# Patient Record
Sex: FEMALE | Race: WHITE | NOT HISPANIC OR LATINO | Employment: OTHER | URBAN - METROPOLITAN AREA
[De-identification: names, ages, dates, MRNs, and addresses within clinical notes are randomized per-mention and may not be internally consistent; named-entity substitution may affect disease eponyms.]

---

## 2017-01-09 ENCOUNTER — OFFICE VISIT - HEALTHEAST (OUTPATIENT)
Dept: INTERNAL MEDICINE | Facility: CLINIC | Age: 82
End: 2017-01-09

## 2017-01-09 DIAGNOSIS — R19.7 DIARRHEA: ICD-10-CM

## 2017-01-09 DIAGNOSIS — Z78.9 NONSMOKER: ICD-10-CM

## 2017-01-09 DIAGNOSIS — K21.00 GASTROESOPHAGEAL REFLUX DISEASE WITH ESOPHAGITIS: ICD-10-CM

## 2017-01-09 DIAGNOSIS — R10.13 EPIGASTRIC DISCOMFORT: ICD-10-CM

## 2017-01-09 DIAGNOSIS — M35.3 PMR (POLYMYALGIA RHEUMATICA) (H): ICD-10-CM

## 2017-01-09 DIAGNOSIS — M19.90 OA (OSTEOARTHRITIS): ICD-10-CM

## 2017-01-09 DIAGNOSIS — L72.3 SEBACEOUS CYST: ICD-10-CM

## 2017-01-09 DIAGNOSIS — E21.3 HYPERPARATHYROIDISM (H): ICD-10-CM

## 2017-02-26 ENCOUNTER — OFFICE VISIT - HEALTHEAST (OUTPATIENT)
Dept: FAMILY MEDICINE | Facility: CLINIC | Age: 82
End: 2017-02-26

## 2017-02-26 DIAGNOSIS — R19.7 DIARRHEA: ICD-10-CM

## 2017-03-14 ENCOUNTER — OFFICE VISIT - HEALTHEAST (OUTPATIENT)
Dept: INTERNAL MEDICINE | Facility: CLINIC | Age: 82
End: 2017-03-14

## 2017-03-14 DIAGNOSIS — I10 HTN (HYPERTENSION): ICD-10-CM

## 2017-03-14 DIAGNOSIS — L57.0 AK (ACTINIC KERATOSIS): ICD-10-CM

## 2017-03-14 DIAGNOSIS — R19.7 DIARRHEA: ICD-10-CM

## 2017-03-14 DIAGNOSIS — K21.9 GERD (GASTROESOPHAGEAL REFLUX DISEASE): ICD-10-CM

## 2017-03-14 DIAGNOSIS — M35.3 PMR (POLYMYALGIA RHEUMATICA) (H): ICD-10-CM

## 2017-04-04 ENCOUNTER — RECORDS - HEALTHEAST (OUTPATIENT)
Dept: GENERAL RADIOLOGY | Age: 82
End: 2017-04-04

## 2017-04-04 ENCOUNTER — OFFICE VISIT - HEALTHEAST (OUTPATIENT)
Dept: INTERNAL MEDICINE | Facility: CLINIC | Age: 82
End: 2017-04-04

## 2017-04-04 DIAGNOSIS — J06.9 URI (UPPER RESPIRATORY INFECTION): ICD-10-CM

## 2017-04-04 DIAGNOSIS — R05.9 COUGH: ICD-10-CM

## 2017-06-20 ENCOUNTER — OFFICE VISIT - HEALTHEAST (OUTPATIENT)
Dept: INTERNAL MEDICINE | Facility: CLINIC | Age: 82
End: 2017-06-20

## 2017-06-20 ENCOUNTER — COMMUNICATION - HEALTHEAST (OUTPATIENT)
Dept: INTERNAL MEDICINE | Facility: CLINIC | Age: 82
End: 2017-06-20

## 2017-06-20 DIAGNOSIS — K22.89 PRESBYESOPHAGUS: ICD-10-CM

## 2017-06-20 DIAGNOSIS — F41.9 ANXIETY: ICD-10-CM

## 2017-06-20 DIAGNOSIS — I10 HTN (HYPERTENSION): ICD-10-CM

## 2017-06-20 DIAGNOSIS — R60.0 BILATERAL LOWER EXTREMITY EDEMA: ICD-10-CM

## 2017-06-20 DIAGNOSIS — M35.3 PMR (POLYMYALGIA RHEUMATICA) (H): ICD-10-CM

## 2017-06-20 DIAGNOSIS — Z66 DNR (DO NOT RESUSCITATE): ICD-10-CM

## 2017-06-21 ENCOUNTER — COMMUNICATION - HEALTHEAST (OUTPATIENT)
Dept: INTERNAL MEDICINE | Facility: CLINIC | Age: 82
End: 2017-06-21

## 2017-07-10 ENCOUNTER — AMBULATORY - HEALTHEAST (OUTPATIENT)
Dept: INTERNAL MEDICINE | Facility: CLINIC | Age: 82
End: 2017-07-10

## 2017-07-11 ENCOUNTER — OFFICE VISIT - HEALTHEAST (OUTPATIENT)
Dept: INTERNAL MEDICINE | Facility: CLINIC | Age: 82
End: 2017-07-11

## 2017-07-11 DIAGNOSIS — Z02.89 ENCOUNTER FOR COMPLETION OF FORM WITH PATIENT: ICD-10-CM

## 2017-07-11 DIAGNOSIS — M35.3 PMR (POLYMYALGIA RHEUMATICA) (H): ICD-10-CM

## 2017-07-11 DIAGNOSIS — R60.0 BILATERAL LOWER EXTREMITY EDEMA: ICD-10-CM

## 2017-07-11 DIAGNOSIS — I10 HTN (HYPERTENSION): ICD-10-CM

## 2017-07-11 DIAGNOSIS — F41.9 ANXIETY: ICD-10-CM

## 2017-08-14 ENCOUNTER — COMMUNICATION - HEALTHEAST (OUTPATIENT)
Dept: INTERNAL MEDICINE | Facility: CLINIC | Age: 82
End: 2017-08-14

## 2017-08-14 DIAGNOSIS — K21.00 GASTROESOPHAGEAL REFLUX DISEASE WITH ESOPHAGITIS: ICD-10-CM

## 2017-08-28 ENCOUNTER — COMMUNICATION - HEALTHEAST (OUTPATIENT)
Dept: INTERNAL MEDICINE | Facility: CLINIC | Age: 82
End: 2017-08-28

## 2017-09-11 ENCOUNTER — OFFICE VISIT - HEALTHEAST (OUTPATIENT)
Dept: INTERNAL MEDICINE | Facility: CLINIC | Age: 82
End: 2017-09-11

## 2017-09-11 DIAGNOSIS — K21.00 GASTROESOPHAGEAL REFLUX DISEASE WITH ESOPHAGITIS: ICD-10-CM

## 2017-09-11 DIAGNOSIS — F41.9 ANXIETY: ICD-10-CM

## 2017-09-11 DIAGNOSIS — K22.89 PRESBYESOPHAGUS: ICD-10-CM

## 2017-09-11 DIAGNOSIS — Z00.00 HEALTHCARE MAINTENANCE: ICD-10-CM

## 2017-09-11 DIAGNOSIS — I10 HTN (HYPERTENSION): ICD-10-CM

## 2017-09-11 ASSESSMENT — MIFFLIN-ST. JEOR: SCORE: 1098.44

## 2017-10-21 ENCOUNTER — COMMUNICATION - HEALTHEAST (OUTPATIENT)
Dept: INTERNAL MEDICINE | Facility: CLINIC | Age: 82
End: 2017-10-21

## 2017-10-21 DIAGNOSIS — I10 HTN (HYPERTENSION): ICD-10-CM

## 2017-11-10 ENCOUNTER — RECORDS - HEALTHEAST (OUTPATIENT)
Dept: ADMINISTRATIVE | Facility: OTHER | Age: 82
End: 2017-11-10

## 2017-11-13 ENCOUNTER — OFFICE VISIT - HEALTHEAST (OUTPATIENT)
Dept: INTERNAL MEDICINE | Facility: CLINIC | Age: 82
End: 2017-11-13

## 2017-11-13 DIAGNOSIS — I10 HTN (HYPERTENSION): ICD-10-CM

## 2017-11-13 DIAGNOSIS — M35.3 PMR (POLYMYALGIA RHEUMATICA) (H): ICD-10-CM

## 2017-11-13 DIAGNOSIS — S80.10XA SUPERFICIAL BRUISING OF LOWER LEG: ICD-10-CM

## 2017-11-13 DIAGNOSIS — F41.9 ANXIETY: ICD-10-CM

## 2017-11-13 DIAGNOSIS — K22.89 PRESBYESOPHAGUS: ICD-10-CM

## 2017-11-13 ASSESSMENT — MIFFLIN-ST. JEOR: SCORE: 1089.03

## 2018-02-13 ENCOUNTER — RECORDS - HEALTHEAST (OUTPATIENT)
Dept: ADMINISTRATIVE | Facility: OTHER | Age: 83
End: 2018-02-13

## 2018-02-13 ENCOUNTER — OFFICE VISIT - HEALTHEAST (OUTPATIENT)
Dept: INTERNAL MEDICINE | Facility: CLINIC | Age: 83
End: 2018-02-13

## 2018-02-13 DIAGNOSIS — M35.3 PMR (POLYMYALGIA RHEUMATICA) (H): ICD-10-CM

## 2018-02-13 DIAGNOSIS — L98.8 SKIN LESION OF BREAST: ICD-10-CM

## 2018-02-13 DIAGNOSIS — L72.3 SEBACEOUS CYST: ICD-10-CM

## 2018-02-13 DIAGNOSIS — I10 HTN (HYPERTENSION): ICD-10-CM

## 2018-02-13 DIAGNOSIS — F41.9 ANXIETY: ICD-10-CM

## 2018-02-13 DIAGNOSIS — K22.89 PRESBYESOPHAGUS: ICD-10-CM

## 2018-02-13 DIAGNOSIS — Z79.899 CONTROLLED SUBSTANCE AGREEMENT SIGNED: ICD-10-CM

## 2018-02-13 LAB
AMPHETAMINES UR QL SCN: NORMAL
ANION GAP SERPL CALCULATED.3IONS-SCNC: 7 MMOL/L (ref 5–18)
BARBITURATES UR QL: NORMAL
BENZODIAZ UR QL: NORMAL
BUN SERPL-MCNC: 19 MG/DL (ref 8–28)
C REACTIVE PROTEIN LHE: 0.8 MG/DL (ref 0–0.8)
CALCIUM SERPL-MCNC: 10.3 MG/DL (ref 8.5–10.5)
CANNABINOIDS UR QL SCN: NORMAL
CHLORIDE BLD-SCNC: 106 MMOL/L (ref 98–107)
CO2 SERPL-SCNC: 29 MMOL/L (ref 22–31)
COCAINE UR QL: NORMAL
CREAT SERPL-MCNC: 0.9 MG/DL (ref 0.6–1.1)
CREAT UR-MCNC: 101.3 MG/DL
ERYTHROCYTE [DISTWIDTH] IN BLOOD BY AUTOMATED COUNT: 12.5 % (ref 11–14.5)
ERYTHROCYTE [SEDIMENTATION RATE] IN BLOOD BY WESTERGREN METHOD: 14 MM/HR (ref 0–20)
GFR SERPL CREATININE-BSD FRML MDRD: 59 ML/MIN/1.73M2
GLUCOSE BLD-MCNC: 89 MG/DL (ref 70–125)
HCT VFR BLD AUTO: 40.3 % (ref 35–47)
HGB BLD-MCNC: 13.5 G/DL (ref 12–16)
MCH RBC QN AUTO: 30.3 PG (ref 27–34)
MCHC RBC AUTO-ENTMCNC: 33.4 G/DL (ref 32–36)
MCV RBC AUTO: 91 FL (ref 80–100)
METHADONE UR QL SCN: NORMAL
OPIATES UR QL SCN: NORMAL
OXYCODONE UR QL: NORMAL
PCP UR QL SCN: NORMAL
PLATELET # BLD AUTO: 215 THOU/UL (ref 140–440)
PMV BLD AUTO: 9 FL (ref 7–10)
POTASSIUM BLD-SCNC: 4.2 MMOL/L (ref 3.5–5)
RBC # BLD AUTO: 4.45 MILL/UL (ref 3.8–5.4)
SODIUM SERPL-SCNC: 142 MMOL/L (ref 136–145)
WBC: 5.7 THOU/UL (ref 4–11)

## 2018-02-15 ENCOUNTER — COMMUNICATION - HEALTHEAST (OUTPATIENT)
Dept: INTERNAL MEDICINE | Facility: CLINIC | Age: 83
End: 2018-02-15

## 2018-02-27 ENCOUNTER — RECORDS - HEALTHEAST (OUTPATIENT)
Dept: ADMINISTRATIVE | Facility: OTHER | Age: 83
End: 2018-02-27

## 2018-03-13 ENCOUNTER — RECORDS - HEALTHEAST (OUTPATIENT)
Dept: ADMINISTRATIVE | Facility: OTHER | Age: 83
End: 2018-03-13

## 2018-04-25 ENCOUNTER — RECORDS - HEALTHEAST (OUTPATIENT)
Dept: ADMINISTRATIVE | Facility: OTHER | Age: 83
End: 2018-04-25

## 2018-04-30 ENCOUNTER — RECORDS - HEALTHEAST (OUTPATIENT)
Dept: ADMINISTRATIVE | Facility: OTHER | Age: 83
End: 2018-04-30

## 2018-05-15 ENCOUNTER — OFFICE VISIT - HEALTHEAST (OUTPATIENT)
Dept: INTERNAL MEDICINE | Facility: CLINIC | Age: 83
End: 2018-05-15

## 2018-05-15 DIAGNOSIS — M35.3 PMR (POLYMYALGIA RHEUMATICA) (H): ICD-10-CM

## 2018-05-15 DIAGNOSIS — S72.001A: ICD-10-CM

## 2018-05-15 DIAGNOSIS — K22.89 PRESBYESOPHAGUS: ICD-10-CM

## 2018-05-15 DIAGNOSIS — I10 HTN (HYPERTENSION): ICD-10-CM

## 2018-06-01 ENCOUNTER — RECORDS - HEALTHEAST (OUTPATIENT)
Dept: ADMINISTRATIVE | Facility: OTHER | Age: 83
End: 2018-06-01

## 2018-07-29 ENCOUNTER — COMMUNICATION - HEALTHEAST (OUTPATIENT)
Dept: INTERNAL MEDICINE | Facility: CLINIC | Age: 83
End: 2018-07-29

## 2018-08-13 ENCOUNTER — RECORDS - HEALTHEAST (OUTPATIENT)
Dept: ADMINISTRATIVE | Facility: OTHER | Age: 83
End: 2018-08-13

## 2018-08-17 ENCOUNTER — OFFICE VISIT - HEALTHEAST (OUTPATIENT)
Dept: INTERNAL MEDICINE | Facility: CLINIC | Age: 83
End: 2018-08-17

## 2018-08-17 DIAGNOSIS — E21.3 HYPERPARATHYROIDISM (H): ICD-10-CM

## 2018-08-17 DIAGNOSIS — M35.3 PMR (POLYMYALGIA RHEUMATICA) (H): ICD-10-CM

## 2018-08-17 DIAGNOSIS — M19.90 OA (OSTEOARTHRITIS): ICD-10-CM

## 2018-08-17 DIAGNOSIS — R06.09 DOE (DYSPNEA ON EXERTION): ICD-10-CM

## 2018-08-17 DIAGNOSIS — K21.9 GERD (GASTROESOPHAGEAL REFLUX DISEASE): ICD-10-CM

## 2018-08-17 DIAGNOSIS — I10 HTN (HYPERTENSION): ICD-10-CM

## 2018-08-17 LAB
ANION GAP SERPL CALCULATED.3IONS-SCNC: 13 MMOL/L (ref 5–18)
BUN SERPL-MCNC: 18 MG/DL (ref 8–28)
C REACTIVE PROTEIN LHE: 0.7 MG/DL (ref 0–0.8)
CALCIUM SERPL-MCNC: 10.4 MG/DL (ref 8.5–10.5)
CHLORIDE BLD-SCNC: 110 MMOL/L (ref 98–107)
CO2 SERPL-SCNC: 22 MMOL/L (ref 22–31)
CREAT SERPL-MCNC: 0.93 MG/DL (ref 0.6–1.1)
ERYTHROCYTE [DISTWIDTH] IN BLOOD BY AUTOMATED COUNT: 12.5 % (ref 11–14.5)
ERYTHROCYTE [SEDIMENTATION RATE] IN BLOOD BY WESTERGREN METHOD: 13 MM/HR (ref 0–20)
GFR SERPL CREATININE-BSD FRML MDRD: 57 ML/MIN/1.73M2
GLUCOSE BLD-MCNC: 75 MG/DL (ref 70–125)
HCT VFR BLD AUTO: 39.6 % (ref 35–47)
HGB BLD-MCNC: 13.3 G/DL (ref 12–16)
MCH RBC QN AUTO: 30.4 PG (ref 27–34)
MCHC RBC AUTO-ENTMCNC: 33.5 G/DL (ref 32–36)
MCV RBC AUTO: 91 FL (ref 80–100)
PLATELET # BLD AUTO: 244 THOU/UL (ref 140–440)
PMV BLD AUTO: 8.8 FL (ref 7–10)
POTASSIUM BLD-SCNC: 4.5 MMOL/L (ref 3.5–5)
RBC # BLD AUTO: 4.36 MILL/UL (ref 3.8–5.4)
SODIUM SERPL-SCNC: 145 MMOL/L (ref 136–145)
WBC: 5.8 THOU/UL (ref 4–11)

## 2018-09-02 ENCOUNTER — COMMUNICATION - HEALTHEAST (OUTPATIENT)
Dept: INTERNAL MEDICINE | Facility: CLINIC | Age: 83
End: 2018-09-02

## 2018-09-02 DIAGNOSIS — K21.00 GASTROESOPHAGEAL REFLUX DISEASE WITH ESOPHAGITIS: ICD-10-CM

## 2018-09-10 ENCOUNTER — COMMUNICATION - HEALTHEAST (OUTPATIENT)
Dept: INTERNAL MEDICINE | Facility: CLINIC | Age: 83
End: 2018-09-10

## 2018-10-26 ENCOUNTER — COMMUNICATION - HEALTHEAST (OUTPATIENT)
Dept: INTERNAL MEDICINE | Facility: CLINIC | Age: 83
End: 2018-10-26

## 2018-10-26 DIAGNOSIS — I10 HTN (HYPERTENSION): ICD-10-CM

## 2018-11-07 ENCOUNTER — COMMUNICATION - HEALTHEAST (OUTPATIENT)
Dept: INTERNAL MEDICINE | Facility: CLINIC | Age: 83
End: 2018-11-07

## 2018-11-07 DIAGNOSIS — I10 HTN (HYPERTENSION): ICD-10-CM

## 2018-12-03 ENCOUNTER — COMMUNICATION - HEALTHEAST (OUTPATIENT)
Dept: INTERNAL MEDICINE | Facility: CLINIC | Age: 83
End: 2018-12-03

## 2018-12-03 DIAGNOSIS — K21.00 GASTROESOPHAGEAL REFLUX DISEASE WITH ESOPHAGITIS: ICD-10-CM

## 2018-12-17 ENCOUNTER — OFFICE VISIT - HEALTHEAST (OUTPATIENT)
Dept: INTERNAL MEDICINE | Facility: CLINIC | Age: 83
End: 2018-12-17

## 2018-12-17 DIAGNOSIS — D69.2 SENILE PURPURA (H): ICD-10-CM

## 2018-12-17 DIAGNOSIS — K21.9 GASTROESOPHAGEAL REFLUX DISEASE, ESOPHAGITIS PRESENCE NOT SPECIFIED: ICD-10-CM

## 2018-12-17 DIAGNOSIS — G56.02 LEFT CARPAL TUNNEL SYNDROME: ICD-10-CM

## 2018-12-17 DIAGNOSIS — K22.89 PRESBYESOPHAGUS: ICD-10-CM

## 2018-12-17 DIAGNOSIS — I10 ESSENTIAL HYPERTENSION: ICD-10-CM

## 2018-12-17 DIAGNOSIS — M35.3 PMR (POLYMYALGIA RHEUMATICA) (H): ICD-10-CM

## 2018-12-17 DIAGNOSIS — F41.9 ANXIETY: ICD-10-CM

## 2019-01-22 ENCOUNTER — COMMUNICATION - HEALTHEAST (OUTPATIENT)
Dept: INTERNAL MEDICINE | Facility: CLINIC | Age: 84
End: 2019-01-22

## 2019-02-01 ENCOUNTER — COMMUNICATION - HEALTHEAST (OUTPATIENT)
Dept: INTERNAL MEDICINE | Facility: CLINIC | Age: 84
End: 2019-02-01

## 2019-02-01 DIAGNOSIS — I10 ESSENTIAL HYPERTENSION: ICD-10-CM

## 2019-04-18 ENCOUNTER — COMMUNICATION - HEALTHEAST (OUTPATIENT)
Dept: INTERNAL MEDICINE | Facility: CLINIC | Age: 84
End: 2019-04-18

## 2019-04-18 ENCOUNTER — OFFICE VISIT - HEALTHEAST (OUTPATIENT)
Dept: INTERNAL MEDICINE | Facility: CLINIC | Age: 84
End: 2019-04-18

## 2019-04-18 DIAGNOSIS — M35.3 PMR (POLYMYALGIA RHEUMATICA) (H): ICD-10-CM

## 2019-04-18 DIAGNOSIS — Z79.899 CONTROLLED SUBSTANCE AGREEMENT SIGNED: ICD-10-CM

## 2019-04-18 DIAGNOSIS — M15.0 PRIMARY OSTEOARTHRITIS INVOLVING MULTIPLE JOINTS: ICD-10-CM

## 2019-04-18 DIAGNOSIS — I10 ESSENTIAL HYPERTENSION: ICD-10-CM

## 2019-04-18 DIAGNOSIS — D69.2 SENILE PURPURA (H): ICD-10-CM

## 2019-04-18 DIAGNOSIS — Z51.81 ENCOUNTER FOR THERAPEUTIC DRUG LEVEL MONITORING: ICD-10-CM

## 2019-04-18 DIAGNOSIS — K21.9 GASTROESOPHAGEAL REFLUX DISEASE, ESOPHAGITIS PRESENCE NOT SPECIFIED: ICD-10-CM

## 2019-04-18 DIAGNOSIS — F41.9 ANXIETY: ICD-10-CM

## 2019-04-18 DIAGNOSIS — R21 FACIAL RASH: ICD-10-CM

## 2019-04-18 DIAGNOSIS — E21.3 HYPERPARATHYROIDISM (H): ICD-10-CM

## 2019-04-18 LAB
ANION GAP SERPL CALCULATED.3IONS-SCNC: 11 MMOL/L (ref 5–18)
BUN SERPL-MCNC: 17 MG/DL (ref 8–28)
C REACTIVE PROTEIN LHE: 0.7 MG/DL (ref 0–0.8)
CALCIUM SERPL-MCNC: 10.9 MG/DL (ref 8.5–10.5)
CHLORIDE BLD-SCNC: 105 MMOL/L (ref 98–107)
CO2 SERPL-SCNC: 25 MMOL/L (ref 22–31)
CREAT SERPL-MCNC: 0.84 MG/DL (ref 0.6–1.1)
ERYTHROCYTE [DISTWIDTH] IN BLOOD BY AUTOMATED COUNT: 12.3 % (ref 11–14.5)
ERYTHROCYTE [SEDIMENTATION RATE] IN BLOOD BY WESTERGREN METHOD: 28 MM/HR (ref 0–20)
GFR SERPL CREATININE-BSD FRML MDRD: >60 ML/MIN/1.73M2
GLUCOSE BLD-MCNC: 94 MG/DL (ref 70–125)
HCT VFR BLD AUTO: 40.5 % (ref 35–47)
HGB BLD-MCNC: 13.4 G/DL (ref 12–16)
MCH RBC QN AUTO: 29.9 PG (ref 27–34)
MCHC RBC AUTO-ENTMCNC: 33.1 G/DL (ref 32–36)
MCV RBC AUTO: 90 FL (ref 80–100)
PLATELET # BLD AUTO: 280 THOU/UL (ref 140–440)
PMV BLD AUTO: 8.5 FL (ref 7–10)
POTASSIUM BLD-SCNC: 4.5 MMOL/L (ref 3.5–5)
RBC # BLD AUTO: 4.48 MILL/UL (ref 3.8–5.4)
SODIUM SERPL-SCNC: 141 MMOL/L (ref 136–145)
WBC: 7.1 THOU/UL (ref 4–11)

## 2019-05-02 ENCOUNTER — RECORDS - HEALTHEAST (OUTPATIENT)
Dept: ADMINISTRATIVE | Facility: OTHER | Age: 84
End: 2019-05-02

## 2019-07-25 ENCOUNTER — COMMUNICATION - HEALTHEAST (OUTPATIENT)
Dept: INTERNAL MEDICINE | Facility: CLINIC | Age: 84
End: 2019-07-25

## 2019-07-25 DIAGNOSIS — F41.9 ANXIETY: ICD-10-CM

## 2019-08-19 ENCOUNTER — OFFICE VISIT - HEALTHEAST (OUTPATIENT)
Dept: INTERNAL MEDICINE | Facility: CLINIC | Age: 84
End: 2019-08-19

## 2019-08-19 DIAGNOSIS — M15.0 PRIMARY OSTEOARTHRITIS INVOLVING MULTIPLE JOINTS: ICD-10-CM

## 2019-08-19 DIAGNOSIS — E21.3 HYPERPARATHYROIDISM (H): ICD-10-CM

## 2019-08-19 DIAGNOSIS — M35.3 PMR (POLYMYALGIA RHEUMATICA) (H): ICD-10-CM

## 2019-08-19 DIAGNOSIS — I10 HTN (HYPERTENSION): ICD-10-CM

## 2019-08-19 DIAGNOSIS — I10 ESSENTIAL HYPERTENSION: ICD-10-CM

## 2019-08-19 DIAGNOSIS — F41.9 ANXIETY: ICD-10-CM

## 2019-08-19 DIAGNOSIS — K21.9 GASTROESOPHAGEAL REFLUX DISEASE, ESOPHAGITIS PRESENCE NOT SPECIFIED: ICD-10-CM

## 2019-08-29 ENCOUNTER — COMMUNICATION - HEALTHEAST (OUTPATIENT)
Dept: INTERNAL MEDICINE | Facility: CLINIC | Age: 84
End: 2019-08-29

## 2019-08-29 DIAGNOSIS — K21.00 GASTROESOPHAGEAL REFLUX DISEASE WITH ESOPHAGITIS: ICD-10-CM

## 2019-09-09 ENCOUNTER — COMMUNICATION - HEALTHEAST (OUTPATIENT)
Dept: INTERNAL MEDICINE | Facility: CLINIC | Age: 84
End: 2019-09-09

## 2019-09-09 DIAGNOSIS — M35.3 PMR (POLYMYALGIA RHEUMATICA) (H): ICD-10-CM

## 2019-09-18 ENCOUNTER — RECORDS - HEALTHEAST (OUTPATIENT)
Dept: ADMINISTRATIVE | Facility: OTHER | Age: 84
End: 2019-09-18

## 2019-10-26 ENCOUNTER — COMMUNICATION - HEALTHEAST (OUTPATIENT)
Dept: INTERNAL MEDICINE | Facility: CLINIC | Age: 84
End: 2019-10-26

## 2019-10-26 DIAGNOSIS — I10 HTN (HYPERTENSION): ICD-10-CM

## 2019-12-16 ENCOUNTER — OFFICE VISIT - HEALTHEAST (OUTPATIENT)
Dept: INTERNAL MEDICINE | Facility: CLINIC | Age: 84
End: 2019-12-16

## 2019-12-16 DIAGNOSIS — I10 ESSENTIAL HYPERTENSION: ICD-10-CM

## 2019-12-16 DIAGNOSIS — M25.50 MULTIPLE JOINT PAIN: ICD-10-CM

## 2019-12-16 DIAGNOSIS — R53.83 FATIGUE, UNSPECIFIED TYPE: ICD-10-CM

## 2019-12-16 DIAGNOSIS — R06.09 DOE (DYSPNEA ON EXERTION): ICD-10-CM

## 2019-12-16 DIAGNOSIS — M15.0 PRIMARY OSTEOARTHRITIS INVOLVING MULTIPLE JOINTS: ICD-10-CM

## 2019-12-16 DIAGNOSIS — M21.069 ACQUIRED GENU VALGUM, UNSPECIFIED LATERALITY: ICD-10-CM

## 2019-12-16 DIAGNOSIS — E21.3 HYPERPARATHYROIDISM (H): ICD-10-CM

## 2019-12-16 DIAGNOSIS — M35.3 PMR (POLYMYALGIA RHEUMATICA) (H): ICD-10-CM

## 2019-12-16 DIAGNOSIS — Z51.81 ENCOUNTER FOR THERAPEUTIC DRUG LEVEL MONITORING: ICD-10-CM

## 2019-12-16 LAB
ALBUMIN SERPL-MCNC: 4.1 G/DL (ref 3.5–5)
ALP SERPL-CCNC: 108 U/L (ref 45–120)
ALT SERPL W P-5'-P-CCNC: 18 U/L (ref 0–45)
AMPHETAMINES UR QL SCN: NORMAL
ANION GAP SERPL CALCULATED.3IONS-SCNC: 10 MMOL/L (ref 5–18)
AST SERPL W P-5'-P-CCNC: 21 U/L (ref 0–40)
BARBITURATES UR QL: NORMAL
BENZODIAZ UR QL: NORMAL
BILIRUB SERPL-MCNC: 0.5 MG/DL (ref 0–1)
BNP SERPL-MCNC: 96 PG/ML (ref 0–167)
BUN SERPL-MCNC: 15 MG/DL (ref 8–28)
C REACTIVE PROTEIN LHE: 1.6 MG/DL (ref 0–0.8)
CALCIUM SERPL-MCNC: 10.7 MG/DL (ref 8.5–10.5)
CANNABINOIDS UR QL SCN: NORMAL
CHLORIDE BLD-SCNC: 105 MMOL/L (ref 98–107)
CO2 SERPL-SCNC: 26 MMOL/L (ref 22–31)
COCAINE UR QL: NORMAL
CREAT SERPL-MCNC: 0.86 MG/DL (ref 0.6–1.1)
CREAT UR-MCNC: 25.7 MG/DL
ERYTHROCYTE [DISTWIDTH] IN BLOOD BY AUTOMATED COUNT: 12.1 % (ref 11–14.5)
ERYTHROCYTE [SEDIMENTATION RATE] IN BLOOD BY WESTERGREN METHOD: 27 MM/HR (ref 0–20)
GFR SERPL CREATININE-BSD FRML MDRD: >60 ML/MIN/1.73M2
GLUCOSE BLD-MCNC: 93 MG/DL (ref 70–125)
HCT VFR BLD AUTO: 42.8 % (ref 35–47)
HGB BLD-MCNC: 13.9 G/DL (ref 12–16)
MCH RBC QN AUTO: 29.2 PG (ref 27–34)
MCHC RBC AUTO-ENTMCNC: 32.3 G/DL (ref 32–36)
MCV RBC AUTO: 90 FL (ref 80–100)
METHADONE UR QL SCN: NORMAL
OPIATES UR QL SCN: NORMAL
OXYCODONE UR QL: NORMAL
PCP UR QL SCN: NORMAL
PLATELET # BLD AUTO: 246 THOU/UL (ref 140–440)
PMV BLD AUTO: 8.3 FL (ref 7–10)
POTASSIUM BLD-SCNC: 4.8 MMOL/L (ref 3.5–5)
PROT SERPL-MCNC: 7.1 G/DL (ref 6–8)
RBC # BLD AUTO: 4.75 MILL/UL (ref 3.8–5.4)
SODIUM SERPL-SCNC: 141 MMOL/L (ref 136–145)
WBC: 7.4 THOU/UL (ref 4–11)

## 2020-01-23 ENCOUNTER — COMMUNICATION - HEALTHEAST (OUTPATIENT)
Dept: INTERNAL MEDICINE | Facility: CLINIC | Age: 85
End: 2020-01-23

## 2020-01-23 DIAGNOSIS — I10 ESSENTIAL HYPERTENSION: ICD-10-CM

## 2020-03-04 ENCOUNTER — RECORDS - HEALTHEAST (OUTPATIENT)
Dept: ADMINISTRATIVE | Facility: OTHER | Age: 85
End: 2020-03-04

## 2020-03-09 ENCOUNTER — OFFICE VISIT - HEALTHEAST (OUTPATIENT)
Dept: INTERNAL MEDICINE | Facility: CLINIC | Age: 85
End: 2020-03-09

## 2020-03-09 DIAGNOSIS — L57.0 ACTINIC KERATOSIS: ICD-10-CM

## 2020-03-09 DIAGNOSIS — L98.499 NONHEALING SKIN ULCER, UNSPECIFIED ULCER STAGE (H): ICD-10-CM

## 2020-03-09 ASSESSMENT — MIFFLIN-ST. JEOR: SCORE: 1060.45

## 2020-04-06 ENCOUNTER — COMMUNICATION - HEALTHEAST (OUTPATIENT)
Dept: INTERNAL MEDICINE | Facility: CLINIC | Age: 85
End: 2020-04-06

## 2020-04-06 DIAGNOSIS — F41.9 ANXIETY: ICD-10-CM

## 2020-04-07 ENCOUNTER — COMMUNICATION - HEALTHEAST (OUTPATIENT)
Dept: INTERNAL MEDICINE | Facility: CLINIC | Age: 85
End: 2020-04-07

## 2020-04-09 ENCOUNTER — OFFICE VISIT - HEALTHEAST (OUTPATIENT)
Dept: INTERNAL MEDICINE | Facility: CLINIC | Age: 85
End: 2020-04-09

## 2020-04-09 DIAGNOSIS — K21.9 GASTROESOPHAGEAL REFLUX DISEASE, ESOPHAGITIS PRESENCE NOT SPECIFIED: ICD-10-CM

## 2020-04-09 DIAGNOSIS — I10 ESSENTIAL HYPERTENSION: ICD-10-CM

## 2020-04-09 DIAGNOSIS — M35.3 PMR (POLYMYALGIA RHEUMATICA) (H): ICD-10-CM

## 2020-04-09 DIAGNOSIS — E21.3 HYPERPARATHYROIDISM (H): ICD-10-CM

## 2020-04-09 DIAGNOSIS — D69.2 SENILE PURPURA (H): ICD-10-CM

## 2020-04-09 DIAGNOSIS — F41.9 ANXIETY: ICD-10-CM

## 2020-06-07 ENCOUNTER — COMMUNICATION - HEALTHEAST (OUTPATIENT)
Dept: INTERNAL MEDICINE | Facility: CLINIC | Age: 85
End: 2020-06-07

## 2020-06-07 DIAGNOSIS — M35.3 PMR (POLYMYALGIA RHEUMATICA) (H): ICD-10-CM

## 2020-08-24 ENCOUNTER — COMMUNICATION - HEALTHEAST (OUTPATIENT)
Dept: INTERNAL MEDICINE | Facility: CLINIC | Age: 85
End: 2020-08-24

## 2020-09-08 ENCOUNTER — COMMUNICATION - HEALTHEAST (OUTPATIENT)
Dept: INTERNAL MEDICINE | Facility: CLINIC | Age: 85
End: 2020-09-08

## 2020-09-08 DIAGNOSIS — F41.9 ANXIETY: ICD-10-CM

## 2020-09-15 ENCOUNTER — COMMUNICATION - HEALTHEAST (OUTPATIENT)
Dept: INTERNAL MEDICINE | Facility: CLINIC | Age: 85
End: 2020-09-15

## 2020-09-15 ENCOUNTER — OFFICE VISIT - HEALTHEAST (OUTPATIENT)
Dept: INTERNAL MEDICINE | Facility: CLINIC | Age: 85
End: 2020-09-15

## 2020-09-15 DIAGNOSIS — E21.3 HYPERPARATHYROIDISM (H): ICD-10-CM

## 2020-09-15 DIAGNOSIS — K21.00 GASTROESOPHAGEAL REFLUX DISEASE WITH ESOPHAGITIS: ICD-10-CM

## 2020-09-15 DIAGNOSIS — Z23 IMMUNIZATION DUE: ICD-10-CM

## 2020-09-15 DIAGNOSIS — M35.3 PMR (POLYMYALGIA RHEUMATICA) (H): ICD-10-CM

## 2020-09-15 DIAGNOSIS — M25.50 MULTIPLE JOINT PAIN: ICD-10-CM

## 2020-09-15 DIAGNOSIS — M54.10 BACK PAIN WITH RIGHT-SIDED RADICULOPATHY: ICD-10-CM

## 2020-09-15 DIAGNOSIS — I10 ESSENTIAL HYPERTENSION: ICD-10-CM

## 2020-09-15 DIAGNOSIS — E78.2 MIXED HYPERLIPIDEMIA: ICD-10-CM

## 2020-09-15 LAB
ANION GAP SERPL CALCULATED.3IONS-SCNC: 11 MMOL/L (ref 5–18)
BUN SERPL-MCNC: 19 MG/DL (ref 8–28)
C REACTIVE PROTEIN LHE: 0.9 MG/DL (ref 0–0.8)
CALCIUM SERPL-MCNC: 10.5 MG/DL (ref 8.5–10.5)
CCP AB SER IA-ACNC: <0.5 U/ML
CHLORIDE BLD-SCNC: 104 MMOL/L (ref 98–107)
CO2 SERPL-SCNC: 25 MMOL/L (ref 22–31)
CREAT SERPL-MCNC: 0.93 MG/DL (ref 0.6–1.1)
ERYTHROCYTE [DISTWIDTH] IN BLOOD BY AUTOMATED COUNT: 12 % (ref 11–14.5)
ERYTHROCYTE [SEDIMENTATION RATE] IN BLOOD BY WESTERGREN METHOD: 21 MM/HR (ref 0–20)
GFR SERPL CREATININE-BSD FRML MDRD: 57 ML/MIN/1.73M2
GLUCOSE BLD-MCNC: 87 MG/DL (ref 70–125)
HCT VFR BLD AUTO: 41.9 % (ref 35–47)
HGB BLD-MCNC: 14.1 G/DL (ref 12–16)
MCH RBC QN AUTO: 30.8 PG (ref 27–34)
MCHC RBC AUTO-ENTMCNC: 33.6 G/DL (ref 32–36)
MCV RBC AUTO: 92 FL (ref 80–100)
PLATELET # BLD AUTO: 252 THOU/UL (ref 140–440)
PMV BLD AUTO: 8.6 FL (ref 7–10)
POTASSIUM BLD-SCNC: 4.8 MMOL/L (ref 3.5–5)
PTH-INTACT SERPL-MCNC: 155 PG/ML (ref 10–86)
RBC # BLD AUTO: 4.58 MILL/UL (ref 3.8–5.4)
RHEUMATOID FACT SERPL-ACNC: <15 IU/ML (ref 0–30)
SODIUM SERPL-SCNC: 140 MMOL/L (ref 136–145)
WBC: 7.3 THOU/UL (ref 4–11)

## 2020-09-15 RX ORDER — OMEPRAZOLE 40 MG/1
40 CAPSULE, DELAYED RELEASE ORAL 2 TIMES DAILY
Qty: 180 CAPSULE | Refills: 3 | Status: SHIPPED | OUTPATIENT
Start: 2020-09-15 | End: 2021-08-30

## 2020-09-21 ENCOUNTER — COMMUNICATION - HEALTHEAST (OUTPATIENT)
Dept: INTERNAL MEDICINE | Facility: CLINIC | Age: 85
End: 2020-09-21

## 2020-10-21 ENCOUNTER — RECORDS - HEALTHEAST (OUTPATIENT)
Dept: ADMINISTRATIVE | Facility: OTHER | Age: 85
End: 2020-10-21

## 2020-10-31 ENCOUNTER — COMMUNICATION - HEALTHEAST (OUTPATIENT)
Dept: INTERNAL MEDICINE | Facility: CLINIC | Age: 85
End: 2020-10-31

## 2020-10-31 DIAGNOSIS — I10 HTN (HYPERTENSION): ICD-10-CM

## 2020-11-03 RX ORDER — METOPROLOL TARTRATE 50 MG
50 TABLET ORAL 2 TIMES DAILY
Qty: 180 TABLET | Refills: 3 | Status: SHIPPED | OUTPATIENT
Start: 2020-11-03 | End: 2021-11-01

## 2020-12-16 ENCOUNTER — RECORDS - HEALTHEAST (OUTPATIENT)
Dept: ADMINISTRATIVE | Facility: OTHER | Age: 85
End: 2020-12-16

## 2021-01-06 ENCOUNTER — COMMUNICATION - HEALTHEAST (OUTPATIENT)
Dept: INTERNAL MEDICINE | Facility: CLINIC | Age: 86
End: 2021-01-06

## 2021-01-06 DIAGNOSIS — I10 ESSENTIAL HYPERTENSION: ICD-10-CM

## 2021-01-07 RX ORDER — AMLODIPINE BESYLATE 2.5 MG/1
2.5 TABLET ORAL DAILY
Qty: 90 TABLET | Refills: 3 | Status: SHIPPED | OUTPATIENT
Start: 2021-01-07 | End: 2022-01-28

## 2021-02-08 ENCOUNTER — COMMUNICATION - HEALTHEAST (OUTPATIENT)
Dept: INTERNAL MEDICINE | Facility: CLINIC | Age: 86
End: 2021-02-08

## 2021-03-02 ENCOUNTER — COMMUNICATION - HEALTHEAST (OUTPATIENT)
Dept: INTERNAL MEDICINE | Facility: CLINIC | Age: 86
End: 2021-03-02

## 2021-03-10 ENCOUNTER — COMMUNICATION - HEALTHEAST (OUTPATIENT)
Dept: INTERNAL MEDICINE | Facility: CLINIC | Age: 86
End: 2021-03-10

## 2021-03-10 DIAGNOSIS — M35.3 PMR (POLYMYALGIA RHEUMATICA) (H): ICD-10-CM

## 2021-03-10 DIAGNOSIS — F41.9 ANXIETY: ICD-10-CM

## 2021-04-09 ENCOUNTER — OFFICE VISIT - HEALTHEAST (OUTPATIENT)
Dept: INTERNAL MEDICINE | Facility: CLINIC | Age: 86
End: 2021-04-09

## 2021-04-09 DIAGNOSIS — D69.2 SENILE PURPURA (H): ICD-10-CM

## 2021-04-09 DIAGNOSIS — R60.0 EDEMA OF RIGHT LOWER EXTREMITY: ICD-10-CM

## 2021-04-09 DIAGNOSIS — M17.0 PRIMARY OSTEOARTHRITIS OF BOTH KNEES: ICD-10-CM

## 2021-04-09 DIAGNOSIS — I10 ESSENTIAL HYPERTENSION: ICD-10-CM

## 2021-04-09 DIAGNOSIS — R06.00 DYSPNEA, UNSPECIFIED TYPE: ICD-10-CM

## 2021-04-09 DIAGNOSIS — L98.499 NONHEALING SKIN ULCER, UNSPECIFIED ULCER STAGE (H): ICD-10-CM

## 2021-04-09 DIAGNOSIS — R20.0 NUMBNESS OF FINGERS: ICD-10-CM

## 2021-04-09 DIAGNOSIS — M19.041 PRIMARY OSTEOARTHRITIS OF BOTH HANDS: ICD-10-CM

## 2021-04-09 DIAGNOSIS — M19.042 PRIMARY OSTEOARTHRITIS OF BOTH HANDS: ICD-10-CM

## 2021-04-09 DIAGNOSIS — M35.3 PMR (POLYMYALGIA RHEUMATICA) (H): ICD-10-CM

## 2021-04-09 DIAGNOSIS — E21.3 HYPERPARATHYROIDISM (H): ICD-10-CM

## 2021-04-09 LAB
ALBUMIN SERPL-MCNC: 4 G/DL (ref 3.5–5)
ALP SERPL-CCNC: 108 U/L (ref 45–120)
ALT SERPL W P-5'-P-CCNC: 16 U/L (ref 0–45)
ANION GAP SERPL CALCULATED.3IONS-SCNC: 8 MMOL/L (ref 5–18)
AST SERPL W P-5'-P-CCNC: 18 U/L (ref 0–40)
BILIRUB DIRECT SERPL-MCNC: 0.1 MG/DL
BILIRUB SERPL-MCNC: 0.3 MG/DL (ref 0–1)
BNP SERPL-MCNC: 79 PG/ML (ref 0–167)
BUN SERPL-MCNC: 21 MG/DL (ref 8–28)
C REACTIVE PROTEIN LHE: 1.1 MG/DL (ref 0–0.8)
CALCIUM SERPL-MCNC: 10.1 MG/DL (ref 8.5–10.5)
CHLORIDE BLD-SCNC: 105 MMOL/L (ref 98–107)
CO2 SERPL-SCNC: 28 MMOL/L (ref 22–31)
CREAT SERPL-MCNC: 0.9 MG/DL (ref 0.6–1.1)
ERYTHROCYTE [DISTWIDTH] IN BLOOD BY AUTOMATED COUNT: 14.1 % (ref 11–14.5)
ERYTHROCYTE [SEDIMENTATION RATE] IN BLOOD BY WESTERGREN METHOD: 22 MM/HR (ref 0–20)
GFR SERPL CREATININE-BSD FRML MDRD: 59 ML/MIN/1.73M2
GLUCOSE BLD-MCNC: 96 MG/DL (ref 70–125)
HCT VFR BLD AUTO: 41.5 % (ref 35–47)
HGB BLD-MCNC: 13.3 G/DL (ref 12–16)
MCH RBC QN AUTO: 28.9 PG (ref 27–34)
MCHC RBC AUTO-ENTMCNC: 32 G/DL (ref 32–36)
MCV RBC AUTO: 90 FL (ref 80–100)
PLATELET # BLD AUTO: 279 THOU/UL (ref 140–440)
PMV BLD AUTO: 10.8 FL (ref 7–10)
POTASSIUM BLD-SCNC: 4.4 MMOL/L (ref 3.5–5)
PROT SERPL-MCNC: 7 G/DL (ref 6–8)
RBC # BLD AUTO: 4.6 MILL/UL (ref 3.8–5.4)
SODIUM SERPL-SCNC: 141 MMOL/L (ref 136–145)
WBC: 8.8 THOU/UL (ref 4–11)

## 2021-04-09 ASSESSMENT — ANXIETY QUESTIONNAIRES
5. BEING SO RESTLESS THAT IT IS HARD TO SIT STILL: NOT AT ALL
2. NOT BEING ABLE TO STOP OR CONTROL WORRYING: SEVERAL DAYS
3. WORRYING TOO MUCH ABOUT DIFFERENT THINGS: SEVERAL DAYS
6. BECOMING EASILY ANNOYED OR IRRITABLE: SEVERAL DAYS
7. FEELING AFRAID AS IF SOMETHING AWFUL MIGHT HAPPEN: NOT AT ALL
4. TROUBLE RELAXING: NOT AT ALL
IF YOU CHECKED OFF ANY PROBLEMS ON THIS QUESTIONNAIRE, HOW DIFFICULT HAVE THESE PROBLEMS MADE IT FOR YOU TO DO YOUR WORK, TAKE CARE OF THINGS AT HOME, OR GET ALONG WITH OTHER PEOPLE: SOMEWHAT DIFFICULT
GAD7 TOTAL SCORE: 4
1. FEELING NERVOUS, ANXIOUS, OR ON EDGE: SEVERAL DAYS

## 2021-04-09 ASSESSMENT — MIFFLIN-ST. JEOR: SCORE: 1101.28

## 2021-04-09 ASSESSMENT — PATIENT HEALTH QUESTIONNAIRE - PHQ9: SUM OF ALL RESPONSES TO PHQ QUESTIONS 1-9: 8

## 2021-05-04 ENCOUNTER — COMMUNICATION - HEALTHEAST (OUTPATIENT)
Dept: INTERNAL MEDICINE | Facility: CLINIC | Age: 86
End: 2021-05-04

## 2021-05-04 DIAGNOSIS — F41.9 ANXIETY: ICD-10-CM

## 2021-05-04 DIAGNOSIS — M35.3 PMR (POLYMYALGIA RHEUMATICA) (H): ICD-10-CM

## 2021-05-25 ENCOUNTER — RECORDS - HEALTHEAST (OUTPATIENT)
Dept: ADMINISTRATIVE | Facility: CLINIC | Age: 86
End: 2021-05-25

## 2021-05-26 ENCOUNTER — RECORDS - HEALTHEAST (OUTPATIENT)
Dept: ADMINISTRATIVE | Facility: CLINIC | Age: 86
End: 2021-05-26

## 2021-05-27 ENCOUNTER — RECORDS - HEALTHEAST (OUTPATIENT)
Dept: ADMINISTRATIVE | Facility: CLINIC | Age: 86
End: 2021-05-27

## 2021-05-27 ASSESSMENT — PATIENT HEALTH QUESTIONNAIRE - PHQ9: SUM OF ALL RESPONSES TO PHQ QUESTIONS 1-9: 8

## 2021-05-27 NOTE — PATIENT INSTRUCTIONS - HE
To schedule an appointment with a dermatologist, I recommend calling [facility] at  [phone].    They have multiple offices in the following locations:    [address], [facility] [address]  [place] [address]    [place]  [address] [place] [address], [address]  [place] [address]    [place] [address] [place] [address]    [place]  [address] [facility], [address]  [place] [address]    Please have them send me copies of your reports from your visit.    Dr. Malissa Mcclellan

## 2021-05-28 ENCOUNTER — RECORDS - HEALTHEAST (OUTPATIENT)
Dept: ADMINISTRATIVE | Facility: CLINIC | Age: 86
End: 2021-05-28

## 2021-05-28 ASSESSMENT — ANXIETY QUESTIONNAIRES: GAD7 TOTAL SCORE: 4

## 2021-05-29 ENCOUNTER — RECORDS - HEALTHEAST (OUTPATIENT)
Dept: ADMINISTRATIVE | Facility: CLINIC | Age: 86
End: 2021-05-29

## 2021-05-30 ENCOUNTER — RECORDS - HEALTHEAST (OUTPATIENT)
Dept: ADMINISTRATIVE | Facility: CLINIC | Age: 86
End: 2021-05-30

## 2021-05-30 VITALS — BODY MASS INDEX: 25.85 KG/M2 | WEIGHT: 151.8 LBS

## 2021-05-30 VITALS — BODY MASS INDEX: 25.29 KG/M2 | WEIGHT: 148.5 LBS

## 2021-05-30 VITALS — WEIGHT: 148 LBS | BODY MASS INDEX: 25.21 KG/M2

## 2021-05-30 VITALS — BODY MASS INDEX: 25.55 KG/M2 | WEIGHT: 150 LBS

## 2021-05-31 VITALS — WEIGHT: 152 LBS | BODY MASS INDEX: 25.95 KG/M2 | HEIGHT: 64 IN

## 2021-05-31 VITALS — WEIGHT: 154.3 LBS | HEIGHT: 63 IN | BODY MASS INDEX: 27.34 KG/M2

## 2021-05-31 VITALS — BODY MASS INDEX: 25.38 KG/M2 | WEIGHT: 149 LBS

## 2021-05-31 VITALS — WEIGHT: 151 LBS | BODY MASS INDEX: 25.72 KG/M2

## 2021-05-31 NOTE — PATIENT INSTRUCTIONS - HE
Please follow up if you have any further issues.    You may contact me by phone or BuildZoom[employer] if you are worsening or if things are not improving.    Thank you for coming in today.

## 2021-06-01 ENCOUNTER — RECORDS - HEALTHEAST (OUTPATIENT)
Dept: ADMINISTRATIVE | Facility: CLINIC | Age: 86
End: 2021-06-01

## 2021-06-01 VITALS — WEIGHT: 159 LBS | BODY MASS INDEX: 28.17 KG/M2

## 2021-06-01 VITALS — BODY MASS INDEX: 27.28 KG/M2 | WEIGHT: 154 LBS

## 2021-06-01 VITALS — BODY MASS INDEX: 27.1 KG/M2 | WEIGHT: 153 LBS

## 2021-06-01 NOTE — TELEPHONE ENCOUNTER
Last office visit: 8/19/19  Last refilled 8/17/18    predniSONE (DELTASONE) 5 MG tablet 90 tablet 2 8/17/2018  No   Sig - Route: Take 0.5-1 tablets (2.5-5 mg total) by mouth daily. - Oral   Sent to pharmacy as: predniSONE (DELTASONE) 5 MG tablet   E-Prescribing Status: Receipt confirmed by pharmacy (8/17/2018  9:22 AM CDT)

## 2021-06-02 ENCOUNTER — RECORDS - HEALTHEAST (OUTPATIENT)
Dept: ADMINISTRATIVE | Facility: CLINIC | Age: 86
End: 2021-06-02

## 2021-06-02 VITALS — BODY MASS INDEX: 26.75 KG/M2 | WEIGHT: 151 LBS

## 2021-06-02 VITALS — BODY MASS INDEX: 26.57 KG/M2 | WEIGHT: 150 LBS

## 2021-06-02 NOTE — TELEPHONE ENCOUNTER
Refill Approved    Rx renewed per Medication Renewal Policy. Medication was last renewed on 10/26/18 .    Evette Amador, [facility] Triage/Med Refill 10/26/2019     Requested Prescriptions   Pending Prescriptions Disp Refills     metoprolol tartrate (LOPRESSOR) 50 MG tablet [Pharmacy Med Name: METOPROLOL TARTRATE 50 MG TAB] 180 tablet 1     Sig: TAKE 1 TABLET BY MOUTH TWICE A DAY       Beta-Blockers Refill Protocol Passed - 10/26/2019  9:15 AM        Passed - PCP or prescribing provider visit in past 12 months or next 3 months     Last office visit with prescriber/PCP: 8/19/2019 Jersey Metcalf MD OR same dept: 8/19/2019 Jersey Metcalf MD OR same specialty: 8/19/2019 Jersey Mtecalf MD  Last physical: 1/28/2016 Last MTM visit: Visit date not found   Next visit within 3 mo: Visit date not found  Next physical within 3 mo: Visit date not found  Prescriber OR PCP: Jersey Metcalf MD  Last diagnosis associated with med order: 1. HTN (hypertension)  - metoprolol tartrate (LOPRESSOR) 50 MG tablet [Pharmacy Med Name: METOPROLOL TARTRATE 50 MG TAB]; TAKE 1 TABLET BY MOUTH TWICE A DAY  Dispense: 180 tablet; Refill: 1    If protocol passes may refill for 12 months if within 3 months of last provider visit (or a total of 15 months).             Passed - Blood pressure filed in past 12 months     BP Readings from Last 1 Encounters:   08/19/19 134/74

## 2021-06-03 VITALS — WEIGHT: 149 LBS | BODY MASS INDEX: 26.39 KG/M2

## 2021-06-04 VITALS
DIASTOLIC BLOOD PRESSURE: 70 MMHG | HEART RATE: 70 BPM | WEIGHT: 148 LBS | SYSTOLIC BLOOD PRESSURE: 138 MMHG | BODY MASS INDEX: 26.22 KG/M2 | HEIGHT: 63 IN

## 2021-06-04 VITALS
WEIGHT: 150 LBS | BODY MASS INDEX: 26.57 KG/M2 | HEART RATE: 72 BPM | DIASTOLIC BLOOD PRESSURE: 84 MMHG | SYSTOLIC BLOOD PRESSURE: 136 MMHG | OXYGEN SATURATION: 96 %

## 2021-06-04 VITALS
BODY MASS INDEX: 26.39 KG/M2 | SYSTOLIC BLOOD PRESSURE: 132 MMHG | DIASTOLIC BLOOD PRESSURE: 84 MMHG | WEIGHT: 149 LBS | OXYGEN SATURATION: 96 % | HEART RATE: 76 BPM

## 2021-06-04 NOTE — PROGRESS NOTES
Results of tests sent to patient via Mendor.    Please look under the Labs tab for specific communication.    Jersey Metcalf MD  [facility]  12/16/2019, 9:30 PM

## 2021-06-04 NOTE — PATIENT INSTRUCTIONS - HE
Please follow up if you have any further issues.    You may contact me by phone or MyChart if you are worsening or if things are not improving.    Thank you for coming in today.      ______________________________________________________________________      Future Appointments   Date Time Provider Department Center   4/17/2020  9:15 AM [facility]

## 2021-06-05 VITALS
SYSTOLIC BLOOD PRESSURE: 138 MMHG | HEIGHT: 63 IN | DIASTOLIC BLOOD PRESSURE: 86 MMHG | OXYGEN SATURATION: 95 % | BODY MASS INDEX: 27.82 KG/M2 | HEART RATE: 77 BPM | WEIGHT: 157 LBS

## 2021-06-06 NOTE — PROGRESS NOTES
Internal Medicine Office Visit  [facility]   Patient Name: Manuela Olivas  Patient Age: 89 y.o.  YOB: 1930  MRN: [id number]    Date of Visit: 3/9/2020  Reason for Office Visit:   Chief Complaint   Patient presents with     Wound Check     Rt leg not healing           Assessment / Plan / Medical Decision Makin. Nonhealing skin ulcer, unspecified ulcer stage (H)  - Give this area another 2-3 weeks to see if it heals without intervention. If not healing, she will see wound care for debridement  - Ambulatory referral to Wound Clinic    2. Actinic keratosis  - 2 AKs treated with cryotherapy. She will be setting up a dermatology skin screen in the next 1 month         Health Maintenance Review  Health Maintenance   Topic Date Due     MEDICARE ANNUAL WELLNESS VISIT  1995     ZOSTER VACCINES (2 of 3) 2022 (Originally 2008)     FALL RISK ASSESSMENT  2020     ADVANCE CARE PLANNING  2022     TD 18+ HE  2022     PNEUMOCOCCAL IMMUNIZATION 65+ LOW/MEDIUM RISK  Completed     DXA SCAN  Completed     INFLUENZA VACCINE RULE BASED  Completed         I am having Manuela Olivas maintain her dorzolamide, timolol maleate, clonazePAM, torsemide, omeprazole, predniSONE, metoprolol tartrate, and amLODIPine.      HPI:  Manuela Olivas is a 89 y.o. year old who presents to the office today for a scabbed area on the right lower extremity.  She does not recall an injury.  It has not been painful.  Once it bled but this has not recurred. It seems to be getting smaller.     She notes a scaled lesion on the forehead.  She sees [facility] when needed and has had several AKs frozen in the past.       Review of Systems- pertinent positive in bold:  As noted in HPI       Current Scheduled Meds:  Outpatient Encounter Medications as of 3/9/2020   Medication Sig Dispense Refill     amLODIPine (NORVASC) 2.5 MG tablet TAKE 1 TABLET (2.5 MG TOTAL) BY MOUTH  "DAILY. FOR HIGH BLOOD PRESSURE. 90 tablet 3     clonazePAM (KLONOPIN) 0.5 MG tablet TAKE HALF A TAB-1 TABLETS (0.25-0.5 MG TOTAL) BY MOUTH BEDTIME. 30 tablet 3     dorzolamide (TRUSOPT) 2 % ophthalmic solution 1 drop 3 (three) times a day.       metoprolol tartrate (LOPRESSOR) 50 MG tablet Take 1 tablet (50 mg total) by mouth 2 (two) times a day. 180 tablet 3     omeprazole (PRILOSEC) 40 MG capsule TAKE 1 CAPSULE BY MOUTH TWICE A  capsule 3     predniSONE (DELTASONE) 5 MG tablet Take 2.5-5 mg by mouth daily. 90 tablet 2     timolol maleate (ISTALOL) 0.5 % DrpD Apply to eye Daily at 8:00 am..       torsemide (DEMADEX) 5 MG tablet Take 1 tablet (5 mg total) by mouth daily as needed (swelling in legs). 90 tablet 3     No facility-administered encounter medications on file as of 3/9/2020.          Past Medical History:   Diagnosis Date     Anxiety      Autoimmune hepatitis (H) 2006    Resolved     Breast cyst 1975     Controlled substance agreement signed - 2/18 - Clonazepam - UDS 2/18 2/13/2018     Esophageal dysphagia      Fibrocystic breast      Former smoker-quit at age 30      GERD (gastroesophageal reflux disease)      Glaucoma      HLD (hyperlipidemia)      HTN (hypertension)      Hypercalcemia      Hyperparathyroidism (H)     Subclinical      Insomnia      OA (osteoarthritis) 5/27/2016     PMR (polymyalgia rheumatica) - August 2016      Presbyesophagus      Past Surgical History:   Procedure Laterality Date     BREAST BIOPSY Right 1975     BREAST CYST ASPIRATION Bilateral 1975     CATARACT EXTRACTION, BILATERAL       HYSTERECTOMY  1967     OOPHORECTOMY Right 1975     TOTAL HIP ARTHROPLASTY Right 2010     Social History     Tobacco Use     Smoking status: Never Smoker     Smokeless tobacco: Never Used   Substance Use Topics     Alcohol use: No     Drug use: Not on file       Objective / Physical Examination:  Vitals:    03/09/20 0914   BP: 138/70   Pulse: 70   Weight: 148 lb (67.1 kg)   Height: 5' 3\" (1.6 " m)     Wt Readings from Last 3 Encounters:   03/09/20 148 lb (67.1 kg)   12/16/19 150 lb (68 kg)   08/19/19 149 lb (67.6 kg)     Body mass index is 26.22 kg/m .     Cardiovascular: No edema  Skin: left forehead with a 2 mm scaled and keratotic projection. Flesh colored scaled macule right upper forehead     Right lower extremity with a 1 cm diameter scab. No surrounding erythema, induration, warmth       Orders Placed This Encounter   Procedures     Ambulatory referral to Wound Clinic   Followup: Return in about 4 weeks (around 4/6/2020) for follow up with PCP as scheduled . earlier if needed.        Dea Melendrez, CNP

## 2021-06-07 NOTE — PATIENT INSTRUCTIONS - HE
Please follow up if you have any further issues.    You may contact me by phone or MyChart if you are worsening or if things are not improving.    Thank you for coming in today.  ______________________________________________________________________      Follow up again in 4-6 months.

## 2021-06-07 NOTE — TELEPHONE ENCOUNTER
Called pt due to COVID Dr. Metcalf's schedule has changed.    Pt rescheduled to a telephone visit per request, 4/9/20 at 9:30.    Pt thanked for the call.

## 2021-06-08 NOTE — PROGRESS NOTES
Wt Readings from Last 20 Encounters:   01/09/17 151 lb 12.8 oz (68.9 kg)   11/07/16 149 lb 6.4 oz (67.8 kg)   10/03/16 141 lb 6.4 oz (64.1 kg)   09/02/16 138 lb 6.4 oz (62.8 kg)   08/12/16 139 lb 12.8 oz (63.4 kg)   05/27/16 142 lb 3.2 oz (64.5 kg)   01/28/16 141 lb (64 kg)   12/03/15 130 lb (59 kg)   11/16/15 125 lb (56.7 kg)   11/02/15 128 lb (58.1 kg)   10/13/15 129 lb 9.6 oz (58.8 kg)   09/17/15 131 lb 12.8 oz (59.8 kg)   08/27/15 135 lb 6.4 oz (61.4 kg)   08/13/15 136 lb 6.4 oz (61.9 kg)   08/09/15 136 lb (61.7 kg)   07/30/15 137 lb 9.6 oz (62.4 kg)   07/21/15 138 lb (62.6 kg)   07/21/15 133 lb 3.2 oz (60.4 kg)   07/13/15 139 lb (63 kg)   06/22/15 141 lb 8 oz (64.2 kg)

## 2021-06-09 NOTE — PROGRESS NOTES
Internal Medicine Office Visit  Patient Name: Manuela Olivas  Patient Age: 86 y.o.  YOB: 1930  MRN: [id number]  ?  Date of Visit: 2017  Reason for Office Visit:   Chief Complaint   Patient presents with     Cough     x 1 week, sinus pressure, no fever, no chills or body aches, mo chest tightness, post nasal drip and feels mucus in her throat but tis not coming out       Assessment / Plan / Medical Decision Makin. URI (upper respiratory infection)  2. Cough    - XR Chest PA and Lateral; Future I personally reviewed the chest x-ray images and do not appreciate any infiltrates or acute abnormalities.  The results were discussed with the patient in the office.:   -Discussed supportive treatment such as dextromethorphan cough suppressant, guaifenesin for mucolytic.  She has been having a difficult time sleeping at night and will prescribe benzonatate 100 mg every 6 hours as needed for cough.  She is advised that she should not drink any alcohol or drive after taking this medication.  Counseled regarding avoidance of decongestant medications since she is taking a hypertensive medication.  Follow-up if symptoms worsen or fail to improve in the next week        Health Maintenance Review  Health Maintenance   Topic Date Due     ADVANCE DIRECTIVES DISCUSSED WITH PATIENT  1948     FALL RISK ASSESSMENT  2017     TD 18+ HE  2022     DXA SCAN  Completed     PNEUMOCOCCAL POLYSACCHARIDE VACCINE AGE 65 AND OVER  Completed     INFLUENZA VACCINE RULE BASED  Completed     PNEUMOCOCCAL CONJUGATE VACCINE FOR ADULTS (PCV13 OR PREVNAR)  Completed     ZOSTER VACCINE  Completed         I am having Ms. Olivas start on benzonatate. I am also having her maintain her dorzolamide, timolol maleate, metoprolol tartrate, clonazePAM, and omeprazole.     HPI:   Encounter Diagnoses   Name Primary?     URI (upper respiratory infection) Yes     Cough       Manuela Olivas is an 87 y/o female who presents to the  office today for a one-week history of a cough and postnasal drip.  She states that the cough is occasionally productive but she is typically unable to expectorate.  She does have congestion and drainage in the back of her throat.  She feels tired and generally exhausted.  She is taking more naps during the day.  She is able to sleep at night but does endorse occasionally awakening eating for the cough.  She denies fever.  She states she has some baseline shortness of breath with exertion which is unchanged.      Review of Systems: Pertinent findings are as noted in HPI.  She denies any chest pain.  No pain with inspiration.    Current Scheduled Meds:  Outpatient Encounter Prescriptions as of 4/4/2017   Medication Sig Dispense Refill     clonazePAM (KLONOPIN) 0.5 MG tablet Take 0.5-1 tablets (0.25-0.5 mg total) by mouth bedtime. 30 tablet 5     dorzolamide (TRUSOPT) 2 % ophthalmic solution 1 drop 3 (three) times a day.       metoprolol tartrate (LOPRESSOR) 50 MG tablet Take 1 tablet (50 mg total) by mouth 2 (two) times a day. 180 tablet 3     omeprazole (PRILOSEC) 20 MG capsule TAKE 1 CAPSULE (20 MG TOTAL) BY MOUTH 2 (TWO) TIMES A DAY. 180 capsule 1     timolol maleate (ISTALOL) 0.5 % DrpD Apply to eye Daily at 8:00 am..       benzonatate (TESSALON PERLES) 100 MG capsule Take 1 capsule (100 mg total) by mouth every 6 (six) hours as needed for cough. 30 capsule 0     No facility-administered encounter medications on file as of 4/4/2017.      Past Medical History:   Diagnosis Date     Anxiety      Autoimmune hepatitis 2006    Resolved     Breast cyst 1975     Esophageal dysphagia      Fibrocystic breast      GERD (gastroesophageal reflux disease)      Glaucoma      HLD (hyperlipidemia)      HTN (hypertension)      Hypercalcemia      Hyperparathyroidism     Subclinical      Insomnia      Nonsmoker      OA (osteoarthritis) 5/27/2016     Osteoarthritis Of The Knee      PMR (polymyalgia rheumatica) - August 2016       Presbyesophagus      Past Surgical History:   Procedure Laterality Date     BREAST BIOPSY Right 1975     BREAST CYST ASPIRATION Bilateral 1975     CATARACT EXTRACTION, BILATERAL       HYSTERECTOMY  1967     OOPHORECTOMY Right 1975     TOTAL HIP ARTHROPLASTY  2010     Social History   Substance Use Topics     Smoking status: Never Smoker     Smokeless tobacco: Never Used     Alcohol use No       Objective / Physical Examination:  Vitals:    04/04/17 0924   BP: 138/80   Patient Site: Right Arm   Patient Position: Sitting   Cuff Size: Adult Regular   Pulse: (!) 102   Temp: 97.8  F (36.6  C)   TempSrc: Oral   SpO2: 95%   Weight: 148 lb 8 oz (67.4 kg)     Wt Readings from Last 3 Encounters:   04/04/17 148 lb 8 oz (67.4 kg)   03/14/17 150 lb (68 kg)   02/26/17 148 lb (67.1 kg)     Body mass index is 25.29 kg/(m^2).      General Appearance: Alert and oriented, cooperative, affect appropriate, speech clear, does not appear toxic  Head: Normocephalic, atraumatic. No sinus percussion tenderness   Ears: Tympanic membrane clear with landmarks well visualized bilaterally  Nose: Septum midline, nares patent, no visible polyps, mucosa moist, clear drainage noted in nares. Turbinates erythematous   Throat: Lips and mucosa moist. Teeth in good repair, pharynx without erythema or exudate  Neck: Supple, trachea midline. No cervical adenopathy  Lungs: Right upper lobe crackles which improved after coughing. The rest of the lungs are clear to ascultation. Normal inspiratory/expiratory effort.   Cardiovascular: Regular rate, normal S1, S2  Extremities: Acyanotic. No edema.       Orders Placed This Encounter   Procedures     XR Chest PA and Lateral   Followup: No Follow-up on file. earlier if needed.      Dea Melendrez, CNP  [facility]

## 2021-06-09 NOTE — PROGRESS NOTES
ASSESSMENT:   No diagnosis found.        Patient is an 86-year-old female presenting for diarrhea for the last 4 days.  Patient was very slightly tachycardic to 10 5 bpm and upon presentation.  Other vital signs were completely normal.  She clinically appeared very well; nontoxic and no signs of clinical dehydration at all.  Diarrhea has been nonbloody.  Diarrhea is not black to make me concern for GI bleeding.  No abdominal pain and no personal history of diverticulitis, so I'm not concerned for this today.  No fevers to suggest sepsis from an infectious enterocolitis.  No vomiting to suggest bowel obstruction.  I do not think there is any other more malicious etiology of her symptoms today.  I we will collect stool culture and C. diff test for completeness sake, however I suspect this is a viral gastroenteritis.  Patient is eating and drinking, and is well capable of replenish A fluid losses orally.  She is appropriate for outpatient therapy today.    At the end of the encounter, I discussed clear red flags for immediate return to clinic/ER, as well as indications for follow up if no improvement. Patient understood and agreed to plan. Patient was stable for discharge.        PLAN:  Your exam today looks very good. You look very mildly dehydrated, but since you are able to drink water, and you don't look too depleted today, there is no reason for you to need IV fluids in the hospital today.    There are no other signs of any other big problem inside the abdomen to be causing the diarrhea today. The diarrhea is most likely due to a viral gastroenteritis. This is a self-limiting process that clears in 3-5 days.    We will collect a stool sample to be sure that there is no bacteria in the diarrhea. Please return when able.    Drink LOTS of liquids. May try gatorade or other electrolyte-containing solutions. Aim for 60-80 ounces today to replenish losses.    Stick to a bland diet until diarrhea is improving.    If  developing severe abdominal pain, vomiting, fevers, black or bloody stools, inability to tolerate oral intake, go to the emergency Department immediately.  Otherwise, follow up with primary care no improvement in loose stools in 2-3 days.               SUBJECTIVE:   Manuela Olivas is a 86 y.o. female who presents today for evaluation of diarrhea for the last 4 days. She is having clear and brown watery stools, about 8 episodes on day 1, then fewer on day 2, less on day 3. Today had 3 watery but less so stools early this morning. No bloody stools. Stools were slightly black after taking Pepto Bismol and so is her tongue. No fevers. No abdominal pain, nausea, vomiting. She feels more tired than usual. No lightheadedness or palpitations. No recent travel. No new foods or restaurants recently. A friend does also have a diarrheal illness currently.  She had a remote hysterectomy, no other abdominal surgeries. No history of GI bleeding.   She is drinking water and gatorade. She is not eating much - only toast, pasta, and yogurt. She took Pepto Bismol and Maalox which seemed to help.      Past Medical History:  Patient Active Problem List   Diagnosis     Glaucoma     Hypercalcemia     Hyperparathyroidism     GERD (gastroesophageal reflux disease)     HTN (hypertension)     Anxiety     Esophageal dysphagia     Insomnia     HLD (hyperlipidemia)     Presbyesophagus     OA (osteoarthritis)-fingers     PMR (polymyalgia rheumatica) - August 2016     Nonsmoker       Surgical History:  Hysterectomy in 1967    Reviewed; otherwise non-contributory    Family History:  Family History   Problem Relation Age of Onset     Stroke Mother      Hyperlipidemia Mother      Heart attack Father      Reviewed; Non-contributory      Social History:    History   Smoking Status     Never Smoker   Smokeless Tobacco     Never Used     Smoking: none  Alcohol use: occasionally  Other drug use: none  Occupation: retired  Lives at home  independently        Current Medications:  Current Outpatient Prescriptions on File Prior to Visit   Medication Sig Dispense Refill     clonazePAM (KLONOPIN) 0.5 MG tablet Take 0.5-1 tablets (0.25-0.5 mg total) by mouth bedtime. 30 tablet 5     dorzolamide (TRUSOPT) 2 % ophthalmic solution 1 drop 3 (three) times a day.       metoprolol tartrate (LOPRESSOR) 50 MG tablet Take 1 tablet (50 mg total) by mouth 2 (two) times a day. 180 tablet 3     omeprazole (PRILOSEC) 20 MG capsule TAKE 1 CAPSULE (20 MG TOTAL) BY MOUTH 2 (TWO) TIMES A DAY. 180 capsule 1     predniSONE (DELTASONE) 2.5 MG tablet Take 3 tablets (7.5 mg total) by mouth daily. 180 tablet 1     predniSONE (DELTASONE) 5 MG tablet Take 1 tablet (5 mg total) by mouth daily. 180 tablet 0     timolol maleate (ISTALOL) 0.5 % DrpD Apply to eye Daily at 8:00 am..       No current facility-administered medications on file prior to visit.        Allergies:   No Known Allergies    I personally reviewed patient's past medical, surgical, social, family history and allergies.    ROS:  Review of Systems  See HPI      OBJECTIVE:   Visit Vitals     /66     Pulse (!) 105     Temp 97.5  F (36.4  C) (Oral)     Resp 15     Wt 148 lb (67.1 kg)     LMP 06/18/1967     SpO2 98%     BMI 25.21 kg/m2         General Appearance:  Alert, well-appearing elderly female in NAD. Afebrile. Talkative.   Integument: Warm, dry, no diaphoresis.  HEENT: Moist mucus membranes. Grooves of tongue slightly tinted black (pepto bismol use)  Respiratory: No distress. Lungs clear to ausculation bilaterally.   Cardiovascular: Mild tachycardia. Normal S1/S2. No murmur, rub or gallop. No obvious chest wall deformities. Peripheral pulses 2+ bilaterally at DPs. No peripheral edema.  GI: Soft, nontender, mildly hyperactive bowel sounds. No masses, organomegaly, rigidity, or guarding.  Neurologic: Alert and orientated appropriately. No focal deficits.

## 2021-06-10 ENCOUNTER — COMMUNICATION - HEALTHEAST (OUTPATIENT)
Dept: INTERNAL MEDICINE | Facility: CLINIC | Age: 86
End: 2021-06-10

## 2021-06-10 DIAGNOSIS — F41.9 ANXIETY: ICD-10-CM

## 2021-06-10 DIAGNOSIS — M35.3 PMR (POLYMYALGIA RHEUMATICA) (H): ICD-10-CM

## 2021-06-11 RX ORDER — CLONAZEPAM 0.5 MG/1
TABLET ORAL
Qty: 30 TABLET | Refills: 0 | Status: SHIPPED | OUTPATIENT
Start: 2021-06-11 | End: 2021-09-21

## 2021-06-11 RX ORDER — PREDNISONE 5 MG/1
TABLET ORAL
Qty: 60 TABLET | Refills: 0 | Status: SHIPPED | OUTPATIENT
Start: 2021-06-11 | End: 2021-08-15

## 2021-06-11 NOTE — PATIENT INSTRUCTIONS - HE

## 2021-06-11 NOTE — PROGRESS NOTES
Wt Readings from Last 20 Encounters:   07/11/17 149 lb (67.6 kg)   06/20/17 151 lb (68.5 kg)   04/04/17 148 lb 8 oz (67.4 kg)   03/14/17 150 lb (68 kg)   02/26/17 148 lb (67.1 kg)   01/09/17 151 lb 12.8 oz (68.9 kg)   11/07/16 149 lb 6.4 oz (67.8 kg)   10/03/16 141 lb 6.4 oz (64.1 kg)   09/02/16 138 lb 6.4 oz (62.8 kg)   08/12/16 139 lb 12.8 oz (63.4 kg)   05/27/16 142 lb 3.2 oz (64.5 kg)   01/28/16 141 lb (64 kg)   12/03/15 130 lb (59 kg)   11/16/15 125 lb (56.7 kg)   11/02/15 128 lb (58.1 kg)   10/13/15 129 lb 9.6 oz (58.8 kg)   09/17/15 131 lb 12.8 oz (59.8 kg)   08/27/15 135 lb 6.4 oz (61.4 kg)   08/13/15 136 lb 6.4 oz (61.9 kg)   08/09/15 136 lb (61.7 kg)

## 2021-06-12 NOTE — TELEPHONE ENCOUNTER
Refill Approved    Rx renewed per Medication Renewal Policy. Medication was last renewed on 10/26/19.    Vannesa Allan, [facility] Triage/Med Refill 11/3/2020     Requested Prescriptions   Pending Prescriptions Disp Refills     metoprolol tartrate (LOPRESSOR) 50 MG tablet 180 tablet 3     Sig: Take 1 tablet (50 mg total) by mouth 2 (two) times a day.       Beta-Blockers Refill Protocol Passed - 10/31/2020 12:21 PM        Passed - PCP or prescribing provider visit in past 12 months or next 3 months     Last office visit with prescriber/PCP: 9/15/2020 Jersey Metcalf MD OR same dept: 9/15/2020 Jersey Metcalf MD OR same specialty: 9/15/2020 Jersey Metcalf MD  Last physical: Visit date not found Last MTM visit: Visit date not found   Next visit within 3 mo: Visit date not found  Next physical within 3 mo: Visit date not found  Prescriber OR PCP: Jersey Metcalf MD  Last diagnosis associated with med order: 1. HTN (hypertension)  - metoprolol tartrate (LOPRESSOR) 50 MG tablet; Take 1 tablet (50 mg total) by mouth 2 (two) times a day.  Dispense: 180 tablet; Refill: 3    If protocol passes may refill for 12 months if within 3 months of last provider visit (or a total of 15 months).             Passed - Blood pressure filed in past 12 months     BP Readings from Last 1 Encounters:   09/15/20 132/84

## 2021-06-15 NOTE — TELEPHONE ENCOUNTER
Controlled Substance Refill Request  Medication Name:   Requested Prescriptions     Pending Prescriptions Disp Refills     clonazePAM (KLONOPIN) 0.5 MG tablet [Pharmacy Med Name: CLONAZEPAM 0.5 MG TABLET] 30 tablet 0     Sig: TAKE HALF A TAB-1 TABLETS (0.25-0.5 MG TOTAL) BY MOUTH EACH NIGHT AT BEDTIME.     predniSONE (DELTASONE) 5 MG tablet [Pharmacy Med Name: PREDNISONE 5 MG TABLET] 90 tablet 2     Sig: TAKE 1/2 TO 1 TABLET BY MOUTH ONCE DAILY     Date Last Fill: 9/8/20  Requested Pharmacy: [facility]  Submit electronically to pharmacy  Controlled Substance Agreement on file:   Encounter-Level CSA Scan Date - 02/13/2018:    Scan on 2/15/2018  9:47 AM        Last office visit:  9/15/20

## 2021-06-15 NOTE — TELEPHONE ENCOUNTER
RN cannot approve Refill Request    RN can NOT refill this medication med is not covered by policy/route to provider. Last office visit: 9/15/2020 Jersey Metcalf MD Last Physical: Visit date not found Last MTM visit: Visit date not found Last visit same specialty: 9/15/2020 Jersey Metcalf MD.  Next visit within 3 mo: Visit date not found  Next physical within 3 mo: Visit date not found      Yemi Jackson, [facility] Triage/Med Refill 3/10/2021    Requested Prescriptions   Pending Prescriptions Disp Refills     clonazePAM (KLONOPIN) 0.5 MG tablet [Pharmacy Med Name: CLONAZEPAM 0.5 MG TABLET] 30 tablet 0     Sig: TAKE HALF A TAB-1 TABLETS (0.25-0.5 MG TOTAL) BY MOUTH EACH NIGHT AT BEDTIME.       Controlled Substances Refill Protocol Failed - 3/10/2021 11:06 AM        Failed - Route all Controlled Substance Requests to Provider        Passed - Patient has controlled substance agreement in past 12 months     Encounter-Level CSA Scan Date - 02/13/2018:    Scan on 2/15/2018  9:47 AM               Passed - Visit with PCP or prescribing provider visit in past 12 months      Last office visit with prescriber/PCP: 9/15/2020 Jersey Metcalf MD OR same dept: 9/15/2020 Jersey Metcalf MD OR same specialty: 9/15/2020 Jersey Metcalf MD Last physical: Visit date not found Last MTM visit: Visit date not found    Next visit within 3 mo: Visit date not found  Next physical within 3 mo: Visit date not found  Prescriber OR PCP: Jersey Metcalf MD  Last diagnosis associated with med order: 1. Anxiety  - clonazePAM (KLONOPIN) 0.5 MG tablet [Pharmacy Med Name: CLONAZEPAM 0.5 MG TABLET]; TAKE HALF A TAB-1 TABLETS (0.25-0.5 MG TOTAL) BY MOUTH EACH NIGHT AT BEDTIME.  Dispense: 30 tablet; Refill: 0    2. PMR (polymyalgia rheumatica) - August 2016  - predniSONE (DELTASONE) 5 MG tablet [Pharmacy Med Name: PREDNISONE 5 MG TABLET]; TAKE 1/2 TO 1 TABLET BY MOUTH ONCE DAILY  Dispense: 90 tablet; Refill: 2                   predniSONE (DELTASONE) 5 MG tablet [Pharmacy Med Name: PREDNISONE 5 MG TABLET] 90 tablet 2     Sig: TAKE 1/2 TO 1 TABLET BY MOUTH ONCE DAILY       There is no refill protocol information for this order

## 2021-06-16 PROBLEM — Z66 DNR (DO NOT RESUSCITATE): Chronic | Status: ACTIVE | Noted: 2017-06-21

## 2021-06-16 PROBLEM — Z79.899 CONTROLLED SUBSTANCE AGREEMENT SIGNED: Chronic | Status: ACTIVE | Noted: 2018-02-13

## 2021-06-16 PROBLEM — D69.2 SENILE PURPURA (H): Status: ACTIVE | Noted: 2019-04-18

## 2021-06-16 PROBLEM — L98.499: Status: ACTIVE | Noted: 2021-04-09

## 2021-06-16 NOTE — PATIENT INSTRUCTIONS - HE
Please follow up if you have any further issues.    You may contact me by phone or MyChart if you are worsening or if things are not improving.    ______________________________________________________________________     Please remember that you can call [phone] to schedule an appointment.     You can schedule appointments 24 hours a day, 7 days a week.  Sometimes the best time to schedule an appointment is after clinic hours when less people are calling in.  Weekends are another option for calling in to schedule appointments.

## 2021-06-17 NOTE — TELEPHONE ENCOUNTER
RN cannot approve Refill Request    RN can NOT refill this medication med is not covered by policy/route to provider. Last office visit: 4/9/2021 Jersey Metcalf MD Last Physical: Visit date not found Last MTM visit: Visit date not found Last visit same specialty: 4/9/2021 Jersey Metcalf MD.  Next visit within 3 mo: Visit date not found  Next physical within 3 mo: Visit date not found      Yemi Jackson, [facility] Triage/Med Refill 5/4/2021    Requested Prescriptions   Pending Prescriptions Disp Refills     clonazePAM (KLONOPIN) 0.5 MG tablet [Pharmacy Med Name: CLONAZEPAM 0.5 MG TABLET] 30 tablet 0     Sig: TAKE 1/2 - 1 TABLET BY MOUTH EACH NIGHT AT BEDTIME       Controlled Substances Refill Protocol Failed - 5/4/2021  1:24 PM        Failed - Route all Controlled Substance Requests to Provider        Passed - Patient has controlled substance agreement in past 12 months     Encounter-Level CSA Scan Date - 02/13/2018:    Scan on 2/15/2018  9:47 AM               Passed - Visit with PCP or prescribing provider visit in past 12 months      Last office visit with prescriber/PCP: 4/9/2021 Jersey Metcalf MD OR same dept: 4/9/2021 Jersey Metcalf MD OR same specialty: 4/9/2021 Jersey Metcalf MD Last physical: Visit date not found Last MTM visit: Visit date not found    Next visit within 3 mo: Visit date not found  Next physical within 3 mo: Visit date not found  Prescriber OR PCP: Jersey Metcalf MD  Last diagnosis associated with med order: 1. Anxiety  - clonazePAM (KLONOPIN) 0.5 MG tablet [Pharmacy Med Name: CLONAZEPAM 0.5 MG TABLET]; TAKE 1/2 - 1 TABLET BY MOUTH EACH NIGHT AT BEDTIME  Dispense: 30 tablet; Refill: 0    2. PMR (polymyalgia rheumatica) - August 2016  - predniSONE (DELTASONE) 5 MG tablet [Pharmacy Med Name: PREDNISONE 5 MG TABLET]; TAKE 1/2 TO 1 TABLET BY MOUTH ONCE DAILY  Dispense: 60 tablet; Refill: 0                  predniSONE (DELTASONE) 5 MG tablet [Pharmacy Med Name: PREDNISONE 5 MG  TABLET] 60 tablet 0     Sig: TAKE 1/2 TO 1 TABLET BY MOUTH ONCE DAILY       There is no refill protocol information for this order

## 2021-06-17 NOTE — TELEPHONE ENCOUNTER
Telephone Encounter by Jersey Metcalf MD at 9/8/2020  5:19 PM     Author: Jersey Metcalf MD Service: -- Author Type: Physician    Filed: 9/8/2020  5:22 PM Encounter Date: 9/8/2020 Status: Signed    : Jersey Metcalf MD (Physician)       ______________________________________________________________________      reviewed and results are as follows:        No inappropriate prescriptions noted.    ______________________________________________________________________

## 2021-06-17 NOTE — TELEPHONE ENCOUNTER
Controlled Substance Refill Request  Medication Name:   Requested Prescriptions     Pending Prescriptions Disp Refills     clonazePAM (KLONOPIN) 0.5 MG tablet [Pharmacy Med Name: CLONAZEPAM 0.5 MG TABLET] 30 tablet 0     Sig: TAKE 1/2 - 1 TABLET BY MOUTH EACH NIGHT AT BEDTIME     predniSONE (DELTASONE) 5 MG tablet [Pharmacy Med Name: PREDNISONE 5 MG TABLET] 60 tablet 0     Sig: TAKE 1/2 TO 1 TABLET BY MOUTH ONCE DAILY     Date Last Fill: 3/11/21  Requested Pharmacy: [facility]  Submit electronically to pharmacy  Controlled Substance Agreement on file:   Encounter-Level CSA Scan Date - 02/13/2018:    Scan on 2/15/2018  9:47 AM        Last office visit:  4/9/21

## 2021-06-19 NOTE — LETTER
Letter by Jersey Metcalf MD at      Author: Jersey Metcalf MD Service: -- Author Type: --    Filed:  Encounter Date: 4/18/2019 Status: (Other)         [facility]  04/18/19    Patient: Manuela Olivas  YOB: 1930  Medical Record Number: [id number]  CSN: [id number]                                                                MEDICATION: CLONAZEPAM  Non-opioid Controlled Substance Agreement    I understand that my care provider has prescribed a controlled substance to help manage my condition(s). I am taking this medicine to help me function or work. I know this is strong medicine, and that it can cause serious side effects. Controlled substances can be sedating, addicting and may cause a dependency on the drug. They can affect my ability to drive or think, and cause depression. They need to be taken exactly as prescribed. Combining controlled substances with certain medicines or chemicals (such as cocaine, sedatives and tranquilizers, sleeping pills, meth) can be dangerous or even fatal. Also, if I stop controlled substances suddenly, I may have severe withdrawal symptoms.  If not helpful, I may be asked to stop them.    The risks, benefits, and side effects of these medicine(s) were explained to me. I agree that:    1. I will take part in other treatments as advised by my care team. This may be psychiatry or counseling, physical therapy, behavioral therapy, group treatment or a referral to a pain clinic. I will reduce or stop my medicine when my care team tells me to do so.  2. I will take my medicines as prescribed. I will not change the dose or schedule unless my care team tells me to. There will be no refills if I run out early.  I may be contactedwithout warning and asked to complete a urine drug test or pill count at any time.   3. I will keep all my appointments, and understand this is part of the monitoring of controlled substances. My care team may require an office visit for  EVERY controlled substance refill. If I miss appointments or dont follow instructions, my care team may stop my medicine.  4. I will not ask other providers to prescribe controlled substances, and I will not accept controlled substances from other people. If I need another prescribed controlled substance for a new reason, I will tell my care team within 1 business day.  5. I will use one pharmacy to fill all of my controlled substance prescriptions, and it is up to me to make sure that I do not run out of my medicines on weekends or holidays. If my care team is willing to refill my controlled substance prescription without a visit, I must request refills only during office hours, refills may take up to 3 days to process, and it may take up to 5 to 7 days for my medicine to be mailed and ready at my pharmacy. Prescriptions will not be mailed anywhere except my pharmacy.    6. I am responsible for my prescriptions. If the medicine/prescription is lost or stolen, it will not be replaced. I also agree not to share controlled substance medicines with anyone.          [facility]  04/18/19  Patient:  Manuela Olivas  YOB: 1930  Medical Record Number: [id number]  CSN: [id number]    7. I agree to not use ANY illegal or recreational drugs. This includes marijuana, cocaine, bath salts or other drugs. I agree not to use alcohol unless my care team says I may. I agree to give urine samples whenever asked. If I dont give a urine sample, the care team may stop my medicine.    8. If I enroll in the [place] Medical Marijuana program, I will tell my care team. I will also sign an agreement to share my medical records with my care team.    9. I will bring in my list of medicines (or my medicine bottles) each time I come to the clinic.   10. I will tell my care team right away if I become pregnant or have a new medical problem treated outside of my regular clinic.  11. I understand that this medicine can  affect my thinking and judgment. It may be unsafe for me to drive, use machinery and do dangerous tasks. I will not do any of these things until I know how the medicine affects me. If my dose changes, I will wait to see how it affects me. I will contact my care team if I have concerns about medicine side effects.    I understand that if I do not follow any of the conditions above, my prescriptions or treatment may be stopped.      I agree that my provider, clinic care team, and pharmacy may work with any city, state or federal law enforcement agency that investigates the misuse, sale, or other diversion of my controlled medicine. I will allow my provider to discuss my care with or share a copy of this agreement with any other treating provider, pharmacy or emergency room where I receive care. I agree to give up (waive) any right of privacy or confidentiality with respect to these consents.   I have read this agreement and have asked questions about anything I did not understand.    ___________________________________________________________________________  Patient signature - Date/Time  -Manuela Olivas                                      ___________________________________________________________________________  Witness signature                                                                    ___________________________________________________________________________  Provider signature- Jersey Metcalf MD

## 2021-06-20 NOTE — LETTER
Letter by Jersey Metcalf MD at      Author: Jersey Metcalf MD Service: -- Author Type: --    Filed:  Encounter Date: 9/15/2020 Status: (Other)         [facility]  09/15/20    Patient: Manuela Olivas  YOB: 1930  Medical Record Number: [id number]  CSN: [id number]     MEDICATION:  CLONAZEPAM                                                                              Non-opioid Controlled Substance Agreement    I understand that my care provider has prescribed a controlled substance to help manage my condition(s). I am taking this medicine to help me function or work. I know this is strong medicine, and that it can cause serious side effects. Controlled substances can be sedating, addicting and may cause a dependency on the drug. They can affect my ability to drive or think, and cause depression. They need to be taken exactly as prescribed. Combining controlled substances with certain medicines or chemicals (such as cocaine, sedatives and tranquilizers, sleeping pills, meth) can be dangerous or even fatal. Also, if I stop controlled substances suddenly, I may have severe withdrawal symptoms.  If not helpful, I may be asked to stop them.    The risks, benefits, and side effects of these medicine(s) were explained to me. I agree that:    1. I will take part in other treatments as advised by my care team. This may be psychiatry or counseling, physical therapy, behavioral therapy, group treatment or a referral to a pain clinic. I will reduce or stop my medicine when my care team tells me to do so.  2. I will take my medicines as prescribed. I will not change the dose or schedule unless my care team tells me to. There will be no refills if I run out early.  I may be contactedwithout warning and asked to complete a urine drug test or pill count at any time.   3. I will keep all my appointments, and understand this is part of the monitoring of controlled substances. My care team may require  an office visit for EVERY controlled substance refill. If I miss appointments or dont follow instructions, my care team may stop my medicine.  4. I will not ask other providers to prescribe controlled substances, and I will not accept controlled substances from other people. If I need another prescribed controlled substance for a new reason, I will tell my care team within 1 business day.  5. I will use one pharmacy to fill all of my controlled substance prescriptions, and it is up to me to make sure that I do not run out of my medicines on weekends or holidays. If my care team is willing to refill my controlled substance prescription without a visit, I must request refills only during office hours, refills may take up to 3 days to process, and it may take up to 5 to 7 days for my medicine to be mailed and ready at my pharmacy. Prescriptions will not be mailed anywhere except my pharmacy.    6. I am responsible for my prescriptions. If the medicine/prescription is lost or stolen, it will not be replaced. I also agree not to share controlled substance medicines with anyone.          [facility]  09/15/20  Patient:  Manuela Olivas  YOB: 1930  Medical Record Number: [id number]  CSN: [id number]    7. I agree to not use ANY illegal or recreational drugs. This includes marijuana, cocaine, bath salts or other drugs. I agree not to use alcohol unless my care team says I may. I agree to give urine samples whenever asked. If I dont give a urine sample, the care team may stop my medicine.    8. If I enroll in the [place] Medical Marijuana program, I will tell my care team. I will also sign an agreement to share my medical records with my care team.    9. I will bring in my list of medicines (or my medicine bottles) each time I come to the clinic.   10. I will tell my care team right away if I become pregnant or have a new medical problem treated outside of my regular clinic.  11. I understand that  this medicine can affect my thinking and judgment. It may be unsafe for me to drive, use machinery and do dangerous tasks. I will not do any of these things until I know how the medicine affects me. If my dose changes, I will wait to see how it affects me. I will contact my care team if I have concerns about medicine side effects.    I understand that if I do not follow any of the conditions above, my prescriptions or treatment may be stopped.      I agree that my provider, clinic care team, and pharmacy may work with any city, state or federal law enforcement agency that investigates the misuse, sale, or other diversion of my controlled medicine. I will allow my provider to discuss my care with or share a copy of this agreement with any other treating provider, pharmacy or emergency room where I receive care. I agree to give up (waive) any right of privacy or confidentiality with respect to these consents.   I have read this agreement and have asked questions about anything I did not understand.    ___________________________________________________________________________  Patient signature - Date/Time  -Manuela Olivas                                      ___________________________________________________________________________  Witness signature                                                                    ___________________________________________________________________________  Provider signature- Jersey Metcalf MD

## 2021-06-20 NOTE — LETTER
Letter by Jersey Metcalf MD at      Author: Jersey Metcalf MD Service: -- Author Type: --    Filed:  Encounter Date: 8/24/2020 Status: (Other)         Manuela Olivas   [address]  [place] [address]         Dear Manuela,    I am sending you this letter to remind you that you are due for a follow up visit in October.    Please call to schedule this visit as soon as possible as our schedule fills up quickly.    If you already have an appointment scheduled with me for around this time, please ignore this notification.    I look forward to seeing you soon.      If you have any questions regarding the above, please feel free to contact me at [phone].        Sincerely,    Jersey Metcalf MD  General Internal Medicine  [facility]

## 2021-06-21 NOTE — LETTER
Letter by Jersey Metcalf MD at      Author: Jersey Metcalf MD Service: -- Author Type: --    Filed:  Encounter Date: 3/2/2021 Status: (Other)       Manuela Olivas   [address]  [place] [address]         Dear Manuela,    I am sending you this letter to remind you that you are due for a follow up visit in March or April.    Please call to schedule this visit as soon as possible as our schedule fills up quickly.    If you already have an appointment scheduled with me for around this time, please ignore this notification.    I look forward to seeing you to review your overall health.          Sincerely,       Jersey Metcalf MD  General Internal Medicine  [facility]

## 2021-06-23 NOTE — TELEPHONE ENCOUNTER
pharmacy requesting refill of Clonazepam.  Last seen 12/17/18  Has appt scheduled 4/18/19.    clonazePAM (KLONOPIN) 0.5 MG tablet 30 tablet 2 7/30/2018  No   Sig: TAKE HALF A TAB-1 TABLETS (0.25-0.5 MG TOTAL) BY MOUTH BEDTIME.   Sent to pharmacy as: clonazePAM (KLONOPIN) 0.5 MG tablet   Notes to Pharmacy: Not to exceed 3 additional fills before 08/12/2018.   E-Prescribing Status: Receipt confirmed by pharmacy (7/30/2018  7:37 AM CDT)

## 2021-06-25 NOTE — PROGRESS NOTES
Progress Notes by Jersey Metcalf MD at 1/9/2017  8:25 AM     Author: Jersey Metcalf MD Service: -- Author Type: Physician    Filed: 1/9/2017  9:26 PM Encounter Date: 1/9/2017 Status: Signed    : Jersey Metcalf MD (Physician)              [facility]    Comprehensive and complex medical care - Chronic disease management - Shared decision making - Care coordination - Compassionate care    Date of Service: 1/9/2017  Primary Provider: Jersey Metcalf MD    Patient Care Team:  Jersey Metcalf MD as PCP - General (Internal Medicine)  Be Wick MD as Physician (Ophthalmology)     ______________________________________________________________________     Patient's Pharmacy:    [facility]/pharmacy [place] [id number]  Phone: [phone] Fax: [phone]     Patient's Insurance:    Payor: BLUE CROSS / Plan: BLUE CROSS PLATINUM / Product Type: COST PLAN /     ________________________________________________________________    Health Maintenance   Topic Date Due   ? ADVANCE DIRECTIVES DISCUSSED WITH PATIENT  08/02/1948   ? FALL RISK ASSESSMENT  11/27/2017   ? TD 18+ HE  08/21/2022   ? PNEUMOCOCCAL POLYSACCHARIDE VACCINE AGE 65 AND OVER  Completed   ? INFLUENZA VACCINE RULE BASED  Completed   ? PNEUMOCOCCAL CONJUGATE VACCINE FOR ADULTS (PCV13 OR PREVNAR)  Completed   ? ZOSTER VACCINE  Completed        ______________________________________________________________________     Manuela NAIK Brendon is 86 y.o. female who comes in today for:    Chief Complaint   Patient presents with   ? Follow-up       Patient Active Problem List   Diagnosis   ? Glaucoma   ? Hypercalcemia   ? Hyperparathyroidism   ? GERD (gastroesophageal reflux disease)   ? HTN (hypertension)   ? Anxiety   ? Esophageal dysphagia   ? Insomnia   ? HLD (hyperlipidemia)   ? Presbyesophagus   ? OA (osteoarthritis)-fingers   ? PMR (polymyalgia  rheumatica) - August 2016   ? Nonsmoker      Current Outpatient Prescriptions   Medication Sig   ? clonazePAM (KLONOPIN) 0.5 MG tablet Take 0.5-1 tablets (0.25-0.5 mg total) by mouth bedtime.   ? dorzolamide (TRUSOPT) 2 % ophthalmic solution 1 drop 3 (three) times a day.   ? metoprolol tartrate (LOPRESSOR) 50 MG tablet Take 1 tablet (50 mg total) by mouth 2 (two) times a day.   ? omeprazole (PRILOSEC) 20 MG capsule TAKE 1 CAPSULE (20 MG TOTAL) BY MOUTH 2 (TWO) TIMES A DAY.   ? predniSONE (DELTASONE) 5 MG tablet Take 1 tablet (5 mg total) by mouth daily.   ? timolol maleate (ISTALOL) 0.5 % DrpD Apply to eye Daily at 8:00 am..   ? predniSONE (DELTASONE) 2.5 MG tablet Take 3 tablets (7.5 mg total) by mouth daily.     Social History     Social History Narrative    , no children, lives alone.        Volunteer at [employer] in the past.     ______________________________________________________________________     History of present illness:    The patient comes in today for follow-up of a number of issues.    We first reviewed her polymyalgia rheumatica.  This is doing well for her.  She has had no major issues with this.  She is down to 7.5 mg of prednisone at this time.  She denies any side effects from this.  She has not had any major return of her aches and pains.  Her right Achilles is doing well.  She denies any headaches or visual issues.  We are going to continue to work on tapering this on an expedited course if possible.    We reviewed some sebaceous cyst on her left scalp and she has to that she would like to have removed at some time.  I reassured her today that these were not anything life-threatening or concerning from a cancer standpoint.    We reviewed her anxiety and she still has some anxiety issues overall.  She is taking the clonazepam still at this time.  She has been on other medications in the past, but has not taken them for a while.    She has had some issues with epigastric discomfort  and indigestion.  She has also had some reflux.  She denies any dysphasia like she's had in the past.  It comes and goes.  It seems to be associated with diarrhea on the days she has it.  Belching can help.  It is not really pain for her.  She has not been losing weight.  She does feel tired a lot.  She doesn't get nauseated.  It might be worse over the last 2 months.    We reviewed her hyperparathyroidism as well today and her osteoarthritis.  Her hands and fingers still give her some issues at times.    On review of systems, the patient denies any chest pain or shortness of breath.    ______________________________________________________________________    Exam:    Wt Readings from Last 3 Encounters:   01/09/17 151 lb 12.8 oz (68.9 kg)   11/07/16 149 lb 6.4 oz (67.8 kg)   10/03/16 141 lb 6.4 oz (64.1 kg)     BP Readings from Last 3 Encounters:   01/09/17 134/80   11/07/16 144/72   10/03/16 152/82     Visit Vitals   ? /80 (Patient Site: Right Arm, Patient Position: Sitting, Cuff Size: Adult Regular)   ? Pulse 80   ? Wt 151 lb 12.8 oz (68.9 kg)   ? LMP 06/18/1967   ? SpO2 98%   ? BMI 25.85 kg/m2       The patient is comfortable, no acute distress.  Mood  Anxious.  Insight good.  Eyes are nonicteric.  Neck is supple without mass.  No cervical adenopathy.  No thyromegaly. Heart regular rate and rhythm.  Lungs clear to auscultation bilaterally.  Respiratory effort is good.  Abdomen soft and nontender.  No hepatosplenomegaly.  Extremities no edema.   Gait is good.  She has degenerative changes in the fingers noted.  ______________________________________________________________________    Assessment:    1. PMR (polymyalgia rheumatica) - August 2016     2. Nonsmoker     3. Gastroesophageal reflux disease with esophagitis  omeprazole (PRILOSEC) 20 MG capsule   4. Sebaceous cyst     5. OA (osteoarthritis)     6. Epigastric discomfort     7. Diarrhea     8. Hyperparathyroidism        ______________________________________________________________________     PHQ-2 Total Score: 0 (11/7/2016  8:00 AM)  No Data Recorded      Plan:    Patient Instructions   1. Reduce prednisone to 1 pill a day through 2/9/17.  2. Reduce then to 1 pill every other day.  Take on even numbered days.  Do this through March 9th and if you continue to do well, keep off of it.  3. Increase omeprazole to 20 mg twice a day.  Do this until you see me back.  4. Let me know if stomach not improving in the next 2-3 weeks.  5. Follow up in 2-3 months if you are otherwise doing well.         Do not suspect ominous cause for epigastric pain, but could consider further workup as needed including blood work.    Follow up sooner if issues.    Blood pressure doing better at this visit compared to past visit.       Jersey Metcalf MD  General Internal Medicine  [facility]     Personal office fax - [phone]  Voice mail - [phone]  E-mail - [email or web address]    Return in about 3 months (around 4/9/2017) for follow up visit.

## 2021-06-25 NOTE — TELEPHONE ENCOUNTER
Controlled Substance Refill Request  Medication Name:   Requested Prescriptions     Pending Prescriptions Disp Refills     clonazePAM (KLONOPIN) 0.5 MG tablet [Pharmacy Med Name: CLONAZEPAM 0.5 MG TABLET] 30 tablet 0     Sig: TAKE 1/2 - 1 TABLET BY MOUTH EACH NIGHT AT BEDTIME     predniSONE (DELTASONE) 5 MG tablet [Pharmacy Med Name: PREDNISONE 5 MG TABLET] 60 tablet 0     Sig: TAKE 1/2 TO 1 TABLET BY MOUTH ONCE DAILY     Date Last Fill: 5/4/21  Requested Pharmacy: [facility]  Submit electronically to pharmacy  Controlled Substance Agreement on file:   Encounter-Level CSA Scan Date - 02/13/2018:    Scan on 2/15/2018  9:47 AM        Last office visit:  4/9/21

## 2021-06-25 NOTE — PROGRESS NOTES
Progress Notes by Jersey Metcalf MD at 11/13/2017  8:50 AM     Author: Jersey Metcalf MD Service: -- Author Type: Physician    Filed: 11/13/2017  2:19 PM Encounter Date: 11/13/2017 Status: Signed    : Jersey Metcafl MD (Physician)              [facility]    Comprehensive and complex medical care - Chronic disease management - Shared decision making - Care coordination - Compassionate care    Patient advocacy - Rational deprescribing - Minimally disruptive medicine - Ethical focus - Customized care    ______________________________________________________________________     Date of Service: 11/13/2017  Primary Provider: Jersey Metcalf MD    Patient Care Team:  Jersey Metcalf MD as PCP - General (Internal Medicine)  Be Wick MD as Physician (Ophthalmology)  Giovanny Koo MD as Physician (Orthopedic Surgery)     ______________________________________________________________________     Patient's Pharmacy:    [facility]/pharmacy [address] [place] [id number]  Phone: [phone] Fax: [phone]     Patient's Contacts:  Name Home Phone Work Phone Mobile Phone Relationship Lg Ej   SHIMON CROSS [phone]   Nephew      Patient's Insurance:    Payor: BLUE CROSS / Plan: BLUE CROSS PLATINUM / Product Type: COST PLAN /     ______________________________________________________________________     Manuela M Brendon is 87 y.o. female who comes in today for:    Chief Complaint   Patient presents with   ? Follow-up     2 MONTH   ? SKIN ON LEGS     HAS RASH POP UP OUT OF NO WHERE       Patient Active Problem List   Diagnosis   ? Glaucoma   ? Hypercalcemia   ? Hyperparathyroidism   ? GERD (gastroesophageal reflux disease)   ? HTN (hypertension)   ? Anxiety   ? Esophageal dysphagia   ? Insomnia   ? HLD (hyperlipidemia)   ? Presbyesophagus   ? OA (osteoarthritis)-fingers   ? PMR (polymyalgia rheumatica) -  August 2016   ? Nonsmoker   ? DNR (do not resuscitate)     Current Outpatient Prescriptions   Medication Sig   ? clonazePAM (KLONOPIN) 0.5 MG tablet TAKE HALF A TAB-1 TABLETS (0.25-0.5 MG TOTAL) BY MOUTH BEDTIME.   ? dorzolamide (TRUSOPT) 2 % ophthalmic solution 1 drop 3 (three) times a day.   ? metoprolol tartrate (LOPRESSOR) 50 MG tablet TAKE 1 TABLET (50 MG TOTAL) BY MOUTH 2 (TWO) TIMES A DAY.   ? omeprazole (PRILOSEC) 40 MG capsule Take 1 capsule (40 mg total) by mouth 2 (two) times a day.   ? predniSONE (DELTASONE) 5 MG tablet Take 2 tablets (10 mg total) by mouth daily.   ? timolol maleate (ISTALOL) 0.5 % DrpD Apply to eye Daily at 8:00 am..   ? torsemide (DEMADEX) 5 MG tablet Take 1 tablet (5 mg total) by mouth daily.     Social History     Social History Narrative    , no children, lives alone.        Volunteer at [employer] in the past.     ______________________________________________________________________     History of present illness:    Patient comes in today for follow-up.    We reviewed her polymyalgia rheumatica.  She is doing well with this.  She is on 7.5 mg of prednisone.  She would like to reduce this if possible.  She is having some bruising on her lower legs.  She is not having it so much on her arms.  Her overall muscle pain has improved quite a bit.  She has no major concerns about it.    She has been on the prednisone 7.5 mg for 2 months.    Reviewed her presbyesophagus and reflux.  She increased her omeprazole to 40 mg twice daily at the last visit.  Her swallowing has been doing much better since this time.  She still belches and has a fair amount of both esophageal and colonic gas which is troublesome for her.    Reviewed her high blood pressure and her blood pressure runs high at times but has been doing well and recent checks.  She is taking her torsemide regularly at this time.    Reviewed her anxiety and she still takes clonazepam 0.25 mg at at bedtime.    On review of  "systems, the patient denies any chest pain or shortness of breath.    ______________________________________________________________________    Exam:    Wt Readings from Last 3 Encounters:   11/13/17 154 lb 4.8 oz (70 kg)   09/11/17 152 lb (68.9 kg)   07/11/17 149 lb (67.6 kg)     BP Readings from Last 3 Encounters:   11/13/17 146/84   09/11/17 138/82   07/11/17 142/78     /84  Pulse 69  Ht 5' 3\" (1.6 m)  Wt 154 lb 4.8 oz (70 kg)  LMP 06/18/1974  SpO2 97%  BMI 27.33 kg/m2   The patient is comfortable, no acute distress.  Mood good.  Insight good.  Eyes are nonicteric.  Neck is supple without mass.  No cervical adenopathy.  No thyromegaly. Heart regular rate and rhythm.  Lungs clear to auscultation bilaterally.  Respiratory effort is good.  Abdomen soft and nontender.  No hepatosplenomegaly.  Extremities no edema.  No active synovitis.  Some small areas of bruising in the lower extremities superficial.      ______________________________________________________________________    Diagnostics:    Results for orders placed or performed in visit on 06/20/17   Urinalysis Macro & Micro   Result Value Ref Range    Color, UA Yellow Colorless, Yellow, Straw, Light Yellow    Clarity, UA Clear Clear    Glucose, UA Negative Negative    Bilirubin, UA Negative Negative    Ketones, UA Negative Negative    Specific Gravity, UA 1.010 1.005 - 1.030    Blood, UA Negative Negative    pH, UA 6.0 5.0 - 8.0    Protein, UA Negative Negative mg/dL    Urobilinogen, UA 0.2 E.U./dL 0.2 E.U./dL, 1.0 E.U./dL    Nitrite, UA Negative Negative    Leukocytes, UA Small (!) Negative    Bacteria, UA Few (!) None Seen hpf    RBC, UA 0-2 None Seen, 0-2 hpf    WBC, UA 0-5 None Seen, 0-5 hpf    Squam Epithel, UA 5-10 (!) None Seen, 0-5 lpf   Sedimentation Rate   Result Value Ref Range    Sed Rate 25 (H) 0 - 20 mm/hr   C-Reactive Protein (CRP)   Result Value Ref Range    CRP 1.0 (H) 0.0 - 0.8 mg/dL   Comprehensive Metabolic Panel   Result Value " Ref Range    Sodium 140 136 - 145 mmol/L    Potassium 4.6 3.5 - 5.0 mmol/L    Chloride 108 (H) 98 - 107 mmol/L    CO2 23 22 - 31 mmol/L    Anion Gap, Calculation 9 5 - 18 mmol/L    Glucose 94 70 - 125 mg/dL    BUN 19 8 - 28 mg/dL    Creatinine 0.89 0.60 - 1.10 mg/dL    GFR MDRD Af Amer >60 >60 mL/min/1.73m2    GFR MDRD Non Af Amer 60 (L) >60 mL/min/1.73m2    Bilirubin, Total 0.4 0.0 - 1.0 mg/dL    Calcium 10.5 8.5 - 10.5 mg/dL    Protein, Total 7.0 6.0 - 8.0 g/dL    Albumin 3.9 3.5 - 5.0 g/dL    Alkaline Phosphatase 121 (H) 45 - 120 U/L    AST 20 0 - 40 U/L    ALT 19 0 - 45 U/L   HM2(CBC w/o Differential)   Result Value Ref Range    WBC 5.2 4.0 - 11.0 thou/uL    RBC 4.48 3.80 - 5.40 mill/uL    Hemoglobin 13.3 12.0 - 16.0 g/dL    Hematocrit 40.3 35.0 - 47.0 %    MCV 90 80 - 100 fL    MCH 29.7 27.0 - 34.0 pg    MCHC 33.0 32.0 - 36.0 g/dL    RDW 12.7 11.0 - 14.5 %    Platelets 249 140 - 440 thou/uL    MPV 8.3 7.0 - 10.0 fL   BNP(B-type Natriuretic Peptide)   Result Value Ref Range    BNP 64 0 - 167 pg/mL      ______________________________________________________________________    Assessment:    1. PMR (polymyalgia rheumatica) - August 2016    2. Superficial bruising of lower leg    3. Presbyesophagus    4. HTN (hypertension)    5. Anxiety       ______________________________________________________________________      PHQ-2 Total Score: 0 (6/20/2017  8:00 AM)  No Data Recorded    Plan:    1. Reduce prednisone to 5 mg a day.  2. Follow-up in 3 months with blood work at that time.  3. Consider reducing the prednisone further if able.  4. Continue omeprazole 40 mg twice daily.  5. Continue clonazepam at this time for her anxiety.  6. Consider update of CSA (controlled substance agreement) at the next visit.   7. Review UDS/DOA screen at next visit if appropriate.     Jersey Metcalf MD  General Internal Medicine  [facility]     Return in about 3 months (around 2/13/2018) for visit and blood work.

## 2021-06-25 NOTE — TELEPHONE ENCOUNTER
RN cannot approve Refill Request    RN can NOT refill this medication med is not covered by policy/route to provider. Last office visit: 4/9/2021 Jersey Metcalf MD Last Physical: Visit date not found Last MTM visit: Visit date not found Last visit same specialty: 4/9/2021 Jersey Metcalf MD.  Next visit within 3 mo: Visit date not found  Next physical within 3 mo: Visit date not found      Yemi Jackson, [facility] Triage/Med Refill 6/10/2021    Requested Prescriptions   Pending Prescriptions Disp Refills     clonazePAM (KLONOPIN) 0.5 MG tablet [Pharmacy Med Name: CLONAZEPAM 0.5 MG TABLET] 30 tablet 0     Sig: TAKE 1/2 - 1 TABLET BY MOUTH EACH NIGHT AT BEDTIME       Controlled Substances Refill Protocol Failed - 6/10/2021 11:14 AM        Failed - Route all Controlled Substance Requests to Provider        Passed - Patient has controlled substance agreement in past 12 months     Encounter-Level CSA Scan Date - 02/13/2018:    Scan on 2/15/2018  9:47 AM               Passed - Visit with PCP or prescribing provider visit in past 12 months      Last office visit with prescriber/PCP: 4/9/2021 Jersey Metcalf MD OR same dept: 4/9/2021 Jersey Metcalf MD OR same specialty: 4/9/2021 Jersey Metcalf MD Last physical: Visit date not found Last MTM visit: Visit date not found    Next visit within 3 mo: Visit date not found  Next physical within 3 mo: Visit date not found  Prescriber OR PCP: Jersey Metcalf MD  Last diagnosis associated with med order: 1. Anxiety  - clonazePAM (KLONOPIN) 0.5 MG tablet [Pharmacy Med Name: CLONAZEPAM 0.5 MG TABLET]; TAKE 1/2 - 1 TABLET BY MOUTH EACH NIGHT AT BEDTIME  Dispense: 30 tablet; Refill: 0    2. PMR (polymyalgia rheumatica) - August 2016  - predniSONE (DELTASONE) 5 MG tablet [Pharmacy Med Name: PREDNISONE 5 MG TABLET]; TAKE 1/2 TO 1 TABLET BY MOUTH ONCE DAILY  Dispense: 60 tablet; Refill: 0                  predniSONE (DELTASONE) 5 MG tablet [Pharmacy Med Name: PREDNISONE 5  MG TABLET] 60 tablet 0     Sig: TAKE 1/2 TO 1 TABLET BY MOUTH ONCE DAILY       There is no refill protocol information for this order

## 2021-06-25 NOTE — TELEPHONE ENCOUNTER
Last Office Visit  4/9/2021 Jersey Metcalf MD    Notes:  1. PMR (polymyalgia rheumatica) (H)  Continue to monitor.  No obvious signs of worsening.     - Sedimentation Rate  - C-Reactive Protein(CRP)     2. Hyperparathyroidism (H)  Continue to monitor.  Blood work checked today.     3. Senile purpura (H)  Not worsening.     4. Essential hypertension  Continue current medications.     - Basic Metabolic Panel     5. Edema of right lower extremity     - Hepatic Profile     6. Dyspnea, unspecified type     - BNP(B-type Natriuretic Peptide)  - HM2(CBC w/o Differential)     7. Primary osteoarthritis of both knees  Consider x-ray in the future.  Exam is consistent with worse disease on the right.  Corticosteroid injection done to both knees.     - methylPREDNISolone acetate injection 80 mg (DEPO-MEDROL)  - Injection - Other     8. Nonhealing skin ulcer, unspecified ulcer stage (H)  Doing well at this time.     9. Numbness of fingers  Discussed possible carpal tunnel syndrome.  Cannot rule out C7 lesion but doubt.     10. Primary osteoarthritis of both hands    Last Filled:  clonazePAM (KLONOPIN) 0.5 MG tablet 30 tablet 0 5/4/2021  No   Sig: TAKE 1/2 - 1 TABLET BY MOUTH EACH NIGHT AT BEDTIME   Sent to pharmacy as: clonazePAM 0.5 mg tablet (KlonoPIN)   Notes to Pharmacy: Not to exceed 5 additional fills before 09/07/2021   E-Prescribing Status: Receipt confirmed by pharmacy (5/4/2021  8:04 PM CDT)     predniSONE (DELTASONE) 5 MG tablet 60 tablet 0 5/4/2021  No   Sig: TAKE 1/2 TO 1 TABLET BY MOUTH ONCE DAILY   Sent to pharmacy as: predniSONE 5 mg tablet (DELTASONE)   E-Prescribing Status: Receipt confirmed by pharmacy (5/4/2021  8:04 PM CDT)       Lab Results   Component Value Date    AMPHET Screen Negative 12/16/2019    HEBENZODIAZ Screen Negative 12/16/2019    OPIATES Screen Negative 12/16/2019    PCP Screen Negative 12/16/2019    THC Screen Negative 12/16/2019    BARBIT Screen Negative 12/16/2019    COCAINEMETAB Screen  Negative 12/16/2019    METHADNE Screen Negative 12/16/2019    OXYCODONE Screen Negative 12/16/2019    CREAUR 25.7 12/16/2019         Next OV:  Visit date not found      Encounter-Level CSA Scan Date - 02/13/2018:    9/17/2020         Medication teed up for provider signature - please adjust as appropriate.

## 2021-06-25 NOTE — PROGRESS NOTES
Progress Notes by Jersey Metcalf MD at 7/11/2017  9:40 AM     Author: Jersey Metcalf MD Service: -- Author Type: Physician    Filed: 7/11/2017 11:27 PM Encounter Date: 7/11/2017 Status: Signed    : Jersey Metcalf MD (Physician)              [facility]    Comprehensive and complex medical care - Chronic disease management - Shared decision making - Care coordination - Compassionate care    Patient advocacy - Rational deprescribing - Minimally disruptive medicine - Ethical focus - Customized care    Date of Service: 7/11/2017  Primary Provider: Jersey Metcalf MD    Patient Care Team:  Jersey Metcalf MD as PCP - General (Internal Medicine)  Be Wick MD as Physician (Ophthalmology)  Giovanny Koo MD as Physician (Orthopedic Surgery)     ______________________________________________________________________     Patient's Pharmacy:    [facility]/pharmacy [address] [place] [address]  Phone: [phone] Fax: [phone]     Patient's Insurance:    Payor: BLUE CROSS / Plan: BLUE CROSS PLATINUM / Product Type: COST PLAN /     ______________________________________________________________________     Manuelakirstin Olivas is 86 y.o. female who comes in today for:    Chief Complaint   Patient presents with   ? Follow-up     PMR and HTN doing a lot better       Patient Active Problem List   Diagnosis   ? Glaucoma   ? Hypercalcemia   ? Hyperparathyroidism   ? GERD (gastroesophageal reflux disease)   ? HTN (hypertension)   ? Anxiety   ? Esophageal dysphagia   ? Insomnia   ? HLD (hyperlipidemia)   ? Presbyesophagus   ? OA (osteoarthritis)-fingers   ? PMR (polymyalgia rheumatica) - August 2016   ? Nonsmoker   ? DNR (do not resuscitate)     Current Outpatient Prescriptions   Medication Sig   ? clonazePAM (KLONOPIN) 0.5 MG tablet Take 0.5-1 tablets (0.25-0.5 mg total) by mouth bedtime.   ? dorzolamide (TRUSOPT) 2 %  ophthalmic solution 1 drop 3 (three) times a day.   ? metoprolol tartrate (LOPRESSOR) 50 MG tablet Take 1 tablet (50 mg total) by mouth 2 (two) times a day.   ? omeprazole (PRILOSEC) 20 MG capsule TAKE 1 CAPSULE (20 MG TOTAL) BY MOUTH 2 (TWO) TIMES A DAY.   ? predniSONE (DELTASONE) 5 MG tablet Take 2 tablets (10 mg total) by mouth daily.   ? timolol maleate (ISTALOL) 0.5 % DrpD Apply to eye Daily at 8:00 am..   ? torsemide (DEMADEX) 5 MG tablet Take 1 tablet (5 mg total) by mouth daily.     Social History     Social History Narrative    , no children, lives alone.        Volunteer at [employer] in the past.     ______________________________________________________________________     History of present illness:    The patient comes in today for follow-up.    We first reviewed herPolymyalgia rheumatica.  She continues on prednisone 10 mg a day at this time.  She is doing a lot better since she resumed this.  Her joint pain overall has been doing very well.  She has no concerns about this and would like to continue on the dose at this time.  We talked about finding the lowest effective dose for her in the future.    We reviewed her high blood pressure and her blood pressure is doing better today.  Her edema in her legs are doing better as well.  She remains on a low dose of torsemide.    We reviewed her anxiety and this is stable at this time without any new issues.  She is on stable medication for this.    On review of systems, the patient denies any chest pain or shortness of breath.    ______________________________________________________________________    Exam:    Wt Readings from Last 3 Encounters:   07/11/17 149 lb (67.6 kg)   06/20/17 151 lb (68.5 kg)   04/04/17 148 lb 8 oz (67.4 kg)     BP Readings from Last 3 Encounters:   07/11/17 142/78   06/20/17 160/84   04/04/17 138/80     /78  Pulse 68  Temp 97.2  F (36.2  C) (Oral)   Wt 149 lb (67.6 kg)  LMP 06/18/1967  SpO2 96%  BMI 25.38 kg/m2    The patient is comfortable, no acute distress.  Mood good.  Insight good.  Eyes are nonicteric.  Neck is supple without mass.  No cervical adenopathy.  No thyromegaly. Heart regular rate and rhythm.  Lungs clear to auscultation bilaterally.  Respiratory effort is good.  Abdomen soft and nontender.  No hepatosplenomegaly.  Extremities no edema.  There is no active synovitis at this time.      ______________________________________________________________________    Diagnostics:    Results for orders placed or performed in visit on 06/20/17   Urinalysis Macro & Micro   Result Value Ref Range    Color, UA Yellow Colorless, Yellow, Straw, Light Yellow    Clarity, UA Clear Clear    Glucose, UA Negative Negative    Bilirubin, UA Negative Negative    Ketones, UA Negative Negative    Specific Gravity, UA 1.010 1.005 - 1.030    Blood, UA Negative Negative    pH, UA 6.0 5.0 - 8.0    Protein, UA Negative Negative mg/dL    Urobilinogen, UA 0.2 E.U./dL 0.2 E.U./dL, 1.0 E.U./dL    Nitrite, UA Negative Negative    Leukocytes, UA Small (!) Negative    Bacteria, UA Few (!) None Seen hpf    RBC, UA 0-2 None Seen, 0-2 hpf    WBC, UA 0-5 None Seen, 0-5 hpf    Squam Epithel, UA 5-10 (!) None Seen, 0-5 lpf   Sedimentation Rate   Result Value Ref Range    Sed Rate 25 (H) 0 - 20 mm/hr   C-Reactive Protein (CRP)   Result Value Ref Range    CRP 1.0 (H) 0.0 - 0.8 mg/dL   Comprehensive Metabolic Panel   Result Value Ref Range    Sodium 140 136 - 145 mmol/L    Potassium 4.6 3.5 - 5.0 mmol/L    Chloride 108 (H) 98 - 107 mmol/L    CO2 23 22 - 31 mmol/L    Anion Gap, Calculation 9 5 - 18 mmol/L    Glucose 94 70 - 125 mg/dL    BUN 19 8 - 28 mg/dL    Creatinine 0.89 0.60 - 1.10 mg/dL    GFR MDRD Af Amer >60 >60 mL/min/1.73m2    GFR MDRD Non Af Amer 60 (L) >60 mL/min/1.73m2    Bilirubin, Total 0.4 0.0 - 1.0 mg/dL    Calcium 10.5 8.5 - 10.5 mg/dL    Protein, Total 7.0 6.0 - 8.0 g/dL    Albumin 3.9 3.5 - 5.0 g/dL    Alkaline Phosphatase 121 (H) 45 -  120 U/L    AST 20 0 - 40 U/L    ALT 19 0 - 45 U/L   HM2(CBC w/o Differential)   Result Value Ref Range    WBC 5.2 4.0 - 11.0 thou/uL    RBC 4.48 3.80 - 5.40 mill/uL    Hemoglobin 13.3 12.0 - 16.0 g/dL    Hematocrit 40.3 35.0 - 47.0 %    MCV 90 80 - 100 fL    MCH 29.7 27.0 - 34.0 pg    MCHC 33.0 32.0 - 36.0 g/dL    RDW 12.7 11.0 - 14.5 %    Platelets 249 140 - 440 thou/uL    MPV 8.3 7.0 - 10.0 fL   BNP(B-type Natriuretic Peptide)   Result Value Ref Range    BNP 64 0 - 167 pg/mL      ______________________________________________________________________    Assessment:    1. PMR (polymyalgia rheumatica) - August 2016    2. HTN (hypertension)    3. Bilateral lower extremity edema    4. Encounter for completion of form with patient    5. Anxiety       ______________________________________________________________________      Lab Results   Component Value Date    LDLCALC 388 (H) 01/28/2016       PHQ-2 Total Score: 0 (6/20/2017  8:00 AM)  No Data Recorded    Plan:    Patient is to proceed with continuing on 10 mg of prednisone at this time.  Follow-up again in 2 months.  Consider increasing medication for blood pressure in the future if needed.  Currently doing well clinically.  We filled out a form for disability parking today.    Jersey Metcalf MD  General Internal Medicine  [facility]     Return in about 2 months (around 9/11/2017) for follow up visit.

## 2021-06-25 NOTE — PROGRESS NOTES
Progress Notes by Jersey Metcalf MD at 6/20/2017  8:25 AM     Author: Jersey Metcalf MD Service: -- Author Type: Physician    Filed: 6/21/2017  9:46 PM Encounter Date: 6/20/2017 Status: Signed    : Jersey Metcalf MD (Physician)              [facility]    Comprehensive and complex medical care - Chronic disease management - Shared decision making - Care coordination - Compassionate care    Patient advocacy - Rational deprescribing - Minimally disruptive medicine - Ethical focus - Customized care    Date of Service: 6/20/2017  Primary Provider: Jersey Metcalf MD    Patient Care Team:  Jersey Metcalf MD as PCP - General (Internal Medicine)  Be Wick MD as Physician (Ophthalmology)  Giovanny Koo MD as Physician (Orthopedic Surgery)     ______________________________________________________________________     Patient's Pharmacy:    [facility]/pharmacy [address] [place] [address]  Phone: [phone] Fax: [phone]     Patient's Insurance:    Payor: BLUE CROSS / Plan: BLUE CROSS PLATINUM / Product Type: COST PLAN /     ______________________________________________________________________     Manuelakirstin Olivas is 86 y.o. female who comes in today for:    Chief Complaint   Patient presents with   ? Follow-up     feet are starting to swell and hurts all over       Patient Active Problem List   Diagnosis   ? Glaucoma   ? Hypercalcemia   ? Hyperparathyroidism   ? GERD (gastroesophageal reflux disease)   ? HTN (hypertension)   ? Anxiety   ? Esophageal dysphagia   ? Insomnia   ? HLD (hyperlipidemia)   ? Presbyesophagus   ? OA (osteoarthritis)-fingers   ? PMR (polymyalgia rheumatica) - August 2016   ? Nonsmoker   ? DNR (do not resuscitate)     Current Outpatient Prescriptions   Medication Sig   ? clonazePAM (KLONOPIN) 0.5 MG tablet Take 0.5-1 tablets (0.25-0.5 mg total) by mouth bedtime.   ? dorzolamide  (TRUSOPT) 2 % ophthalmic solution 1 drop 3 (three) times a day.   ? metoprolol tartrate (LOPRESSOR) 50 MG tablet Take 1 tablet (50 mg total) by mouth 2 (two) times a day.   ? omeprazole (PRILOSEC) 20 MG capsule TAKE 1 CAPSULE (20 MG TOTAL) BY MOUTH 2 (TWO) TIMES A DAY.   ? timolol maleate (ISTALOL) 0.5 % DrpD Apply to eye Daily at 8:00 am..   ? benzonatate (TESSALON PERLES) 100 MG capsule Take 1 capsule (100 mg total) by mouth every 6 (six) hours as needed for cough.   ? predniSONE (DELTASONE) 5 MG tablet Take 2 tablets (10 mg total) by mouth daily.   ? torsemide (DEMADEX) 5 MG tablet Take 1 tablet (5 mg total) by mouth daily.     Social History     Social History Narrative    , no children, lives alone.        Volunteer at [employer] in the past.     ______________________________________________________________________     History of present illness:    The patient comes in today for follow-up and for a number of issues.    We first reviewed her swelling in her feet.  She has been noticing it in the last couple of weeks.  It's in both feet it's worse at night.  It's worse in the right foot.  It feels weak and sometimes tingling.  She has had a little bit of shortness of breath but no chest pain and this is not particularly concerning for her.  She denies any coughing.  She is not sure what to do.    Reviewed her her polymyalgia.  She is off her prednisone at this time.  She been having increasing aching in her legs and knees.  It aches more along the sides of the hips and upper legs.  The prednisone seemed to help her more.  It's hard for her to get moving in the morning.  It's been getting worse over the last 1-2 months.  Her fingers also feel achy to her.    Reviewed her presbyesophagus and she is still having some issues with swallowing at times, but not as bad as in the past.  She continues to crush a lot of her foods.  She hasn't had any unintended weight loss.    Reviewed her CODE STATUS with her.   She does wish to be DNR/DNI.  This was reviewed with her.    Her blood pressure is elevated today.  We reviewed this with her to.    We reviewed her other issues noted in the assessment but not specifically addressed in the HPI above.     The 14-system review of systems form for [facility] was completed by patient, reviewed by me, and pertinent problems are reviewed above.     ______________________________________________________________________    Exam:    Wt Readings from Last 3 Encounters:   06/20/17 151 lb (68.5 kg)   04/04/17 148 lb 8 oz (67.4 kg)   03/14/17 150 lb (68 kg)     BP Readings from Last 3 Encounters:   06/20/17 160/84   04/04/17 138/80   03/16/17 136/76     /84  Pulse 77  Temp 97  F (36.1  C) (Oral)   Wt 151 lb (68.5 kg)  LMP 06/18/1967  SpO2 97%  BMI 25.72 kg/m2   The patient is comfortable, no acute distress.  Mood good.  Insight good.  Eyes are nonicteric.  Neck is supple without mass.  No cervical adenopathy.  No thyromegaly. Heart regular rate and rhythm.  Lungs clear to auscultation bilaterally.  Respiratory effort is good.  Abdomen soft and nontender.  No hepatosplenomegaly.  Extremities  Trace pedal edema.  She has no active synovitis in her hands or her wrists.  Her knees also show no signs of synovitis.      ______________________________________________________________________    Diagnostics:    Results for orders placed or performed in visit on 06/20/17   Urinalysis Macro & Micro   Result Value Ref Range    Color, UA Yellow Colorless, Yellow, Straw, Light Yellow    Clarity, UA Clear Clear    Glucose, UA Negative Negative    Bilirubin, UA Negative Negative    Ketones, UA Negative Negative    Specific Gravity, UA 1.010 1.005 - 1.030    Blood, UA Negative Negative    pH, UA 6.0 5.0 - 8.0    Protein, UA Negative Negative mg/dL    Urobilinogen, UA 0.2 E.U./dL 0.2 E.U./dL, 1.0 E.U./dL    Nitrite, UA Negative Negative    Leukocytes, UA  Small (!) Negative    Bacteria, UA Few (!) None Seen hpf    RBC, UA 0-2 None Seen, 0-2 hpf    WBC, UA 0-5 None Seen, 0-5 hpf    Squam Epithel, UA 5-10 (!) None Seen, 0-5 lpf   Sedimentation Rate   Result Value Ref Range    Sed Rate 25 (H) 0 - 20 mm/hr   C-Reactive Protein (CRP)   Result Value Ref Range    CRP 1.0 (H) 0.0 - 0.8 mg/dL   Comprehensive Metabolic Panel   Result Value Ref Range    Sodium 140 136 - 145 mmol/L    Potassium 4.6 3.5 - 5.0 mmol/L    Chloride 108 (H) 98 - 107 mmol/L    CO2 23 22 - 31 mmol/L    Anion Gap, Calculation 9 5 - 18 mmol/L    Glucose 94 70 - 125 mg/dL    BUN 19 8 - 28 mg/dL    Creatinine 0.89 0.60 - 1.10 mg/dL    GFR MDRD Af Amer >60 >60 mL/min/1.73m2    GFR MDRD Non Af Amer 60 (L) >60 mL/min/1.73m2    Bilirubin, Total 0.4 0.0 - 1.0 mg/dL    Calcium 10.5 8.5 - 10.5 mg/dL    Protein, Total 7.0 6.0 - 8.0 g/dL    Albumin 3.9 3.5 - 5.0 g/dL    Alkaline Phosphatase 121 (H) 45 - 120 U/L    AST 20 0 - 40 U/L    ALT 19 0 - 45 U/L   HM2(CBC w/o Differential)   Result Value Ref Range    WBC 5.2 4.0 - 11.0 thou/uL    RBC 4.48 3.80 - 5.40 mill/uL    Hemoglobin 13.3 12.0 - 16.0 g/dL    Hematocrit 40.3 35.0 - 47.0 %    MCV 90 80 - 100 fL    MCH 29.7 27.0 - 34.0 pg    MCHC 33.0 32.0 - 36.0 g/dL    RDW 12.7 11.0 - 14.5 %    Platelets 249 140 - 440 thou/uL    MPV 8.3 7.0 - 10.0 fL   BNP(B-type Natriuretic Peptide)   Result Value Ref Range    BNP 64 0 - 167 pg/mL      ______________________________________________________________________    Assessment:    1. Bilateral lower extremity edema    2. HTN (hypertension)    3. DNR (do not resuscitate)    4. PMR (polymyalgia rheumatica) - August 2016    5. Anxiety    6. Presbyesophagus       ______________________________________________________________________      Lab Results   Component Value Date    LDLCALC 388 (H) 01/28/2016       PHQ-2 Total Score: 0 (6/20/2017  8:00 AM)  No Data Recorded    Plan:    1. The patient wishes to be DNR/DNI at this  time.  2. We will restart her on prednisone to help with her joint pains which I think is due to her polymyalgia rheumatica.  3. She will be started on a low dose of torsemide for her swelling in her legs and mild shortness of breath.  Blood work also done today.  Consider further imaging if needed.  4. Blood pressure should improve if she continues to do well after this.    Jersey Metcalf MD  General Internal Medicine  [facility]     Return in about 3 weeks (around 7/11/2017) for follow up visit.

## 2021-06-25 NOTE — PROGRESS NOTES
Progress Notes by Jersey Metcalf MD at 9/11/2017  8:50 AM     Author: Jersey Metcalf MD Service: -- Author Type: Physician    Filed: 9/11/2017 11:34 PM Encounter Date: 9/11/2017 Status: Signed    : Jersey Metcalf MD (Physician)              [facility]    Comprehensive and complex medical care - Chronic disease management - Shared decision making - Care coordination - Compassionate care    Patient advocacy - Rational deprescribing - Minimally disruptive medicine - Ethical focus - Customized care    Date of Service: 9/11/2017  Primary Provider: Jersey Metcalf MD    Patient Care Team:  Jersey Metcalf MD as PCP - General (Internal Medicine)  Be Wick MD as Physician (Ophthalmology)  Giovanny Koo MD as Physician (Orthopedic Surgery)     ______________________________________________________________________     Patient's Pharmacy:    [facility]/pharmacy [address] [place] [address]  Phone: [phone] Fax: [phone]     Patient's Insurance:    Payor: BLUE CROSS / Plan: BLUE CROSS PLATINUM / Product Type: COST PLAN /     ______________________________________________________________________     Manuelaeitan Olivas is 87 y.o. female who comes in today for:    Chief Complaint   Patient presents with   ? Follow-up     2 month follow up        Patient Active Problem List   Diagnosis   ? Glaucoma   ? Hypercalcemia   ? Hyperparathyroidism   ? GERD (gastroesophageal reflux disease)   ? HTN (hypertension)   ? Anxiety   ? Esophageal dysphagia   ? Insomnia   ? HLD (hyperlipidemia)   ? Presbyesophagus   ? OA (osteoarthritis)-fingers   ? PMR (polymyalgia rheumatica) - August 2016   ? Nonsmoker   ? DNR (do not resuscitate)      Current Outpatient Prescriptions   Medication Sig   ? clonazePAM (KLONOPIN) 0.5 MG tablet TAKE HALF A TAB-1 TABLETS (0.25-0.5 MG TOTAL) BY MOUTH BEDTIME.   ? dorzolamide (TRUSOPT) 2 %  ophthalmic solution 1 drop 3 (three) times a day.   ? metoprolol tartrate (LOPRESSOR) 50 MG tablet Take 1 tablet (50 mg total) by mouth 2 (two) times a day.   ? omeprazole (PRILOSEC) 40 MG capsule Take 1 capsule (40 mg total) by mouth 2 (two) times a day.   ? predniSONE (DELTASONE) 5 MG tablet Take 2 tablets (10 mg total) by mouth daily.   ? timolol maleate (ISTALOL) 0.5 % DrpD Apply to eye Daily at 8:00 am..   ? torsemide (DEMADEX) 5 MG tablet Take 1 tablet (5 mg total) by mouth daily.     Social History     Social History Narrative    , no children, lives alone.        Volunteer at [employer] in the past.     ______________________________________________________________________     History of present illness:    Patient comes in today for follow-up of a number of issues.    Reviewed her hypertension today.  Blood pressure has been in the goal range.  Denies any excessive dizziness from the medication with this.  She is wondering whether she should continue on the torsemide.  She does not always take it every day due to diuresis.  We discussed the details of this.    Reviewed the patient's anxiety and this is doing well.    We reviewed her polymyalgia rheumatica and this is doing well on 10 mg of prednisone a day.  She still has some aches and pains, but appeared to how it was, she is doing a lot better.  She denies any swelling in any of her joints.  She would be willing to try reducing the medication at this point.    We reviewed her presbyesophagus and reflux disease.  She does get some belching and some catching in her chest at times.  She does get some acid at times.  She would like to try to increase her omeprazole if possible.  She is taking 20 mg twice a day and we could increase this to 40 mg twice a day provided her insurance covers this.  She denies any major dysphagia with this.    On review of systems, the patient denies any chest pain or shortness of  "breath.    ______________________________________________________________________    Exam:    Wt Readings from Last 3 Encounters:   09/11/17 152 lb (68.9 kg)   07/11/17 149 lb (67.6 kg)   06/20/17 151 lb (68.5 kg)     BP Readings from Last 3 Encounters:   09/11/17 138/82   07/11/17 142/78   06/20/17 160/84     /82  Pulse 80  Ht 5' 4.25\" (1.632 m)  Wt 152 lb (68.9 kg)  LMP 06/18/1967  BMI 25.89 kg/m2    The patient is comfortable, no acute distress.  Mood good.  Insight good.  Eyes are nonicteric.  Neck is supple without mass.  No cervical adenopathy.  No thyromegaly. Heart regular rate and rhythm.  Lungs clear to auscultation bilaterally.  Respiratory effort is good.  Abdomen soft and nontender.  No hepatosplenomegaly.  Extremities no edema.      ______________________________________________________________________    Diagnostics:    Results for orders placed or performed in visit on 06/20/17   Urinalysis Macro & Micro   Result Value Ref Range    Color, UA Yellow Colorless, Yellow, Straw, Light Yellow    Clarity, UA Clear Clear    Glucose, UA Negative Negative    Bilirubin, UA Negative Negative    Ketones, UA Negative Negative    Specific Gravity, UA 1.010 1.005 - 1.030    Blood, UA Negative Negative    pH, UA 6.0 5.0 - 8.0    Protein, UA Negative Negative mg/dL    Urobilinogen, UA 0.2 E.U./dL 0.2 E.U./dL, 1.0 E.U./dL    Nitrite, UA Negative Negative    Leukocytes, UA Small (!) Negative    Bacteria, UA Few (!) None Seen hpf    RBC, UA 0-2 None Seen, 0-2 hpf    WBC, UA 0-5 None Seen, 0-5 hpf    Squam Epithel, UA 5-10 (!) None Seen, 0-5 lpf   Sedimentation Rate   Result Value Ref Range    Sed Rate 25 (H) 0 - 20 mm/hr   C-Reactive Protein (CRP)   Result Value Ref Range    CRP 1.0 (H) 0.0 - 0.8 mg/dL   Comprehensive Metabolic Panel   Result Value Ref Range    Sodium 140 136 - 145 mmol/L    Potassium 4.6 3.5 - 5.0 mmol/L    Chloride 108 (H) 98 - 107 mmol/L    CO2 23 22 - 31 mmol/L    Anion Gap, Calculation 9 " 5 - 18 mmol/L    Glucose 94 70 - 125 mg/dL    BUN 19 8 - 28 mg/dL    Creatinine 0.89 0.60 - 1.10 mg/dL    GFR MDRD Af Amer >60 >60 mL/min/1.73m2    GFR MDRD Non Af Amer 60 (L) >60 mL/min/1.73m2    Bilirubin, Total 0.4 0.0 - 1.0 mg/dL    Calcium 10.5 8.5 - 10.5 mg/dL    Protein, Total 7.0 6.0 - 8.0 g/dL    Albumin 3.9 3.5 - 5.0 g/dL    Alkaline Phosphatase 121 (H) 45 - 120 U/L    AST 20 0 - 40 U/L    ALT 19 0 - 45 U/L   HM2(CBC w/o Differential)   Result Value Ref Range    WBC 5.2 4.0 - 11.0 thou/uL    RBC 4.48 3.80 - 5.40 mill/uL    Hemoglobin 13.3 12.0 - 16.0 g/dL    Hematocrit 40.3 35.0 - 47.0 %    MCV 90 80 - 100 fL    MCH 29.7 27.0 - 34.0 pg    MCHC 33.0 32.0 - 36.0 g/dL    RDW 12.7 11.0 - 14.5 %    Platelets 249 140 - 440 thou/uL    MPV 8.3 7.0 - 10.0 fL   BNP(B-type Natriuretic Peptide)   Result Value Ref Range    BNP 64 0 - 167 pg/mL      ______________________________________________________________________    Assessment:    1. Presbyesophagus    2. Healthcare maintenance    3. Gastroesophageal reflux disease with esophagitis    4. Anxiety    5. HTN (hypertension)       ______________________________________________________________________     PHQ-2 Total Score: 0 (6/20/2017  8:00 AM)  No Data Recorded      Plan:    1. Reduce the prednisone to 7.5 mg a day and see if her symptoms do well  2. Increase omeprazole to 40 mg twice daily.  3. Use Maalox or Mylanta for indigestion.  4. Follow-up sooner if issues.  Consider bloodwork at next visit.  5. Continue torsemide for blood pressure management.       Jersey Metcalf MD  General Internal Medicine  [facility]     Personal office fax - [phone]  Voice mail - [phone]  E-mail - [email or web address]    Return in about 2 months (around 11/11/2017) for visit and blood work.     Future Appointments  Date Time Provider Department Center   11/13/2017 8:50 AM Jersey Metcalf MD [facility]

## 2021-06-25 NOTE — PROGRESS NOTES
Progress Notes by Jersey Metcalf MD at 3/14/2017  8:50 AM     Author: Jersey Metcalf MD Service: -- Author Type: Physician    Filed: 3/16/2017  9:26 AM Encounter Date: 3/14/2017 Status: Signed    : Jersey Metcalf MD (Physician)              [facility]    Comprehensive and complex medical care - Chronic disease management - Shared decision making - Care coordination - Compassionate care    Patient advocacy - Rational deprescribing - Minimally disruptive medicine - Ethical focus - Customized care    Date of Service: 3/14/2017  Primary Provider: Jersey Metcalf MD    Patient Care Team:  Jersey Metcalf MD as PCP - General (Internal Medicine)  Be Wick MD as Physician (Ophthalmology)     ______________________________________________________________________     Patient's Pharmacy:    [facility]/pharmacy [address] [place] [address]  Phone: [phone] Fax: [phone]     Patient's Insurance:    Payor: BLUE CROSS / Plan: BLUE CROSS PLATINUM / Product Type: COST PLAN /     ______________________________________________________________________     Manuelakirstin Olivas is 86 y.o. female who comes in today for:    Chief Complaint   Patient presents with   ? Follow-up       Patient Active Problem List   Diagnosis   ? Glaucoma   ? Hypercalcemia   ? Hyperparathyroidism   ? GERD (gastroesophageal reflux disease)   ? HTN (hypertension)   ? Anxiety   ? Esophageal dysphagia   ? Insomnia   ? HLD (hyperlipidemia)   ? Presbyesophagus   ? OA (osteoarthritis)-fingers   ? PMR (polymyalgia rheumatica) - August 2016   ? Nonsmoker     Current Outpatient Prescriptions   Medication Sig   ? clonazePAM (KLONOPIN) 0.5 MG tablet Take 0.5-1 tablets (0.25-0.5 mg total) by mouth bedtime.   ? dorzolamide (TRUSOPT) 2 % ophthalmic solution 1 drop 3 (three) times a day.   ? metoprolol tartrate (LOPRESSOR) 50 MG tablet Take 1 tablet (50  mg total) by mouth 2 (two) times a day.   ? omeprazole (PRILOSEC) 20 MG capsule TAKE 1 CAPSULE (20 MG TOTAL) BY MOUTH 2 (TWO) TIMES A DAY.   ? timolol maleate (ISTALOL) 0.5 % DrpD Apply to eye Daily at 8:00 am..     Social History     Social History Narrative    , no children, lives alone.        Volunteer at [employer] in the past.     ______________________________________________________________________     History of present illness:    The patient comes in today for a number of issues.    First reviewed some skin lesions I have been bothering her on both of her forearms.  They do notably seem to be gradually increasing in size.    We reviewed her diarrhea.  She was seen in walk-in care for this.  She is doing much better from this standpoint.  She is now having a bowel movement every other day.  She is noticing still some rumbling in her stomach and she is burping a lot.  She is markedly improved though from 2 weeks ago.    We reviewed her polymyalgia rheumatica and she is doing okay at this time in relationship to her aches and pains.  This was a clinical diagnosis/working diagnosis and we are following her off steroids at this time.    We reviewed her reflux disease in the twice a day omeprazole seems to be working better for her than once a day at this time.    Reviewed her blood pressure and she denies any issues with her current medications.    On review of systems, the patient denies any chest pain or shortness of breath.    ______________________________________________________________________    Exam:    Wt Readings from Last 3 Encounters:   03/14/17 150 lb (68 kg)   02/26/17 148 lb (67.1 kg)   01/09/17 151 lb 12.8 oz (68.9 kg)     BP Readings from Last 3 Encounters:   03/16/17 136/76   02/26/17 142/66   01/09/17 134/80     Visit Vitals   ? /76   ? Pulse 86   ? Wt 150 lb (68 kg)   ? LMP 06/18/1967   ? SpO2 99%   ? BMI 25.55 kg/m2      The patient is comfortable, no acute distress.  Mood  anxious.  Insight good.  Eyes are nonicteric.  Neck is supple without mass.  No cervical adenopathy.  No thyromegaly. Heart regular rate and rhythm.  Lungs clear to auscultation bilaterally.  Respiratory effort is good.  Abdomen soft and nontender.  No hepatosplenomegaly.  Extremities no edema.  She has 2 actinic keratoses on her left forearm and one on her right forearm which are thickened.      ______________________________________________________________________    Diagnostics:    Results for orders placed or performed in visit on 02/26/17   EnteroHaemorrhagic E. coli Work Up   Result Value Ref Range    Shiga Toxin 1 Negative Negative    Shiga Toxin 2 Negative Negative   Culture, Stool   Result Value Ref Range    Culture       No Salmonella, Shigella, Yersinia, or Campylobacter.   C. difficile Toxigenic by PCR   Result Value Ref Range    C.Difficile Toxigenic by PCR Negative Negative      ______________________________________________________________________    Assessment:    1. AK (actinic keratosis)    2. Diarrhea    3. PMR (polymyalgia rheumatica)    4. GERD (gastroesophageal reflux disease)    5. HTN (hypertension)       ______________________________________________________________________      PHQ-2 Total Score: 0 (11/7/2016  8:00 AM)  No Data Recorded    Plan:    1. The 3 actinic keratoses on her arms were treated with liquid nitrogen in a freeze thaw freeze technique.   2. Diarrhea resolved.  3. Follow joint symptoms off of prednisone.  4. Continue omeprazole twice a day.  5. Consider medication for blood pressure if needed-likely triamterene hydrochlorothiazide or likewise.    Jersey eMtcalf MD  General Internal Medicine  [facility]     Return in about 3 months (around 6/14/2017).

## 2021-06-26 NOTE — PROGRESS NOTES
Progress Notes by Jersey Metcalf MD at 2/13/2018  8:50 AM     Author: Jersey Metcalf MD Service: -- Author Type: Physician    Filed: 2/13/2018 10:22 AM Encounter Date: 2/13/2018 Status: Signed    : Jersey Metcalf MD (Physician)              [facility]    Comprehensive and complex medical care - Chronic disease management - Shared decision making - Care coordination - Compassionate care    Patient advocacy - Rational deprescribing - Minimally disruptive medicine - Ethical focus - Customized care    ______________________________________________________________________     Date of Service: 2/13/2018  Primary Provider: Jersey Metcalf MD    Patient Care Team:  Jersey Metcalf MD as PCP - General (Internal Medicine)  Be Wick MD as Physician (Ophthalmology)  Giovanny Koo MD as Physician (Orthopedic Surgery)     ______________________________________________________________________     Patient's Pharmacy:    [facility]/pharmacy [address] [place] [address]  Phone: [phone] Fax: [phone]     Patient's Contacts:  Name Home Phone Work Phone Mobile Phone Relationship Lg Ej   SHIMON CROSS [phone]   Nephew      Patient's Insurance:    Payor: BLUE CROSS / Plan: BLUE CROSS PLATINUM / Product Type: COST PLAN /     ______________________________________________________________________     Manuela M Brendon is 87 y.o. female who comes in today for:    Chief Complaint   Patient presents with   ? Follow-up     still has a lot of digestion problems   ? Referral     for cysts on head       Patient Active Problem List   Diagnosis   ? Glaucoma   ? Hypercalcemia   ? Hyperparathyroidism   ? GERD (gastroesophageal reflux disease)   ? HTN (hypertension)   ? Anxiety   ? Esophageal dysphagia   ? Insomnia   ? HLD (hyperlipidemia)   ? Presbyesophagus   ? OA (osteoarthritis)-fingers   ? PMR (polymyalgia  rheumatica) - August 2016   ? Nonsmoker   ? DNR (do not resuscitate)   ? Controlled substance agreement signed - 2/18 - Clonazepam - UDS 2/18     Current Outpatient Prescriptions   Medication Sig   ? clonazePAM (KLONOPIN) 0.5 MG tablet TAKE HALF A TAB-1 TABLETS (0.25-0.5 MG TOTAL) BY MOUTH BEDTIME.   ? dorzolamide (TRUSOPT) 2 % ophthalmic solution 1 drop 3 (three) times a day.   ? metoprolol tartrate (LOPRESSOR) 50 MG tablet TAKE 1 TABLET (50 MG TOTAL) BY MOUTH 2 (TWO) TIMES A DAY.   ? omeprazole (PRILOSEC) 40 MG capsule Take 1 capsule (40 mg total) by mouth 2 (two) times a day.   ? predniSONE (DELTASONE) 5 MG tablet Take 1-2 tablets (5-10 mg total) by mouth daily.   ? timolol maleate (ISTALOL) 0.5 % DrpD Apply to eye Daily at 8:00 am..   ? torsemide (DEMADEX) 5 MG tablet Take 1 tablet (5 mg total) by mouth daily.   ? amLODIPine (NORVASC) 2.5 MG tablet Take 1 tablet (2.5 mg total) by mouth daily. For high blood pressure.     Social History     Social History Narrative    , no children, lives alone.        Volunteer at [employer] in the past.     ______________________________________________________________________     History of present illness:    Patient comes in today for follow-up of a number of issues.    We first reviewed her polymyalgia.  She is doing well with this.  She is taking prednisone 5 mg a day.  She has not had any major issues with achiness.  She has tried reducing it to off in the past without a whole lot of success.  She might want to stay where she is at right now.  We reviewed this with her.  Next    She has a few skin issues to review.  She does have a couple of sebaceous cyst over her scalp which she would like to have looked at.  She has a skin lesion of her left breast which has been checked in the past but she wants it looked at again.  She would like to have an overall skin exam.    We reviewed her high blood pressure and her blood pressure is elevated today.  We will address  this today as well.  She takes metoprolol regularly, but takes torsemide like every 3-5 days as needed for swelling.    We reviewed her presbyesophagus and she still has issues with digestion and feels like she can burp and things can come out.  Maalox helps her sometimes.  She does take omeprazole twice a day at this point.    We reviewed her controlled substance agreement and we did this today for her anxiety and ongoing use of clonazepam.  This does work for her and she takes 0.25 mg at nighttime at this point.    On review of systems, the patient denies any chest pain or shortness of breath.    ______________________________________________________________________    Exam:    Wt Readings from Last 3 Encounters:   02/13/18 159 lb (72.1 kg)   11/13/17 154 lb 4.8 oz (70 kg)   09/11/17 152 lb (68.9 kg)     BP Readings from Last 3 Encounters:   02/13/18 (!) 160/92   11/13/17 146/84   09/11/17 138/82     BP (!) 160/92  Pulse 78  Wt 159 lb (72.1 kg)  LMP 06/18/1974  SpO2 98%  BMI 28.17 kg/m2   The patient is comfortable, no acute distress.  Mood mildly anxious to good.  Insight good.  Eyes are nonicteric.  Neck is supple without mass.  No cervical adenopathy.  No thyromegaly. Heart regular rate and rhythm.  Lungs clear to auscultation bilaterally.  Respiratory effort is good.  Abdomen soft and nontender.  No hepatosplenomegaly.  Extremities no edema.  There are 2 sebaceous cyst on the left scalp without signs of infection.  There is a flat lesion over the left breast which does not suggest cancer at this point but I think it would be good for her to see dermatology related to this.      ______________________________________________________________________    Diagnostics:    Results for orders placed or performed in visit on 06/20/17   Urinalysis Macro & Micro   Result Value Ref Range    Color, UA Yellow Colorless, Yellow, Straw, Light Yellow    Clarity, UA Clear Clear    Glucose, UA Negative Negative     Bilirubin, UA Negative Negative    Ketones, UA Negative Negative    Specific Gravity, UA 1.010 1.005 - 1.030    Blood, UA Negative Negative    pH, UA 6.0 5.0 - 8.0    Protein, UA Negative Negative mg/dL    Urobilinogen, UA 0.2 E.U./dL 0.2 E.U./dL, 1.0 E.U./dL    Nitrite, UA Negative Negative    Leukocytes, UA Small (!) Negative    Bacteria, UA Few (!) None Seen hpf    RBC, UA 0-2 None Seen, 0-2 hpf    WBC, UA 0-5 None Seen, 0-5 hpf    Squam Epithel, UA 5-10 (!) None Seen, 0-5 lpf   Sedimentation Rate   Result Value Ref Range    Sed Rate 25 (H) 0 - 20 mm/hr   C-Reactive Protein (CRP)   Result Value Ref Range    CRP 1.0 (H) 0.0 - 0.8 mg/dL   Comprehensive Metabolic Panel   Result Value Ref Range    Sodium 140 136 - 145 mmol/L    Potassium 4.6 3.5 - 5.0 mmol/L    Chloride 108 (H) 98 - 107 mmol/L    CO2 23 22 - 31 mmol/L    Anion Gap, Calculation 9 5 - 18 mmol/L    Glucose 94 70 - 125 mg/dL    BUN 19 8 - 28 mg/dL    Creatinine 0.89 0.60 - 1.10 mg/dL    GFR MDRD Af Amer >60 >60 mL/min/1.73m2    GFR MDRD Non Af Amer 60 (L) >60 mL/min/1.73m2    Bilirubin, Total 0.4 0.0 - 1.0 mg/dL    Calcium 10.5 8.5 - 10.5 mg/dL    Protein, Total 7.0 6.0 - 8.0 g/dL    Albumin 3.9 3.5 - 5.0 g/dL    Alkaline Phosphatase 121 (H) 45 - 120 U/L    AST 20 0 - 40 U/L    ALT 19 0 - 45 U/L   HM2(CBC w/o Differential)   Result Value Ref Range    WBC 5.2 4.0 - 11.0 thou/uL    RBC 4.48 3.80 - 5.40 mill/uL    Hemoglobin 13.3 12.0 - 16.0 g/dL    Hematocrit 40.3 35.0 - 47.0 %    MCV 90 80 - 100 fL    MCH 29.7 27.0 - 34.0 pg    MCHC 33.0 32.0 - 36.0 g/dL    RDW 12.7 11.0 - 14.5 %    Platelets 249 140 - 440 thou/uL    MPV 8.3 7.0 - 10.0 fL   BNP(B-type Natriuretic Peptide)   Result Value Ref Range    BNP 64 0 - 167 pg/mL      ______________________________________________________________________    Assessment:    1. PMR (polymyalgia rheumatica) - August 2016    2. Anxiety    3. HTN (hypertension)    4. Sebaceous cyst    5. Skin lesion of breast    6.  Presbyesophagus    7. Controlled substance agreement signed - 2/18 - Clonazepam - UDS 2/18       ______________________________________________________________________      PHQ-2 Total Score: 0 (2/13/2018  8:00 AM)  No Data Recorded  ______________________________________________________________________    Plan:    1. Patient had blood work done today.  2. She will see dermatology about her skin issues.  3. She was started on amlodipine 2.5 mg a day for her blood pressure.  She will continue use torsemide as needed.  4. I am hoping the amlodipine will also help with her esophageal symptoms.  I did offer to send her back to GI, but she does not want to do this.  5. A controlled substance agreement was done today for her clonazepam which he takes a half pill at night and this helps her quite well.    Jersey Metcalf MD  General Internal Medicine  [facility]     Return in about 4 months (around 6/13/2018).     Future Appointments  Date Time Provider Department Center   5/15/2018 8:25 AM Jersey Metcalf MD [facility]

## 2021-06-26 NOTE — PROGRESS NOTES
Progress Notes by Jersey Metcalf MD at 8/17/2018  8:50 AM     Author: Jersey Metcalf MD Service: -- Author Type: Physician    Filed: 8/17/2018  1:04 PM Encounter Date: 8/17/2018 Status: Signed    : Jersey Metcalf MD (Physician)              [facility]    Comprehensive and complex medical care - Chronic disease management - Shared decision making - Care coordination - Compassionate care    Patient advocacy - Rational deprescribing - Minimally disruptive medicine - Ethical focus - Customized care    ______________________________________________________________________     Date of Service: 8/17/2018  Primary Provider: Jersey Metcalf MD    Patient Care Team:  Jersey Metcalf MD as PCP - General (Internal Medicine)  Giovanny Koo MD as Physician (Orthopedic Surgery)  Malissa Mcclellan MD as Physician (Dermatology)     ______________________________________________________________________     Patient's Pharmacy:    [facility]/pharmacy [address] [place] [address]  Phone: [phone] Fax: [phone]     Patient's Contacts:  Name Home Phone Work Phone Mobile Phone Relationship Lg Ej   SHIMON CROSS [phone]   Nephew      Patient's Insurance:    Payor: BLUE CROSS / Plan: BLUE CROSS PLATINUM / Product Type: COST PLAN /     ______________________________________________________________________     Manuela M Brendon is 88 y.o. female who comes in today for:    Chief Complaint   Patient presents with   ? Follow-up     polymyalgia rhumatica, hip fracture   ? Medication Refill     prednisone       Patient Active Problem List   Diagnosis   ? Glaucoma   ? Hypercalcemia   ? Hyperparathyroidism (H)   ? GERD (gastroesophageal reflux disease)   ? HTN (hypertension)   ? Anxiety   ? Esophageal dysphagia   ? Insomnia   ? HLD (hyperlipidemia)   ? Presbyesophagus   ? OA (osteoarthritis)-fingers   ? PMR (polymyalgia  rheumatica) - August 2016   ? Nonsmoker   ? DNR (do not resuscitate)   ? Controlled substance agreement signed - 2/18 - Clonazepam - UDS 2/18     Current Outpatient Prescriptions   Medication Sig   ? amLODIPine (NORVASC) 2.5 MG tablet Take 1 tablet (2.5 mg total) by mouth daily. For high blood pressure.   ? clonazePAM (KLONOPIN) 0.5 MG tablet TAKE HALF A TAB-1 TABLETS (0.25-0.5 MG TOTAL) BY MOUTH BEDTIME.   ? dorzolamide (TRUSOPT) 2 % ophthalmic solution 1 drop 3 (three) times a day.   ? metoprolol tartrate (LOPRESSOR) 50 MG tablet TAKE 1 TABLET (50 MG TOTAL) BY MOUTH 2 (TWO) TIMES A DAY.   ? omeprazole (PRILOSEC) 40 MG capsule Take 1 capsule (40 mg total) by mouth 2 (two) times a day.   ? predniSONE (DELTASONE) 5 MG tablet Take 0.5-1 tablets (2.5-5 mg total) by mouth daily.   ? timolol maleate (ISTALOL) 0.5 % DrpD Apply to eye Daily at 8:00 am..   ? torsemide (DEMADEX) 5 MG tablet Take 1 tablet (5 mg total) by mouth daily.     Social History     Social History Narrative    Moved out of her home in 2018 and moved to the [facility].        , no children, lives alone.        Volunteer at [employer] in the past.     ______________________________________________________________________     History of present illness:    Patient comes in today for follow-up of a number of issues.    We first reviewed her polymyalgia rheumatica.  She is doing pretty well overall.  She does have achiness in her body.  She has had a history of autoimmune hepatitis diagnosed in 2006.  She has had no signs of recurrence here.  She does have some hip pain.  She has osteoarthritis of her hands that bother her.  Her shoulders can also bother her as well.    We reviewed her shortness of breath.  She gets along short of breath with exertion.  This is usually with a flight or 2 of stairs.  She denies any chest pain.  This is been present previously slightly b ut may be worse.  She denies any chronic cough.  We reviewed her  previous x-ray.  She did smoke up until age 30 but did not smoke a lot and her  did not smoke.    We reviewed her reflux and this is doing well.    We reviewed her high blood pressure and this is doing okay at this point.    We reviewed her other issues noted in the assessment but not specifically addressed in the HPI above.     On review of systems, the patient denies any chest pain or fevers or chills.    ______________________________________________________________________    Exam:    Wt Readings from Last 3 Encounters:   08/17/18 153 lb (69.4 kg)   05/15/18 154 lb (69.9 kg)   02/13/18 159 lb (72.1 kg)     BP Readings from Last 3 Encounters:   08/17/18 138/80   05/15/18 144/78   02/13/18 (!) 160/92     /80  Pulse 78  Wt 153 lb (69.4 kg)  LMP 06/18/1974  SpO2 95%  BMI 27.1 kg/m2   The patient is comfortable, no acute distress.  Mood good.  Insight good.  Eyes are nonicteric.  Neck is supple without mass.  No cervical adenopathy.  No thyromegaly. Heart regular rate and rhythm.  Lungs shows occasional wheezing on auscultation bilaterally.  Respiratory effort is good.  Abdomen soft and nontender.  No hepatosplenomegaly.  Extremities no edema.  There are degenerative changes of the hands noted.      ______________________________________________________________________    Diagnostics:    Results for orders placed or performed in visit on 08/17/18   HM2(CBC w/o Differential)   Result Value Ref Range    WBC 5.8 4.0 - 11.0 thou/uL    RBC 4.36 3.80 - 5.40 mill/uL    Hemoglobin 13.3 12.0 - 16.0 g/dL    Hematocrit 39.6 35.0 - 47.0 %    MCV 91 80 - 100 fL    MCH 30.4 27.0 - 34.0 pg    MCHC 33.5 32.0 - 36.0 g/dL    RDW 12.5 11.0 - 14.5 %    Platelets 244 140 - 440 thou/uL    MPV 8.8 7.0 - 10.0 fL   Sedimentation Rate   Result Value Ref Range    Sed Rate 13 0 - 20 mm/hr      ______________________________________________________________________    Assessment:    1. PMR (polymyalgia rheumatica) - August 2016   "  2. Hyperparathyroidism (H)    3. HTN (hypertension)    4. OA (osteoarthritis)    5. KENDRICK (dyspnea on exertion)    6. GERD (gastroesophageal reflux disease)      ______________________________________________________________________      PHQ-2 Total Score: 0 (5/15/2018  8:00 AM)  No Data Recorded    Estimated body mass index is 27.1 kg/(m^2) as calculated from the following:    Height as of 11/13/17: 5' 3\" (1.6 m).    Weight as of this encounter: 153 lb (69.4 kg).     ______________________________________________________________________    Plan:    1. Check blood work today.  See relevant orders and diagnosis associations at the bottom of this note.   2. Determine further dosing for her prednisone.  She is taking a half pill a day at this time.  Refill done.  3. Follow blood pressure and treat as necessary.    Jersey Metcalf MD  General Internal Medicine  [facility]     Return in about 4 months (around 12/17/2018) for follow up visit.     Future Appointments  Date Time Provider Department Center   12/17/2018 10:55 AM Jersey Metcalf MD [facility]         ______________________________________________________________________     Relevant ICD-10 codes and order associations:      ICD-10-CM    1. PMR (polymyalgia rheumatica) - August 2016 M35.3 HM2(CBC w/o Differential)     Sedimentation Rate     C-Reactive Protein(CRP)     predniSONE (DELTASONE) 5 MG tablet   2. Hyperparathyroidism (H) E21.3    3. HTN (hypertension) I10 Basic Metabolic Panel   4. OA (osteoarthritis) M19.90    5. KENDRICK (dyspnea on exertion) R06.09    6. GERD (gastroesophageal reflux disease) K21.9            "

## 2021-06-26 NOTE — PROGRESS NOTES
Progress Notes by Jersey Metcalf MD at 5/15/2018  8:25 AM     Author: Jersey Metcalf MD Service: -- Author Type: Physician    Filed: 5/15/2018  8:47 AM Encounter Date: 5/15/2018 Status: Signed    : Jersey Metcalf MD (Physician)              [facility]    Comprehensive and complex medical care - Chronic disease management - Shared decision making - Care coordination - Compassionate care    Patient advocacy - Rational deprescribing - Minimally disruptive medicine - Ethical focus - Customized care    ______________________________________________________________________     Date of Service: 5/15/2018  Primary Provider: Jersey Metcalf MD    Patient Care Team:  Jersey Metcalf MD as PCP - General (Internal Medicine)  Be Wick MD as Physician (Ophthalmology)  Giovanny Koo MD as Physician (Orthopedic Surgery)  Malissa Mcclellan MD as Physician (Dermatology)     ______________________________________________________________________     Patient's Pharmacy:    [facility]/pharmacy #[id number] [place] [address]  Phone: [phone] Fax: [phone]     Patient's Contacts:  Name Home Phone Work Phone Mobile Phone Relationship Lg SHIMON Adame [phone]   Nephew      Patient's Insurance:    Payor: BLUE CROSS / Plan: BLUE CROSS PLATINUM / Product Type: COST PLAN /     ______________________________________________________________________     Manuela NAIK Brendon is 87 y.o. female who comes in today for:    Chief Complaint   Patient presents with   ? Follow-up     POLYMYALGIA, HTN, WENT TO [facility] FOR HIP FRACTURE       Patient Active Problem List   Diagnosis   ? Glaucoma   ? Hypercalcemia   ? Hyperparathyroidism   ? GERD (gastroesophageal reflux disease)   ? HTN (hypertension)   ? Anxiety   ? Esophageal dysphagia   ? Insomnia   ? HLD (hyperlipidemia)   ? Presbyesophagus   ? OA  (osteoarthritis)-fingers   ? PMR (polymyalgia rheumatica) - August 2016   ? Nonsmoker   ? DNR (do not resuscitate)   ? Controlled substance agreement signed - 2/18 - Clonazepam - UDS 2/18     Current Outpatient Prescriptions   Medication Sig   ? amLODIPine (NORVASC) 2.5 MG tablet Take 1 tablet (2.5 mg total) by mouth daily. For high blood pressure.   ? clonazePAM (KLONOPIN) 0.5 MG tablet TAKE HALF A TAB-1 TABLETS (0.25-0.5 MG TOTAL) BY MOUTH BEDTIME.   ? dorzolamide (TRUSOPT) 2 % ophthalmic solution 1 drop 3 (three) times a day.   ? metoprolol tartrate (LOPRESSOR) 50 MG tablet TAKE 1 TABLET (50 MG TOTAL) BY MOUTH 2 (TWO) TIMES A DAY.   ? omeprazole (PRILOSEC) 40 MG capsule Take 1 capsule (40 mg total) by mouth 2 (two) times a day.   ? predniSONE (DELTASONE) 5 MG tablet Take 1-2 tablets (5-10 mg total) by mouth daily.   ? timolol maleate (ISTALOL) 0.5 % DrpD Apply to eye Daily at 8:00 am..   ? torsemide (DEMADEX) 5 MG tablet Take 1 tablet (5 mg total) by mouth daily.     Social History     Social History Narrative    , no children, lives alone.        Volunteer at [employer] in the past.     ______________________________________________________________________     History of present illness:    Patient comes in today for follow-up of multiple issues.    We reviewed her recent right hip fracture.  This is in the greater trochanter area.  It is mild.  She is following with orthopedics.  It is healing and her pain is doing better.  This occurred about 3 weeks ago and occurred spontaneously when she was standing.  She got a sudden pain in the hip.  She is using a cane to get around at this time.  She is using Advil or Aleve for pain.  They had given her tramadol, but she does not want to use this.  She denies any other areas of pain.  Next    Reviewed her polymyalgia rheumatica.  She remains on 5 mg of prednisone.  She has had no major issues with her overall joint pain at this time.  She would rather not  reduce it at this point with her other pain issues.    We reviewed her hypertension.  Her blood pressure is high at times but better on recheck today after she was walking and had rested.  We are going to continue her medications as is.  She has not had any excessive edema in her legs.    Reviewed her esophageal dysphagia and presbyesophagus.  This is doing well at this point.  She remains on omeprazole for this.    On review of systems, the patient denies any chest pain or shortness of breath.    ______________________________________________________________________    Exam:    Wt Readings from Last 3 Encounters:   05/15/18 154 lb (69.9 kg)   02/13/18 159 lb (72.1 kg)   11/13/17 154 lb 4.8 oz (70 kg)     BP Readings from Last 3 Encounters:   05/15/18 144/78   02/13/18 (!) 160/92   11/13/17 146/84     /78  Pulse 86  Wt 154 lb (69.9 kg)  LMP 06/18/1974  SpO2 97%  BMI 27.28 kg/m2   The patient is comfortable, no acute distress.  Mood good.  Insight good.  Eyes are nonicteric.  Neck is supple without mass.  No cervical adenopathy.  No thyromegaly. Heart regular rate and rhythm.  Lungs clear to auscultation bilaterally.  Respiratory effort is good.  Abdomen soft and nontender.  No hepatosplenomegaly.  Extremities no edema.  She walks with a small limp related to her right hip.      ______________________________________________________________________    Diagnostics:    Results for orders placed or performed in visit on 02/13/18   Drug Abuse 1+, Urine   Result Value Ref Range    Amphetamines Screen Negative Screen Negative    Benzodiazepines Screen Negative Screen Negative    Opiates Screen Negative Screen Negative    Phencyclidine Screen Negative Screen Negative    THC Screen Negative Screen Negative    Barbiturates Screen Negative Screen Negative    Cocaine Metabolite Screen Negative Screen Negative    Methadone Screen Negative Screen Negative    Oxycodone Screen Negative Screen Negative    Creatinine,  Urine 101.3 mg/dL   Sedimentation Rate   Result Value Ref Range    Sed Rate 14 0 - 20 mm/hr   C-Reactive Protein   Result Value Ref Range    CRP 0.8 0.0 - 0.8 mg/dL   HM2(CBC w/o Differential)   Result Value Ref Range    WBC 5.7 4.0 - 11.0 thou/uL    RBC 4.45 3.80 - 5.40 mill/uL    Hemoglobin 13.5 12.0 - 16.0 g/dL    Hematocrit 40.3 35.0 - 47.0 %    MCV 91 80 - 100 fL    MCH 30.3 27.0 - 34.0 pg    MCHC 33.4 32.0 - 36.0 g/dL    RDW 12.5 11.0 - 14.5 %    Platelets 215 140 - 440 thou/uL    MPV 9.0 7.0 - 10.0 fL   Basic Metabolic Panel   Result Value Ref Range    Sodium 142 136 - 145 mmol/L    Potassium 4.2 3.5 - 5.0 mmol/L    Chloride 106 98 - 107 mmol/L    CO2 29 22 - 31 mmol/L    Anion Gap, Calculation 7 5 - 18 mmol/L    Glucose 89 70 - 125 mg/dL    Calcium 10.3 8.5 - 10.5 mg/dL    BUN 19 8 - 28 mg/dL    Creatinine 0.90 0.60 - 1.10 mg/dL    GFR MDRD Af Amer >60 >60 mL/min/1.73m2    GFR MDRD Non Af Amer 59 (L) >60 mL/min/1.73m2      Component      Latest Ref Rng & Units 11/2/2015 8/12/2016 6/20/2017 2/13/2018   CRP      0.0 - 0.8 mg/dL 0.3 1.2 (H) 1.0 (H) 0.8   Sed Rate      0 - 20 mm/hr 14 21 (H) 25 (H) 14   Rheumatoid Factor Quantitative      0 - 30 IU/mL  <15.0     CCP IgG Antibodies      <=4.9 U/mL  <0.5       ______________________________________________________________________    Assessment:    1. HTN (hypertension)    2. Presbyesophagus    3. PMR (polymyalgia rheumatica) - August 2016    4. Fracture of right hip      ______________________________________________________________________      PHQ-2 Total Score: 0 (5/15/2018  8:00 AM)  No Data Recorded  ______________________________________________________________________    Plan:    1. Continue current blood pressure medication, but consider escalation if needed.  2. Continue prednisone 5 mg a day but consider reducing to 2.5 mg a day at the next visit.  3. Blood work to be done at next visit.  4. Discussed alendronate but the patient really is not interested  in additional medications at this point.  Bone density in 2015 was quite good.    Jersey Metcalf MD  General Internal Medicine  [facility]     Return in about 3 months (around 8/15/2018) for visit and blood work.     Future Appointments  Date Time Provider Department Center   8/17/2018 8:50 AM Jersey Metcalf MD [facility]         ______________________________________________________________________     Relevant ICD-10 codes and order associations:      ICD-10-CM    1. HTN (hypertension) I10    2. Presbyesophagus K22.8    3. PMR (polymyalgia rheumatica) - August 2016 M35.3    4. Fracture of right hip S72.001A

## 2021-06-27 ENCOUNTER — HEALTH MAINTENANCE LETTER (OUTPATIENT)
Age: 86
End: 2021-06-27

## 2021-06-27 NOTE — PROGRESS NOTES
Progress Notes by Jersey Metcalf MD at 4/18/2019  1:15 PM     Author: Jersey Metcalf MD Service: -- Author Type: Physician    Filed: 4/19/2019  9:50 PM Encounter Date: 4/18/2019 Status: Signed    : Jersey Metcalf MD (Physician)              [facility]    Comprehensive and complex medical care - Chronic disease management - Shared decision making - Care coordination - Compassionate care    Patient advocacy - Rational deprescribing - Minimally disruptive medicine - Ethical focus - Customized care    ______________________________________________________________________     Date of Service: 4/18/2019  Primary Provider: Jersey Metcalf MD    Patient Care Team:  Jersey Metcalf MD as PCP - General (Internal Medicine)  Giovanny Koo MD as Physician (Orthopedic Surgery)  Malissa Mcclellan MD as Physician (Dermatology)     ______________________________________________________________________     Patient's Pharmacy:    [facility]/pharmacy #[id number] - [address] [address]  Phone: [phone] Fax: [phone]     Patient's Contacts:  Name Home Phone Work Phone Mobile Phone Relationship Lg SHIMON Adame [phone]   Nephew      Patient's Insurance:    Payor: MEDICARE / Plan: MEDICARE A AND B / Product Type: Medicare /     ______________________________________________________________________     Manuela GUMARO Olivas is 88 y.o. female who comes in today for:    Chief Complaint   Patient presents with   ? Sinusitis   ? Follow-up     reflux is better, still had numbness in left hand, polymyalgiz   ? Bleeding/Bruising     on leg getting worse   ? Eczema     on face       Active Problem List:  Problem List as of 4/18/2019 Reviewed: 4/18/2019 10:09 AM by Jersey Metcalf MD       High    DNR (do not resuscitate)    Former smoker-quit at age 30       Medium    Controlled substance agreement signed - 2/18 -  Clonazepam - UDS 2/18    Anxiety    GERD (gastroesophageal reflux disease)    HTN (hypertension)    OA (osteoarthritis)-fingers    PMR (polymyalgia rheumatica) (H) - August 2016    Presbyesophagus       Low    Esophageal dysphagia    Glaucoma    HLD (hyperlipidemia)    Hypercalcemia    Hyperparathyroidism (H)    Insomnia       Unprioritized    Senile purpura (H)           Current Outpatient Medications   Medication Sig   ? amLODIPine (NORVASC) 2.5 MG tablet TAKE 1 TABLET (2.5 MG TOTAL) BY MOUTH DAILY. FOR HIGH BLOOD PRESSURE.   ? clonazePAM (KLONOPIN) 0.5 MG tablet TAKE HALF A TAB-1 TABLETS (0.25-0.5 MG TOTAL) BY MOUTH BEDTIME.   ? dorzolamide (TRUSOPT) 2 % ophthalmic solution 1 drop 3 (three) times a day.   ? metoprolol tartrate (LOPRESSOR) 50 MG tablet Take 1 tablet (50 mg total) by mouth 2 (two) times a day.   ? omeprazole (PRILOSEC) 40 MG capsule Take 1 capsule (40 mg total) by mouth 2 (two) times a day.   ? predniSONE (DELTASONE) 5 MG tablet Take 0.5-1 tablets (2.5-5 mg total) by mouth daily.   ? timolol maleate (ISTALOL) 0.5 % DrpD Apply to eye Daily at 8:00 am..   ? torsemide (DEMADEX) 5 MG tablet Take 1 tablet (5 mg total) by mouth daily.     Social History     Social History Narrative    Moved out of her home in 2018 and moved to the [facility].        , no children, lives alone.        Volunteer at [employer] in the past.     ______________________________________________________________________     History of present illness:    Patient comes in today for follow-up of a number of issues.    She recently had a cold with sinus drainage and cough.  This is doing better at this time.  She had problems for at least a week and a half.  She had postnasal drip with this.    We reviewed her polymyalgia rheumatica.  This is doing much better for her.  She is taking 2.5 mg of prednisone every other day.  She denies any excessive pains related to her joints.    We reviewed her high blood pressure  and her blood pressure appears to be doing well without issues.    Review her reflux disease and this is stable.    Reviewed her anxiety and she is doing intermittent use of clonazepam.  We renewed her controlled substance agreement and urine drug screen today.    We reviewed her easy bruising on her arms and legs.    She has some thickened skin lesions on her face and would like to go back to dermatology for this.    She does have significant arthritis of her hands and this does bother her off and on.    We reviewed her other issues noted in the assessment but not specifically addressed in the HPI above.     On review of systems, the patient denies any chest pain or shortness of breath.    ______________________________________________________________________    Exam:    Wt Readings from Last 3 Encounters:   04/18/19 150 lb (68 kg)   12/17/18 151 lb (68.5 kg)   08/17/18 153 lb (69.4 kg)     BP Readings from Last 3 Encounters:   04/18/19 136/80   12/17/18 138/80   08/17/18 138/80     /80   Pulse 76   Wt 150 lb (68 kg)   LMP 06/18/1974   SpO2 95%   BMI 26.57 kg/m     The patient is comfortable, no acute distress.  Mood good.  Insight good.  Eyes show mild congestion in the conjunctiva..  Her nose reveals mild rhinitis.  Neck is supple without mass.  No cervical adenopathy.  No thyromegaly. Heart regular rate and rhythm.  Lungs clear to auscultation bilaterally.  Respiratory effort is good.  Abdomen soft and nontender.  No hepatosplenomegaly.  Extremities no edema.  She has diffuse osteoarthritic changes of the hands.    ______________________________________________________________________    Diagnostics:    Results for orders placed or performed in visit on 04/18/19   Basic Metabolic Panel   Result Value Ref Range    Sodium 141 136 - 145 mmol/L    Potassium 4.5 3.5 - 5.0 mmol/L    Chloride 105 98 - 107 mmol/L    CO2 25 22 - 31 mmol/L    Anion Gap, Calculation 11 5 - 18 mmol/L    Glucose 94 70 - 125 mg/dL     Calcium 10.9 (H) 8.5 - 10.5 mg/dL    BUN 17 8 - 28 mg/dL    Creatinine 0.84 0.60 - 1.10 mg/dL    GFR MDRD Af Amer >60 >60 mL/min/1.73m2    GFR MDRD Non Af Amer >60 >60 mL/min/1.73m2   HM2(CBC w/o Differential)   Result Value Ref Range    WBC 7.1 4.0 - 11.0 thou/uL    RBC 4.48 3.80 - 5.40 mill/uL    Hemoglobin 13.4 12.0 - 16.0 g/dL    Hematocrit 40.5 35.0 - 47.0 %    MCV 90 80 - 100 fL    MCH 29.9 27.0 - 34.0 pg    MCHC 33.1 32.0 - 36.0 g/dL    RDW 12.3 11.0 - 14.5 %    Platelets 280 140 - 440 thou/uL    MPV 8.5 7.0 - 10.0 fL   Erythrocyte Sedimentation Rate   Result Value Ref Range    Sed Rate 28 (H) 0 - 20 mm/hr   C-Reactive Protein   Result Value Ref Range    CRP 0.7 0.0 - 0.8 mg/dL     ______________________________________________________________________    Assessment:    1. Essential hypertension    2. Hyperparathyroidism (H)    3. PMR (polymyalgia rheumatica) (H) - August 2016    4. Senile purpura (H)    5. Encounter for therapeutic drug level monitoring    6. Controlled substance agreement signed - 4/19 - Clonazepam - UDS 4/19    7. Anxiety    8. Gastroesophageal reflux disease, esophagitis presence not specified    9. Primary osteoarthritis involving multiple joints    10. Facial rash      ______________________________________________________________________     PHQ-2 Total Score: 0 (4/18/2019  1:00 PM)    No data recorded  ______________________________________________________________________    Plan:    1. Reviewed Shingrix today and she will likely pass on this.  2. Controlled substance agreement renewed.  Urine drug screen done.  3. Referred to dermatology for multiple skin lesions of the face.  There is more than I am comfortable with managing related to this.  4. Blood work done today.  5. Could consider going off prednisone if feeling better but this is up to the patient.    Jersey Metcalf MD  General Internal Medicine  [facility]     Return in about 4 months (around  8/18/2019) for follow up visit.     Future Appointments   Date Time Provider Department Center   8/19/2019 10:55 AM Jersey Metcalf MD [facility]         ______________________________________________________________________     Relevant ICD-10 codes and order associations:      ICD-10-CM    1. Essential hypertension I10 Basic Metabolic Panel   2. Hyperparathyroidism (H) E21.3 Basic Metabolic Panel   3. PMR (polymyalgia rheumatica) (H) - August 2016 M35.3 HM2(CBC w/o Differential)     Erythrocyte Sedimentation Rate     C-Reactive Protein   4. Senile purpura (H) D69.2    5. Encounter for therapeutic drug level monitoring Z51.81 Drug Abuse 1+, Urine     CANCELED: Drug Abuse 1+, Urine     CANCELED: Drugs of Abuse 1,Urine   6. Controlled substance agreement signed - 4/19 - Clonazepam - UDS 4/19 Z79.899    7. Anxiety F41.9    8. Gastroesophageal reflux disease, esophagitis presence not specified K21.9    9. Primary osteoarthritis involving multiple joints M15.0    10. Facial rash R21

## 2021-06-27 NOTE — PROGRESS NOTES
Progress Notes by Jersey Metcalf MD at 8/19/2019 10:55 AM     Author: Jersey Metcalf MD Service: -- Author Type: Physician    Filed: 8/19/2019  1:18 PM Encounter Date: 8/19/2019 Status: Signed    : Jersey Metcalf MD (Physician)              [facility]    Comprehensive and complex medical care - Chronic disease management - Shared decision making - Care coordination - Compassionate care    Patient advocacy - Rational deprescribing - Minimally disruptive medicine - Ethical focus - Customized care    ______________________________________________________________________     Date of Service: 8/19/2019  Primary Provider: Jersey Metcalf MD    Patient Care Team:  Jersey Metcalf MD as PCP - General (Internal Medicine)  Giovanny Koo MD as Physician (Orthopedic Surgery)  Malissa Mcclellan MD as Physician (Dermatology)  Vannesa Madrigal MD as Physician (Ophthalmology)     ______________________________________________________________________     Patient's Pharmacy:    [facility]/pharmacy #[id number] - [address] [address]  Phone: [phone] Fax: [phone]     Patient's Contacts:  Name Home Phone Work Phone Mobile Phone Relationship Lgl SHIMON Adame [phone]   Nephew      Patient's Insurance:    Payor: MEDICARE / Plan: MEDICARE A AND B / Product Type: Medicare /   ______________________________________________________________________   ______________________________________________________________________     Manuela Olivas is a 89 y.o. female who comes in today for:    Chief Complaint   Patient presents with   ? Follow-up     PMR, ARTHRITIS OF THE HANDS, DOES NOT HAVE MUCH STRENGTH       Active Problem List:  Problem List as of 8/19/2019 Reviewed: 8/19/2019  1:15 PM by Jersey Metcalf MD       High    DNR (do not resuscitate)    Former smoker-quit at age 30       Medium    Controlled substance  agreement signed - 4/19 - Clonazepam - UDS 4/19    Anxiety    GERD (gastroesophageal reflux disease)    HTN (hypertension)    OA (osteoarthritis)-fingers    PMR (polymyalgia rheumatica) (H) - August 2016    Presbyesophagus       Low    Esophageal dysphagia    HLD (hyperlipidemia)    Hypercalcemia    Hyperparathyroidism (H)    Insomnia    Unspecified glaucoma       Unprioritized    Senile purpura (H)           Current Outpatient Medications   Medication Sig   ? amLODIPine (NORVASC) 2.5 MG tablet TAKE 1 TABLET (2.5 MG TOTAL) BY MOUTH DAILY. FOR HIGH BLOOD PRESSURE.   ? clonazePAM (KLONOPIN) 0.5 MG tablet TAKE HALF A TAB-1 TABLETS (0.25-0.5 MG TOTAL) BY MOUTH BEDTIME.   ? dorzolamide (TRUSOPT) 2 % ophthalmic solution 1 drop 3 (three) times a day.   ? metoprolol tartrate (LOPRESSOR) 50 MG tablet Take 1 tablet (50 mg total) by mouth 2 (two) times a day.   ? omeprazole (PRILOSEC) 40 MG capsule Take 1 capsule (40 mg total) by mouth 2 (two) times a day.   ? predniSONE (DELTASONE) 5 MG tablet Take 0.5-1 tablets (2.5-5 mg total) by mouth daily.   ? timolol maleate (ISTALOL) 0.5 % DrpD Apply to eye Daily at 8:00 am..   ? torsemide (DEMADEX) 5 MG tablet Take 1 tablet (5 mg total) by mouth daily as needed (swelling in legs).     Social History     Social History Narrative    Moved out of her home in 2018 and moved to the [facility].        , no children, lives alone.        Volunteer at [employer] in the past.     ______________________________________________________________________     History of present illness:    Patient comes in today for follow-up.    She has a lot of issues with joint pain.  She has known osteoarthritis of her hands and has had it in hip.  She has problems with her knees at times as well as her hips.  She has to use a cart to get around the grocery store so she can lean on it.  She does get a little out of breath with walking too far.  She does have back pain as well but denies any  numbness down her legs.    Reviewed her PMR and things have been going well with this.  Her most recent blood work has been good.    Reviewed her high blood pressure and her blood pressures under good control without issue.    Reviewed her insomnia and the clonazepam seems to be this as well satiety.    Her heartburn is doing well without issue.    We reviewed her other issues noted in the assessment but not specifically addressed in the HPI above.     On review of systems, the patient denies any chest pain or shortness of breath.    ______________________________________________________________________    Exam:    Wt Readings from Last 3 Encounters:   08/19/19 149 lb (67.6 kg)   04/18/19 150 lb (68 kg)   12/17/18 151 lb (68.5 kg)     BP Readings from Last 3 Encounters:   08/19/19 134/74   04/18/19 136/80   12/17/18 138/80     /74   Pulse 80   Wt 149 lb (67.6 kg)   LMP 06/18/1974   SpO2 95%   BMI 26.39 kg/m     The patient is comfortable, no acute distress.  Mood good.  Insight good.  Eyes are nonicteric.  Neck is supple without mass.  No cervical adenopathy.  No thyromegaly. Heart regular rate and rhythm.  Lungs clear to auscultation bilaterally.  Respiratory effort is good.  Abdomen soft and nontender.  No hepatosplenomegaly.  Extremities no edema.  Her gait is overall good a little bit stiff when she first gets up.    ______________________________________________________________________    Diagnostics:    Results for orders placed or performed in visit on 04/18/19   Basic Metabolic Panel   Result Value Ref Range    Sodium 141 136 - 145 mmol/L    Potassium 4.5 3.5 - 5.0 mmol/L    Chloride 105 98 - 107 mmol/L    CO2 25 22 - 31 mmol/L    Anion Gap, Calculation 11 5 - 18 mmol/L    Glucose 94 70 - 125 mg/dL    Calcium 10.9 (H) 8.5 - 10.5 mg/dL    BUN 17 8 - 28 mg/dL    Creatinine 0.84 0.60 - 1.10 mg/dL    GFR MDRD Af Amer >60 >60 mL/min/1.73m2    GFR MDRD Non Af Amer >60 >60 mL/min/1.73m2   HM2(CBC w/o  Differential)   Result Value Ref Range    WBC 7.1 4.0 - 11.0 thou/uL    RBC 4.48 3.80 - 5.40 mill/uL    Hemoglobin 13.4 12.0 - 16.0 g/dL    Hematocrit 40.5 35.0 - 47.0 %    MCV 90 80 - 100 fL    MCH 29.9 27.0 - 34.0 pg    MCHC 33.1 32.0 - 36.0 g/dL    RDW 12.3 11.0 - 14.5 %    Platelets 280 140 - 440 thou/uL    MPV 8.5 7.0 - 10.0 fL   Erythrocyte Sedimentation Rate   Result Value Ref Range    Sed Rate 28 (H) 0 - 20 mm/hr   C-Reactive Protein   Result Value Ref Range    CRP 0.7 0.0 - 0.8 mg/dL     ______________________________________________________________________    Assessment:    1. Primary osteoarthritis involving multiple joints    2. Essential hypertension    3. PMR (polymyalgia rheumatica) (H) - August 2016    4. Gastroesophageal reflux disease, esophagitis presence not specified    5. Anxiety    6. Hyperparathyroidism (H)    7. HTN (hypertension)      ______________________________________________________________________     PHQ-2 Total Score: 0 (8/19/2019 11:00 AM)    No data recorded  ______________________________________________________________________     BMI Readings from Last 1 Encounters:   08/19/19 26.39 kg/m      ______________________________________________________________________    Plan:    1. We will do blood work at next visit.  2. Continue current medications.  3. She is taking the torsemide as needed at this time and maybe uses it 2 to 3 days out of the week.  This is reasonable.  4. Follow-up sooner if issues.  5. Consider x-rays of joints in the future if needed for her health.    Jersey Metcalf MD  General Internal Medicine  [facility]     Return in about 4 months (around 12/19/2019) for visit and blood work.     Future Appointments   Date Time Provider Department Center   12/16/2019  9:40 AM Jersey Metcalf MD [facility]         ______________________________________________________________________     Relevant ICD-10 codes and order associations:       ICD-10-CM    1. Primary osteoarthritis involving multiple joints M15.0    2. Essential hypertension I10    3. PMR (polymyalgia rheumatica) (H) - August 2016 M35.3    4. Gastroesophageal reflux disease, esophagitis presence not specified K21.9    5. Anxiety F41.9    6. Hyperparathyroidism (H) E21.3    7. HTN (hypertension) I10 torsemide (DEMADEX) 5 MG tablet

## 2021-06-27 NOTE — PROGRESS NOTES
Progress Notes by Jersey Metcalf MD at 12/17/2018 10:55 AM     Author: Jersey Metcalf MD Service: -- Author Type: Physician    Filed: 12/17/2018  3:36 PM Encounter Date: 12/17/2018 Status: Signed    : Jersey Metcalf MD (Physician)              [facility]    Comprehensive and complex medical care - Chronic disease management - Shared decision making - Care coordination - Compassionate care    Patient advocacy - Rational deprescribing - Minimally disruptive medicine - Ethical focus - Customized care    ______________________________________________________________________     Date of Service: 12/17/2018  Primary Provider: Jersey Metcalf MD    Patient Care Team:  Jersey Metcalf MD as PCP - General (Internal Medicine)  Giovanny Koo MD as Physician (Orthopedic Surgery)  Malissa Mcclellan MD as Physician (Dermatology)     ______________________________________________________________________     Patient's Pharmacy:    [facility]/pharmacy #[id number] [address] [address]  Phone: [phone] Fax: [phone]     Patient's Contacts:  Name Home Phone Work Phone Mobile Phone Relationship Lg SHIMON Adame [phone]   Nephew      Patient's Insurance:    Payor: BLUE CROSS / Plan: BLUE CROSS PLATINUM / Product Type: COST PLAN /     ______________________________________________________________________     Manuela Olivas is 88 y.o. female who comes in today for:    Chief Complaint   Patient presents with   ? Follow-up     SOB IS ABOUT THE SAME, PMR, ARTHRITIS, SPOTS ON LEGS       Active Problem List:  Problem List as of 12/17/2018 Reviewed: 8/17/2018 12:52 PM by Jersey Metcalf MD       High    DNR (do not resuscitate)    Former smoker-quit at age 30       Medium    Controlled substance agreement signed - 2/18 - Clonazepam - UDS 2/18    Anxiety    GERD (gastroesophageal reflux disease)    HTN  (hypertension)    OA (osteoarthritis)-fingers    PMR (polymyalgia rheumatica) - August 2016    Presbyesophagus       Low    Esophageal dysphagia    Glaucoma    HLD (hyperlipidemia)    Hypercalcemia    Hyperparathyroidism (H)    Insomnia           Current Outpatient Medications   Medication Sig   ? amLODIPine (NORVASC) 2.5 MG tablet Take 1 tablet (2.5 mg total) by mouth daily. For high blood pressure.   ? clonazePAM (KLONOPIN) 0.5 MG tablet TAKE HALF A TAB-1 TABLETS (0.25-0.5 MG TOTAL) BY MOUTH BEDTIME.   ? dorzolamide (TRUSOPT) 2 % ophthalmic solution 1 drop 3 (three) times a day.   ? metoprolol tartrate (LOPRESSOR) 50 MG tablet Take 1 tablet (50 mg total) by mouth 2 (two) times a day.   ? omeprazole (PRILOSEC) 40 MG capsule Take 1 capsule (40 mg total) by mouth 2 (two) times a day.   ? predniSONE (DELTASONE) 5 MG tablet Take 0.5-1 tablets (2.5-5 mg total) by mouth daily.   ? timolol maleate (ISTALOL) 0.5 % DrpD Apply to eye Daily at 8:00 am..   ? torsemide (DEMADEX) 5 MG tablet Take 1 tablet (5 mg total) by mouth daily.     Social History     Social History Narrative    Moved out of her home in 2018 and moved to the [facility].        , no children, lives alone.        Volunteer at [employer] in the past.     ______________________________________________________________________     History of present illness:    Patient comes in today for follow-up.    We first reviewed her reflux disease.  She had questions about stopping the omeprazole or cutting back.  This is mainly on relationship to concerns about bone density.    She had really bad esophageal spasms and reflux about 2-3 years ago.  We talked about cutting it back and using other medications or continuing with it.  Given the severity of her symptoms 2-3 Years ago, she does not want to cut back on it    We reviewed her polymyalgia and her joint pains are doing well.  She does get some knee pain which is more likely osteoarthritis.    We  reviewed her high blood pressure and her blood pressure is doing well without issue.  She takes the torsemide about 5 days a week.    She has had some issues with numbness in her left hand.  This is mainly in her thumb, index finger and middle finger.  It comes and goes but has been more present.    She gets bruising easily on her lower legs and this was reviewed with her.  It does not necessarily coordinate with known trauma.    We reviewed her other issues noted in the assessment but not specifically addressed in the HPI above.     On review of systems, the patient denies any chest pain or shortness of breath.    ______________________________________________________________________    Exam:    Wt Readings from Last 3 Encounters:   12/17/18 151 lb (68.5 kg)   08/17/18 153 lb (69.4 kg)   05/15/18 154 lb (69.9 kg)     BP Readings from Last 3 Encounters:   12/17/18 138/80   08/17/18 138/80   05/15/18 144/78     /80   Pulse 72   Wt 151 lb (68.5 kg)   LMP 06/18/1974   SpO2 95%   BMI 26.75 kg/m     The patient is comfortable, no acute distress.  Mood good.  Insight good.  Eyes are nonicteric.  Neck is supple without mass.  No cervical adenopathy.  No thyromegaly. Heart regular rate and rhythm.  Lungs clear to auscultation bilaterally.  Respiratory effort is good.  Abdomen soft and nontender.  No hepatosplenomegaly.  Extremities no edema.  She has actinic purpura of her lower extremities.  Her strength in her left hand is good.  ______________________________________________________________________    Diagnostics:    Results for orders placed or performed in visit on 08/17/18   HM2(CBC w/o Differential)   Result Value Ref Range    WBC 5.8 4.0 - 11.0 thou/uL    RBC 4.36 3.80 - 5.40 mill/uL    Hemoglobin 13.3 12.0 - 16.0 g/dL    Hematocrit 39.6 35.0 - 47.0 %    MCV 91 80 - 100 fL    MCH 30.4 27.0 - 34.0 pg    MCHC 33.5 32.0 - 36.0 g/dL    RDW 12.5 11.0 - 14.5 %    Platelets 244 140 - 440 thou/uL    MPV 8.8 7.0 -  10.0 fL   Basic Metabolic Panel   Result Value Ref Range    Sodium 145 136 - 145 mmol/L    Potassium 4.5 3.5 - 5.0 mmol/L    Chloride 110 (H) 98 - 107 mmol/L    CO2 22 22 - 31 mmol/L    Anion Gap, Calculation 13 5 - 18 mmol/L    Glucose 75 70 - 125 mg/dL    Calcium 10.4 8.5 - 10.5 mg/dL    BUN 18 8 - 28 mg/dL    Creatinine 0.93 0.60 - 1.10 mg/dL    GFR MDRD Af Amer >60 >60 mL/min/1.73m2    GFR MDRD Non Af Amer 57 (L) >60 mL/min/1.73m2   Sedimentation Rate   Result Value Ref Range    Sed Rate 13 0 - 20 mm/hr   C-Reactive Protein(CRP)   Result Value Ref Range    CRP 0.7 0.0 - 0.8 mg/dL     ______________________________________________________________________    Assessment:    1. Gastroesophageal reflux disease, esophagitis presence not specified    2. Anxiety    3. Essential hypertension    4. PMR (polymyalgia rheumatica) - August 2016    5. Presbyesophagus    6. Senile purpura (H)    7. Left carpal tunnel syndrome      ______________________________________________________________________     PHQ-2 Total Score: 0 (12/17/2018 11:00 AM)    No Data Recorded  ______________________________________________________________________    Plan:    1. Continue current blood pressure medications.  Continue PPI.  2. Wear a carpal tunnel brace on her left hand at night.  Consider EMG or hand consult if not improving.  3. Continue current dose of prednisone.  4. Follow-up in 4 months with blood work at that time.    Jersey Metcalf MD  General Internal Medicine  [facility]     Return in about 4 months (around 4/17/2019) for visit and blood work.     Future Appointments   Date Time Provider Department Center   4/18/2019  1:15 PM Jersey Metcalf MD [facility]         ______________________________________________________________________     Relevant ICD-10 codes and order associations:      ICD-10-CM    1. Gastroesophageal reflux disease, esophagitis presence not specified K21.9    2. Anxiety F41.9     3. Essential hypertension I10    4. PMR (polymyalgia rheumatica) - August 2016 M35.3    5. Presbyesophagus K22.8    6. Senile purpura (H) D69.2    7. Left carpal tunnel syndrome G56.02

## 2021-06-28 NOTE — PROGRESS NOTES
Progress Notes by Jersey Metcalf MD at 12/16/2019  9:40 AM     Author: Jersey Metcalf MD Service: -- Author Type: Physician    Filed: 12/16/2019  9:36 PM Encounter Date: 12/16/2019 Status: Signed    : Jersey Metcalf MD (Physician)              [facility]    Comprehensive and complex medical care - Chronic disease management - Shared decision making - Care coordination - Compassionate care    Patient advocacy - Rational deprescribing - Minimally disruptive medicine - Ethical focus - Customized care          Date of Service: 12/16/2019  Primary Provider: Jersey Metcalf MD    Patient Care Team:  Jersey Metcalf MD as PCP - General (Internal Medicine)  Giovanny Koo MD as Physician (Orthopedic Surgery)  Malissa Mcclellan MD as Physician (Dermatology)  Jersey Metcalf MD as Assigned PCP  Vannesa Madrigal MD as Physician (Ophthalmology)     ______________________________________________________________________     Patient's Pharmacy:    [facility]/pharmacy #[id number] - [address] [address]  Phone: [phone] Fax: [phone]     Patient's Contacts:  Name Home Phone Work Phone Mobile Phone Relationship Lgl RitchieSHIMON Velasco [phone]   Nephew      Patient's Insurance:    Payor: MEDICARE / Plan: MEDICARE A AND B / Product Type: Medicare /           Manuela NAIK Brendon is a 89 y.o. female who comes in today for:    Chief Complaint   Patient presents with   ? Follow-up     JOINT PAIN, OSTEOARTHRITIS, PMR   ? Fatigue       Active Problem List:  Problem List as of 12/16/2019 Reviewed: 12/16/2019  9:32 PM by Jersey Metcalf MD       High    DNR (do not resuscitate)    Former smoker-quit at age 30       Medium    Anxiety    Controlled substance agreement signed - 4/19 - Clonazepam - UDS 4/19    GERD (gastroesophageal reflux disease)    HTN (hypertension)    OA (osteoarthritis)-fingers    PMR (polymyalgia  rheumatica) (H) - August 2016    Presbyesophagus       Low    Esophageal dysphagia    HLD (hyperlipidemia)    Hypercalcemia    Hyperparathyroidism (H)    Insomnia    Unspecified glaucoma       Unprioritized    Senile purpura (H)           Current Outpatient Medications   Medication Sig   ? amLODIPine (NORVASC) 2.5 MG tablet TAKE 1 TABLET (2.5 MG TOTAL) BY MOUTH DAILY. FOR HIGH BLOOD PRESSURE.   ? clonazePAM (KLONOPIN) 0.5 MG tablet TAKE HALF A TAB-1 TABLETS (0.25-0.5 MG TOTAL) BY MOUTH BEDTIME.   ? dorzolamide (TRUSOPT) 2 % ophthalmic solution 1 drop 3 (three) times a day.   ? metoprolol tartrate (LOPRESSOR) 50 MG tablet Take 1 tablet (50 mg total) by mouth 2 (two) times a day.   ? omeprazole (PRILOSEC) 40 MG capsule TAKE 1 CAPSULE BY MOUTH TWICE A DAY   ? predniSONE (DELTASONE) 5 MG tablet Take 2.5-5 mg by mouth daily.   ? timolol maleate (ISTALOL) 0.5 % DrpD Apply to eye Daily at 8:00 am..   ? torsemide (DEMADEX) 5 MG tablet Take 1 tablet (5 mg total) by mouth daily as needed (swelling in legs).     Social History     Social History Narrative    Moved out of her home in 2018 and moved to the [facility].        , no children, lives alone.        Volunteer at [employer] in the past.       Subjective:     Comes in today for follow-up of multiple issues.    We reviewed her PMR.  She remains on low-dose prednisone.  She has had increasing pain in her fingers, back, knees.  She is taking Aleve and Tylenol for this.  She denies any joint swelling at this time.  She would like to increase her prednisone if possible.  She also has known osteoarthritis of multiple joints and it is hard to know how much each of these could be an issue.  We also reviewed x-rays and corticosteroid injections if needed.    We reviewed her shortness of breath.  She does get short of breath with activity.  She denies any sudden worsening.  It is slightly worse over the last year.  We reviewed further work-up is not 1 to proceed  with this yet at this time.  This could include chest x-ray, PFTs, stress test.  She denies any cough with it.  She denies any chest pain with it.    We reviewed her high blood pressure her blood pressure seems to be doing well without issue.    Reviewed her anxiety and things are stable here.    She continues have some bruising on her legs and this was reviewed.    She does feel fatigued in general.  This is just a ongoing sense of fatigue.    We reviewed her other issues noted in the assessment but not specifically addressed in the HPI above.     On review of systems, the patient denies any chest pain or shortness of breath.    Objective:     Wt Readings from Last 3 Encounters:   12/16/19 150 lb (68 kg)   08/19/19 149 lb (67.6 kg)   04/18/19 150 lb (68 kg)     BP Readings from Last 3 Encounters:   12/16/19 136/84   08/19/19 134/74   04/18/19 136/80     /84   Pulse 72   Wt 150 lb (68 kg)   LMP 06/18/1974   SpO2 96%   BMI 26.57 kg/m     The patient is comfortable, no acute distress.  Mood good.  Insight good.  Eyes are nonicteric.  Neck is supple without mass.  No cervical adenopathy.  No thyromegaly. Heart regular rate and rhythm.  Lungs clear to auscultation bilaterally.  Respiratory effort is good.  Abdomen soft and nontender.  No hepatosplenomegaly.  Extremities no edema.  No active synovitis.  There is valgus deformity in both knees.    Diagnostics:     Results for orders placed or performed in visit on 12/16/19   Comprehensive Metabolic Panel   Result Value Ref Range    Sodium 141 136 - 145 mmol/L    Potassium 4.8 3.5 - 5.0 mmol/L    Chloride 105 98 - 107 mmol/L    CO2 26 22 - 31 mmol/L    Anion Gap, Calculation 10 5 - 18 mmol/L    Glucose 93 70 - 125 mg/dL    BUN 15 8 - 28 mg/dL    Creatinine 0.86 0.60 - 1.10 mg/dL    GFR MDRD Af Amer >60 >60 mL/min/1.73m2    GFR MDRD Non Af Amer >60 >60 mL/min/1.73m2    Bilirubin, Total 0.5 0.0 - 1.0 mg/dL    Calcium 10.7 (H) 8.5 - 10.5 mg/dL    Protein, Total 7.1  6.0 - 8.0 g/dL    Albumin 4.1 3.5 - 5.0 g/dL    Alkaline Phosphatase 108 45 - 120 U/L    AST 21 0 - 40 U/L    ALT 18 0 - 45 U/L   HM2(CBC w/o Differential)   Result Value Ref Range    WBC 7.4 4.0 - 11.0 thou/uL    RBC 4.75 3.80 - 5.40 mill/uL    Hemoglobin 13.9 12.0 - 16.0 g/dL    Hematocrit 42.8 35.0 - 47.0 %    MCV 90 80 - 100 fL    MCH 29.2 27.0 - 34.0 pg    MCHC 32.3 32.0 - 36.0 g/dL    RDW 12.1 11.0 - 14.5 %    Platelets 246 140 - 440 thou/uL    MPV 8.3 7.0 - 10.0 fL   Sedimentation Rate   Result Value Ref Range    Sed Rate 27 (H) 0 - 20 mm/hr   C-Reactive Protein   Result Value Ref Range    CRP 1.6 (H) 0.0 - 0.8 mg/dL   BNP(B-type Natriuretic Peptide)   Result Value Ref Range    BNP 96 0 - 167 pg/mL   Drug Abuse 1+, Urine   Result Value Ref Range    Amphetamines Screen Negative Screen Negative    Benzodiazepines Screen Negative Screen Negative    Opiates Screen Negative Screen Negative    Phencyclidine Screen Negative Screen Negative    THC Screen Negative Screen Negative    Barbiturates Screen Negative Screen Negative    Cocaine Metabolite Screen Negative Screen Negative    Methadone Screen Negative Screen Negative    Oxycodone Screen Negative Screen Negative    Creatinine, Urine 25.7 mg/dL       Assessment:     1. Fatigue, unspecified type    2. Essential hypertension    3. PMR (polymyalgia rheumatica) - August 2016    4. KENDRICK (dyspnea on exertion)    5. Encounter for therapeutic drug level monitoring    6. Multiple joint pain    7. Hyperparathyroidism (H)    8. Primary osteoarthritis involving multiple joints    9. Acquired genu valgum, unspecified laterality        Quality review:     PHQ-2 Total Score: 0 (12/16/2019 10:00 AM)    No data recorded  ______________________________________________________________________     BMI Readings from Last 1 Encounters:   12/16/19 26.57 kg/m        Plan:     1. Urine drug screen done today and blood work done today.  2. We might try a therapeutic trial of decreasing her  prednisone dose if this helps.  3. No obvious cause for shortness of breath noted today.  Consider further work-up if patient allows in the future.  4. Continue current medications otherwise.       Jersey Metcalf MD  General Internal Medicine  [facility]    Personal office fax - [phone]   Voice mail - [phone]  E-mail - [email or web address]     Return in about 4 months (around 4/16/2020), or if symptoms worsen or fail to improve, for follow up visit.     Future Appointments   Date Time Provider Department Center   4/17/2020  9:15 AM [facility]         ______________________________________________________________________     Relevant ICD-10 codes and order associations:      ICD-10-CM    1. Fatigue, unspecified type R53.83 Comprehensive Metabolic Panel     HM2(CBC w/o Differential)   2. Essential hypertension I10    3. PMR (polymyalgia rheumatica) - August 2016 M35.3 Sedimentation Rate     C-Reactive Protein   4. KENDRICK (dyspnea on exertion) R06.09 BNP(B-type Natriuretic Peptide)   5. Encounter for therapeutic drug level monitoring Z51.81 Drug Abuse 1+, Urine   6. Multiple joint pain M25.50    7. Hyperparathyroidism (H) E21.3    8. Primary osteoarthritis involving multiple joints M15.0    9. Acquired genu valgum, unspecified laterality M21.069

## 2021-06-28 NOTE — PROGRESS NOTES
"Progress Notes by Jersey Metcalf MD at 4/9/2020  9:30 AM     Author: Jersey Metcalf MD Service: -- Author Type: Physician    Filed: 4/9/2020 10:03 AM Encounter Date: 4/9/2020 Status: Signed    : Jersey Metcalf MD (Physician)              [facility]     TELEPHONE VISIT     Comprehensive and complex medical care - Chronic disease management - Shared decision making - Care coordination - Compassionate care    Patient advocacy - Rational deprescribing - Minimally disruptive medicine - Ethical focus - Customized care         Manuela Olivas is a 89 y.o. female who is being evaluated via a billable telephone visit.      The patient has been notified of following:     \"This telephone visit will be conducted via a call between you and your physician/provider. We have found that certain health care needs can be provided without the need for a physical exam.  This service lets us provide the care you need with a short phone conversation.  If a prescription is necessary we can send it directly to your pharmacy.  If lab work is needed we can place an order for that and you can then stop by our lab to have the test done at a later time.    If during the course of the call the physician/provider feels a telephone visit is not appropriate, you will not be charged for this service.\"          Manuela Olivas complains of    Chief Complaint   Patient presents with   ? Follow-up     PMR, SOB DOING GOOD, FATIGUE IS STILL THE SAME       Active Problem List:  Problem List as of 4/9/2020 Reviewed: 4/9/2020  9:57 AM by Jersey Metcalf MD       High    DNR (do not resuscitate)    Former smoker-quit at age 30       Medium    Controlled substance agreement signed - 4/19 - Clonazepam - UDS 4/19    Anxiety    GERD (gastroesophageal reflux disease)    HTN (hypertension)    OA (osteoarthritis)-fingers    PMR (polymyalgia rheumatica) (H) - August 2016    Presbyesophagus       Low    " Esophageal dysphagia    HLD (hyperlipidemia)    Hypercalcemia    Hyperparathyroidism (H)    Insomnia    Unspecified glaucoma       Unprioritized    Senile purpura (H)           Current Outpatient Medications   Medication Sig   ? amLODIPine (NORVASC) 2.5 MG tablet TAKE 1 TABLET (2.5 MG TOTAL) BY MOUTH DAILY. FOR HIGH BLOOD PRESSURE.   ? clonazePAM (KLONOPIN) 0.5 MG tablet TAKE HALF A TAB-1 TABLETS (0.25-0.5 MG TOTAL) BY MOUTH EACH NIGHT AT BEDTIME.   ? dorzolamide (TRUSOPT) 2 % ophthalmic solution 1 drop 3 (three) times a day.   ? metoprolol tartrate (LOPRESSOR) 50 MG tablet Take 1 tablet (50 mg total) by mouth 2 (two) times a day.   ? omeprazole (PRILOSEC) 40 MG capsule TAKE 1 CAPSULE BY MOUTH TWICE A DAY   ? predniSONE (DELTASONE) 5 MG tablet Take 2.5-5 mg by mouth daily.   ? timolol maleate (ISTALOL) 0.5 % DrpD Apply to eye Daily at 8:00 am..   ? torsemide (DEMADEX) 5 MG tablet Take 1 tablet (5 mg total) by mouth daily as needed (swelling in legs).       Subjective:     Manuela Olivas complains of      Chief Complaint   Patient presents with   ? Follow-up     PMR, SOB DOING GOOD, FATIGUE IS STILL THE SAME     The patient has a phone visit today which is done in light of the current coronavirus outbreak.    Patient is generally doing well without issues.    We reviewed her polymyalgia rheumatica.  Joint pain is about the same.  She is taking 2-1/2 of prednisone a day.  She is happy with this dose.  She does not feel she needs increase it.  Her sed rate and CRP have been mildly elevated in the most recent check.    We reviewed her high blood pressure and her blood pressure is stable.  Her shortness of breath with exertion is stable.    Reviewed her senile purpura and she has not had any worsening of this.    She has hyperparathyroidism which is borderline and she has no symptoms related to this.    Her reflux is doing well on the current medication.    We reviewed her other issues noted in the assessment but not  specifically addressed in the HPI above.     On ROS, the patient denies any chest pain or pressure.  No fever or chills.    Objective:     Limited phone exam reveals:  patient in no distress.  Mood is good.  Insight is good.  Speech is not labored.    Diagnostics:     Results for orders placed or performed in visit on 12/16/19   Comprehensive Metabolic Panel   Result Value Ref Range    Sodium 141 136 - 145 mmol/L    Potassium 4.8 3.5 - 5.0 mmol/L    Chloride 105 98 - 107 mmol/L    CO2 26 22 - 31 mmol/L    Anion Gap, Calculation 10 5 - 18 mmol/L    Glucose 93 70 - 125 mg/dL    BUN 15 8 - 28 mg/dL    Creatinine 0.86 0.60 - 1.10 mg/dL    GFR MDRD Af Amer >60 >60 mL/min/1.73m2    GFR MDRD Non Af Amer >60 >60 mL/min/1.73m2    Bilirubin, Total 0.5 0.0 - 1.0 mg/dL    Calcium 10.7 (H) 8.5 - 10.5 mg/dL    Protein, Total 7.1 6.0 - 8.0 g/dL    Albumin 4.1 3.5 - 5.0 g/dL    Alkaline Phosphatase 108 45 - 120 U/L    AST 21 0 - 40 U/L    ALT 18 0 - 45 U/L   HM2(CBC w/o Differential)   Result Value Ref Range    WBC 7.4 4.0 - 11.0 thou/uL    RBC 4.75 3.80 - 5.40 mill/uL    Hemoglobin 13.9 12.0 - 16.0 g/dL    Hematocrit 42.8 35.0 - 47.0 %    MCV 90 80 - 100 fL    MCH 29.2 27.0 - 34.0 pg    MCHC 32.3 32.0 - 36.0 g/dL    RDW 12.1 11.0 - 14.5 %    Platelets 246 140 - 440 thou/uL    MPV 8.3 7.0 - 10.0 fL   Sedimentation Rate   Result Value Ref Range    Sed Rate 27 (H) 0 - 20 mm/hr   C-Reactive Protein   Result Value Ref Range    CRP 1.6 (H) 0.0 - 0.8 mg/dL   BNP(B-type Natriuretic Peptide)   Result Value Ref Range    BNP 96 0 - 167 pg/mL   Drug Abuse 1+, Urine   Result Value Ref Range    Amphetamines Screen Negative Screen Negative    Benzodiazepines Screen Negative Screen Negative    Opiates Screen Negative Screen Negative    Phencyclidine Screen Negative Screen Negative    THC Screen Negative Screen Negative    Barbiturates Screen Negative Screen Negative    Cocaine Metabolite Screen Negative Screen Negative    Methadone Screen  Negative Screen Negative    Oxycodone Screen Negative Screen Negative    Creatinine, Urine 25.7 mg/dL       Quality review:     PHQ-2 Total Score: 0 (4/9/2020  9:00 AM)      No data recorded  ______________________________________________________________________     BMI Readings from Last 1 Encounters:   03/09/20 26.22 kg/m        Assessment and Plan:     1. Hyperparathyroidism (H)  Monitor intermittently with blood work in the future.    2. PMR (polymyalgia rheumatica) (H) - August 2016  Continue current prednisone dose.  Consider other testing in the future.    3. Senile purpura (H)  No change.    4. Gastroesophageal reflux disease, esophagitis presence not specified  Continue current medications .    5. Essential hypertension  Blood work next visit to be considered.    6. Anxiety  Continue current medications.       Phone call duration:  7 minutes    Return in about 6 months (around 10/9/2020) for follow up visit.     No future appointments.         Jersey Metcalf MD  General Internal Medicine  [facility]    Personal office fax - [phone]   Voice mail - [phone]  E-mail - [email or web address]     HCC issues resolved at this visit.

## 2021-06-29 NOTE — PROGRESS NOTES
Progress Notes by Jersey Metcalf MD at 9/15/2020  9:40 AM     Author: Jersey Metcalf MD Service: -- Author Type: Physician    Filed: 2020  8:30 AM Encounter Date: 9/15/2020 Status: Signed    : Jersey Metcalf MD (Physician)              [facility]    Comprehensive and complex medical care - Chronic disease management - Shared decision making - Care coordination - Compassionate care    Patient advocacy - Rational deprescribing - Minimally disruptive medicine - Ethical focus - Customized care          Date of Service: 9/15/2020  Primary Provider: Jersey Metcalf MD    Patient Care Team:  Jersey Metcalf MD as PCP - General (Internal Medicine)  Giovanny Koo MD as Physician (Orthopedic Surgery)  Malissa Mcclellan MD as Physician (Dermatology)  Jersey Metcalf MD as Assigned PCP  Vannesa Madrigal MD as Physician (Ophthalmology)     ______________________________________________________________________     Patient's Pharmacy:      [facility]/pharmacy #[id number] - [address] [address]  Phone: [phone] Fax: [phone]     Patient's Contacts:  Name Home Phone Work Phone Mobile Phone Relationship Lgl Grd   SHIMON OLIVAS [phone]   Nephew    DECLINED, PER PT    Declined        Patient's Insurance:    Payor/Plan Subscr  Sex Relation Sub. Ins. ID Effective Group Num   1. MEDICARE - ME* MANUELA OLIVAS 1930 Female  [id number] 1995                                    [place], [address]   2. BLUE CROSS - * MANUELA OLIVAS 1930 Female  [id number]* Not Eff -LZ                                   [address] [address]           Manuela Olivas is a 89 y.o. female who comes in today for:    Chief Complaint   Patient presents with   ? Follow-up     PMR, fatigue, spots on legs are worse       Active Problem List:  Problem List as of 9/15/2020 Reviewed: 2020  9:57 AM by Jersey Metcalf MD        High    DNR (do not resuscitate)    Former smoker-quit at age 30       Medium    Controlled substance agreement signed - 9/20 - Clonazepam - UDS 12/19    Anxiety    GERD (gastroesophageal reflux disease)    HTN (hypertension)    OA (osteoarthritis)-fingers    PMR (polymyalgia rheumatica) (H) - August 2016    Presbyesophagus       Low    Esophageal dysphagia    HLD (hyperlipidemia)    Hypercalcemia    Hyperparathyroidism (H)    Insomnia    Unspecified glaucoma       Unprioritized    Senile purpura (H)           Current Outpatient Medications   Medication Sig   ? amLODIPine (NORVASC) 2.5 MG tablet TAKE 1 TABLET (2.5 MG TOTAL) BY MOUTH DAILY. FOR HIGH BLOOD PRESSURE.   ? clonazePAM (KLONOPIN) 0.5 MG tablet TAKE HALF A TAB-1 TABLETS (0.25-0.5 MG TOTAL) BY MOUTH EACH NIGHT AT BEDTIME.   ? dorzolamide (TRUSOPT) 2 % ophthalmic solution 1 drop 3 (three) times a day.   ? metoprolol tartrate (LOPRESSOR) 50 MG tablet Take 1 tablet (50 mg total) by mouth 2 (two) times a day.   ? omeprazole (PRILOSEC) 40 MG capsule Take 1 capsule (40 mg total) by mouth 2 (two) times a day.   ? predniSONE (DELTASONE) 5 MG tablet TAKE 2.5-5 MG BY MOUTH DAILY.   ? timolol maleate (ISTALOL) 0.5 % DrpD Apply to eye Daily at 8:00 am..       Social History     Social History Narrative    Moved out of her home in 2018 and moved to the [facility].        , no children, lives alone.        Volunteer at [employer] in the past.         Subjective:     Patient comes in today for follow-up of a number of issues.    We reviewed her overall health.  She continues at her assisted living.    We reviewed her overall joint pain.  She has had some back and right-sided leg pain.  She has had some stiffness overall.  She is had some problems with her knees.  She is taking half of a prednisone a day.  We did increase her prednisone briefly with last visit and it may have not made a whole lot of difference in her joint pain.  We will repeat her blood  work today and determine further options.    Specifically, related to the back and the right side of the leg pain.  This is a newer occurrence for her.  This has been worse in the last couple of months.  She denies any numbness or tingling with it.  She is using a cane to get around at this time.  She does get a little out of breath with activity due to some of the discomfort.    Reviewed her high blood pressure and her blood pressure appears to be doing well.  She is not taking torsemide.    We reviewed her polymyalgia rheumatica in relationship to this.    We reviewed her other issues noted in the assessment but not specifically addressed in the HPI above.     On review of systems, the patient denies any chest pain or shortness of breath.    Objective:     Wt Readings from Last 3 Encounters:   09/15/20 149 lb (67.6 kg)   03/09/20 148 lb (67.1 kg)   12/16/19 150 lb (68 kg)       BP Readings from Last 3 Encounters:   09/15/20 132/84   03/09/20 138/70   12/16/19 136/84       /84   Pulse 76   Wt 149 lb (67.6 kg)   LMP 06/18/1974   SpO2 96%   BMI 26.39 kg/m     The patient is comfortable, no acute distress.  Mood good.  Insight good.  Eyes are nonicteric.  Neck is supple without mass.  No cervical adenopathy.  No thyromegaly. Heart regular rate and rhythm.  Lungs clear to auscultation bilaterally.  Respiratory effort is good.  Abdomen soft and nontender.  No hepatosplenomegaly.  Extremities no edema.  There is no swelling/synovitis of her hands, wrists, knees.  There is a valgus deformity of the right knee.  There is no trochanteric bursitis noted.  Her back is nontender today.  She moves somewhat stiffly.      Diagnostics:     Results for orders placed or performed in visit on 09/15/20   Sedimentation Rate   Result Value Ref Range    Sed Rate 21 (H) 0 - 20 mm/hr   C-Reactive Protein(CRP)   Result Value Ref Range    CRP 0.9 (H) 0.0 - 0.8 mg/dL   Basic Metabolic Panel   Result Value Ref Range    Sodium 140  136 - 145 mmol/L    Potassium 4.8 3.5 - 5.0 mmol/L    Chloride 104 98 - 107 mmol/L    CO2 25 22 - 31 mmol/L    Anion Gap, Calculation 11 5 - 18 mmol/L    Glucose 87 70 - 125 mg/dL    Calcium 10.5 8.5 - 10.5 mg/dL    BUN 19 8 - 28 mg/dL    Creatinine 0.93 0.60 - 1.10 mg/dL    GFR MDRD Af Amer >60 >60 mL/min/1.73m2    GFR MDRD Non Af Amer 57 (L) >60 mL/min/1.73m2   Parathyroid Hormone Intact   Result Value Ref Range     (H) 10 - 86 pg/mL   Rheumatoid Factor Quant   Result Value Ref Range    Rheumatoid Factor Quantitative <15.0 0 - 30 IU/mL   CCP Antibodies   Result Value Ref Range    CCP IgG Antibodies <0.5 <=4.9 U/mL   HM2(CBC w/o Differential)   Result Value Ref Range    WBC 7.3 4.0 - 11.0 thou/uL    RBC 4.58 3.80 - 5.40 mill/uL    Hemoglobin 14.1 12.0 - 16.0 g/dL    Hematocrit 41.9 35.0 - 47.0 %    MCV 92 80 - 100 fL    MCH 30.8 27.0 - 34.0 pg    MCHC 33.6 32.0 - 36.0 g/dL    RDW 12.0 11.0 - 14.5 %    Platelets 252 140 - 440 thou/uL    MPV 8.6 7.0 - 10.0 fL       Assessment:     1. Essential hypertension    2. Gastroesophageal reflux disease with esophagitis    3. Immunization due    4. Mixed hyperlipidemia    5. Hyperparathyroidism (H)    6. PMR (polymyalgia rheumatica) (H) - August 2016    7. Multiple joint pain    8. Back pain with right-sided radiculopathy        Quality review:     PHQ-2 Total Score: 0 (9/15/2020  9:00 AM)      No data recorded  ______________________________________________________________________     BMI Readings from Last 1 Encounters:   09/15/20 26.39 kg/m          Plan:     1. Blood work done today.  2. We discussed that the back and right leg issues could be related to a spinal issue and she could have further evaluation.  She is not really interested in MRI at this point.  We talked about other options including physical therapy if needed.  3. Consider another run of prednisone to see if we can help with her overall pain.  We would need some sort of follow-up related to  this.  4. Continue current medications as is at this time.  5. Flu shot done today.  6. Esophageal issues doing well.         Jersey Metcalf MD  General Internal Medicine  [facility]    Personal office fax - [phone]   Voice mail - [phone]  E-mail - [email or web address]     Return in about 6 months (around 3/15/2021), or if symptoms worsen or fail to improve, for visit and blood work.     No future appointments.      ______________________________________________________________________     Relevant ICD-10 codes and order associations:      ICD-10-CM    1. Essential hypertension  I10    2. Gastroesophageal reflux disease with esophagitis  K21.0 omeprazole (PRILOSEC) 40 MG capsule   3. Immunization due  Z23 Influenza,Quad,High Dose,PF 65 YR+   4. Mixed hyperlipidemia  E78.2    5. Hyperparathyroidism (H)  E21.3 Basic Metabolic Panel     Parathyroid Hormone Intact   6. PMR (polymyalgia rheumatica) (H) - August 2016  M35.3 Sedimentation Rate     C-Reactive Protein(CRP)     Rheumatoid Factor Quant     CCP Antibodies     HM2(CBC w/o Differential)   7. Multiple joint pain  M25.50    8. Back pain with right-sided radiculopathy  M54.10

## 2021-06-30 NOTE — PROGRESS NOTES
Progress Notes by Jersey Metcalf MD at 2021  9:15 AM     Author: Jersey Metcalf MD Service: -- Author Type: Physician    Filed: 2021 11:28 PM Encounter Date: 2021 Status: Signed    : Jersey Metcalf MD (Physician)              [facility]    Comprehensive and complex medical care - Chronic disease management - Shared decision making - Care coordination - Compassionate care    Patient advocacy - Rational deprescribing - Minimally disruptive medicine - Ethical focus - Customized care          Date of Service: 2021  Primary Provider: Jersey Metcalf MD    Patient Care Team:  Jersey Metcalf MD as PCP - General (Internal Medicine)  Giovanny Koo MD as Physician (Orthopedic Surgery)  Malissa Mcclellan MD as Physician (Dermatology)  Jersey Metcalf MD as Assigned PCP  Vannesa Madrigal MD as Physician (Ophthalmology)     ______________________________________________________________________     Patient's Pharmacy:    [facility]/pharmacy #[id number] - [address] [address]  Phone: [phone] Fax: [phone]     Patient's Contacts:  Name Home Phone Work Phone Mobile Phone Relationship Lgl Grd   SHIMON OLIVAS [phone]   Nephew      Patient's Insurance:    Payor/Plan Subscr  Sex Relation Sub. Ins. ID Effective Group Num   1. MEDICARE - ME* MANUELA OLIVAS 1930 Female Self [id number] 1995                                    [place], [address]   2. BLUE CROSS - * MANUELA OLIVAS 1930 Female  [id number]* Not Eff -IX                                   [address] [address]           Manuela Olivas is a 89 y.o. female who comes in today for:    Chief Complaint   Patient presents with   ? Follow-up     joint pain, back/leg pain is having a lot of pain has no stamina to walk much, PMR   ? Edema     right ankle   ? Follow-up     spot on right lower leg is getting bigger   ? Numbness     in  fingers       Active Problem List:  Problem List as of 4/9/2021 Reviewed: 4/9/2021 11:11 PM by Jersey Metcalf MD       High    DNR (do not resuscitate)    Former smoker-quit at age 30       Medium    Controlled substance agreement signed - 9/20 - Clonazepam - UDS 12/19    Anxiety    GERD (gastroesophageal reflux disease)    HTN (hypertension)    OA (osteoarthritis)-fingers    PMR (polymyalgia rheumatica) (H) - August 2016    Presbyesophagus       Low    Esophageal dysphagia    HLD (hyperlipidemia)    Hypercalcemia    Hyperparathyroidism (H)    Insomnia    Unspecified glaucoma       Unprioritized    Nonhealing skin ulcer, unspecified ulcer stage (H)    Senile purpura (H)           Current Outpatient Medications   Medication Sig   ? amLODIPine (NORVASC) 2.5 MG tablet TAKE 1 TABLET (2.5 MG TOTAL) BY MOUTH DAILY. FOR HIGH BLOOD PRESSURE.   ? clonazePAM (KLONOPIN) 0.5 MG tablet TAKE HALF A TAB-1 TABLETS (0.25-0.5 MG TOTAL) BY MOUTH EACH NIGHT AT BEDTIME.   ? dorzolamide (TRUSOPT) 2 % ophthalmic solution 1 drop 3 (three) times a day.   ? metoprolol tartrate (LOPRESSOR) 50 MG tablet Take 1 tablet (50 mg total) by mouth 2 (two) times a day.   ? omeprazole (PRILOSEC) 40 MG capsule Take 1 capsule (40 mg total) by mouth 2 (two) times a day.   ? predniSONE (DELTASONE) 5 MG tablet TAKE 1/2 TO 1 TABLET BY MOUTH ONCE DAILY   ? timolol maleate (ISTALOL) 0.5 % DrpD Apply to eye Daily at 8:00 am..       Social History     Social History Narrative    Moved out of her home in 2018 and moved to the [facility].        , no children, lives alone.        Volunteer at [employer] in the past.       Subjective:     Comes in today for follow up.    Generally doing okay.  Joint pains have been more of an issue and does have osteoarthritis (OA).    We will check on polymyalgia rheumatica (PMR) but no symptoms consistent with this worsening.  Increasing the prednisone slightly in the recent past has not helped.    Has  "increased pain in the knees.  Right greater than left.  Both bothers her a lot and she is wondering about a corticosteroid injection.    Has osteoarthritis (OA) of the hands and has noticed numbness in the index and middle fingers of both hands.  No loss of strength.  Does not want to do more with this at this time.    Reviewed her hypertension today.  Blood pressure has been in the goal range.  Denies any excessive dizziness from the medication with this.     Has some shortness of breath with exertion.  No chest pain.  Uses a cane to get around.    We reviewed her other issues noted in the assessment but not specifically addressed in the HPI above.     Objective:     Wt Readings from Last 3 Encounters:   04/09/21 157 lb (71.2 kg)   09/15/20 149 lb (67.6 kg)   03/09/20 148 lb (67.1 kg)     BP Readings from Last 3 Encounters:   04/09/21 138/86   09/15/20 132/84   03/09/20 138/70     /86   Pulse 77   Ht 5' 3\" (1.6 m)   Wt 157 lb (71.2 kg)   [facility] 06/18/1974   SpO2 95%   BMI 27.81 kg/m     The patient is comfortable, no acute distress.  Mood good.  Insight good.  Eyes are nonicteric.  Neck is supple without mass.  No cervical adenopathy.  No thyromegaly. Heart regular rate and rhythm.  Lungs clear to auscultation bilaterally.  Respiratory effort is good.  Abdomen soft and nontender.  No hepatosplenomegaly.  Extremities 1+ right lower extremity edema. Venous stasis change on the SHIN with some irritation of the skin.  Valgus deformity of the right knee with baker's cyst here.  Mild to moderate effusion of the right knee with some warmth but no redness.  Left knee shows osteoarthritis (OA) changes without effusion or deformity.    Diagnostics:     Results for orders placed or performed in visit on 04/09/21   Basic Metabolic Panel   Result Value Ref Range    Sodium 141 136 - 145 mmol/L    Potassium 4.4 3.5 - 5.0 mmol/L    Chloride 105 98 - 107 mmol/L    CO2 28 22 - 31 mmol/L    Anion Gap, Calculation 8 5 - 18 " mmol/L    Glucose 96 70 - 125 mg/dL    Calcium 10.1 8.5 - 10.5 mg/dL    BUN 21 8 - 28 mg/dL    Creatinine 0.90 0.60 - 1.10 mg/dL    GFR MDRD Af Amer >60 >60 mL/min/1.73m2    GFR MDRD Non Af Amer 59 (L) >60 mL/min/1.73m2   Sedimentation Rate   Result Value Ref Range    Sed Rate 22 (H) 0 - 20 mm/hr   C-Reactive Protein(CRP)   Result Value Ref Range    CRP 1.1 (H) 0.0 - 0.8 mg/dL   Hepatic Profile   Result Value Ref Range    Bilirubin, Total 0.3 0.0 - 1.0 mg/dL    Bilirubin, Direct 0.1 <=0.5 mg/dL    Protein, Total 7.0 6.0 - 8.0 g/dL    Albumin 4.0 3.5 - 5.0 g/dL    Alkaline Phosphatase 108 45 - 120 U/L    AST 18 0 - 40 U/L    ALT 16 0 - 45 U/L   BNP(B-type Natriuretic Peptide)   Result Value Ref Range    BNP 79 0 - 167 pg/mL   HM2(CBC w/o Differential)   Result Value Ref Range    WBC 8.8 4.0 - 11.0 thou/uL    RBC 4.60 3.80 - 5.40 mill/uL    Hemoglobin 13.3 12.0 - 16.0 g/dL    Hematocrit 41.5 35.0 - 47.0 %    MCV 90 80 - 100 fL    MCH 28.9 27.0 - 34.0 pg    MCHC 32.0 32.0 - 36.0 g/dL    RDW 14.1 11.0 - 14.5 %    Platelets 279 140 - 440 thou/uL    MPV 10.8 (H) 7.0 - 10.0 fL       Quality review:     PHQ-2 Total Score: 3 (4/9/2021  9:00 AM)  Depression Follow-up Plan: patient follow-up to return when and if necessary (4/9/2021  9:00 AM)    PHQ-9 Total Score: 8 (4/9/2021  9:00 AM)    ______________________________________________________________________     BMI Readings from Last 1 Encounters:   04/09/21 27.81 kg/m        Assessment and Plan:     1. PMR (polymyalgia rheumatica) (H)  Continue to monitor.  No obvious signs of worsening.    - Sedimentation Rate  - C-Reactive Protein(CRP)    2. Hyperparathyroidism (H)  Continue to monitor.  Blood work checked today.    3. Senile purpura (H)  Not worsening.    4. Essential hypertension  Continue current medications.    - Basic Metabolic Panel    5. Edema of right lower extremity    - Hepatic Profile    6. Dyspnea, unspecified type    - BNP(B-type Natriuretic Peptide)  -  HM2(CBC w/o Differential)    7. Primary osteoarthritis of both knees  Consider x-ray in the future.  Exam is consistent with worse disease on the right.  Corticosteroid injection done to both knees.    - methylPREDNISolone acetate injection 80 mg (DEPO-MEDROL)  - Injection - Other    8. Nonhealing skin ulcer, unspecified ulcer stage (H)  Doing well at this time.    9. Numbness of fingers  Discussed possible carpal tunnel syndrome.  Cannot rule out C7 lesion but doubt.    10. Primary osteoarthritis of both hands          Jersey Metcalf MD  General Internal Medicine  [facility]      Return in about 6 months (around 10/9/2021), or if symptoms worsen or fail to improve, for visit and blood work.     No future appointments.      HCC issues resolved at this visit.

## 2021-07-13 ENCOUNTER — RECORDS - HEALTHEAST (OUTPATIENT)
Dept: ADMINISTRATIVE | Facility: CLINIC | Age: 86
End: 2021-07-13

## 2021-07-21 ENCOUNTER — RECORDS - HEALTHEAST (OUTPATIENT)
Dept: ADMINISTRATIVE | Facility: CLINIC | Age: 86
End: 2021-07-21

## 2021-07-27 ENCOUNTER — OFFICE VISIT (OUTPATIENT)
Dept: FAMILY MEDICINE | Facility: CLINIC | Age: 86
End: 2021-07-27
Payer: MEDICARE

## 2021-07-27 VITALS
SYSTOLIC BLOOD PRESSURE: 140 MMHG | HEART RATE: 76 BPM | RESPIRATION RATE: 20 BRPM | BODY MASS INDEX: 27.14 KG/M2 | DIASTOLIC BLOOD PRESSURE: 70 MMHG | WEIGHT: 153.2 LBS

## 2021-07-27 DIAGNOSIS — R53.83 FATIGUE, UNSPECIFIED TYPE: ICD-10-CM

## 2021-07-27 DIAGNOSIS — S80.811A ABRASION OF RIGHT LOWER EXTREMITY, INITIAL ENCOUNTER: ICD-10-CM

## 2021-07-27 DIAGNOSIS — R26.81 GAIT INSTABILITY: Primary | ICD-10-CM

## 2021-07-27 DIAGNOSIS — N18.31 STAGE 3A CHRONIC KIDNEY DISEASE (H): ICD-10-CM

## 2021-07-27 PROBLEM — N18.30 CHRONIC KIDNEY DISEASE, STAGE 3 (H): Status: ACTIVE | Noted: 2021-07-27

## 2021-07-27 LAB
ANION GAP SERPL CALCULATED.3IONS-SCNC: 14 MMOL/L (ref 5–18)
BUN SERPL-MCNC: 20 MG/DL (ref 8–28)
CALCIUM SERPL-MCNC: 10.9 MG/DL (ref 8.5–10.5)
CHLORIDE BLD-SCNC: 105 MMOL/L (ref 98–107)
CO2 SERPL-SCNC: 23 MMOL/L (ref 22–31)
CREAT SERPL-MCNC: 0.93 MG/DL (ref 0.6–1.1)
GFR SERPL CREATININE-BSD FRML MDRD: 54 ML/MIN/1.73M2
GLUCOSE BLD-MCNC: 89 MG/DL (ref 70–125)
POTASSIUM BLD-SCNC: 4.2 MMOL/L (ref 3.5–5)
SODIUM SERPL-SCNC: 142 MMOL/L (ref 136–145)
VIT B12 SERPL-MCNC: 538 PG/ML (ref 213–816)

## 2021-07-27 PROCEDURE — 82607 VITAMIN B-12: CPT | Performed by: FAMILY MEDICINE

## 2021-07-27 PROCEDURE — 80048 BASIC METABOLIC PNL TOTAL CA: CPT | Performed by: FAMILY MEDICINE

## 2021-07-27 PROCEDURE — 36415 COLL VENOUS BLD VENIPUNCTURE: CPT | Performed by: FAMILY MEDICINE

## 2021-07-27 PROCEDURE — 99215 OFFICE O/P EST HI 40 MIN: CPT | Performed by: FAMILY MEDICINE

## 2021-07-27 RX ORDER — SILVER SULFADIAZINE 10 MG/G
CREAM TOPICAL DAILY
Qty: 25 G | Refills: 0 | Status: SHIPPED | OUTPATIENT
Start: 2021-07-27 | End: 2022-01-12

## 2021-07-27 ASSESSMENT — PATIENT HEALTH QUESTIONNAIRE - PHQ9
SUM OF ALL RESPONSES TO PHQ QUESTIONS 1-9: 10
5. POOR APPETITE OR OVEREATING: SEVERAL DAYS

## 2021-07-27 ASSESSMENT — ANXIETY QUESTIONNAIRES
2. NOT BEING ABLE TO STOP OR CONTROL WORRYING: MORE THAN HALF THE DAYS
GAD7 TOTAL SCORE: 9
1. FEELING NERVOUS, ANXIOUS, OR ON EDGE: MORE THAN HALF THE DAYS
7. FEELING AFRAID AS IF SOMETHING AWFUL MIGHT HAPPEN: NOT AT ALL
3. WORRYING TOO MUCH ABOUT DIFFERENT THINGS: MORE THAN HALF THE DAYS
IF YOU CHECKED OFF ANY PROBLEMS ON THIS QUESTIONNAIRE, HOW DIFFICULT HAVE THESE PROBLEMS MADE IT FOR YOU TO DO YOUR WORK, TAKE CARE OF THINGS AT HOME, OR GET ALONG WITH OTHER PEOPLE: SOMEWHAT DIFFICULT
6. BECOMING EASILY ANNOYED OR IRRITABLE: SEVERAL DAYS
5. BEING SO RESTLESS THAT IT IS HARD TO SIT STILL: SEVERAL DAYS

## 2021-07-27 NOTE — PATIENT INSTRUCTIONS
For the scab present at the right shin, I would recommend showering normally but avoid scrubbing, pat dry afterwards and then apply a thin layer of silvadene over the scab once daily to help speed resolution of the scab.  This will likely still take a few weeks to resolve.      I would recommend a course of physical therapy to help with your gait instability.  One of our schedulers should be phoning you in the next 2 days to help you schedule a visit.  If you find that you could have therapy at [facility], please let me know.      I placed orders for you to have your B12 and kidney function tests checked today.  Results should return tomorrow and will be released to your [employer] account.      Please schedule a routine follow-up visit with Dr Metcalf in October.

## 2021-07-27 NOTE — PROGRESS NOTES
Assessment / Plan    Manuela was seen today for mobility issues and sore.    Diagnoses and all orders for this visit:    Fatigue, unspecified type  -     Vitamin B12; Future  -     Vitamin B12    Stage 3a chronic kidney disease  -     Basic metabolic panel  (Ca, Cl, CO2, Creat, Gluc, K, Na, BUN); Future  -     Basic metabolic panel  (Ca, Cl, CO2, Creat, Gluc, K, Na, BUN)    Abrasion of right lower extremity, initial encounter  -     silver sulfADIAZINE (SILVADENE) 1 % external cream; Apply topically daily    Gait instability  -     Physical Therapy Referral; Future    reviewed potential treatment options for anxiety / depression symptoms.  Patient declines referral to mental health counseling or medication aside from clonazepam.  She is invited to contact me if she would like to reconsider either of these options.  She denies thoughts or plans of harm to self or others.     Patient Instructions   For the scab present at the right shin, I would recommend showering normally but avoid scrubbing, pat dry afterwards and then apply a thin layer of silvadene over the scab once daily to help speed resolution of the scab.  This will likely still take a few weeks to resolve.      I would recommend a course of physical therapy to help with your gait instability.  One of our schedulers should be phoning you in the next 2 days to help you schedule a visit.  If you find that you could have therapy at [facility], please let me know.      I placed orders for you to have your B12 and kidney function tests checked today.  Results should return tomorrow and will be released to your [employer] account.      Please schedule a routine follow-up visit with Dr Metcalf in October.      Return in about 8 weeks (around 9/21/2021) for Follow up.    44 minutes spent on the date of the encounter doing chart review, history and exam, documentation and further activities per the note.     Subjective   Manuela Olivas is a 89 year old year old female  with history of anxiety chronically treated with clonazepam, GERD, hypertension, OA, PMR, HLD, hyperparathyroidism, CkD, and insomnia, who presents with the following concerns:     Follow up right-sided leg sore -patient notes that she had sustained an abrasion at her right shin 5 weeks ago while getting into a vehicle. She notes that the abrasion does seem to be healing, albeit quite slowly. She continues to note a rather large scab at the right shin. It is not particularly painful. She has been trying to avoid scrubbing at the area, as she has unroofed part of the scab previously with resultant bleeding. Patient wonders if there are any treatment that she should be utilizing in order to speed the healing of this wound. She notes no surrounding redness, warmth, or swelling. The lesion is not particularly tender to touch.    Difficulty walking/fatigue -for the last few months, patient feels that she doesn't have strength in her legs the way she used to. She has been using a 4 pronged cane or rolling walker primarily, even to get around her residence. She has had falls previously, though rather infrequently. She doesn't associate these with episodes of lightheadedness or dizziness. No known injuries related to falls in the recent past. In April she had steroid injections into her knee, which were helpful.  She finds that she is able to walk around her assisted living facility without extreme effort. She notes no recent associated injuries. She questions if her weakness is due more to deconditioning, as she has certainly been quite a bit less physically active since the onset of the COVID-19 pandemic. Patient has a friend who was recently diagnosed with vitamin B12 deficiency who was started on supplemental B12 with significant improvement in her general energy levels. Patient questions whether she might not have this test checked. She does report long-term use of daily omeprazole.    Patient reports significant life  stresses currently.  For current mood related symptoms, please see questionnaires below. Patient clarifies that she has no plans for harm to self or others, though at times wishes for escape from life challenges.  She declines consideration of medication or referral to mental health but is reminded that those doors are always open to her if she changes her mind.   JEANNE-7   [employer], 2002; Used with Permission) 7/27/2021   1. Feeling nervous, anxious, or on edge 2   2. Not being able to stop or control worrying 2   3. Worrying too much about different things 2   4. Trouble relaxing 1   5. Being so restless that it is hard to sit still 1   6. Becoming easily annoyed or irritable 1   7. Feeling afraid, as if something awful might happen 0   JEANNE-7 Total Score 9   If you checked any problems, how difficult have they made it for you to do your work, take care of things at home, or get along with other people? Somewhat difficult   PHQ-9 ([employer]) 7/27/2021   1.  Little interest or pleasure in doing things 2   2.  Feeling down, depressed, or hopeless 2   3.  Trouble falling or staying asleep, or sleeping too much 2   4.  Feeling tired or having little energy 2   5.  Poor appetite or overeating 0   6.  Feeling bad about yourself 0   7.  Trouble concentrating 1   8.  Moving slowly or restless 0   9.  Suicidal or self-harm thoughts 1   PHQ-9 Total Score 10   Difficulty at work, home, or with people Somewhat difficult         Patient Active Problem List   Diagnosis     Unspecified glaucoma     Hypercalcemia     Hyperparathyroidism (H)     GERD (gastroesophageal reflux disease)     HTN (hypertension)     Anxiety     Esophageal dysphagia     Insomnia     HLD (hyperlipidemia)     Presbyesophagus     OA (osteoarthritis)-fingers     PMR (polymyalgia rheumatica) (H) - August 2016     Former smoker-quit at age 30     DNR (do not resuscitate)     Controlled substance agreement signed - 9/20 - Clonazepam - UDS 12/19     Senile purpura  (H)     Nonhealing skin ulcer, unspecified ulcer stage (H)     Chronic kidney disease, stage 3     Past Surgical History:   Procedure Laterality Date     BIOPSY BREAST Right 1975     BREAST CYST ASPIRATION Bilateral 1975     CATARACT EXTRACTION, BILATERAL       HYSTERECTOMY  1967     OOPHORECTOMY Right 1975     TOTAL HIP ARTHROPLASTY Right 2010       Social History     Tobacco Use     Smoking status: Former Smoker     Smokeless tobacco: Never Used   Substance Use Topics     Alcohol use: No     Family History   Problem Relation Age of Onset     Cerebrovascular Disease Mother      Hyperlipidemia Mother      Coronary Artery Disease Father          Current Outpatient Medications   Medication Sig Dispense Refill     amLODIPine (NORVASC) 2.5 MG tablet [AMLODIPINE (NORVASC) 2.5 MG TABLET] TAKE 1 TABLET (2.5 MG TOTAL) BY MOUTH DAILY. FOR HIGH BLOOD PRESSURE. 90 tablet 3     clonazePAM (KLONOPIN) 0.5 MG tablet [CLONAZEPAM (KLONOPIN) 0.5 MG TABLET] TAKE 1/2 - 1 TABLET BY MOUTH EACH NIGHT AT BEDTIME 30 tablet 0     dorzolamide (TRUSOPT) 2 % ophthalmic solution [DORZOLAMIDE (TRUSOPT) 2 % OPHTHALMIC SOLUTION] 1 drop 3 (three) times a day.       metoprolol tartrate (LOPRESSOR) 50 MG tablet [METOPROLOL TARTRATE (LOPRESSOR) 50 MG TABLET] Take 1 tablet (50 mg total) by mouth 2 (two) times a day. 180 tablet 3     omeprazole (PRILOSEC) 40 MG capsule [OMEPRAZOLE (PRILOSEC) 40 MG CAPSULE] Take 1 capsule (40 mg total) by mouth 2 (two) times a day. 180 capsule 3     predniSONE (DELTASONE) 5 MG tablet [PREDNISONE (DELTASONE) 5 MG TABLET] TAKE 1/2 TO 1 TABLET BY MOUTH ONCE DAILY 60 tablet 0     timolol maleate (ISTALOL) 0.5 % DrpD [TIMOLOL MALEATE (ISTALOL) 0.5 % DRPD] Apply to eye Daily at 8:00 am..       No Known Allergies    ROS:   Negative except as noted above in HPI.     Objective  BP (!) 140/70   Pulse 76   Resp 20   Wt 69.5 kg (153 lb 3.2 oz)   LMP  (LMP Unknown)   Breastfeeding No   BMI 27.14 kg/m       General: Alert, no  "acute distress.   Affect: pleasant, cooperative.  Lungs: Clear to auscultation bilaterally.   Heart:RR s1/s2   Skin: approximately 2\" round scabbed over lesion at the middle of right shin. No surrounding redness, warmth or tenderness.   Neuro: alert, interactive. moving all extremities. Walks with assistance of cane slowly and deliberately but without faltering.           Zen Best MD   Family medicine physician  [facility]             "

## 2021-07-28 ASSESSMENT — ANXIETY QUESTIONNAIRES: GAD7 TOTAL SCORE: 9

## 2021-08-15 DIAGNOSIS — M35.3 PMR (POLYMYALGIA RHEUMATICA) (H): ICD-10-CM

## 2021-08-15 RX ORDER — PREDNISONE 5 MG/1
TABLET ORAL
Qty: 60 TABLET | Refills: 0 | Status: SHIPPED | OUTPATIENT
Start: 2021-08-15 | End: 2022-04-25

## 2021-09-07 ENCOUNTER — TRANSFERRED RECORDS (OUTPATIENT)
Dept: HEALTH INFORMATION MANAGEMENT | Facility: CLINIC | Age: 86
End: 2021-09-07

## 2021-09-21 ENCOUNTER — OFFICE VISIT (OUTPATIENT)
Dept: INTERNAL MEDICINE | Facility: CLINIC | Age: 86
End: 2021-09-21
Payer: MEDICARE

## 2021-09-21 VITALS
DIASTOLIC BLOOD PRESSURE: 76 MMHG | HEART RATE: 70 BPM | SYSTOLIC BLOOD PRESSURE: 154 MMHG | OXYGEN SATURATION: 97 % | BODY MASS INDEX: 25.86 KG/M2 | WEIGHT: 146 LBS

## 2021-09-21 DIAGNOSIS — F41.9 ANXIETY: ICD-10-CM

## 2021-09-21 DIAGNOSIS — M35.3 PMR (POLYMYALGIA RHEUMATICA) (H): ICD-10-CM

## 2021-09-21 DIAGNOSIS — I10 ESSENTIAL HYPERTENSION: ICD-10-CM

## 2021-09-21 DIAGNOSIS — R53.83 FATIGUE, UNSPECIFIED TYPE: Primary | ICD-10-CM

## 2021-09-21 DIAGNOSIS — R26.81 GAIT INSTABILITY: ICD-10-CM

## 2021-09-21 DIAGNOSIS — M25.551 HIP PAIN, RIGHT: ICD-10-CM

## 2021-09-21 PROBLEM — Z79.899 CONTROLLED SUBSTANCE AGREEMENT SIGNED: Chronic | Status: RESOLVED | Noted: 2018-02-13 | Resolved: 2021-09-21

## 2021-09-21 PROBLEM — L98.499: Status: RESOLVED | Noted: 2021-04-09 | Resolved: 2021-09-21

## 2021-09-21 LAB
ALBUMIN SERPL-MCNC: 3.8 G/DL (ref 3.5–5)
ALP SERPL-CCNC: 105 U/L (ref 45–120)
ALT SERPL W P-5'-P-CCNC: 18 U/L (ref 0–45)
ANION GAP SERPL CALCULATED.3IONS-SCNC: 12 MMOL/L (ref 5–18)
AST SERPL W P-5'-P-CCNC: 17 U/L (ref 0–40)
BILIRUB DIRECT SERPL-MCNC: 0.1 MG/DL
BILIRUB SERPL-MCNC: 0.4 MG/DL (ref 0–1)
BUN SERPL-MCNC: 21 MG/DL (ref 8–28)
C REACTIVE PROTEIN LHE: 3.1 MG/DL (ref 0–0.8)
CALCIUM SERPL-MCNC: 11.3 MG/DL (ref 8.5–10.5)
CHLORIDE BLD-SCNC: 103 MMOL/L (ref 98–107)
CK SERPL-CCNC: 45 U/L (ref 30–190)
CO2 SERPL-SCNC: 26 MMOL/L (ref 22–31)
CREAT SERPL-MCNC: 0.9 MG/DL (ref 0.6–1.1)
ERYTHROCYTE [DISTWIDTH] IN BLOOD BY AUTOMATED COUNT: 14.1 % (ref 10–15)
ERYTHROCYTE [SEDIMENTATION RATE] IN BLOOD BY WESTERGREN METHOD: 28 MM/HR (ref 0–20)
GFR SERPL CREATININE-BSD FRML MDRD: 56 ML/MIN/1.73M2
GLUCOSE BLD-MCNC: 96 MG/DL (ref 70–125)
HCT VFR BLD AUTO: 41.3 % (ref 35–47)
HGB BLD-MCNC: 13.2 G/DL (ref 11.7–15.7)
MCH RBC QN AUTO: 29.1 PG (ref 26.5–33)
MCHC RBC AUTO-ENTMCNC: 32 G/DL (ref 31.5–36.5)
MCV RBC AUTO: 91 FL (ref 78–100)
PLATELET # BLD AUTO: 292 10E3/UL (ref 150–450)
POTASSIUM BLD-SCNC: 4.8 MMOL/L (ref 3.5–5)
PROT SERPL-MCNC: 6.9 G/DL (ref 6–8)
RBC # BLD AUTO: 4.54 10E6/UL (ref 3.8–5.2)
SODIUM SERPL-SCNC: 141 MMOL/L (ref 136–145)
TSH SERPL DL<=0.005 MIU/L-ACNC: 2.35 UIU/ML (ref 0.3–5)
WBC # BLD AUTO: 8.6 10E3/UL (ref 4–11)

## 2021-09-21 PROCEDURE — 86141 C-REACTIVE PROTEIN HS: CPT | Performed by: INTERNAL MEDICINE

## 2021-09-21 PROCEDURE — 80053 COMPREHEN METABOLIC PANEL: CPT | Performed by: INTERNAL MEDICINE

## 2021-09-21 PROCEDURE — 85652 RBC SED RATE AUTOMATED: CPT | Performed by: INTERNAL MEDICINE

## 2021-09-21 PROCEDURE — 82550 ASSAY OF CK (CPK): CPT | Performed by: INTERNAL MEDICINE

## 2021-09-21 PROCEDURE — G0008 ADMIN INFLUENZA VIRUS VAC: HCPCS | Performed by: INTERNAL MEDICINE

## 2021-09-21 PROCEDURE — 82248 BILIRUBIN DIRECT: CPT | Performed by: INTERNAL MEDICINE

## 2021-09-21 PROCEDURE — 85027 COMPLETE CBC AUTOMATED: CPT | Performed by: INTERNAL MEDICINE

## 2021-09-21 PROCEDURE — 99214 OFFICE O/P EST MOD 30 MIN: CPT | Mod: 25 | Performed by: INTERNAL MEDICINE

## 2021-09-21 PROCEDURE — 36415 COLL VENOUS BLD VENIPUNCTURE: CPT | Performed by: INTERNAL MEDICINE

## 2021-09-21 PROCEDURE — 84443 ASSAY THYROID STIM HORMONE: CPT | Performed by: INTERNAL MEDICINE

## 2021-09-21 PROCEDURE — 90662 IIV NO PRSV INCREASED AG IM: CPT | Performed by: INTERNAL MEDICINE

## 2021-09-21 NOTE — PATIENT INSTRUCTIONS

## 2021-09-21 NOTE — PROGRESS NOTES
[facility]    Comprehensive and complex medical care - Chronic disease management - Shared decision making - Care coordination - Compassionate care    Patient advocacy - Rational deprescribing - Minimally disruptive medicine - Ethical focus - Customized care         Date of Service: 9/21/2021  Primary Provider: Jersey Metcalf    Patient Care Team:  Jersey Metcalf MD as PCP - General  Jersey Metcalf MD as Assigned PCP  Giovanny Koo MD as MD (Orthopaedic Surgery)  Malissa Mcclellan as MD (Dermatology)  Vannesa Madrigal MD as MD (Ophthalmology)          Patient's Pharmacy:    [facility]/pharmacy #[id number] - [place], [place] - [address] [address]  Phone: [phone] Fax: [phone]     Patient's Contacts:  Name Home Phone Work Phone Mobile Phone Relationship Lgl SHIMON Adame [phone]   Nephew      Patient's Insurance:    Payor: MEDICARE / Plan: MEDICARE / Product Type: Medicare /            Active Problem List:  Problem List as of 9/21/2021 Reviewed: 9/21/2021 12:09 PM by Jersey Metcalf MD       High    Former smoker-quit at age 30    DNR (do not resuscitate)       Medium    HTN (hypertension)    Anxiety    Presbyesophagus    OA (osteoarthritis)-fingers    PMR (polymyalgia rheumatica) (H) - August 2016    Chronic kidney disease, stage 3       Low    Unspecified glaucoma    Hyperparathyroidism (H)    GERD (gastroesophageal reflux disease)    Esophageal dysphagia    Insomnia    HLD (hyperlipidemia)    Senile purpura (H)           Current Outpatient Medications   Medication Instructions     amLODIPine (NORVASC) 2.5 mg, Oral, DAILY     dorzolamide (TRUSOPT) 2 % ophthalmic solution 1 drop, 3 TIMES DAILY     metoprolol tartrate (LOPRESSOR) 50 mg, Oral, 2 TIMES DAILY     omeprazole (PRILOSEC) 40 MG DR capsule TAKE 1 CAPSULE BY MOUTH TWICE A DAY     predniSONE (DELTASONE) 5 MG tablet TAKE 1/2 TO 1 TABLET BY MOUTH ONCE DAILY      silver sulfADIAZINE (SILVADENE) 1 % external cream Topical, DAILY     timolol maleate (ISTALOL) 0.5 % DrpD DAILY     Social History     Social History Narrative    Moved out of her home in 2018 and moved to the [place].    , no children, lives alone.    Volunteer at [employer] in the past.       Subjective:     Manuela Olivas is a 89 year old female who comes in today for:    Chief Complaint   Patient presents with     RECHECK     difficulty walking/unsteady, PMR, osteoarthritis     Fall     has fallen a few times right hip hurts went to [place] and they did not see anything wrong and did an injection     Fatigue        Patient comes in today for follow-up of a number of issues.    She has gradually been losing strength over time.  She is using a walker at this point.    She has been having right hip pain over the last 4 to 6 weeks.  It has been worsening.  She was seen at [facility] for this.  She had an x-ray which looked good.  She previously had a fracture around her implant here.  She follows up with them next week.  She did get a cortisone shot 2 weeks ago which did not seem to work.  It is particularly bad when first getting out of a chair.    She does have gait instability and falls.  She is using the walker.  She feels a loss of strength in her lower extremities.  She does not necessarily notice any loss of sensation.  She denies any numbness in her legs.  She has had the right hip pain.  Her knees might continue to bother her off and on.  Her neck and back are doing well.    We reviewed her high blood pressure and her blood pressure is a little up today.  We reviewed this.    Reviewed her anxiety and she is no longer on the clonazepam.    Reviewed her polymyalgia rheumatica and she remains on prednisone 5 mg a day at this time.    Recent visits and lab work reviewed.    We reviewed her other issues noted in the assessment but not specifically addressed in the  HPI above.     Objective:     Wt Readings from Last 3 Encounters:   09/21/21 66.2 kg (146 lb)   07/27/21 69.5 kg (153 lb 3.2 oz)   04/09/21 71.2 kg (157 lb)     BP Readings from Last 3 Encounters:   09/21/21 (!) 154/76   07/27/21 (!) 140/70   04/09/21 138/86     BP (!) 154/76   Pulse 70   Wt 66.2 kg (146 lb)   LMP  (LMP Unknown)   SpO2 97%   BMI 25.86 kg/m     The patient is comfortable, no acute distress.  Mood good.  Insight good.  Eyes are nonicteric.  Neck is supple without mass.  No cervical adenopathy.  No thyromegaly. Heart regular rate and rhythm.  Lungs clear to auscultation bilaterally.  Respiratory effort is good.  Abdomen soft and nontender.  No hepatosplenomegaly.  Extremities no edema.  Her hip is quite tender and her gait is slow related to this.  She does have tenderness over the trochanteric bursa on the right.    Diagnostics:     Office Visit on 07/27/2021   Component Date Value Ref Range Status     Vitamin B12 07/27/2021 538  213 - 816 pg/mL Final     Sodium 07/27/2021 142  136 - 145 mmol/L Final     Potassium 07/27/2021 4.2  3.5 - 5.0 mmol/L Final     Chloride 07/27/2021 105  98 - 107 mmol/L Final     Carbon Dioxide (CO2) 07/27/2021 23  22 - 31 mmol/L Final     Anion Gap 07/27/2021 14  5 - 18 mmol/L Final     Urea Nitrogen 07/27/2021 20  8 - 28 mg/dL Final     Creatinine 07/27/2021 0.93  0.60 - 1.10 mg/dL Final     Calcium 07/27/2021 10.9* 8.5 - 10.5 mg/dL Final     Glucose 07/27/2021 89  70 - 125 mg/dL Final     GFR Estimate 07/27/2021 54* >60 mL/min/1.73m2 Final    As of July 11, 2021, eGFR is calculated by the CKD-EPI creatinine equation, without race adjustment. eGFR can be influenced by muscle mass, exercise, and diet. The reported eGFR is an estimation only and is only applicable if the renal function is stable.        Results for orders placed or performed in visit on 09/21/21   CBC with platelets     Status: Normal   Result Value Ref Range    WBC Count 8.6 4.0 - 11.0 10e3/uL    RBC  Count 4.54 3.80 - 5.20 10e6/uL    Hemoglobin 13.2 11.7 - 15.7 g/dL    Hematocrit 41.3 35.0 - 47.0 %    MCV 91 78 - 100 fL    MCH 29.1 26.5 - 33.0 pg    MCHC 32.0 31.5 - 36.5 g/dL    RDW 14.1 10.0 - 15.0 %    Platelet Count 292 150 - 450 10e3/uL   Erythrocyte sedimentation rate auto     Status: Abnormal   Result Value Ref Range    Erythrocyte Sedimentation Rate 28 (H) 0 - 20 mm/hr       Assessment:     1. Fatigue, unspecified type    2. Essential hypertension    3. Anxiety    4. PMR (polymyalgia rheumatica) (H) - August 2016    5. Hip pain, right    6. Gait instability         Plan:     1. Blood work done today.  2. Follow-up with orthopedics.  3. Gait instability likely multifactorial.  4. Reviewed the role of the prednisone and her current treatment plan.  Question try off.  5. Continue other medications as is.  6. PT order given via a paper prescription for her facility if she is able to get this done.  Wait until she meets with Dr. Koo with this.  7. Follow up sooner if issues.      Jersey Metcalf MD  General Internal Medicine  [facility]    Return in about 6 months (around 3/21/2022), or if symptoms worsen or fail to improve, for follow up visit.     No future appointments.        Wt Readings from Last 20 Encounters:   09/21/21 66.2 kg (146 lb)   07/27/21 69.5 kg (153 lb 3.2 oz)   04/09/21 71.2 kg (157 lb)   09/15/20 67.6 kg (149 lb)   03/09/20 67.1 kg (148 lb)   12/16/19 68 kg (150 lb)   08/19/19 67.6 kg (149 lb)   04/18/19 68 kg (150 lb)   12/17/18 68.5 kg (151 lb)   08/17/18 69.4 kg (153 lb)   05/15/18 69.9 kg (154 lb)   02/13/18 72.1 kg (159 lb)   11/13/17 70 kg (154 lb 4.8 oz)   09/11/17 68.9 kg (152 lb)   07/11/17 67.6 kg (149 lb)   06/20/17 68.5 kg (151 lb)   04/04/17 67.4 kg (148 lb 8 oz)   03/14/17 68 kg (150 lb)   02/26/17 67.1 kg (148 lb)   01/09/17 68.9 kg (151 lb 12.8 oz)     BP Readings from Last 20 Encounters:   09/21/21 (!) 154/76   07/27/21 (!) 140/70   04/09/21  138/86   09/15/20 132/84   03/09/20 138/70   12/16/19 136/84      Pulse Readings from Last 20 Encounters:   09/21/21 70   07/27/21 76   04/09/21 77   09/15/20 76   03/09/20 70   12/16/19 72     SpO2 Readings from Last 20 Encounters:   09/21/21 97%   04/09/21 95%   09/15/20 96%   12/16/19 96%

## 2021-09-21 NOTE — LETTER
[facility]  09/21/21  Patient: Manuela Olivas  YOB: 1930  Medical Record Number: [id number]                                                                               MEDICATION: CLONAZEPAM          Non-Opioid Controlled Substance Agreement    This is an agreement between you and your provider regarding safe and appropriate use of controlled substances prescribed by your care team. Controlled substances are?medicines that can cause physical and mental dependence (abuse).     There are strict laws about having and using these medicines. We here at [facility] are  committed to working with you in your efforts to get better. To support you in this work, we'll help you schedule regular office appointments for medicine refills. If we must cancel or change your appointment for any reason, we'll make sure you have enough medicine to last until your next appointment.     As a Provider, I will:     Listen carefully to your concerns while treating you with respect.     Recommend a treatment plan that I believe is in your best interest and may involve therapies other than medicine.      Talk with you often about the possible benefits and the risk of harm of any medicine that we prescribe for you.    Assess the safety of this medicine and check how well it works.      Provide a plan on how to taper (discontinue or go off) using this medicine if the decision is made to stop its use.      ::  As a Patient, I understand controlled substances:       Are prescribed by my care provider to help me function or work and manage my condition(s).?    Are strong medicines and can cause serious side effects.       Need to be taken exactly as prescribed.?Combining controlled substances with certain medicines or chemicals (such as illegal drugs, alcohol, sedatives, sleeping pills, and benzodiazepines) can be dangerous or even fatal.? If I stop taking my medicines suddenly, I may have severe  withdrawal symptoms.     The risks, benefits, and side effects of these medicine(s) were explained to me. I agree that:    1. I will take part in other treatments as advised by my care team. This may be psychiatry or counseling, physical therapy, behavioral therapy, group treatment or a referral to specialist.    2. I will keep all my appointments and understand this is part of the monitoring of controlled substances.?My care team may require an office visit for EVERY controlled substance refill. If I miss appointments or don t follow instructions, my care team may stop my medicine    3. I will take my medicines as prescribed. I will not change the dose or schedule unless my care team tells me to. There will be no refills if I run out early.      4. I may be asked to come to the clinic and complete a urine drug test or complete a pill count. If I don t give a urine sample or participate in a pill count, the care team may stop my medicine.    5. I will only receive controlled substance prescriptions from this clinic. If I am treated by another provider, I will tell them that I am taking controlled substances and that I have a treatment agreement with this provider. I will inform my [facility] care team within one business day if I am given a prescription for any controlled substance by another healthcare provider. My [facility] care team can contact other providers and pharmacists about my use of any medicines.    6. It is up to me to make sure that I don't run out of my medicines on weekends or holidays.?If my care team is willing to refill my prescription without a visit, I must request refills only during office hours. Refills may take up to 3 business days to process. I will use one pharmacy to fill all my controlled substance prescriptions. I will notify the clinic about any changes to my insurance or medicine availability.    7. I am responsible for my prescriptions. If the medicine/prescription  is lost, stolen or destroyed, it will not be replaced.?I also agree not to share controlled substance medicines with anyone.     8. I am aware I should not use any illegal or recreational drugs. I agree not to drink alcohol unless my care team says I can.     9. If I enroll in the [place] Medical Cannabis program, I will tell my care team before my next refill.    10. I will tell my care team right away if I become pregnant, have a new medical problem treated outside of my regular clinic, or have a change in my medicines.     11. I understand that this medicine can affect my thinking, judgment and reaction time.? Alcohol and drugs affect the brain and body, which can affect the safety of my driving. Being under the influence of alcohol or drugs can affect my decision-making, behaviors, personal safety and the safety of others. Driving while impaired (DWI) can occur if a person is driving, operating or in physical control of a car, motorcycle, boat, snowmobile, ATV, motorbike, off-road vehicle or any other motor vehicle ([place] Statute 169A.20). I understand the risk if I choose to drive or operate any vehicle or machinery.    I understand that if I do not follow any of the conditions above, my prescriptions or treatment may be stopped or changed.   I agree that my provider, clinic care team and pharmacy may work with any city, state or federal law enforcement agency that investigates the misuse, sale or other diversion of my controlled medicine. I will allow my provider to discuss my care with, or share a copy of, this agreement with any other treating provider, pharmacy or emergency room where I receive care.     I have read this agreement and have asked questions about anything I did not understand.    ________________________________________________________  Patient Signature - Manuela GUMARO Olivas     ___________________                   Date     ________________________________________________________  Provider  Signature - Jersey Ardolf, MD       ___________________                   Date     ________________________________________________________  Witness Signature (required if provider not present while patient signing)          ___________________                   Date

## 2021-09-25 ENCOUNTER — MYC MEDICAL ADVICE (OUTPATIENT)
Dept: INTERNAL MEDICINE | Facility: CLINIC | Age: 86
End: 2021-09-25

## 2021-09-25 DIAGNOSIS — M35.3 PMR (POLYMYALGIA RHEUMATICA) (H): Primary | ICD-10-CM

## 2021-09-27 RX ORDER — PREDNISONE 5 MG/1
TABLET ORAL
Qty: 201 TABLET | Refills: 0 | Status: SHIPPED | OUTPATIENT
Start: 2021-09-27 | End: 2021-12-30

## 2021-11-01 DIAGNOSIS — I10 HTN (HYPERTENSION): ICD-10-CM

## 2021-11-01 RX ORDER — METOPROLOL TARTRATE 50 MG
TABLET ORAL
Qty: 180 TABLET | Refills: 3 | Status: SHIPPED | OUTPATIENT
Start: 2021-11-01 | End: 2023-01-04

## 2021-11-03 ENCOUNTER — TRANSFERRED RECORDS (OUTPATIENT)
Dept: HEALTH INFORMATION MANAGEMENT | Facility: CLINIC | Age: 86
End: 2021-11-03
Payer: MEDICARE

## 2021-12-14 ENCOUNTER — OFFICE VISIT (OUTPATIENT)
Dept: INTERNAL MEDICINE | Facility: CLINIC | Age: 86
End: 2021-12-14
Payer: MEDICARE

## 2021-12-14 VITALS
OXYGEN SATURATION: 98 % | DIASTOLIC BLOOD PRESSURE: 84 MMHG | SYSTOLIC BLOOD PRESSURE: 152 MMHG | WEIGHT: 149 LBS | HEART RATE: 74 BPM | BODY MASS INDEX: 26.39 KG/M2

## 2021-12-14 DIAGNOSIS — N18.31 STAGE 3A CHRONIC KIDNEY DISEASE (H): ICD-10-CM

## 2021-12-14 DIAGNOSIS — M35.3 PMR (POLYMYALGIA RHEUMATICA) (H): Primary | ICD-10-CM

## 2021-12-14 DIAGNOSIS — I10 PRIMARY HYPERTENSION: ICD-10-CM

## 2021-12-14 DIAGNOSIS — I87.2 CHRONIC VENOUS STASIS DERMATITIS OF LEFT LOWER EXTREMITY: ICD-10-CM

## 2021-12-14 DIAGNOSIS — E83.52 HYPERCALCEMIA: ICD-10-CM

## 2021-12-14 DIAGNOSIS — E21.3 HYPERPARATHYROIDISM (H): ICD-10-CM

## 2021-12-14 LAB
ALBUMIN SERPL-MCNC: 3.9 G/DL (ref 3.5–5)
ALP SERPL-CCNC: 84 U/L (ref 45–120)
ALT SERPL W P-5'-P-CCNC: 16 U/L (ref 0–45)
ANION GAP SERPL CALCULATED.3IONS-SCNC: 12 MMOL/L (ref 5–18)
AST SERPL W P-5'-P-CCNC: 15 U/L (ref 0–40)
BILIRUB SERPL-MCNC: 0.5 MG/DL (ref 0–1)
BUN SERPL-MCNC: 20 MG/DL (ref 8–28)
C REACTIVE PROTEIN LHE: 1.3 MG/DL (ref 0–0.8)
CALCIUM SERPL-MCNC: 10.4 MG/DL (ref 8.5–10.5)
CHLORIDE BLD-SCNC: 104 MMOL/L (ref 98–107)
CO2 SERPL-SCNC: 23 MMOL/L (ref 22–31)
CREAT SERPL-MCNC: 0.85 MG/DL (ref 0.6–1.1)
ERYTHROCYTE [DISTWIDTH] IN BLOOD BY AUTOMATED COUNT: 14.6 % (ref 10–15)
ERYTHROCYTE [SEDIMENTATION RATE] IN BLOOD BY WESTERGREN METHOD: 21 MM/HR (ref 0–20)
GFR SERPL CREATININE-BSD FRML MDRD: 60 ML/MIN/1.73M2
GLUCOSE BLD-MCNC: 84 MG/DL (ref 70–125)
HCT VFR BLD AUTO: 42.1 % (ref 35–47)
HGB BLD-MCNC: 13.6 G/DL (ref 11.7–15.7)
MCH RBC QN AUTO: 29.7 PG (ref 26.5–33)
MCHC RBC AUTO-ENTMCNC: 32.3 G/DL (ref 31.5–36.5)
MCV RBC AUTO: 92 FL (ref 78–100)
PLATELET # BLD AUTO: 258 10E3/UL (ref 150–450)
POTASSIUM BLD-SCNC: 4 MMOL/L (ref 3.5–5)
PROT SERPL-MCNC: 6.8 G/DL (ref 6–8)
PTH-INTACT SERPL-MCNC: 176 PG/ML (ref 10–86)
RBC # BLD AUTO: 4.58 10E6/UL (ref 3.8–5.2)
SODIUM SERPL-SCNC: 139 MMOL/L (ref 136–145)
WBC # BLD AUTO: 9.7 10E3/UL (ref 4–11)

## 2021-12-14 PROCEDURE — 84165 PROTEIN E-PHORESIS SERUM: CPT | Performed by: PATHOLOGY

## 2021-12-14 PROCEDURE — 86141 C-REACTIVE PROTEIN HS: CPT | Performed by: INTERNAL MEDICINE

## 2021-12-14 PROCEDURE — 85652 RBC SED RATE AUTOMATED: CPT | Performed by: INTERNAL MEDICINE

## 2021-12-14 PROCEDURE — 80053 COMPREHEN METABOLIC PANEL: CPT | Performed by: INTERNAL MEDICINE

## 2021-12-14 PROCEDURE — 36415 COLL VENOUS BLD VENIPUNCTURE: CPT | Performed by: INTERNAL MEDICINE

## 2021-12-14 PROCEDURE — 85027 COMPLETE CBC AUTOMATED: CPT | Performed by: INTERNAL MEDICINE

## 2021-12-14 PROCEDURE — 83970 ASSAY OF PARATHORMONE: CPT | Performed by: INTERNAL MEDICINE

## 2021-12-14 PROCEDURE — 99214 OFFICE O/P EST MOD 30 MIN: CPT | Performed by: INTERNAL MEDICINE

## 2021-12-14 ASSESSMENT — ANXIETY QUESTIONNAIRES
6. BECOMING EASILY ANNOYED OR IRRITABLE: SEVERAL DAYS
3. WORRYING TOO MUCH ABOUT DIFFERENT THINGS: SEVERAL DAYS
GAD7 TOTAL SCORE: 5
5. BEING SO RESTLESS THAT IT IS HARD TO SIT STILL: NOT AT ALL
7. FEELING AFRAID AS IF SOMETHING AWFUL MIGHT HAPPEN: NOT AT ALL
GAD7 TOTAL SCORE: 5
1. FEELING NERVOUS, ANXIOUS, OR ON EDGE: SEVERAL DAYS
7. FEELING AFRAID AS IF SOMETHING AWFUL MIGHT HAPPEN: NOT AT ALL
4. TROUBLE RELAXING: SEVERAL DAYS
GAD7 TOTAL SCORE: 5
2. NOT BEING ABLE TO STOP OR CONTROL WORRYING: SEVERAL DAYS

## 2021-12-14 ASSESSMENT — PATIENT HEALTH QUESTIONNAIRE - PHQ9
SUM OF ALL RESPONSES TO PHQ QUESTIONS 1-9: 5
SUM OF ALL RESPONSES TO PHQ QUESTIONS 1-9: 5
10. IF YOU CHECKED OFF ANY PROBLEMS, HOW DIFFICULT HAVE THESE PROBLEMS MADE IT FOR YOU TO DO YOUR WORK, TAKE CARE OF THINGS AT HOME, OR GET ALONG WITH OTHER PEOPLE: SOMEWHAT DIFFICULT

## 2021-12-15 ASSESSMENT — ANXIETY QUESTIONNAIRES: GAD7 TOTAL SCORE: 5

## 2021-12-15 ASSESSMENT — PATIENT HEALTH QUESTIONNAIRE - PHQ9: SUM OF ALL RESPONSES TO PHQ QUESTIONS 1-9: 5

## 2021-12-16 LAB
ALBUMIN PERCENT: 64.2 % (ref 51–67)
ALBUMIN SERPL ELPH-MCNC: 4.4 G/DL (ref 3.2–4.7)
ALPHA 1 PERCENT: 2.6 % (ref 2–4)
ALPHA 2 PERCENT: 12.8 % (ref 5–13)
ALPHA1 GLOB SERPL ELPH-MCNC: 0.2 G/DL (ref 0.1–0.3)
ALPHA2 GLOB SERPL ELPH-MCNC: 0.9 G/DL (ref 0.4–0.9)
B-GLOBULIN SERPL ELPH-MCNC: 1 G/DL (ref 0.7–1.2)
BETA PERCENT: 15.2 % (ref 10–17)
GAMMA GLOB SERPL ELPH-MCNC: 0.4 G/DL (ref 0.6–1.4)
GAMMA GLOBULIN PERCENT: 5.2 % (ref 9–20)
PATH ICD:: ABNORMAL
PROT PATTERN SERPL ELPH-IMP: ABNORMAL
REVIEWING PATHOLOGIST: ABNORMAL
TOTAL PROTEIN SERUM FOR ELP (SYNCED VALUE): 6.8 G/DL
TOTAL PROTEIN SERUM FOR ELP: 6.8 G/DL (ref 6–8)

## 2021-12-23 NOTE — PATIENT INSTRUCTIONS
Please follow up if you have any further issues.    You may contact me by phone or MyChart if you are worsening or if things are not improving.    ______________________________________________________________________     Please remember that you can call [phone] to schedule an appointment.     You can schedule appointments 24 hours a day, 7 days a week.  Sometimes the best time to schedule an appointment is after clinic hours when less people are calling in.  Weekends are another option for calling in to schedule appointments.        ______________________________________________________________________      No future appointments.

## 2021-12-23 NOTE — PROGRESS NOTES
[facility]    Comprehensive and complex medical care - Chronic disease management - Shared decision making - Care coordination - Compassionate care    Patient advocacy - Rational deprescribing - Minimally disruptive medicine - Ethical focus - Customized care         Date of Service: 12/14/2021  Primary Provider: Jersey Metcalf    Patient Care Team:  Jersey Metcalf MD as PCP - General  Jersey Metcalf MD as Assigned PCP  Giovanny Koo MD as MD (Orthopaedic Surgery)  Malissa Mcclellan as MD (Dermatology)  Vannesa Madrigal MD as MD (Ophthalmology)          Patient's Pharmacy:    [facility] #[id number] - [place], [place] - [address]  [address] [address]  Phone: [phone] Fax: [phone]     Patient's Contacts:  Name Home Phone Work Phone Mobile Phone Relationship Lgl SHIMON Adame [phone]   Nephew      Patient's Insurance:    Payor: MEDICARE / Plan: MEDICARE / Product Type: Medicare /           Active Problem List:  Problem List as of 12/14/2021 Reviewed: 9/21/2021 12:09 PM by Jersey Metcalf MD       High    Former smoker-quit at age 30    DNR (do not resuscitate)       Low    Unspecified glaucoma    Hyperparathyroidism (H)    GERD (gastroesophageal reflux disease)    Esophageal dysphagia    Insomnia    Senile purpura (H)       Other    HTN (hypertension)    Anxiety    Presbyesophagus       Other    OA (osteoarthritis)-fingers    PMR (polymyalgia rheumatica) (H) - August 2016    Chronic kidney disease, stage 3 (H)       Other    HLD (hyperlipidemia)           Current Outpatient Medications   Medication Instructions     amLODIPine (NORVASC) 2.5 mg, Oral, DAILY     dorzolamide (TRUSOPT) 2 % ophthalmic solution 1 drop, 3 TIMES DAILY     metoprolol tartrate (LOPRESSOR) 50 MG tablet TAKE 1 TABLET BY MOUTH TWICE A DAY     omeprazole (PRILOSEC) 40 MG DR capsule TAKE 1 CAPSULE BY MOUTH TWICE A DAY     predniSONE (DELTASONE) 5 MG  tablet TAKE 1/2 TO 1 TABLET BY MOUTH ONCE DAILY     predniSONE (DELTASONE) 5 MG tablet Take 3 tablets (15 mg) by mouth daily for 21 days, THEN 2 tablets (10 mg) daily.     silver sulfADIAZINE (SILVADENE) 1 % external cream Topical, DAILY     timolol maleate (ISTALOL) 0.5 % DrpD DAILY     Social History     Social History Narrative    Moved out of her home in 2018 and moved to the [place].    , no children, lives alone.    Volunteer at [employer] in the past.       Subjective:     Manuela Olivas is a 89 year old female who comes in today for:    Chief Complaint   Patient presents with     RECHECK     hip is doing better, but knees are hurting, gait stability and falls, PMR, BP     Derm Problem     skin coming off legs     Fatigue      In for follow up multiple issues.    Mainly in for follow up of polymyalgia rheumatica (PMR) and joint pain.  This continues to be part of the issue.  Taking 10 mg of prednisone and this does help her somewhat.  Both knees bother her as well.    Balance can be an issue at times    Reviewed her hypertension today.  Denies any excessive dizziness from the medication with this.     Has dry skin of the legs and some chronic venous stasis in the left leg which has been there previously.    We reviewed her other issues noted in the assessment but not specifically addressed in the HPI above.     Objective:     Wt Readings from Last 3 Encounters:   12/14/21 67.6 kg (149 lb)   09/21/21 66.2 kg (146 lb)   07/27/21 69.5 kg (153 lb 3.2 oz)     BP Readings from Last 3 Encounters:   12/14/21 (!) 152/84   09/21/21 (!) 154/76   07/27/21 (!) 140/70     BP (!) 152/84   Pulse 74   Wt 67.6 kg (149 lb)   LMP  (LMP Unknown)   SpO2 98%   BMI 26.39 kg/m     The patient is comfortable, no acute distress.  Mood good.  Insight good.  Eyes are nonicteric.  Neck is supple without mass.  No cervical adenopathy.  No thyromegaly. Heart regular rate and rhythm.  Lungs clear to auscultation  bilaterally.  Respiratory effort is good.  Abdomen soft and nontender.  Extremities no edema.  Dry skin of the lower extremities.  Chronic venous stasis of the left lower extremity.    Diagnostics:     Office Visit on 09/21/2021   Component Date Value Ref Range Status     TSH 09/21/2021 2.35  0.30 - 5.00 uIU/mL Final     CK 09/21/2021 45  30 - 190 U/L Final     WBC Count 09/21/2021 8.6  4.0 - 11.0 10e3/uL Final     RBC Count 09/21/2021 4.54  3.80 - 5.20 10e6/uL Final     Hemoglobin 09/21/2021 13.2  11.7 - 15.7 g/dL Final     Hematocrit 09/21/2021 41.3  35.0 - 47.0 % Final     MCV 09/21/2021 91  78 - 100 fL Final     MCH 09/21/2021 29.1  26.5 - 33.0 pg Final     MCHC 09/21/2021 32.0  31.5 - 36.5 g/dL Final     RDW 09/21/2021 14.1  10.0 - 15.0 % Final     Platelet Count 09/21/2021 292  150 - 450 10e3/uL Final     CRP 09/21/2021 3.1* 0.0-<0.8 mg/dL Final     Erythrocyte Sedimentation Rate 09/21/2021 28* 0 - 20 mm/hr Final     Bilirubin Total 09/21/2021 0.4  0.0 - 1.0 mg/dL Final     Bilirubin Direct 09/21/2021 0.1  <=0.5 mg/dL Final     Protein Total 09/21/2021 6.9  6.0 - 8.0 g/dL Final     Albumin 09/21/2021 3.8  3.5 - 5.0 g/dL Final     Alkaline Phosphatase 09/21/2021 105  45 - 120 U/L Final     AST 09/21/2021 17  0 - 40 U/L Final     ALT 09/21/2021 18  0 - 45 U/L Final     Sodium 09/21/2021 141  136 - 145 mmol/L Final     Potassium 09/21/2021 4.8  3.5 - 5.0 mmol/L Final     Chloride 09/21/2021 103  98 - 107 mmol/L Final     Carbon Dioxide (CO2) 09/21/2021 26  22 - 31 mmol/L Final     Anion Gap 09/21/2021 12  5 - 18 mmol/L Final     Urea Nitrogen 09/21/2021 21  8 - 28 mg/dL Final     Creatinine 09/21/2021 0.90  0.60 - 1.10 mg/dL Final     Calcium 09/21/2021 11.3* 8.5 - 10.5 mg/dL Final     Glucose 09/21/2021 96  70 - 125 mg/dL Final     GFR Estimate 09/21/2021 56* >60 mL/min/1.73m2 Final    As of July 11, 2021, eGFR is calculated by the CKD-EPI creatinine equation, without race adjustment. eGFR can be influenced by  muscle mass, exercise, and diet. The reported eGFR is an estimation only and is only applicable if the renal function is stable.       Results for orders placed or performed in visit on 12/14/21   Comprehensive metabolic panel     Status: Abnormal   Result Value Ref Range    Sodium 139 136 - 145 mmol/L    Potassium 4.0 3.5 - 5.0 mmol/L    Chloride 104 98 - 107 mmol/L    Carbon Dioxide (CO2) 23 22 - 31 mmol/L    Anion Gap 12 5 - 18 mmol/L    Urea Nitrogen 20 8 - 28 mg/dL    Creatinine 0.85 0.60 - 1.10 mg/dL    Calcium 10.4 8.5 - 10.5 mg/dL    Glucose 84 70 - 125 mg/dL    Alkaline Phosphatase 84 45 - 120 U/L    AST 15 0 - 40 U/L    ALT 16 0 - 45 U/L    Protein Total 6.8 6.0 - 8.0 g/dL    Albumin 3.9 3.5 - 5.0 g/dL    Bilirubin Total 0.5 0.0 - 1.0 mg/dL    GFR Estimate 60 (L) >60 mL/min/1.73m2   Erythrocyte sedimentation rate auto     Status: Abnormal   Result Value Ref Range    Erythrocyte Sedimentation Rate 21 (H) 0 - 20 mm/hr   CRP inflammation     Status: Abnormal   Result Value Ref Range    CRP 1.3 (H) 0.0-<0.8 mg/dL   CBC with platelets     Status: Normal   Result Value Ref Range    WBC Count 9.7 4.0 - 11.0 10e3/uL    RBC Count 4.58 3.80 - 5.20 10e6/uL    Hemoglobin 13.6 11.7 - 15.7 g/dL    Hematocrit 42.1 35.0 - 47.0 %    MCV 92 78 - 100 fL    MCH 29.7 26.5 - 33.0 pg    MCHC 32.3 31.5 - 36.5 g/dL    RDW 14.6 10.0 - 15.0 %    Platelet Count 258 150 - 450 10e3/uL   Parathyroid Hormone Intact     Status: Abnormal   Result Value Ref Range    Parathyroid Hormone Intact 176 (H) 10 - 86 pg/mL   Total Protein, Serum for ELP     Status: Normal   Result Value Ref Range    Total Protein Serum for ELP 6.8 6.0 - 8.0 g/dL   Protein Electrophoresis, Serum     Status: Abnormal   Result Value Ref Range    Albumin % 64.2 51.0 - 67.0 %    Albumin 4.4 3.2 - 4.7 g/dL    Alpha 1 % 2.6 2.0 - 4.0 %    Alpha 1 0.2 0.1 - 0.3 g/dL    Alpha 2 % 12.8 5.0 - 13.0 %    Alpha 2 0.9 0.4 - 0.9 g/dL    Beta % 15.2 10.0 - 17.0 %    Beta Globulin  1.0 0.7 - 1.2 g/dL    Gamma Globulin % 5.2 (L) 9.0 - 20.0 %    Gamma Globulin 0.4 (L) 0.6 - 1.4 g/dL    ELP Interpretation       Decreased gamma globulin fraction. Etiologies include lymphoproliferative disorders, chemotherapy, and immunodeficiency.     Path ICD: M35.3     Reviewing Pathologist Kanu Sanchez MD      Total Protein Serum for ELP 6.8 g/dL   Protein electrophoresis     Status: Abnormal    Narrative    The following orders were created for panel order Protein electrophoresis.  Procedure                               Abnormality         Status                     ---------                               -----------         ------                     Total Protein, Serum for...[[id number]]  Normal              Final result               Protein Electrophoresis,...[[id number]]  Abnormal            Final result                 Please view results for these tests on the individual orders.       Assessment:     1. PMR (polymyalgia rheumatica) (H)    2. Hypercalcemia    3. Hyperparathyroidism (H)    4. Primary hypertension    5. Stage 3a chronic kidney disease (H)    6. 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 Check blood work today.     2. Continue prednisone 10 mg.  3. Urea cream for dry skin.  4. Use walker for getting around as needed.  5. Continue current medications otherwise.  6. Follow up sooner if issues.       Jersey Metcalf MD  General Internal Medicine  [facility]    Return in about 4 months (around 4/14/2022), or if symptoms worsen or fail to improve, for follow up visit.     No future appointments.

## 2022-01-03 ENCOUNTER — OFFICE VISIT (OUTPATIENT)
Dept: FAMILY MEDICINE | Facility: CLINIC | Age: 87
End: 2022-01-03
Payer: MEDICARE

## 2022-01-03 VITALS
DIASTOLIC BLOOD PRESSURE: 90 MMHG | WEIGHT: 147 LBS | BODY MASS INDEX: 26.04 KG/M2 | OXYGEN SATURATION: 97 % | SYSTOLIC BLOOD PRESSURE: 180 MMHG | HEART RATE: 85 BPM | TEMPERATURE: 98.5 F | RESPIRATION RATE: 16 BRPM

## 2022-01-03 DIAGNOSIS — S91.001A OPEN WOUND OF KNEE, LEG, AND ANKLE, RIGHT, INITIAL ENCOUNTER: Primary | ICD-10-CM

## 2022-01-03 DIAGNOSIS — R03.0 ELEVATED BLOOD PRESSURE READING: ICD-10-CM

## 2022-01-03 DIAGNOSIS — S81.001A OPEN WOUND OF KNEE, LEG, AND ANKLE, RIGHT, INITIAL ENCOUNTER: Primary | ICD-10-CM

## 2022-01-03 DIAGNOSIS — S81.801A OPEN WOUND OF KNEE, LEG, AND ANKLE, RIGHT, INITIAL ENCOUNTER: Primary | ICD-10-CM

## 2022-01-03 PROCEDURE — 99214 OFFICE O/P EST MOD 30 MIN: CPT | Performed by: FAMILY MEDICINE

## 2022-01-03 PROCEDURE — 87205 SMEAR GRAM STAIN: CPT | Performed by: FAMILY MEDICINE

## 2022-01-03 PROCEDURE — 87070 CULTURE OTHR SPECIMN AEROBIC: CPT | Performed by: FAMILY MEDICINE

## 2022-01-03 RX ORDER — CEPHALEXIN 500 MG/1
500 CAPSULE ORAL 3 TIMES DAILY
Qty: 21 CAPSULE | Refills: 0 | Status: SHIPPED | OUTPATIENT
Start: 2022-01-03 | End: 2022-01-10

## 2022-01-03 NOTE — PROGRESS NOTES
"  Assessment & Plan     Open wounds right lower leg  Needs debriding and wound care. Referral placed. They report wounds have been present only a week at most, and there was no injury she recalls. Does not appear to have obvious signs of surrounding infection or cellulitis today, but will collect wound culture and have her start antibiotics while waiting to get into wound clinic. Recommend wet to dry dressings or non stick pads held in place with a bandage roll, and avoid applying tape or adhesive bandages to her fragile skin.   - Wound Aerobic Bacterial Culture Routine with Gram Stain    Elevated blood pressure  Reports that she took medications as prescribed today, has not been missing medications. Denies any symptoms. I do not want to make changes to her home regiment in case this is just situational today. Recommend they get a home BP cuff and check at least daily. If it remains at this level when rechecked at home, recommend they get in touch with PCP right away for medication adjustment.               BMI:   Estimated body mass index is 26.04 kg/m  as calculated from the following:    Height as of 4/9/21: 1.6 m (5' 3\").    Weight as of this encounter: 66.7 kg (147 lb).           No follow-ups on file.    Jocelyn Sullivan MD  [facility] NOÉ Roy is a 89 year old who presents for the following health issues     HPI   Chief Complaint   Patient presents with     Leg Problem     had bruise on rt lower leg thin skin has weeping from area red and painful for over 1 week     No specific injury but left lower leg skin wounds for over a week, not healing, and seems to be looking worse - more raised and swollen at site of wound per son. No surrounding redness. Thick drainage at times.  Son is concerned about infection.     Takes prednisone chronically for PMR.     No fevers, otherwise feeling well.     BP is high. No symptoms related to high BP. BP in clinic at least visits through " the fall 150s/70s-80s. Does not check BP at home. Reports compliance with BP medications norvasc and metoprolol.          Review of Systems         Objective    BP (!) 180/90 (BP Location: Right arm, Patient Position: Sitting, Cuff Size: Adult Large)   Pulse 85   Temp 98.5  F (36.9  C) (Tympanic)   Resp 16   Wt 66.7 kg (147 lb)   LMP  (LMP Unknown)   SpO2 97%   BMI 26.04 kg/m    Body mass index is 26.04 kg/m .  Physical Exam   GENERAL: healthy, alert and no distress  Lower extremities: Right lower extremity with 3 open wounds: the largest is on posterior/lateral calf about half way up lower leg - at least 2x3cm approximately; one on posterior leg just above ankle, and another just proximal and lateral to that. None have surrounding erythema, but there is dead tissue and thick drainage. No significant tenderness to palpation, no induration or fluctuant areas. Legs and feet are warm and appear well perfused

## 2022-01-05 LAB
BACTERIA WND CULT: NORMAL
GRAM STAIN RESULT: NORMAL
GRAM STAIN RESULT: NORMAL

## 2022-01-12 ENCOUNTER — OFFICE VISIT (OUTPATIENT)
Dept: VASCULAR SURGERY | Facility: CLINIC | Age: 87
End: 2022-01-12
Attending: FAMILY MEDICINE
Payer: MEDICARE

## 2022-01-12 VITALS
SYSTOLIC BLOOD PRESSURE: 168 MMHG | DIASTOLIC BLOOD PRESSURE: 88 MMHG | RESPIRATION RATE: 16 BRPM | BODY MASS INDEX: 26.32 KG/M2 | TEMPERATURE: 98.1 F | HEART RATE: 80 BPM | WEIGHT: 148.6 LBS

## 2022-01-12 DIAGNOSIS — S81.001A OPEN WOUND OF KNEE, LEG, AND ANKLE, RIGHT, INITIAL ENCOUNTER: ICD-10-CM

## 2022-01-12 DIAGNOSIS — S81.801A OPEN WOUND OF KNEE, LEG, AND ANKLE, RIGHT, INITIAL ENCOUNTER: ICD-10-CM

## 2022-01-12 DIAGNOSIS — S91.001A OPEN WOUND OF KNEE, LEG, AND ANKLE, RIGHT, INITIAL ENCOUNTER: ICD-10-CM

## 2022-01-12 DIAGNOSIS — E78.2 MIXED HYPERLIPIDEMIA: ICD-10-CM

## 2022-01-12 DIAGNOSIS — R60.9 DEPENDENT EDEMA: ICD-10-CM

## 2022-01-12 DIAGNOSIS — Z79.52 LONG TERM SYSTEMIC STEROID USER: ICD-10-CM

## 2022-01-12 DIAGNOSIS — R09.89 ABNORMAL FOOT PULSE: Primary | ICD-10-CM

## 2022-01-12 PROCEDURE — 250N000009 HC RX 250: Performed by: NURSE PRACTITIONER

## 2022-01-12 PROCEDURE — 11042 DBRDMT SUBQ TIS 1ST 20SQCM/<: CPT | Performed by: NURSE PRACTITIONER

## 2022-01-12 PROCEDURE — G0463 HOSPITAL OUTPT CLINIC VISIT: HCPCS | Mod: 25 | Performed by: NURSE PRACTITIONER

## 2022-01-12 PROCEDURE — 99204 OFFICE O/P NEW MOD 45 MIN: CPT | Mod: 25 | Performed by: NURSE PRACTITIONER

## 2022-01-12 RX ORDER — LIDOCAINE HYDROCHLORIDE 20 MG/ML
JELLY TOPICAL DAILY PRN
Status: DISCONTINUED | OUTPATIENT
Start: 2022-01-12 | End: 2022-10-13

## 2022-01-12 RX ORDER — AMLODIPINE BESYLATE 2.5 MG/1
2.5 TABLET ORAL DAILY
COMMUNITY
End: 2022-01-27

## 2022-01-12 RX ADMIN — LIDOCAINE HYDROCHLORIDE: 20 JELLY TOPICAL at 08:02

## 2022-01-12 ASSESSMENT — PAIN SCALES - GENERAL: PAINLEVEL: NO PAIN (0)

## 2022-01-12 NOTE — PROGRESS NOTES
"Compression Applied to Right  2-Layer Coban Lite: I Applied the inner foam layer with the foot dorsiflexed and started atthe base of the fifth metatarsal head. I Left the bottom of the heel exposed, and proceed by winding the foam up the leg using minimal overlap to just below the fibular head. I then applied the compression layer with the foot dorsiflexed and startingat the base of the fifth metatarsal head. I applied at full stretch and proceeded up the leg using 50% overlap. The bottom of the heel is covered with the compression layer up to the end at the fibular head just below the back of the knee and levelwith the top edge of the foam layer.  I gently pressed and conformed the entire surface of the system to ensurethat the two layers are firmly bound together   Compression Applied to Left  Velcro\", Compression Stockings  "

## 2022-01-12 NOTE — PATIENT INSTRUCTIONS
"Check blood pressure daily 1 hour after taking your blood pressure medication  Write these down  If they are running above 140/80 make appt with Dr. Metcalf      We will get you scheduled for [facility]O to check the arterial blood flow in the leg      Wound Care Instructions    Weekly at the clinic , Cleanse your right leg wound(s) with Dilute hibiclens 30cc in 500cc NS.    Pat Dry with non-sterile gauze    Apply Lotion to the intact skin surrounding your wound and other dry skin locations. Some good lotions include: Remedy Skin Repair Cream, Sarna, Vanicream or Cetaphil    Primary Dressing: Apply silvercel into/onto the wounds    Secondary dressing: Cover with gauze    Secure with non-sterile roll gauze (4\" x 75\" roll) and tape (1\" roll tape) as needed; avoid adhesive directly on the skin    Compression: 2 layer lite on the right; tubular compression on the left    It is not ok to get your wound wet in the bath or shower    SEEK MEDICAL CARE IF:    You have an increase in swelling, pain, or redness around the wound.    You have an increase in the amount of pus coming from the wound.    There is a bad smell coming from the wound.    The wound appears to be worsening/enlarging    You have a fever greater than 101.5 F      It is ok to continue current wound care treatment/products for the next 2-3 days until new wound care supplies are ordered and arrive. If longer than this please contact our office at [phone].      It is recommended that you do not get your ulcer wet when showering.  Listed below are several ways of keeping it dry when you shower.     1. Wrap it with Press and Seal plastic wrap.  It can be found in the stores where the plastic wraps or tin foil is kept.               2.  Some people take a bath and hang their leg/foot out of the tub.                        3  Put your leg in a plastic bag and tape it on.           4. You can purchase a shower cover for casts at some pharmacies and through the Internet. "            5. Take a Bed Bath or wash up at the sink

## 2022-01-12 NOTE — PROGRESS NOTES
[facility] Clinic Consult Note    Date of Service:   1/12/2022     Requesting Provider: Dr. Jocelyn Sullivan    Chief Complaint: right leg ulcer    History of Present Illness: Manuela Olivas is being seen at [facility] today regarding right leg ulcer. They arrive to the clinic today with nephew. The patient reports that the wounds were first noted 1 month ago; she has had similar issues in the past; her skin is very thin and fragile; she has also had issues with blistering and weeping of the right leg in the past. Was currently/previously using wet to dry dressing changes or vaseline gauze per day; being done by nephew daily.  Culture was done on 1/3/22; this was negative; she was empirically started on keflex and has tolerated these well; completed last night.  Reports pain of 0/10; currently using apap for pain. Has used nothing as compression in the past, is currently using nothing for compression. Denies any fevers, chills, or generalized ill feeling. Denies history of cancer. Sleeps in a bed with legs elevated. Pt no longer has their uterus and ovaries, they deny any abnormal vaginal bleeding. Denies history of DVT. Positive history of Joint Replacement, Cellulitis and Vein Procedures. Left leg was treated with sclerotherapy many years ago. History of right hip replacement.  I personally reviewed outside imaging, lab work, and progress noted through Care Everywhere and outside records. Recent labs were reviewed; noted elevated parathyroid hormone, mildly elevated inflammatory markers, and decreased gamma globulin; otherwise lab panel unremarkable; follows closely with her PCP Dr. Metcalf. She takes long term prednisone for arthritis and myalgia; polymyalgia rheumatica. She currently resides at [facility] in [place]. She is a former smoker quit 40-50 years ago.  Denies diabetes. She was noted to have elevated bp during her visit with Dr. Sullivan and again today; she was  previously instructed to use bp cuff daily she has the cuff but has not started checking these yet.         Review of Systems:   Constitutional:  Negative   EENTM: positive for glasses;  negative [place]  GI:  negative for nausea/vomiting;   negative for constipation   negative diarrhea;   negative for fecal incontinence  negative weight loss  :   negative dysuria,  negative incontinence  MS: patient is ambulatory;  does use assistive devices  Cardiac: negative   Respiratory:  negative SOB  Endocrine:  negative  diabetes  Psych: negative  depression/anxiety    Past Medical History:    Past Medical History:   Diagnosis Date     Anxiety      Autoimmune hepatitis (H) 2006    Resolved     Breast cyst 1975     Chronic kidney disease, stage 3 (H) 7/27/2021     Controlled substance agreement signed 2/13/2018     Esophageal dysphagia      Fibrocystic breast      Former smoker      GERD (gastroesophageal reflux disease)      HLD (hyperlipidemia)      HTN (hypertension)      Hypercalcemia      Hyperparathyroidism (H)     Subclinical      Insomnia      OA (osteoarthritis) 5/27/2016     PMR (polymyalgia rheumatica) (H)      Presbyesophagus      Senile purpura (H) 4/18/2019     Unspecified glaucoma      Unspecified glaucoma(365.9)        Surgical History:   Past Surgical History:   Procedure Laterality Date     BIOPSY BREAST Right 1975     BREAST CYST ASPIRATION Bilateral 1975     CATARACT EXTRACTION, BILATERAL       HYSTERECTOMY  1967     OOPHORECTOMY Right 1975     TOTAL HIP ARTHROPLASTY Right 2010        Medications:   Current Outpatient Medications   Medication Sig     amLODIPine (NORVASC) 2.5 MG tablet Take 2.5 mg by mouth daily     dorzolamide (TRUSOPT) 2 % ophthalmic solution [DORZOLAMIDE (TRUSOPT) 2 % OPHTHALMIC SOLUTION] 1 drop 3 (three) times a day.     metoprolol tartrate (LOPRESSOR) 50 MG tablet TAKE 1 TABLET BY MOUTH TWICE A DAY     omeprazole (PRILOSEC) 40 MG DR capsule TAKE 1 CAPSULE BY MOUTH TWICE A DAY      predniSONE (DELTASONE) 5 MG tablet Take 2 tablets (10 mg) by mouth daily     predniSONE (DELTASONE) 5 MG tablet TAKE 1/2 TO 1 TABLET BY MOUTH ONCE DAILY     timolol maleate (ISTALOL) 0.5 % DrpD [TIMOLOL MALEATE (ISTALOL) 0.5 % DRPD] Apply to eye Daily at 8:00 am..     amLODIPine (NORVASC) 2.5 MG tablet [AMLODIPINE (NORVASC) 2.5 MG TABLET] TAKE 1 TABLET (2.5 MG TOTAL) BY MOUTH DAILY. FOR HIGH BLOOD PRESSURE.     Current Facility-Administered Medications   Medication     lidocaine (XYLOCAINE) 2 % external gel       Allergies: No Known Allergies    Family history:   Family History   Problem Relation Age of Onset     Cerebrovascular Disease Mother      Hyperlipidemia Mother      Coronary Artery Disease Father         Social History:   Social History     Socioeconomic History     Marital status:      Spouse name: Not on file     Number of children: 0     Years of education: Not on file     Highest education level: Not on file   Occupational History     Not on file   Tobacco Use     Smoking status: Former Smoker     Smokeless tobacco: Never Used   Substance and Sexual Activity     Alcohol use: No     Drug use: Not on file     Sexual activity: Not on file   Other Topics Concern     Not on file   Social History Narrative    Moved out of her home in 2018 and moved to the [facility].    , no children, lives alone.    Volunteer at [employer] in the past.     Social Determinants of Health     Financial Resource Strain: Not on file   Food Insecurity: Not on file   Transportation Needs: Not on file   Physical Activity: Not on file   Stress: Not on file   Social Connections: Not on file   Intimate Partner Violence: Not on file   Housing Stability: Not on file        Physical Exam  Vitals: BP (!) 168/88   Pulse 80   Temp 98.1  F (36.7  C)   Resp 16   Wt 148 lb 9.6 oz (67.4 kg)   LMP  (LMP Unknown)   BMI 26.32 kg/m    Weight is 148 lbs 9.6 oz          Body mass index is 26.32 kg/m .  General: This is  a 89 year old female who appears their reported age, not in acute distress  Head: normocephalic, Atraumatic; wearing glasses; non-icteric sclera; no exudate; mild hearing loss   Respiratory: Clear throughout all lung fields; unlabored breathing; no cough   Cardiac: Regular, Rate and Rhythm, no murmurs appreciated   Skin: Uniformly warm and dry  Psych: Alert and oriented x4; calm and cooperative throughout exam  Abdomen: Normal bowel sounds. Soft, symmetric, no guarding or rigidity, nontender with palpation.  No organomegaly or masses palpated.   Extremities: BLE with +2 edema; there are several full thickness necrotic ulcers on the right lateral leg and calf; see photos and measures below; with moderate serosang drainage; no surrounding erythema; no purulence; no induration; strength testing revealed 3/4 to BLEs.    Sensation: Intact to pinprick and light touch throughout lower extremities bilaterally     Peripheral Vascular: abnormal PT pulses bilaterally; could not locate; slightly abnorma dorsalis pedis to the right foot; monophasic; normal DP on the left.  Good capillary refill. Toes were slightly cooler on the right foot. No unusual venous distention. Positive for spider veins, telangiectasias, hemosiderin deposition or hyperpigmentation and fibrosis or scarring    Circumferential volume measures:      Circumferential Measures 1/12/2022   Right just above MTP 19.5   Right Ankle 20.2   Right Widest Calf 34.5   Right Knee to Ankle 34   Left - just above MTP 18.5   Left Ankle 19   Left Widest Calf 35   Left Knee to Ankle 34       Ulceration(s)/Wound(s):     VASC Wound Rt shin (Active)   Pre Size Length 1.7 01/12/22 0700   Pre Size Width 0.4 01/12/22 0700   Pre Size Depth 0.1 01/12/22 0700   Pre Total Sq cm 0.68 01/12/22 0700   Number of days: 0       VASC Wound Rt calf (Active)   Pre Size Length 2.8 01/12/22 0700   Pre Size Width 5.3 01/12/22 0700   Pre Size Depth 0.1 01/12/22 0700   Pre Total Sq cm 14.84 01/12/22  0700   Number of days: 0       VASC Wound Rt lateral leg (Active)   Pre Size Length 0.8 01/12/22 0700   Pre Size Width 0.8 01/12/22 0700   Pre Size Depth 0.1 01/12/22 0700   Pre Total Sq cm 0.64 01/12/22 0700   Number of days: 0       VASC Wound Rt distal calf (Active)   Pre Size Length 1 01/12/22 0700   Pre Size Width 1.8 01/12/22 0700   Pre Size Depth 0.1 01/12/22 0700   Pre Total Sq cm 1.8 01/12/22 0700   Number of days: 0        Laboratory studies:     I personally reviewed the following lab results today and those on care everywhere    CRP   Date Value Ref Range Status   12/14/2021 1.3 (H) 0.0-<0.8 mg/dL Final      Erythrocyte Sedimentation Rate   Date Value Ref Range Status   12/14/2021 21 (H) 0 - 20 mm/hr Final      Last Renal Panel:  Sodium   Date Value Ref Range Status   12/14/2021 139 136 - 145 mmol/L Final     Potassium   Date Value Ref Range Status   12/14/2021 4.0 3.5 - 5.0 mmol/L Final     Chloride   Date Value Ref Range Status   12/14/2021 104 98 - 107 mmol/L Final     Carbon Dioxide (CO2)   Date Value Ref Range Status   12/14/2021 23 22 - 31 mmol/L Final     Anion Gap   Date Value Ref Range Status   12/14/2021 12 5 - 18 mmol/L Final     Glucose   Date Value Ref Range Status   12/14/2021 84 70 - 125 mg/dL Final     Urea Nitrogen   Date Value Ref Range Status   12/14/2021 20 8 - 28 mg/dL Final     Creatinine   Date Value Ref Range Status   12/14/2021 0.85 0.60 - 1.10 mg/dL Final     GFR Estimate   Date Value Ref Range Status   12/14/2021 60 (L) >60 mL/min/1.73m2 Final     Comment:     As of July 11, 2021, eGFR is calculated by the CKD-EPI creatinine equation, without race adjustment. eGFR can be influenced by muscle mass, exercise, and diet. The reported eGFR is an estimation only and is only applicable if the renal function is stable.   04/09/2021 59 (L) >60 mL/min/1.73m2 Final     Calcium   Date Value Ref Range Status   12/14/2021 10.4 8.5 - 10.5 mg/dL Final     Albumin   Date Value Ref Range Status    12/14/2021 3.9 3.5 - 5.0 g/dL Final      Lab Results   Component Value Date    WBC 9.7 12/14/2021     Lab Results   Component Value Date    RBC 4.58 12/14/2021     Lab Results   Component Value Date    HGB 13.6 12/14/2021     Lab Results   Component Value Date    HCT 42.1 12/14/2021     No components found for: MCT  Lab Results   Component Value Date    MCV 92 12/14/2021     Lab Results   Component Value Date    MCH 29.7 12/14/2021     Lab Results   Component Value Date    MCHC 32.3 12/14/2021     Lab Results   Component Value Date    RDW 14.6 12/14/2021     Lab Results   Component Value Date     12/14/2021      Lab Results   Component Value Date    A1C 5.9 03/17/2010      TSH   Date Value Ref Range Status   09/21/2021 2.35 0.30 - 5.00 uIU/mL Final      No results found for: VITDT             Impression:   Encounter Diagnoses   Name Primary?     Abnormal foot pulse Yes     Open wound of knee, leg, and ankle, right, initial encounter      Mixed hyperlipidemia      Long term systemic steroid user      Dependent edema                    Assessment/Plan:  1. Debridement: After discussion of risk factors and verbal consent was obtained 2% Lidocaine HCL jelly was applied, under clean conditions, the right leg ulceration(s) were debrided using currette. Devitalized and nonviable tissue, along with any fibrin and slough, was removed to improve granulation tissue formation, stimulate wound healing, decrease overall bacteria load, disrupt biofilm formation and decrease edge senescence.  Total excisional debridement was 17.96 sq cm from the epidermis/dermis area or into the subcutaneous tissue with a depth of 0.1-0.2 cm.   Ulcers were improved afterwards and .  Measures were unchanged after debridement.    2. Treatment: wound treatment will include irrigation and dressings to promote autolytic debridement which will include: will have her come weekly to clinic; will use silvercel; gauze; rolled gauze; has  completed antibiotics; culture was negative; no active s/sx of infection today on exam. Because of her abnormal pedal pulses today on exam and her history of high cholesterol and remote history of smoking will obtain ABIs; if these are abnormal will refer to Vascular Surgeon for consideration of CTA and angiogram.    3. Edema. We spoke at length regarding their swelling, potential causes, and treatment options. Answered all questions. Their swelling is likely multifactorial including but not limited to the following: dependency of the limbs for most hours of the day, reduced activity with reduced calf pump mechanism, venous hypertension, valvular incompetence, side effect of certain medications and history of joint replacements. She is on norvasc and prednisone which can worsen edema. I would like to first reduce their swelling down using 2 layer lite compression wraps due to her abnormal pedal pulses on exam; and then transition them into compression garments; such as compression stockings or velcro wraps. The patient should continue to elevate their legs periodically throughout the day. Continue to take their diuretics per their PMD recommendations.       4. Offloading: we spoke about the location of the wound; we want to avoid her elevating her leg on a hard surface; advised to prop leg up on a pillow for extra cushion    5. Nutrition: low sodium diet; albumin was normal    6. Elevated bp: she was reminded to start checking her bp daily after taking her medications 1-2 hours; record these data points if numbers are above 140/80 she was instructed to call and make appt with PCP; hand out provided    Patient to return to clinic in 4 week(s) for re-evaluation to be seen by me; she will have weekly NV at  site until then. They were instructed to call the clinic sooner with any further questions or concerns. Answered all questions.              Makayla Huynh DNP, RN, CNP, CWOCN  [facility]  V[facility]  [phone]      This note was electronically signed by Makayla Huynh NP

## 2022-01-19 ENCOUNTER — ANCILLARY PROCEDURE (OUTPATIENT)
Dept: VASCULAR ULTRASOUND | Facility: CLINIC | Age: 87
End: 2022-01-19
Attending: NURSE PRACTITIONER
Payer: MEDICARE

## 2022-01-19 ENCOUNTER — ALLIED HEALTH/NURSE VISIT (OUTPATIENT)
Dept: VASCULAR SURGERY | Facility: CLINIC | Age: 87
End: 2022-01-19
Attending: NURSE PRACTITIONER
Payer: MEDICARE

## 2022-01-19 VITALS
DIASTOLIC BLOOD PRESSURE: 76 MMHG | SYSTOLIC BLOOD PRESSURE: 172 MMHG | HEART RATE: 92 BPM | TEMPERATURE: 97.9 F | RESPIRATION RATE: 18 BRPM

## 2022-01-19 DIAGNOSIS — S91.001A OPEN WOUND OF KNEE, LEG, AND ANKLE, RIGHT, INITIAL ENCOUNTER: ICD-10-CM

## 2022-01-19 DIAGNOSIS — S81.001A OPEN WOUND OF KNEE, LEG, AND ANKLE, RIGHT, INITIAL ENCOUNTER: ICD-10-CM

## 2022-01-19 DIAGNOSIS — S81.001A OPEN WOUND OF KNEE, LEG, AND ANKLE, RIGHT, INITIAL ENCOUNTER: Primary | ICD-10-CM

## 2022-01-19 DIAGNOSIS — R09.89 ABNORMAL FOOT PULSE: ICD-10-CM

## 2022-01-19 DIAGNOSIS — S81.801A OPEN WOUND OF KNEE, LEG, AND ANKLE, RIGHT, INITIAL ENCOUNTER: ICD-10-CM

## 2022-01-19 DIAGNOSIS — S81.801A OPEN WOUND OF KNEE, LEG, AND ANKLE, RIGHT, INITIAL ENCOUNTER: Primary | ICD-10-CM

## 2022-01-19 DIAGNOSIS — R60.9 DEPENDENT EDEMA: ICD-10-CM

## 2022-01-19 DIAGNOSIS — E78.2 MIXED HYPERLIPIDEMIA: ICD-10-CM

## 2022-01-19 DIAGNOSIS — S91.001A OPEN WOUND OF KNEE, LEG, AND ANKLE, RIGHT, INITIAL ENCOUNTER: Primary | ICD-10-CM

## 2022-01-19 PROCEDURE — 93922 UPR/L XTREMITY ART 2 LEVELS: CPT

## 2022-01-19 PROCEDURE — 93925 LOWER EXTREMITY STUDY: CPT | Mod: 26 | Performed by: SURGERY

## 2022-01-19 PROCEDURE — 97602 WOUND(S) CARE NON-SELECTIVE: CPT

## 2022-01-19 PROCEDURE — 93922 UPR/L XTREMITY ART 2 LEVELS: CPT | Mod: 26 | Performed by: SURGERY

## 2022-01-19 PROCEDURE — 29581 APPL MULTLAYER CMPRN SYS LEG: CPT

## 2022-01-19 PROCEDURE — 93925 LOWER EXTREMITY STUDY: CPT

## 2022-01-19 ASSESSMENT — PAIN SCALES - GENERAL: PAINLEVEL: NO PAIN (0)

## 2022-01-19 NOTE — PROGRESS NOTES
[facility]  -  Nurse Visit      Chief Complaint: Patient presents to clinic for assement, and treatment of their wound(s) and swelling.     Dressing on Arrival: silvercel, gauze, roll gauze, 2-layer on right, tubular on left, intact      ICD-10-CM    1. Open wound of knee, leg, and ankle, right, initial encounter  S81.001A     S81.801A     S91.001A    2. Dependent edema  R60.9    3. Abnormal foot pulse  R09.89          Allergies: No Known Allergies    Medications:   Current Outpatient Medications:      amLODIPine (NORVASC) 2.5 MG tablet, Take 2.5 mg by mouth daily, Disp: , Rfl:      dorzolamide (TRUSOPT) 2 % ophthalmic solution, [DORZOLAMIDE (TRUSOPT) 2 % OPHTHALMIC SOLUTION] 1 drop 3 (three) times a day., Disp: , Rfl:      metoprolol tartrate (LOPRESSOR) 50 MG tablet, TAKE 1 TABLET BY MOUTH TWICE A DAY, Disp: 180 tablet, Rfl: 3     omeprazole (PRILOSEC) 40 MG DR capsule, TAKE 1 CAPSULE BY MOUTH TWICE A DAY, Disp: 180 capsule, Rfl: 3     predniSONE (DELTASONE) 5 MG tablet, Take 2 tablets (10 mg) by mouth daily, Disp: 180 tablet, Rfl: 1     predniSONE (DELTASONE) 5 MG tablet, TAKE 1/2 TO 1 TABLET BY MOUTH ONCE DAILY, Disp: 60 tablet, Rfl: 0     timolol maleate (ISTALOL) 0.5 % DrpD, [TIMOLOL MALEATE (ISTALOL) 0.5 % DRPD] Apply to eye Daily at 8:00 am.., Disp: , Rfl:      amLODIPine (NORVASC) 2.5 MG tablet, [AMLODIPINE (NORVASC) 2.5 MG TABLET] TAKE 1 TABLET (2.5 MG TOTAL) BY MOUTH DAILY. FOR HIGH BLOOD PRESSURE., Disp: 90 tablet, Rfl: 3    Current Facility-Administered Medications:      lidocaine (XYLOCAINE) 2 % external gel, , Topical, Daily PRN, Makayla Huynh, NP, Given at 01/12/22 0802    Vital Signs: BP (!) 172/76   Pulse 92   Temp 97.9  F (36.6  C)   Resp 18   LMP  (LMP Unknown)         Assessment:    General:  Patient presents to clinic in no apparent distress.  Psychiatric:  Alert and oriented x3.   Lower extremity:  edema is present.    Integumentary:  Skin  is WNL    Swelling Measurements:  Circumferential Measures 2022   Right just above MTP 19.5   Right Ankle 20.2   Right Widest Calf 34.5   Right Knee to Ankle 34   Left - just above MTP 18.5   Left Ankle 19   Left Widest Calf 35   Left Knee to Ankle 34       Wound info:  VASC Wound Rt shin (Active)   Pre Size Length 1.5 22 0856   Pre Size Width 0.3 22 0856   Pre Size Depth 0.1 22 0856   Pre Total Sq cm 0.45 22 0856       VASC Wound Rt calf (Active)   Pre Size Length 2.4 22 0856   Pre Size Width 5.2 22 0856   Pre Size Depth 0.1 22 0856   Pre Total Sq cm 12.48 22 0856       VASC Wound Rt lateral leg (Active)   Pre Size Length 0.8 22 0700   Pre Size Width 0.8 22 0700   Pre Size Depth 0.1 22 0700   Pre Total Sq cm 0.64 22 0700       VASC Wound Rt distal calf (Active)   Pre Size Length 1 22 0856   Pre Size Width 1.2 22 0856   Pre Size Depth 0.1 22 0856   Pre Total Sq cm 1.2 22 0856       Undermining is not present.    The periwoundskin is WNL    Plan:         1. Patient will follow up in 1 week.          2. Treatment provided will include irrigation and dressings to promote autolytic debridement and will be as listed below     Cleansed with: Dilute hibiclens 30cc in 500cc NS    Protected skin with: Remedy skin repair lotion    Dressings Applied: Silver alginate, Gauze and Secured with roll gauze and tape.     Compression Applied to the Left Leg: Tubular compression  Tubigrip Size F       Compression Applied to the Right Le-Layer [place] Lite  2-Layer [place] Lite: I Applied the inner foam layer with the foot dorsiflexed and started atthe base of the fifth metatarsal head. I Left the bottom of the heel exposed, and proceed by winding the foam up the leg using minimal overlap to just below the fibular head. I then applied the compression layer with the foot dorsiflexed and startingat the base of the fifth metatarsal head. I  applied at full stretch and proceeded up the leg using 50% overlap. The bottom of the heel is covered with the compression layer up to the end at the fibular head just below the back of the knee and levelwith the top edge of the foam layer.  I gently pressed and conformed the entire surface of the system to ensurethat the two layers are firmly bound together      Offloading applied: None    Trial Products: No  Provider notified regarding concerns: No  Treatment Changes: No        Jessica Huang RN

## 2022-01-19 NOTE — PATIENT INSTRUCTIONS
Call to schedule an appointment with Dr. Metcalf to discuss elevated blood pressure.      - Please call us if your compression wraps fall more than 1-2 inches below the bend of the knee. Remove the wraps if you experience any shortness of breathe or notice your toes turning blue/purple and then give us a call. Call if they are too painful. Call if they get wet. If it is a weekend and the wraps fall down, are too painful, or get wet take the wraps off and put on another form of compression. Compression such as velcro wraps, compression stockings, short stretch bandages, or tubular compression. Apply one of these until you can be seen in clinic. Please call us if you have any questions [phone].    - Treatment:  Layered Compression Bandaging (2-layer)    What is it?  The layered compression bandaging has a layer of absorbent material that will soak up drainage.     Why we do it.   This is done to treat swelling, wounds, or both.  This will in turn help circulation and healing.    How to care for your bandages.  The wraps need to be kept dry. If  the wraps become wet, remove them and call the clinic to have another wrap applied.    What to expect.  It is common for the wraps to be uncomfortable at the beginning. The first two days are usually the hardest; then they will become more comfortable.       Elevating your legs will help the discomfort. Try to elevate your legs as much as possible.    If rest and elevation does not help your discomfort, call your provider.  If your provider is not available you can remove the wrap and leave a message for further instructions.    - Swelling and Compression Therapy    Swelling in the legs can be caused by many reasons. No matter what the reason, treatment usually includes some type of compression. This may be done with a support sock, dressing, ace wrap, or layered wraps.     It is important to treat the swelling for many reasons. If the swelling is not treated you may  develop blisters that can lead to ulcerations. This is caused when extra fluid goes into tissue causing damage and blocking blood flow to the tissue.     It is important that you wear your compression every day, including days that you will be seen in clinic.     Compression is often the most important part of treating leg wounds. Without controlling the swelling it is often not possible to heal wounds.     Going without compression for even brief periods of time can be damaging to your legs and your health.  Your compression should be put on first thing in the morning. Take the compression off at night only when instructed by your care provider to do so. Sometimes wearing compression 24 hours a day will be recommended.       If you are having difficulty wearing your compression it is important to notify your care provider so that other options may be reviewed.    Thank you for choosing [email or web address]. Please call us if you have any questions [phone].

## 2022-01-20 ENCOUNTER — TELEPHONE (OUTPATIENT)
Dept: VASCULAR SURGERY | Facility: CLINIC | Age: 87
End: 2022-01-20
Payer: MEDICARE

## 2022-01-20 NOTE — TELEPHONE ENCOUNTER
Talked with patient.  She agrees to remove her compression on her right leg as was discussed with her earlier today.  The wound dressings can be changed weekly per Makayla KLINE as long as patient does not have increased drainage.  Patient states her nephew will assist with the dressings.

## 2022-01-20 NOTE — TELEPHONE ENCOUNTER
Patient would like a call back to discuss her wound cares again as she does not remember what she was told at her appointment yesterday. She has questions regarding her compression and about how often she will need to change her bandages.   PH: [phone]

## 2022-01-27 ENCOUNTER — TELEPHONE (OUTPATIENT)
Dept: INTERNAL MEDICINE | Facility: CLINIC | Age: 87
End: 2022-01-27

## 2022-01-27 ENCOUNTER — OFFICE VISIT (OUTPATIENT)
Dept: VASCULAR SURGERY | Facility: CLINIC | Age: 87
End: 2022-01-27
Attending: SURGERY
Payer: MEDICARE

## 2022-01-27 VITALS — TEMPERATURE: 98 F | HEART RATE: 88 BPM | SYSTOLIC BLOOD PRESSURE: 182 MMHG | DIASTOLIC BLOOD PRESSURE: 84 MMHG

## 2022-01-27 DIAGNOSIS — I73.9 PVD (PERIPHERAL VASCULAR DISEASE) (H): ICD-10-CM

## 2022-01-27 DIAGNOSIS — I73.9 PAD (PERIPHERAL ARTERY DISEASE) (H): Primary | ICD-10-CM

## 2022-01-27 PROCEDURE — G0463 HOSPITAL OUTPT CLINIC VISIT: HCPCS

## 2022-01-27 PROCEDURE — 99214 OFFICE O/P EST MOD 30 MIN: CPT | Performed by: SURGERY

## 2022-01-27 RX ORDER — ROSUVASTATIN CALCIUM 20 MG/1
20 TABLET, COATED ORAL DAILY
Qty: 30 TABLET | Refills: 3 | Status: SHIPPED | OUTPATIENT
Start: 2022-01-27 | End: 2022-02-21

## 2022-01-27 RX ORDER — ASPIRIN 81 MG/1
81 TABLET, CHEWABLE ORAL DAILY
Qty: 30 TABLET | Refills: 3 | Status: SHIPPED | OUTPATIENT
Start: 2022-01-27 | End: 2022-04-25

## 2022-01-27 ASSESSMENT — PAIN SCALES - GENERAL: PAINLEVEL: MODERATE PAIN (5)

## 2022-01-27 NOTE — TELEPHONE ENCOUNTER
Makayla with vascular is calling with update for PCP.  Patient has had several appt with Vascular and her BP has been elevated.  Today's BP was 182/84

## 2022-01-27 NOTE — PROGRESS NOTES
Called and left message at Dr. Metcalf's office regarding patient's consistently high blood pressure.

## 2022-01-27 NOTE — PROGRESS NOTES
VASCULAR SURGERY OUTPATIENT CONSULT OR VISIT   VASCULAR SURGEON: Hali Appiah MD    LOCATION:  [facility] (formerly [facility])    Manuela Olivas   Medical Record #:  [id number]  YOB: 1930  Age:  89 year old     Date of Service: 1/27/2022    PRIMARY CARE PROVIDER: Jersey Metcalf      Reason for consultation: Evaluation for right lower leg wounds    IMPRESSION: Patient with right lower leg wounds x4 that are improving and without signs of infection.  Toe pressure -if they can be trusted-are more than adequate for wound healing at 90 mmHg.  ABIs abnormal and SFA disease noted on duplex imaging.  Patient not on a statin or aspirin which she had been on several years ago.  Patient has never had carotid screening    RECOMMENDATION: Patient with improving wounds and adequate toe pressures who would like to avoid intervention if possible.  In this context we will have her continue with local wound care and see Makayla Garcia in a month.  If things are not improving at that point we will plan on angiointervention with right SFA intervention and possibly tibial vessel treatment if indicated.  Starting her on statin and antiplatelet therapy given her known SFA occlusion.    At this point will not do a carotid screen until the active issue are resolved and at her age it may not be appropriate.  HPI:  Manuela Olivas is a 89 year old female who was seen today for right leg wounds.  This started a little over a month ago.  She recollects bumping her leg.  There is a small shin wound on the front of the leg as well as on the back as well as two wounds near the ankle.  Her son has been treating them with local therapy recommended by Makayla Garcia.  These appear to be improving.    The patient has PAD but has never had symptoms of rest pain or claudication.  She is not very active.  She used to be on a statin and aspirin therapy but over the years stopped taking them as she was told that she did not really need  them.    Patient has never had a stroke or TIA.    She had a right hip replacement 15 years ago.    She has had a remote hysterectomy and surgery for benign breast mass.  Very remote left leg vein stripping surgery.    Family history is negative for stroke or PAD with amputations.  Father had a history of myocardial infarction.    No other new health issues.    PHH:    Past Medical History:   Diagnosis Date     Anxiety      Autoimmune hepatitis (H) 2006    Resolved     Breast cyst 1975     Chronic kidney disease, stage 3 (H) 7/27/2021     Controlled substance agreement signed 2/13/2018     Esophageal dysphagia      Fibrocystic breast      Former smoker      GERD (gastroesophageal reflux disease)      HLD (hyperlipidemia)      HTN (hypertension)      Hypercalcemia      Hyperparathyroidism (H)     Subclinical      Insomnia      OA (osteoarthritis) 5/27/2016     PMR (polymyalgia rheumatica) (H)      Presbyesophagus      Senile purpura (H) 4/18/2019     Unspecified glaucoma      Unspecified glaucoma(365.9)         Past Surgical History:   Procedure Laterality Date     BIOPSY BREAST Right 1975     BREAST CYST ASPIRATION Bilateral 1975     CATARACT EXTRACTION, BILATERAL       HYSTERECTOMY  1967     OOPHORECTOMY Right 1975     TOTAL HIP ARTHROPLASTY Right 2010       ALLERGIES:  Patient has no known allergies.    MEDS:    Current Outpatient Medications:      aspirin (ASA) 81 MG chewable tablet, Take 1 tablet (81 mg) by mouth daily, Disp: 30 tablet, Rfl: 3     dorzolamide (TRUSOPT) 2 % ophthalmic solution, [DORZOLAMIDE (TRUSOPT) 2 % OPHTHALMIC SOLUTION] 1 drop 3 (three) times a day., Disp: , Rfl:      metoprolol tartrate (LOPRESSOR) 50 MG tablet, TAKE 1 TABLET BY MOUTH TWICE A DAY, Disp: 180 tablet, Rfl: 3     omeprazole (PRILOSEC) 40 MG DR capsule, TAKE 1 CAPSULE BY MOUTH TWICE A DAY, Disp: 180 capsule, Rfl: 3     predniSONE (DELTASONE) 5 MG tablet, Take 2 tablets (10 mg) by mouth daily, Disp: 180 tablet, Rfl: 1      predniSONE (DELTASONE) 5 MG tablet, TAKE 1/2 TO 1 TABLET BY MOUTH ONCE DAILY, Disp: 60 tablet, Rfl: 0     rosuvastatin (CRESTOR) 20 MG tablet, Take 1 tablet (20 mg) by mouth daily, Disp: 30 tablet, Rfl: 3     timolol maleate (ISTALOL) 0.5 % DrpD, [TIMOLOL MALEATE (ISTALOL) 0.5 % DRPD] Apply to eye Daily at 8:00 am.., Disp: , Rfl:      amLODIPine (NORVASC) 2.5 MG tablet, [AMLODIPINE (NORVASC) 2.5 MG TABLET] TAKE 1 TABLET (2.5 MG TOTAL) BY MOUTH DAILY. FOR HIGH BLOOD PRESSURE., Disp: 90 tablet, Rfl: 3    Current Facility-Administered Medications:      lidocaine (XYLOCAINE) 2 % external gel, , Topical, Daily PRN, Makayla Huynh, NP, Given at 01/12/22 0802    SOCIAL HABITS:    History   Smoking Status     Former Smoker   Smokeless Tobacco     Never Used     Social History    Substance and Sexual Activity      Alcohol use: No      History   Drug Use Not on file       FAMILY HISTORY:    Family History   Problem Relation Age of Onset     Cerebrovascular Disease Mother      Hyperlipidemia Mother      Coronary Artery Disease Father        REVIEW OF SYSTEMS:    A 12 point ROS was reviewed and except for what is listed in the HPI above, all others are negative    PE:  BP (!) 182/84   Pulse 88   Temp 98  F (36.7  C)   LMP  (LMP Unknown)   Wt Readings from Last 1 Encounters:   01/12/22 148 lb 9.6 oz (67.4 kg)     There is no height or weight on file to calculate BMI.    EXAM:  GENERAL: This is a well-developed 89 year old female who appears her stated age  EYES: Grossly normal.  MOUTH: Buccal mucosa normal   MUSCULOSKELETAL: Grossly normal and both lower extremities are intact.  HEME/LYMPH: No lymphedema  NEUROLOGIC: Focally intact, Alert and oriented x 3.   PSYCH: appropriate affect  INTEGUMENT: For relatively small and clean appearing wounds of the left lower leg close to the ankle as well as at the mid shin level.  No signs of infection.  Pulse Exam:   Nonpalpable pedal pulses      DIAGNOSTIC STUDIES:     Images:  US CATRACHITO  with PPG wo Exercise Bilateral    Result Date: 1/19/2022  BILATERAL RESTING ANKLE-BRACHIAL INDICES (CATRACHITO'S) (Date: 01/19/22) Indication: SURVEILLANCE CTARACHITO'S: RIGHT LEG ULCERS. ABNORMAL PEDAL PULSES. Previous: NONE History: Previous Smoker, Hypertension, Hyperlipidemia, Skin Color Change and Vascular Ulcers  Resting CATRACHITO's          Right: mmHg Index     Brachial: 200  Ankle-(PT): 110 0.55 Ankle-(DP): 119 0.60           Digit: 90 0.45               Left: mmHg Index     Brachial: 197  Ankle-(PT): 151 0.76 Ankle-(DP): 167 0.84           Digit: 117 0.59 Resting ankle-brachial index of 0.60 on the right. Toe Pressures of 90 mmHg and TBI of 0.45  Resting ankle-brachial index of 0.84 on the left. Toe Pressures of 117 mmHg and TBI of 0.59  WAVEFORMS: The right dorsalis pedis and posterior tibial arteries show monphasic waveforms. The left dorsalis pedis and posterior tibial arteries show biphasic waveforms.  Impression:  1. RIGHT LOWER EXTREMITY: CATRACHITO is Abnormal with an CATRACHITO of 0.60. and Normal toe pressures of 90 mmHg. 2. LEFT LOWER EXTREMITY: CATRACHITO is Abnormal with an CATRACHITO of 0.84. and Normal toe pressures of 117 mmHg. Reference: Wound classification Grade CATRACHITO Ankle Systolic Pressure Toe Pressures 0 > 0.80 > 100 mmHg > 60 mmHg 1 0.6 - 0.79 70 - 100 mmHg 40 - 59 mmHg 2 0.4 - 0.59 50-70 mmHg 30 - 39 mmHg 3 < 0.39 < 50 mmHg < 30 mmHg Digit Pressures DBI Disease Category > 0.70 Normal < 0.70 Abnormal > 30 mmHg Potential wound healing < 30 mmHg Impaired wound healing Ankle Brachial Pressures CATRACHITO Disease Category > 1.3  Likely vessel calcification with monophasic waveforms, non-diagnostic 0.95-1.30 Normal with multiphasic waveforms 0.50-0.95 Single level disease 0.30-0.50 Multilevel disease < 0.30 Critical limb ischema Volume Plethysmography Recording (VPR) at all levels Normal Sharp systolic peak, fast upstroke, prominent dicrotic notch in wave Mild Sharp systolic peak, fast upstroke, absent dicrotic notch in wave Moderate Flattened  systolic peak, slowed upstroke, absent dicrotic notch in wave Severe Low-amplitude wave with = upslope and down slope Occluded Flat Line Multiple Level Segmental Pressures  Normal Abnormal Upper Thigh > 1.2 pressure indices, <30 mmHg between lateral and horizontal cuffs < 0.8 pressure indices suggest proximal occlusion, 0.8-1.2 pressure indices suggest inflow disease, > 30 mmHg between lateral and horizontal cuffs  Lower Thigh < 30 mmHg between lateral and horizontal cuffs > 30 mmHg between lateral and horizontal cuffs Upper Calf < 30 mmHg between lateral and horizontal cuffs > 30 mmHg between lateral and horizontal cuffs Note: As limb diameter increases, pressure measurements also increase.    US Lower Extremity Arterial Duplex Bilateral    Result Date: 1/19/2022  Arterial Duplex Ultrasound (Date: 01/19/22) Lower Extremity Artery Evaluation Indication: SURVEILLANCE: RIGHT LEG ULCERS. ABNORMAL PEDAL PULSES.  Previous: NONE History: Previous Smoker, Hypertension, Hyperlipidemia, Skin Color Change and Vascular Ulcers Technique: Duplex imaging is performed utilizing gray-scale, two-dimensional images, and color-flow imaging. Doppler waveform analysis and spectral Doppler imaging is also performed. LOWER EXTREMITY ARTERIAL DUPLEX EXAM WITH WAVEFORMS Right Leg:(cm/s) Location: Velocities Waveforms EIA:   159  B CFA:   152  B PFA:   118  B SFA Proximal:   108  B SFA Mid:   106  M SFA Distal:   75  M Popliteal Artery:   46 / 84 M PTA:   16   M EVERARDO:   28  M DPA:   30  M Waveforms: T=Triphasic, M=Monophasic, B=Biphasic Left Leg:(cm/s) Location: Velocities Waveforms EIA:   147  B CFA:   170  T PFA:   145  T SFA Proximal:   131  B SFA Mid:   154  B SFA Distal:   188  B Popliteal Artery:   101/ 105  B PTA:   20   B EVERARDO:   60  B DPA:   68  B Waveforms: T=Triphasic, M=Monophasic, B=Biphasic Impression: Right Lower Extremity: Biphasic external iliac and common femoral waveforms suggest no inflow disease.  From the mid SFA  waveforms become monophasic suggesting hemodynamically significant stenosis in the distal SFA or proximal popliteal artery.  Waveforms remain monophasic with roughly the same morphology from this point down suggesting no additional infrapopliteal disease. Left Lower Extremity: Biphasic external iliac artery and common femoral waveforms suggest no inflow disease.  While waveforms remain biphasic throughout from this point on there is a mildly elevated velocity in the distal SFA suggesting potential mild stenosis.  No additional hemodynamically significant disease identified. Reference: Category Normal 1-19% 20-49% 50-99% Occluded PSV <160 cm/sec without spectral broadening <160 cm/sec with spectral broadening Increased Increased Absent Flow Ratio N/A N/A < 2.0 >2.0 N/A Post-Stenotic Turbulence No No No Yes N/A       I personally reviewed the images and my interpretation is that her toe pressures appear to be adequate for wound healing at 90 mmHg.  On duplex she clearly has SFA occlusive disease and her ABIs are abnormal..    LABS:      Sodium   Date Value Ref Range Status   12/14/2021 139 136 - 145 mmol/L Final   09/21/2021 141 136 - 145 mmol/L Final   07/27/2021 142 136 - 145 mmol/L Final     Urea Nitrogen   Date Value Ref Range Status   12/14/2021 20 8 - 28 mg/dL Final   09/21/2021 21 8 - 28 mg/dL Final   07/27/2021 20 8 - 28 mg/dL Final     Hemoglobin   Date Value Ref Range Status   12/14/2021 13.6 11.7 - 15.7 g/dL Final   09/21/2021 13.2 11.7 - 15.7 g/dL Final   04/09/2021 13.3 12.0 - 16.0 g/dL Final     Platelet Count   Date Value Ref Range Status   12/14/2021 258 150 - 450 10e3/uL Final   09/21/2021 292 150 - 450 10e3/uL Final   04/09/2021 279 140 - 440 thou/uL Final     BNP   Date Value Ref Range Status   04/09/2021 79 0 - 167 pg/mL Final   12/16/2019 96 0 - 167 pg/mL Final       Hali Appiah MD, MD  VASCULAR SURGERY

## 2022-01-27 NOTE — PATIENT INSTRUCTIONS
"    Follow up with Makayla Huynh CNP in one month at scheduled appointment. If wound not improving, further follow up with Dr. Appiah.     Follow up with Dr. Metcalf regarding your blood pressure. If chest pain or shortness of breath, go to Emergency Department.    We are ordering your supplies from [employer]. To reorder supplies or if you have any questions about your order please call [employer] at [phone]      Wound Care Instructions     Weekly, Cleanse your right leg wound(s) with Dilute hibiclens 30cc in 500cc NS.     Pat Dry with non-sterile gauze     Apply Lotion to the intact skin surrounding your wound and other dry skin locations. Some good lotions include: Remedy Skin Repair Cream, Sarna, Vanicream or Cetaphil     Primary Dressing: Apply silvercel into/onto the wounds     Secondary dressing: Cover with gauze     Secure with non-sterile roll gauze (4\" x 75\" roll) and tape (1\" roll tape) as needed; avoid adhesive directly on the skin         It is not ok to get your wound wet in the bath or shower     SEEK MEDICAL CARE IF:   You have an increase in swelling, pain, or redness around the wound.   You have an increase in the amount of pus coming from the wound.   There is a bad smell coming from the wound.   The wound appears to be worsening/enlarging   You have a fever greater than 101.5 F       It is ok to continue current wound care treatment/products for the next 2-3 days until new wound care supplies are ordered and arrive. If longer than this please contact our office at [phone].       It is recommended that you do not get your ulcer wet when showering.  Listed below are several ways of keeping it dry when you shower.     1. Wrap it with Press and Seal plastic wrap.  It can be found in the stores where the plastic wraps or tin foil is kept.                2.  Some people take a bath and hang their leg/foot out of the tub.                          3  Put your leg in a plastic bag and tape it " on.             4. You can purchase a shower cover for casts at some pharmacies and through the Internet.          Patient Education     Crestor Oral Tablet 20 mg  Uses  To lower high fat levels in blood.  Instructions  Swallow the medicine without crushing or chewing it.  This medicine may be taken with or without food.  Keep the medicine at room temperature. Avoid heat and direct light.  Do not take this medicine with antacids.  It is important that you keep taking each dose of this medicine on time even if you are feeling well.  If you forget to take a dose on time, take it as soon as you remember. If it is almost time for the next dose, do not take the missed dose. Return to your normal dosing schedule. Do not take 2 doses of this medicine at one time.  Please tell your doctor and pharmacist about all the medicines you take. Include both prescription and over-the-counter medicines. Also tell them about any vitamins, herbal medicines, or anything else you take for your health.  It is very important that you follow your doctor's instructions for all blood tests.  Cautions  Tell your doctor and pharmacist if you ever had an allergic reaction to a medicine. Symptoms of an allergic reaction can include trouble breathing, skin rash, itching, swelling, or severe dizziness.  Do not use the medication any more than instructed.  Please check with your doctor before drinking alcohol while on this medicine.  Contact your doctor if you notice a change in the amount or darkening of your urine.  Tell the doctor or pharmacist if you are pregnant, planning to be pregnant, or breastfeeding.  Do not breastfeed while on this medicine. This medicine can pass through breast milk to the baby.  Do not use this medicine if you are pregnant. If you become pregnant while on this medicine, contact your doctor immediately.  This medicine can cause birth defects. Speak with your doctor about birth control methods that should be used while on  this medicine.  Ask your pharmacist if this medicine can interact with any of your other medicines. Be sure to tell them about all the medicines you take.  Do not start or stop any other medicines without first speaking to your doctor or pharmacist.  Do not share this medicine with anyone who has not been prescribed this medicine.  Side Effects  Call your doctor or get medical help right away if you notice any of these more serious side effects:    lack of energy and tiredness    symptoms of liver damage (such as yellowing of skin or eyes, dark urine, unusual tiredness or weakness; severe stomach or back pain)    muscle pain    nausea    stomach upset or abdominal pain  A few people may have an allergic reactions to this medicine. Symptoms can include difficulty breathing, skin rash, itching, swelling, or severe dizziness. If you notice any of these symptoms, seek medical help quickly.  Extra  Please speak with your doctor, nurse, or pharmacist if you have any questions about this medicine.  [email or web address]  IMPORTANT NOTE: This document tells you briefly how to take your medicine, but it does not tell you all there is to know about it.Your doctor or pharmacist may give you other documents about your medicine. Please talk to them if you have any questions.Always follow their advice. There is a more complete description of this medicine available in English.Scan this code on your smartphone or tablet or use the web address below. You can also ask your pharmacist for a printout. If you have any questions, please ask your pharmacist.     2021 [facility].          Patient Education     Aspirin Oral Tablet 81 mg  Uses  This medicine is used for the following purposes:    fever    heart attack    heart disease    inflammatory disease    pain    prevent blood clots    prevent stroke    stroke    prevent heart attack  Instructions  Take the medicine with 250 mL (1 cup) of water.  Sit  or stand upright for 10 minutes after taking the medicine. Do not lie down.  Keep the medicine at room temperature. Avoid heat and direct light.  If you are using this medicine regularly, it is important to take each dose of medicine on time. Keep taking the medicine even if you feel well.  If you forget to take a dose on time, take it as soon as you remember. If it is almost time for the next dose, do not take the missed dose. Return to your normal dosing schedule. Do not take 2 doses of this medicine at one time.  Please tell your doctor and pharmacist about all the medicines you take. Include both prescription and over-the-counter medicines. Also tell them about any vitamins, herbal medicines, or anything else you take for your health.  Cautions  IMPORTANT: Children and teenagers should not use medications containing aspirin for cold and flu symptoms or chickenpox.  Tell your doctor and pharmacist if you ever had an allergic reaction to a medicine. Symptoms of an allergic reaction can include trouble breathing, skin rash, itching, swelling, or severe dizziness.  There is an increased risk of bleeding while on this medicine, please tell your doctor or nurse if you notice any excessive bleeding or bruising.  Do not use the medication any more than instructed.  If you drink more than a few alcoholic beverages each day, ask your doctor whether you should be on this medicine.  Contact your doctor if you notice a change in the amount or darkening of your urine.  Tell the doctor or pharmacist if you are pregnant, planning to be pregnant, or breastfeeding.  Ask your pharmacist if this medicine can interact with any of your other medicines. Be sure to tell them about all the medicines you take.  Please tell all your doctors and dentists that you are on this medicine before they provide care.  Do not start or stop any other medicines without first speaking to your doctor or pharmacist.  Side Effects  The following is a  list of some common side effects from this medicine. Please speak with your doctor about what you should do if you experience these or other side effects.    drowsiness or sedation    headaches    nausea    stomach upset or abdominal pain    vomiting  If you have any of the following side effects, you may be getting too much medicine. Please contact your doctor to let them know about these side effects.    ringing in the ears  Call your doctor or get medical help right away if you notice any of these more serious side effects:    increased risk of bleeding    coughing up blood or vomit that looks like coffee grounds    shortness of breath    dark, tarry stool  A few people may have an allergic reactions to this medicine. Symptoms can include difficulty breathing, skin rash, itching, swelling, or severe dizziness. If you notice any of these symptoms, seek medical help quickly.  Extra  Please speak with your doctor, nurse, or pharmacist if you have any questions about this medicine.  [email or web address]  IMPORTANT NOTE: This document tells you briefly how to take your medicine, but it does not tell you all there is to know about it.Your doctor or pharmacist may give you other documents about your medicine. Please talk to them if you have any questions.Always follow their advice. There is a more complete description of this medicine available in English.Scan this code on your smartphone or tablet or use the web address below. You can also ask your pharmacist for a printout. If you have any questions, please ask your pharmacist.     2021 [employer].

## 2022-01-27 NOTE — NURSING NOTE
[facility]        Patient is here for a consult to discuss abnormal CATRACHITO's.    Pt is currently taking no medications that would affect our treatment plan..    BP (!) 182/84   Pulse 88   Temp 98  F (36.7  C)   LMP  (LMP Unknown)     The provider has been notified that the patient has concerns of wound care appointments were canceled, patient and nephew are wondering why?     Refills are needed: No    Has homecare services and agency name:  [facility]

## 2022-01-28 DIAGNOSIS — I10 ESSENTIAL HYPERTENSION: ICD-10-CM

## 2022-01-28 RX ORDER — AMLODIPINE BESYLATE 2.5 MG/1
TABLET ORAL
Qty: 90 TABLET | Refills: 3 | Status: SHIPPED | OUTPATIENT
Start: 2022-01-28 | End: 2022-10-17

## 2022-02-02 ENCOUNTER — TELEPHONE (OUTPATIENT)
Dept: VASCULAR SURGERY | Facility: CLINIC | Age: 87
End: 2022-02-02
Payer: MEDICARE

## 2022-02-02 NOTE — TELEPHONE ENCOUNTER
Patient called to say she has not received her supply order from [employer] yet.  She called [facility] and they told her that they are still waiting on a dx code.  She states the reference number for her order is [id number] (may be helpful when calling [facility]) and requested that we fax the order with the dx code to [phone]    Manuela is requesting a call back with an update on the status of her supplies:  [phone]

## 2022-02-04 ENCOUNTER — TELEPHONE (OUTPATIENT)
Dept: VASCULAR SURGERY | Facility: CLINIC | Age: 87
End: 2022-02-04
Payer: MEDICARE

## 2022-02-04 NOTE — TELEPHONE ENCOUNTER
Left message for patient to call back.Please relay message if she does call back. Order receipt was signed and sent back to [facility] today. Should be receiving order soon. Patient can contact [facility] at [phone] if she still does not get supplies.

## 2022-02-04 NOTE — TELEPHONE ENCOUNTER
Just received order receipt from [facility]. This was signed and faxed back today. Will update patient

## 2022-02-14 ENCOUNTER — OFFICE VISIT (OUTPATIENT)
Dept: INTERNAL MEDICINE | Facility: CLINIC | Age: 87
End: 2022-02-14
Payer: MEDICARE

## 2022-02-14 VITALS
HEART RATE: 81 BPM | WEIGHT: 147 LBS | DIASTOLIC BLOOD PRESSURE: 92 MMHG | BODY MASS INDEX: 26.04 KG/M2 | OXYGEN SATURATION: 97 % | SYSTOLIC BLOOD PRESSURE: 198 MMHG

## 2022-02-14 DIAGNOSIS — I73.9 PAD (PERIPHERAL ARTERY DISEASE) (H): ICD-10-CM

## 2022-02-14 DIAGNOSIS — F41.9 ANXIETY: ICD-10-CM

## 2022-02-14 DIAGNOSIS — M25.562 CHRONIC PAIN OF BOTH KNEES: ICD-10-CM

## 2022-02-14 DIAGNOSIS — N18.31 STAGE 3A CHRONIC KIDNEY DISEASE (H): ICD-10-CM

## 2022-02-14 DIAGNOSIS — M35.3 PMR (POLYMYALGIA RHEUMATICA) (H): ICD-10-CM

## 2022-02-14 DIAGNOSIS — L98.499 NONHEALING SKIN ULCER, UNSPECIFIED ULCER STAGE (H): ICD-10-CM

## 2022-02-14 DIAGNOSIS — I10 ESSENTIAL HYPERTENSION: Primary | ICD-10-CM

## 2022-02-14 DIAGNOSIS — E21.3 HYPERPARATHYROIDISM (H): ICD-10-CM

## 2022-02-14 DIAGNOSIS — G89.29 CHRONIC PAIN OF BOTH KNEES: ICD-10-CM

## 2022-02-14 DIAGNOSIS — D69.2 SENILE PURPURA (H): ICD-10-CM

## 2022-02-14 DIAGNOSIS — M25.561 CHRONIC PAIN OF BOTH KNEES: ICD-10-CM

## 2022-02-14 PROCEDURE — 99214 OFFICE O/P EST MOD 30 MIN: CPT | Performed by: INTERNAL MEDICINE

## 2022-02-14 RX ORDER — TRIAMTERENE AND HYDROCHLOROTHIAZIDE 37.5; 25 MG/1; MG/1
1 CAPSULE ORAL EVERY MORNING
Qty: 90 CAPSULE | Refills: 3 | Status: SHIPPED | OUTPATIENT
Start: 2022-02-14 | End: 2022-10-19

## 2022-02-14 NOTE — PROGRESS NOTES
[facility] - Primary Care Specialists    Comprehensive and complex medical care - Chronic disease management - Shared decision making - Care coordination - Compassionate care    Patient advocacy - Rational deprescribing - Minimally disruptive medicine - Ethical focus - Customized care         Date of Service: 2/14/2022  Primary Provider: Jersey Metcalf    Patient Care Team:  Jersey Metcalf MD as PCP - General  Jersey Metcalf MD as Assigned PCP  Giovanny Koo MD as MD (Orthopaedic Surgery)  Malissa Mcclellan as MD (Dermatology)  Vannesa Madrigal MD as MD (Ophthalmology)  Makayla Huynh NP as Assigned Surgical Provider  Hali Appiah MD as Assigned Heart and Vascular Provider          Patient's Pharmacy:    [facility]/pharmacy #[id number] - [address] [address]  Phone: [phone] Fax: [phone]     Patient's Contacts:  Name Home Phone Work Phone Mobile Phone Relationship Lgl Ej   SHIMON CROSS [phone]   Nephew      Patient's Insurance:    Payor: MEDICARE / Plan: MEDICARE / Product Type: Medicare /            Active Problem List:  Problem List as of 2/14/2022 Reviewed: 9/21/2021 12:09 PM by Jersey Metcalf MD       High    Former smoker-quit at age 30    DNR (do not resuscitate)       Medium    HTN (hypertension)    Anxiety    Presbyesophagus    OA (osteoarthritis)-fingers    PMR (polymyalgia rheumatica) (H) - August 2016    Chronic kidney disease, stage 3 (H)    PAD (peripheral artery disease) (H)       Low    Unspecified glaucoma    Hyperparathyroidism (H)    GERD (gastroesophageal reflux disease)    Esophageal dysphagia    Insomnia    HLD (hyperlipidemia)    Senile purpura (H)           Current Outpatient Medications   Medication Instructions     amLODIPine (NORVASC) 2.5 MG tablet TAKE 1 TABLET (2.5 MG TOTAL) BY MOUTH DAILY. FOR HIGH BLOOD PRESSURE.     aspirin (ASA) 81 mg, Oral, DAILY     dorzolamide (TRUSOPT) 2 % ophthalmic  solution 1 drop, 3 TIMES DAILY     metoprolol tartrate (LOPRESSOR) 50 MG tablet TAKE 1 TABLET BY MOUTH TWICE A DAY     omeprazole (PRILOSEC) 40 MG DR capsule TAKE 1 CAPSULE BY MOUTH TWICE A DAY     predniSONE (DELTASONE) 5 MG tablet TAKE 1/2 TO 1 TABLET BY MOUTH ONCE DAILY     predniSONE (DELTASONE) 10 mg, Oral, DAILY     rosuvastatin (CRESTOR) 20 mg, Oral, DAILY     timolol maleate (ISTALOL) 0.5 % DrpD DAILY     triamterene-HCTZ (DYAZIDE) 37.5-25 MG capsule 1 capsule, Oral, EVERY MORNING        Social History     Social History Narrative    Moved out of her home in 2018 and moved to the [place] [facility].    , no children, lives alone.    Volunteer at [employer] in the past.       Subjective:     Manuela Olivas is a 89 year old female who comes in today for:    Chief Complaint   Patient presents with     RECHECK     htn, knee pain       Patient comes in today for a number of issues.    We first reviewed her high blood pressure.  She admits to anxiety and white coat syndrome which she has had in the past.  She has been more concerned about her health.  She is on amlodipine and takes her metoprolol twice daily as prescribed.  She denies any shortness of breath.  She has had some edema in her legs at times.  She denies any headaches.    Reviewed her new diagnosis of peripheral artery disease.  This was found on ultrasound.  She was started on Crestor by Dr. Macias.  We reviewed this with her.  We reviewed her sore and she continues to follow-up with the wound clinic related to this.    We reviewed her anxiety in relationship to these issues.  She no longer takes clonazepam.  She does get flushed in the face when she is anxious.  She denies any lightheadedness.    We reviewed her osteoarthritis of her knees and this has been worse for her.  She is considering injections if needed.    We reviewed her other issues noted in the assessment but not specifically addressed in the HPI above.     Objective:     Wt  Readings from Last 3 Encounters:   02/14/22 66.7 kg (147 lb)   01/12/22 67.4 kg (148 lb 9.6 oz)   01/03/22 66.7 kg (147 lb)     BP Readings from Last 3 Encounters:   02/14/22 (!) 198/92   01/27/22 (!) 182/84   01/19/22 (!) 172/76     BP (!) 198/92   Pulse 81   Wt 66.7 kg (147 lb)   LMP  (LMP Unknown)   SpO2 97%   BMI 26.04 kg/m     The patient is comfortable, no acute distress.  Mood good to anxious.  Insight fair to good.  Eyes are nonicteric.  Neck is supple without mass.  No cervical adenopathy.  No thyromegaly. Heart regular rate and rhythm.  Lungs clear to auscultation bilaterally.  Respiratory effort is good.  Extremities no edema.      Diagnostics:     Office Visit on 01/03/2022   Component Date Value Ref Range Status     Culture 01/03/2022 1+ Normal derrek   Final     Gram Stain Result 01/03/2022 No organisms seen   Final     Gram Stain Result 01/03/2022 4+ WBC seen   Final    Predominantly PMNs       No results found for any visits on 02/14/22.     Assessment and Plan:     1. Essential hypertension  We added in triamterene hydrochlorothiazide today.  Take in the morning and switch to amlodipine to evening.    - triamterene-HCTZ (DYAZIDE) 37.5-25 MG capsule; Take 1 capsule by mouth every morning  Dispense: 90 capsule; Refill: 3    2. PMR (polymyalgia rheumatica) (H)  She is on low-dose prednisone and we continue to monitor this.    3. Hyperparathyroidism (H)  Subclinical.  Continue to monitor and follow blood work.    4. Senile purpura (H)  No signs of worsening.  Stable.    5. Stage 3a chronic kidney disease (H)  Continue to monitor.  Doing well generally.    6. PAD (peripheral artery disease) (H)  Reviewed recent studies related to this.  Continue cholesterol medication at this time but continue to reassess depending on tolerance.    7. Nonhealing skin ulcer, unspecified ulcer stage (H)  Continue to follow-up with wound clinic.    8. Anxiety  No change in treatment at this time.    9. Chronic pain of  both knees  Try Voltaren gel for this.       Continue current medications otherwise.  Follow up sooner if issues.        Jersey Metcalf MD  General Internal Medicine  [facility]      Return in about 7 weeks (around 4/1/2022), or if symptoms worsen or fail to improve, for visit and blood work.     Future Appointments   Date Time Provider Department Center   2/28/2022  7:40 AM [facility]   4/1/2022  9:00 AM Jersey Metcalf MD [facility]         HCC issues resolved at this visit.

## 2022-02-14 NOTE — PATIENT INSTRUCTIONS
Future Appointments   Date Time Provider Department Center   2/28/2022  7:40 AM [facility]   4/1/2022  9:00 AM Jersey Metcalf MD [facility]

## 2022-02-19 DIAGNOSIS — I73.9 PVD (PERIPHERAL VASCULAR DISEASE) (H): ICD-10-CM

## 2022-02-21 RX ORDER — ROSUVASTATIN CALCIUM 20 MG/1
TABLET, COATED ORAL
Qty: 30 TABLET | Refills: 3 | Status: SHIPPED | OUTPATIENT
Start: 2022-02-21 | End: 2022-04-25

## 2022-02-28 ENCOUNTER — OFFICE VISIT (OUTPATIENT)
Dept: VASCULAR SURGERY | Facility: CLINIC | Age: 87
End: 2022-02-28
Attending: NURSE PRACTITIONER
Payer: MEDICARE

## 2022-02-28 VITALS
HEART RATE: 80 BPM | TEMPERATURE: 98 F | SYSTOLIC BLOOD PRESSURE: 156 MMHG | BODY MASS INDEX: 25.51 KG/M2 | DIASTOLIC BLOOD PRESSURE: 86 MMHG | RESPIRATION RATE: 16 BRPM | WEIGHT: 144 LBS

## 2022-02-28 DIAGNOSIS — I73.9 PVD (PERIPHERAL VASCULAR DISEASE) (H): ICD-10-CM

## 2022-02-28 DIAGNOSIS — S81.801A OPEN WOUND OF KNEE, LEG, AND ANKLE, RIGHT, INITIAL ENCOUNTER: ICD-10-CM

## 2022-02-28 DIAGNOSIS — I73.9 PAD (PERIPHERAL ARTERY DISEASE) (H): Primary | ICD-10-CM

## 2022-02-28 DIAGNOSIS — R60.9 DEPENDENT EDEMA: ICD-10-CM

## 2022-02-28 DIAGNOSIS — S81.001A OPEN WOUND OF KNEE, LEG, AND ANKLE, RIGHT, INITIAL ENCOUNTER: ICD-10-CM

## 2022-02-28 DIAGNOSIS — Z79.52 LONG TERM SYSTEMIC STEROID USER: ICD-10-CM

## 2022-02-28 DIAGNOSIS — S91.001A OPEN WOUND OF KNEE, LEG, AND ANKLE, RIGHT, INITIAL ENCOUNTER: ICD-10-CM

## 2022-02-28 PROCEDURE — 97597 DBRDMT OPN WND 1ST 20 CM/<: CPT | Performed by: NURSE PRACTITIONER

## 2022-02-28 RX ADMIN — LIDOCAINE HYDROCHLORIDE: 20 JELLY TOPICAL at 08:18

## 2022-02-28 ASSESSMENT — PAIN SCALES - GENERAL: PAINLEVEL: NO PAIN (0)

## 2022-02-28 NOTE — PROGRESS NOTES
Follow up Vascular Visit       Date of Service:02/28/22      Chief Complaint: right leg ulcer; and edema      Pt returns to [facility] with regards to their right leg ulcer and edema.  They arrive today with nephew. They are currently using silvercel; gauze; rolled gauze to the wounds. The nephew every 3 days. They are using nothing for compression; we previously had her in a 2 layer and then was found to have PAD and this was discontinued. They are feeling well today. Denies fevers, chills. No shortness of breath. She just recently followed up with PCP Dr. Metcalf for her htn, knee pain and anxiety. He added Dyazide. She continue on ow dose prednisone for her polymyalgia rheumatica. And he recommended Voltaren gel for her knee pain. Weight has been stable. CATRACHITO was done and this demonstrated decreased arterial flow. She was seen by Dr. Appiah who would prefer to not perform angiointervention at this time; he added asa and statin. However if wound healing stalls or worsens he would reconsider.     Allergies: No Known Allergies    Medications:   Current Outpatient Medications:      amLODIPine (NORVASC) 2.5 MG tablet, TAKE 1 TABLET (2.5 MG TOTAL) BY MOUTH DAILY. FOR HIGH BLOOD PRESSURE., Disp: 90 tablet, Rfl: 3     aspirin (ASA) 81 MG chewable tablet, Take 1 tablet (81 mg) by mouth daily, Disp: 30 tablet, Rfl: 3     dorzolamide (TRUSOPT) 2 % ophthalmic solution, [DORZOLAMIDE (TRUSOPT) 2 % OPHTHALMIC SOLUTION] 1 drop 3 (three) times a day., Disp: , Rfl:      metoprolol tartrate (LOPRESSOR) 50 MG tablet, TAKE 1 TABLET BY MOUTH TWICE A DAY, Disp: 180 tablet, Rfl: 3     omeprazole (PRILOSEC) 40 MG DR capsule, TAKE 1 CAPSULE BY MOUTH TWICE A DAY, Disp: 180 capsule, Rfl: 3     predniSONE (DELTASONE) 5 MG tablet, Take 2 tablets (10 mg) by mouth daily, Disp: 180 tablet, Rfl: 1     predniSONE (DELTASONE) 5 MG tablet, TAKE 1/2 TO 1 TABLET BY MOUTH ONCE DAILY, Disp: 60 tablet, Rfl: 0     rosuvastatin  (CRESTOR) 20 MG tablet, TAKE 1 TABLET BY MOUTH EVERY DAY, Disp: 30 tablet, Rfl: 3     timolol maleate (ISTALOL) 0.5 % DrpD, [TIMOLOL MALEATE (ISTALOL) 0.5 % DRPD] Apply to eye Daily at 8:00 am.., Disp: , Rfl:      triamterene-HCTZ (DYAZIDE) 37.5-25 MG capsule, Take 1 capsule by mouth every morning, Disp: 90 capsule, Rfl: 3    Current Facility-Administered Medications:      lidocaine (XYLOCAINE) 2 % external gel, , Topical, Daily PRN, [place], Makayla, NP, Given at 01/12/22 0802    History:   Past Medical History:   Diagnosis Date     Anxiety      Autoimmune hepatitis (H) 2006    Resolved     Breast cyst 1975     Chronic kidney disease, stage 3 (H) 7/27/2021     Controlled substance agreement signed 2/13/2018     Esophageal dysphagia      Fibrocystic breast      Former smoker      GERD (gastroesophageal reflux disease)      HLD (hyperlipidemia)      HTN (hypertension)      Hypercalcemia      Hyperparathyroidism (H)     Subclinical      Insomnia      OA (osteoarthritis) 5/27/2016     PMR (polymyalgia rheumatica) (H)      Presbyesophagus      Senile purpura (H) 4/18/2019     Unspecified glaucoma      Unspecified glaucoma(365.9)        Physical Exam:    BP (!) 156/86   Pulse 80   Temp 98  F (36.7  C)   Resp 16   Wt 144 lb (65.3 kg)   LMP  (LMP Unknown)   BMI 25.51 kg/m      General:  Patient presents to clinic in no apparent distress.  Head: normocephalic atraumatic  Psychiatric:  Alert and oriented x3.   Respiratory: unlabored breathing; no cough  Integumentary:  Skin is uniformly warm, dry and pink.    Wound #1 Location: right calf  Size: 3L x 1W x 0depth.  nosinus tract present, Wound base: initially covered with eschar this was removed and was intact underneath  No undermining present. Wound is healed thickness. There is no drainage. Periwound: no denudement, erythema, induration, maceration or warmth.      No edema in the legs    VASC Wound Rt shin (Active)   Number of days: 47       VASC Wound Rt calf (Active)    Description eschar 02/28/22 0700   Number of days: 47       VASC Wound Rt lateral leg (Active)   Number of days: 47       VASC Wound Rt distal calf (Active)   Number of days: 47            Circumferential volume measures:      Circumferential Measures 1/12/2022   Right just above MTP 19.5   Right Ankle 20.2   Right Widest Calf 34.5   Right Knee to Ankle 34   Left - just above MTP 18.5   Left Ankle 19   Left Widest Calf 35   Left Knee to Ankle 34       Labs:    I personally reviewed the following lab results today and those on care everywhere    CRP   Date Value Ref Range Status   12/14/2021 1.3 (H) 0.0-<0.8 mg/dL Final      Erythrocyte Sedimentation Rate   Date Value Ref Range Status   12/14/2021 21 (H) 0 - 20 mm/hr Final      Last Renal Panel:  Sodium   Date Value Ref Range Status   12/14/2021 139 136 - 145 mmol/L Final     Potassium   Date Value Ref Range Status   12/14/2021 4.0 3.5 - 5.0 mmol/L Final     Chloride   Date Value Ref Range Status   12/14/2021 104 98 - 107 mmol/L Final     Carbon Dioxide (CO2)   Date Value Ref Range Status   12/14/2021 23 22 - 31 mmol/L Final     Anion Gap   Date Value Ref Range Status   12/14/2021 12 5 - 18 mmol/L Final     Glucose   Date Value Ref Range Status   12/14/2021 84 70 - 125 mg/dL Final     Urea Nitrogen   Date Value Ref Range Status   12/14/2021 20 8 - 28 mg/dL Final     Creatinine   Date Value Ref Range Status   12/14/2021 0.85 0.60 - 1.10 mg/dL Final     GFR Estimate   Date Value Ref Range Status   12/14/2021 60 (L) >60 mL/min/1.73m2 Final     Comment:     As of July 11, 2021, eGFR is calculated by the CKD-EPI creatinine equation, without race adjustment. eGFR can be influenced by muscle mass, exercise, and diet. The reported eGFR is an estimation only and is only applicable if the renal function is stable.   04/09/2021 59 (L) >60 mL/min/1.73m2 Final     Calcium   Date Value Ref Range Status   12/14/2021 10.4 8.5 - 10.5 mg/dL Final     Albumin   Date Value Ref Range  Status   12/14/2021 3.9 3.5 - 5.0 g/dL Final      Lab Results   Component Value Date    WBC 9.7 12/14/2021     Lab Results   Component Value Date    RBC 4.58 12/14/2021     Lab Results   Component Value Date    HGB 13.6 12/14/2021     Lab Results   Component Value Date    HCT 42.1 12/14/2021     No components found for: MCT  Lab Results   Component Value Date    MCV 92 12/14/2021     Lab Results   Component Value Date    MCH 29.7 12/14/2021     Lab Results   Component Value Date    MCHC 32.3 12/14/2021     Lab Results   Component Value Date    RDW 14.6 12/14/2021     Lab Results   Component Value Date     12/14/2021      Lab Results   Component Value Date    A1C 5.9 03/17/2010      TSH   Date Value Ref Range Status   09/21/2021 2.35 0.30 - 5.00 uIU/mL Final      No results found for: VITDT                Impression:  Encounter Diagnoses   Name Primary?     PAD (peripheral artery disease) (H) Yes     Open wound of knee, leg, and ankle, right, initial encounter      PVD (peripheral vascular disease) (H)      Dependent edema      Long term systemic steroid user         2/28/2022 right leg              1/12/2022 right leg         1/12/2022                Are any of these wounds new today: No; Location: na    Assessment/Plan:          1. Debridement: After discussion of risk factors and verbal consent was obtained 2% Lidocaine HCL jelly was applied, under clean conditions, the right calf ulceration(s) were debrided using currette. Devitalized and nonviable tissue, along with any fibrin and slough, was removed to improve granulation tissue formation, stimulate wound healing, decrease overall bacteria load, disrupt biofilm formation and decrease edge senescence.  Total excisional debridement was 3 sq cm from the epidermis/dermis area with a depth of 0 cm.   Ulcers were improved afterwards and .  Measures were healed underneath.       2.  Wound treatment: wound treatment will include irrigation and dressings  to promote autolytic debridement which will include:wounds are healed; will have her apply remedy skin repair cream to the arms and legs BID to reduce risk of skin tears. Skin is thin due to advanced age and chronic steroid use.        If for some reason the patient is not able to get their dressing(s) changed as outlined above (due to illness, lack of supplies, lack of help) please do the following: remove old, soiled dressings; wash the wounds with saline; pat dry; apply ABD pad or other absorbant pad and secure with rolled gauze; avoid tape directly on your skin; patient instructed to call the clinic as soon as possible to let us know what the current issues are in receiving wound care. Stable            3. Edema: no edema; was recently started on Dyazide and working well with her edema and htn. The compression wraps were applied today in clinic.     If a 2 layer or 4 layer compression wrap is being used; these are safe to have on for ONLY 7 days. If for some reason the patient is not able to get the wrap(s) changed (due to illness; lack of supplies, lack of help, lack of transportation) please do the following: unwrap the old 2 or 4 layer compression wrap; avoid using scissors as you could cut your skin and cause wounds; use tubular compression when available. Call to reschedule your home care or clinic visit appointment as soon as possible.  Stable            4. Nutrition: focus on protein; low sodium diet           5. Offloading: na           6. HTN: under better control; recently seen by PCP on dyazide now; doing well; feeling better         7. PAD: has seen Dr. Appiah; wounds are now healed; no need for angiointervention at this time; continue on asa and crestor per his recommendations     Patient will follow up with me PRN weeks for reevaluation. They were instructed to call the clinic sooner with any signs or symptoms of infection or any further questions/concerns. Answered all questions.          Makayla MARIE  Lino HUGO, RN, CNP, CWOCN, CFCN, CLT  [facility]   [phone]        This note was electronically signed by Makayla Huynh NP

## 2022-02-28 NOTE — PATIENT INSTRUCTIONS
Apply remedy skin repair cream to the arms and legs 1-2 times per day    If you develop any other wounds please make follow up appt

## 2022-04-25 ENCOUNTER — OFFICE VISIT (OUTPATIENT)
Dept: INTERNAL MEDICINE | Facility: CLINIC | Age: 87
End: 2022-04-25
Payer: MEDICARE

## 2022-04-25 VITALS
SYSTOLIC BLOOD PRESSURE: 154 MMHG | OXYGEN SATURATION: 98 % | RESPIRATION RATE: 16 BRPM | BODY MASS INDEX: 26.63 KG/M2 | WEIGHT: 144.7 LBS | TEMPERATURE: 97.6 F | HEART RATE: 75 BPM | HEIGHT: 62 IN | DIASTOLIC BLOOD PRESSURE: 84 MMHG

## 2022-04-25 DIAGNOSIS — M25.561 CHRONIC PAIN OF BOTH KNEES: ICD-10-CM

## 2022-04-25 DIAGNOSIS — N18.31 STAGE 3A CHRONIC KIDNEY DISEASE (H): ICD-10-CM

## 2022-04-25 DIAGNOSIS — I73.9 PVD (PERIPHERAL VASCULAR DISEASE) (H): ICD-10-CM

## 2022-04-25 DIAGNOSIS — I73.9 PAD (PERIPHERAL ARTERY DISEASE) (H): ICD-10-CM

## 2022-04-25 DIAGNOSIS — I10 ESSENTIAL HYPERTENSION: ICD-10-CM

## 2022-04-25 DIAGNOSIS — M25.562 CHRONIC PAIN OF BOTH KNEES: ICD-10-CM

## 2022-04-25 DIAGNOSIS — G89.29 CHRONIC PAIN OF BOTH KNEES: ICD-10-CM

## 2022-04-25 DIAGNOSIS — E78.2 MIXED HYPERLIPIDEMIA: Primary | ICD-10-CM

## 2022-04-25 PROCEDURE — 99214 OFFICE O/P EST MOD 30 MIN: CPT | Performed by: INTERNAL MEDICINE

## 2022-04-25 RX ORDER — ATORVASTATIN CALCIUM 20 MG/1
20 TABLET, FILM COATED ORAL DAILY
Qty: 90 TABLET | Refills: 3 | Status: SHIPPED | OUTPATIENT
Start: 2022-04-25 | End: 2022-09-06

## 2022-04-25 RX ORDER — ASPIRIN 325 MG/1
325 TABLET, FILM COATED ORAL DAILY
COMMUNITY
End: 2022-10-05

## 2022-04-25 ASSESSMENT — PATIENT HEALTH QUESTIONNAIRE - PHQ9
5. POOR APPETITE OR OVEREATING: MORE THAN HALF THE DAYS
SUM OF ALL RESPONSES TO PHQ QUESTIONS 1-9: 6

## 2022-04-25 ASSESSMENT — ANXIETY QUESTIONNAIRES
1. FEELING NERVOUS, ANXIOUS, OR ON EDGE: SEVERAL DAYS
3. WORRYING TOO MUCH ABOUT DIFFERENT THINGS: SEVERAL DAYS
2. NOT BEING ABLE TO STOP OR CONTROL WORRYING: SEVERAL DAYS
5. BEING SO RESTLESS THAT IT IS HARD TO SIT STILL: NOT AT ALL
6. BECOMING EASILY ANNOYED OR IRRITABLE: NOT AT ALL
7. FEELING AFRAID AS IF SOMETHING AWFUL MIGHT HAPPEN: NOT AT ALL
GAD7 TOTAL SCORE: 5
IF YOU CHECKED OFF ANY PROBLEMS ON THIS QUESTIONNAIRE, HOW DIFFICULT HAVE THESE PROBLEMS MADE IT FOR YOU TO DO YOUR WORK, TAKE CARE OF THINGS AT HOME, OR GET ALONG WITH OTHER PEOPLE: SOMEWHAT DIFFICULT

## 2022-04-25 NOTE — PROGRESS NOTES
[facility] - Primary Care Specialists    Comprehensive and complex medical care - Chronic disease management - Shared decision making - Care coordination - Compassionate care    Patient advocacy - Rational deprescribing - Minimally disruptive medicine - Ethical focus - Customized care         Date of Service: 4/25/2022  Primary Provider: Jersey Metcalf    Patient Care Team:  Jersey Metcalf MD as PCP - General  Jersey Metcalf MD as Assigned PCP  Giovanny Koo MD as MD (Orthopaedic Surgery)  Malissa Mcclellan as MD (Dermatology)  Vannesa Madrigal MD as MD (Ophthalmology)  Makayla Huynh NP as Assigned Surgical Provider  Hali Appiah MD as Assigned Heart and Vascular Provider          Patient's Pharmacy:    [facility]/pharmacy #[id number] - [address] [address]  Phone: [phone] Fax: [phone]     Patient's Contacts:  Name Home Phone Work Phone Mobile Phone Relationship Lgl Ej   SHIMON CROSS [phone]   Nephew      Patient's Insurance:    Payor: MEDICARE / Plan: MEDICARE / Product Type: Medicare /            Active Problem List:  Problem List as of 4/25/2022 Reviewed: 4/25/2022  7:57 AM by Jersey Metcalf MD       High    Former smoker-quit at age 30    DNR (do not resuscitate)       Medium    HTN (hypertension)    Anxiety    Presbyesophagus    OA (osteoarthritis)-fingers    PMR (polymyalgia rheumatica) (H) - August 2016    Chronic kidney disease, stage 3 (H)    PAD (peripheral artery disease) (H)       Low    Unspecified glaucoma    Hyperparathyroidism (H)    GERD (gastroesophageal reflux disease)    Esophageal dysphagia    Insomnia    HLD (hyperlipidemia)    Senile purpura (H)           Current Outpatient Medications   Medication Instructions     amLODIPine (NORVASC) 2.5 MG tablet TAKE 1 TABLET (2.5 MG TOTAL) BY MOUTH DAILY. FOR HIGH BLOOD PRESSURE.     aspirin - buffered (ASCRIPTIN) 325 MG TABS tablet 325 mg, Oral, DAILY      atorvastatin (LIPITOR) 20 mg, Oral, DAILY     dorzolamide (TRUSOPT) 2 % ophthalmic solution 1 drop, 3 TIMES DAILY     metoprolol tartrate (LOPRESSOR) 50 MG tablet TAKE 1 TABLET BY MOUTH TWICE A DAY     omeprazole (PRILOSEC) 40 MG DR capsule TAKE 1 CAPSULE BY MOUTH TWICE A DAY     predniSONE (DELTASONE) 10 mg, Oral, DAILY     timolol maleate (ISTALOL) 0.5 % DrpD DAILY     triamterene-HCTZ (DYAZIDE) 37.5-25 MG capsule 1 capsule, Oral, EVERY MORNING     Social History     Social History Narrative    Moved out of her home in 2018 and moved to the [place] [facility] [place].    , no children, lives alone.    Volunteer at [employer] in the past.       Subjective:     Manuela Olivas is a 89 year old female who comes in today for:    Chief Complaint   Patient presents with     RECHECK     Pt states going to wound vascular clinic about skin on leg would like it looked at, knees are bad      Patient comes in today for follow-up of a number of issues.    Are mainly following up on her high blood pressure.  It was running high earlier.  She thinks that it was due to the sores on her legs and stress related to this.    We started a diuretic at the last visit and this has been going okay for her.  She denies any lightheadedness or dizziness.  Her blood pressure medications are now including amlodipine, triamterene hydrochlorothiazide, and metoprolol.  She denies any shortness of breath.    Her sores on her legs have healed over except that she has a scab on her left leg but it is not worsening.  There is no signs or symptoms of infection.    We reviewed her peripheral vascular disease.  We reviewed the cholesterol medication.  The rosuvastatin was too expensive for her.  She would like to go back on the lower dose of atorvastatin.  She used to be on 80 mg and were going to reduce this to 20 and follow-up on this in the future.    We reviewed her other issues noted in the assessment but not specifically addressed in the HPI  "above.     Objective:     Wt Readings from Last 3 Encounters:   04/25/22 65.6 kg (144 lb 11.2 oz)   02/28/22 65.3 kg (144 lb)   02/14/22 66.7 kg (147 lb)     BP Readings from Last 3 Encounters:   04/25/22 (!) 154/84   02/28/22 (!) 156/86   02/14/22 (!) 198/92     BP (!) 154/84 (BP Location: Right arm, Patient Position: Sitting, Cuff Size: Adult Regular)   Pulse 75   Temp 97.6  F (36.4  C) (Oral)   Resp 16   Ht 1.581 m (5' 2.25\")   Wt 65.6 kg (144 lb 11.2 oz)   LMP  (LMP Unknown)   SpO2 98%   BMI 26.25 kg/m     The patient is comfortable, no acute distress.  Mood good.  Insight good.  Eyes are nonicteric.  Neck is supple without mass.  No cervical adenopathy.  No thyromegaly. Heart regular rate and rhythm.  Lungs clear to auscultation bilaterally.  Respiratory effort is good.  Extremities no edema.  She has a 8 mm scab over her left lateral leg but this does not look infected or at risk for issues.    Diagnostics:     Office Visit on 01/03/2022   Component Date Value Ref Range Status     Culture 01/03/2022 1+ Normal derrek   Final     Gram Stain Result 01/03/2022 No organisms seen   Final     Gram Stain Result 01/03/2022 4+ WBC seen   Final    Predominantly PMNs        No results found for any visits on 04/25/22.    Assessment:     1. Mixed hyperlipidemia    2. PVD (peripheral vascular disease) (H)    3. Essential hypertension    4. Stage 3a chronic kidney disease (H)    5. PAD (peripheral artery disease) (H)    6. Chronic pain of both knees         Plan:     1. Atorvastatin 20 mg a day.  2. Consider checking cholesterol, comprehensive metabolic panel at next visit.  3. Reviewed her peripheral vascular disease and skin issues.  She can use creams as reviewed.  4. Talked about her knee pain again today and is using the Voltaren gel.  She might consider a corticosteroid injection at the next visit.  5. Of note she is taking a full aspirin a day.  6. Continue current medications otherwise.  7. Follow up sooner if " issues.      Jersey Metcalf MD  General Internal Medicine  [facility]    Return in about 18 weeks (around 8/29/2022), or if symptoms worsen or fail to improve, for visit and blood work.     Future Appointments   Date Time Provider Department Center   8/29/2022  7:30 AM [facility]

## 2022-04-25 NOTE — PATIENT INSTRUCTIONS
Future Appointments   Date Time Provider Department Center   8/29/2022  7:30 AM [facility]

## 2022-04-26 ASSESSMENT — ANXIETY QUESTIONNAIRES: GAD7 TOTAL SCORE: 5

## 2022-05-12 DIAGNOSIS — M35.3 PMR (POLYMYALGIA RHEUMATICA) (H): ICD-10-CM

## 2022-05-12 RX ORDER — PREDNISONE 5 MG/1
TABLET ORAL
Qty: 180 TABLET | Refills: 1 | Status: SHIPPED | OUTPATIENT
Start: 2022-05-12 | End: 2022-12-15

## 2022-05-18 ENCOUNTER — OFFICE VISIT (OUTPATIENT)
Dept: VASCULAR SURGERY | Facility: CLINIC | Age: 87
End: 2022-05-18
Attending: NURSE PRACTITIONER
Payer: MEDICARE

## 2022-05-18 VITALS
RESPIRATION RATE: 16 BRPM | TEMPERATURE: 97.6 F | SYSTOLIC BLOOD PRESSURE: 168 MMHG | HEART RATE: 76 BPM | DIASTOLIC BLOOD PRESSURE: 86 MMHG

## 2022-05-18 DIAGNOSIS — Z79.52 LONG TERM SYSTEMIC STEROID USER: ICD-10-CM

## 2022-05-18 DIAGNOSIS — Z79.01 CURRENT USE OF LONG TERM ANTICOAGULATION: ICD-10-CM

## 2022-05-18 DIAGNOSIS — I73.9 PAD (PERIPHERAL ARTERY DISEASE) (H): ICD-10-CM

## 2022-05-18 DIAGNOSIS — S80.11XA HEMATOMA OF LEG, RIGHT, INITIAL ENCOUNTER: ICD-10-CM

## 2022-05-18 DIAGNOSIS — L97.222 CALF ULCER, LEFT, WITH FAT LAYER EXPOSED (H): Primary | ICD-10-CM

## 2022-05-18 DIAGNOSIS — Z79.52 CURRENT CHRONIC USE OF SYSTEMIC STEROIDS: ICD-10-CM

## 2022-05-18 DIAGNOSIS — R60.9 DEPENDENT EDEMA: ICD-10-CM

## 2022-05-18 PROCEDURE — 99213 OFFICE O/P EST LOW 20 MIN: CPT | Mod: 25 | Performed by: NURSE PRACTITIONER

## 2022-05-18 PROCEDURE — 11042 DBRDMT SUBQ TIS 1ST 20SQCM/<: CPT | Performed by: NURSE PRACTITIONER

## 2022-05-18 ASSESSMENT — PAIN SCALES - GENERAL: PAINLEVEL: MILD PAIN (2)

## 2022-05-18 NOTE — PROGRESS NOTES
Follow up Vascular Visit       Date of Service:05/18/22      Chief Complaint: BLE swelling and wounds; new      Pt returns to [facility] with regards to their BLE swelling and wounds, new; complicated by chronic steroid use and PAD.  They arrive today with nephew. They are currently using silvercel; gauze; rolled gauze to the wounds. This is being done by the nephew every few days. They are using nothing for compression. They are feeling well today. Denies fevers, chills. No shortness of breath.  She reports the wound started  1 week ago; on the right she ran into something and caused a blood blister on the left her nephew accidentally dropped a box on her leg. She was last seen 2/2022 for right leg wound we were able to get this healed. We had her seen by Dr. Appiah at that time due to her PAD; he wanted to hold off on angiointervention at that time due to age and risk and we were able to get her healed. She continues on asa, prednisone due to polymyalgia rheuamtica, dyazide and statin.     Allergies: No Known Allergies    Medications:   Current Outpatient Medications:      amLODIPine (NORVASC) 2.5 MG tablet, TAKE 1 TABLET (2.5 MG TOTAL) BY MOUTH DAILY. FOR HIGH BLOOD PRESSURE., Disp: 90 tablet, Rfl: 3     aspirin - buffered (ASCRIPTIN) 325 MG TABS tablet, Take 325 mg by mouth daily, Disp: , Rfl:      atorvastatin (LIPITOR) 20 MG tablet, Take 1 tablet (20 mg) by mouth daily, Disp: 90 tablet, Rfl: 3     dorzolamide (TRUSOPT) 2 % ophthalmic solution, [DORZOLAMIDE (TRUSOPT) 2 % OPHTHALMIC SOLUTION] 1 drop 3 (three) times a day., Disp: , Rfl:      metoprolol tartrate (LOPRESSOR) 50 MG tablet, TAKE 1 TABLET BY MOUTH TWICE A DAY, Disp: 180 tablet, Rfl: 3     omeprazole (PRILOSEC) 40 MG DR capsule, TAKE 1 CAPSULE BY MOUTH TWICE A DAY, Disp: 180 capsule, Rfl: 3     predniSONE (DELTASONE) 5 MG tablet, TAKE 2 TABLETS BY MOUTH EVERY DAY, Disp: 180 tablet, Rfl: 1     timolol maleate (ISTALOL) 0.5 %  DrpD, [TIMOLOL MALEATE (ISTALOL) 0.5 % DRPD] Apply to eye Daily at 8:00 am.., Disp: , Rfl:      triamterene-HCTZ (DYAZIDE) 37.5-25 MG capsule, Take 1 capsule by mouth every morning, Disp: 90 capsule, Rfl: 3    Current Facility-Administered Medications:      lidocaine (XYLOCAINE) 2 % external gel, , Topical, Daily PRN, Makayla Huynh NP, Given at 02/28/22 0818    History:   Past Medical History:   Diagnosis Date     Anxiety      Autoimmune hepatitis (H) 2006    Resolved     Breast cyst 1975     Chronic kidney disease, stage 3 (H) 7/27/2021     Controlled substance agreement signed 2/13/2018     Esophageal dysphagia      Fibrocystic breast      Former smoker      GERD (gastroesophageal reflux disease)      HLD (hyperlipidemia)      HTN (hypertension)      Hypercalcemia      Hyperparathyroidism (H)     Subclinical      Insomnia      OA (osteoarthritis) 5/27/2016     PMR (polymyalgia rheumatica) (H)      Presbyesophagus      Senile purpura (H) 4/18/2019     Unspecified glaucoma      Unspecified glaucoma(365.9)        Physical Exam:    BP (!) 168/86   Pulse 76   Temp 97.6  F (36.4  C)   Resp 16   LMP  (LMP Unknown)     General:  Patient presents to clinic in no apparent distress.  Head: normocephalic atraumatic  Psychiatric:  Alert and oriented x3.   Respiratory: unlabored breathing; no cough  Integumentary:  Skin is uniformly warm, dry and pink.    Wound #1 Location: left calf  Size: 2L x 3.5W x 0.1depth.  No sinus tract present, Wound base: old blood; fibrinous material  No undermining present. Wound is full thickness. There is moderate bloody drainage. Periwound: no denudement, erythema, induration, maceration or warmth.      Right shin with 5x4cm area of bruising; mild fluctuance; no erythema; skin intact    No edema in the legs    Toes are warm without ischemia    VASC Wound Lt calf (Active)   Pre Size Length 2 05/18/22 0800   Pre Size Width 3.5 05/18/22 0800   Pre Size Depth 0.1 05/18/22 0800   Pre Total Sq cm  7 05/18/22 0800   Number of days: 0            Circumferential volume measures:      Circumferential Measures 1/12/2022   Right just above MTP 19.5   Right Ankle 20.2   Right Widest Calf 34.5   Right Knee to Ankle 34   Left - just above MTP 18.5   Left Ankle 19   Left Widest Calf 35   Left Knee to Ankle 34       Labs:    I personally reviewed the following lab results today and those on care everywhere    CRP   Date Value Ref Range Status   12/14/2021 1.3 (H) 0.0-<0.8 mg/dL Final      Erythrocyte Sedimentation Rate   Date Value Ref Range Status   12/14/2021 21 (H) 0 - 20 mm/hr Final      Last Renal Panel:  Sodium   Date Value Ref Range Status   12/14/2021 139 136 - 145 mmol/L Final     Potassium   Date Value Ref Range Status   12/14/2021 4.0 3.5 - 5.0 mmol/L Final     Chloride   Date Value Ref Range Status   12/14/2021 104 98 - 107 mmol/L Final     Carbon Dioxide (CO2)   Date Value Ref Range Status   12/14/2021 23 22 - 31 mmol/L Final     Anion Gap   Date Value Ref Range Status   12/14/2021 12 5 - 18 mmol/L Final     Glucose   Date Value Ref Range Status   12/14/2021 84 70 - 125 mg/dL Final     Urea Nitrogen   Date Value Ref Range Status   12/14/2021 20 8 - 28 mg/dL Final     Creatinine   Date Value Ref Range Status   12/14/2021 0.85 0.60 - 1.10 mg/dL Final     GFR Estimate   Date Value Ref Range Status   12/14/2021 60 (L) >60 mL/min/1.73m2 Final     Comment:     As of July 11, 2021, eGFR is calculated by the CKD-EPI creatinine equation, without race adjustment. eGFR can be influenced by muscle mass, exercise, and diet. The reported eGFR is an estimation only and is only applicable if the renal function is stable.   04/09/2021 59 (L) >60 mL/min/1.73m2 Final     Calcium   Date Value Ref Range Status   12/14/2021 10.4 8.5 - 10.5 mg/dL Final     Albumin   Date Value Ref Range Status   12/14/2021 3.9 3.5 - 5.0 g/dL Final      Lab Results   Component Value Date    WBC 9.7 12/14/2021     Lab Results   Component Value Date     RBC 4.58 12/14/2021     Lab Results   Component Value Date    HGB 13.6 12/14/2021     Lab Results   Component Value Date    HCT 42.1 12/14/2021     No components found for: MCT  Lab Results   Component Value Date    MCV 92 12/14/2021     Lab Results   Component Value Date    MCH 29.7 12/14/2021     Lab Results   Component Value Date    MCHC 32.3 12/14/2021     Lab Results   Component Value Date    RDW 14.6 12/14/2021     Lab Results   Component Value Date     12/14/2021      Lab Results   Component Value Date    A1C 5.9 03/17/2010      TSH   Date Value Ref Range Status   09/21/2021 2.35 0.30 - 5.00 uIU/mL Final      No results found for: VITDT                Impression:  Encounter Diagnoses   Name Primary?     Calf ulcer, left, with fat layer exposed (H) Yes     Dependent edema      Long term systemic steroid user      Hematoma of leg, right, initial encounter      Current use of long term anticoagulation      PAD (peripheral artery disease) (H)      Current chronic use of systemic steroids           5/18/2022 left calf         5/18/2022 right shin            Are any of these wounds new today: yes Location: right shin and left calf    Assessment/Plan:          1. Debridement: After discussion of risk factors and verbal consent was obtained 2% Lidocaine HCL jelly was applied, under clean conditions, the left calf ulceration(s) were debrided using currette. Devitalized and nonviable tissue, along with any fibrin and slough, was removed to improve granulation tissue formation, stimulate wound healing, decrease overall bacteria load, disrupt biofilm formation and decrease edge senescence.  Total excisional debridement was 7 sq cm from the epidermis/dermis area and into the subcutaneous tissue with a depth of 0.1 cm.   Ulcers were improved afterwards and .  Measures were unchanged after debridement.       2.  Wound treatment: wound treatment will include irrigation and dressings to promote autolytic  debridement which will include:will have the nephew change the dressing 1-2 times per week; cleanse the legs with dilute hibiclens; pat dry; cut to fit silvercel; gauze; rolled gauze; lotion to intact skin; do not get wet in the shower. If wounds fail to heal will have her see Dr. Appiah again and repeat non-invasive studies. Educated pt on s/sx of infection. Will monitor the right shin; unclear if the skin will survive at this time.     If for some reason the patient is not able to get their dressing(s) changed as outlined above (due to illness, lack of supplies, lack of help) please do the following: remove old, soiled dressings; wash the wounds with saline; pat dry; apply ABD pad or other absorbant pad and secure with rolled gauze; avoid tape directly on your skin; patient instructed to call the clinic as soon as possible to let us know what the current issues are in receiving wound care. Worsened new wounds           3. Edema: elevation; no compression due to PAD; edema well controlled today with the dyazide. The compression wraps were applied today in clinic.     If a 2 layer or 4 layer compression wrap is being used; these are safe to have on for ONLY 7 days. If for some reason the patient is not able to get the wrap(s) changed (due to illness; lack of supplies, lack of help, lack of transportation) please do the following: unwrap the old 2 or 4 layer compression wrap; avoid using scissors as you could cut your skin and cause wounds; use tubular compression when available. Call to reschedule your home care or clinic visit appointment as soon as possible.  Stable            4. Nutrition: focus on low sodium diet           5. Offloading: put the left leg up on pillow for extra padding over the wound area     Patient will follow up with me in 3 weeks for reevaluation. They were instructed to call the clinic sooner with any signs or symptoms of infection or any further questions/concerns. Answered all  questions.          Makayla Huynh DNP, RN, CNP, CWOCN, CFCN, CLT  [facility]   [phone]        This note was electronically signed by Makayla Huynh, NP

## 2022-05-18 NOTE — LETTER
[facility]  [address] [place] [address] [address]  [place], [place] [address]  [phone]      Fax [phone]    2022    [facility]  Fax: [phone] Wound Dressing Rx and Order Form  Customer Service: [phone] Order Status: nEW   Verbal: jolene  Patient Info:  Name: Manuela Olivas  : 1930  Address: [address] [place] [address]   [facility] [address]  [phone] (home)           Insurance Info:  INSURER: Payor: MEDICARE / Plan: MEDICARE / Product Type: Medicare /   Policy ID#:  [id number]  SECONDARY INSURANCE:  [employer]  Secondary Policy ID#:  [id number]    Physician Info:   Name: Makayla Webb    Dept Address/Phones:   [address] [place] [address], [address]  [facility] [id number] [phone]  Fax: [phone]      Impression:   Encounter Diagnoses   Name Primary?     Calf ulcer, left, with fat layer exposed (H) Yes     Dependent edema      Long term systemic steroid user      Hematoma of leg, right, initial encounter      Current use of long term anticoagulation      PAD (peripheral artery disease) (H)      Current chronic use of systemic steroids      Wound info:    VASC Wound Lt calf (Active)   Pre Size Length 2 22 0800   Pre Size Width 3.5 22 0800   Pre Size Depth 0.1 22 0800   Pre Total Sq cm 7 22 0800         Drainage: Moderate  Thickness:  Full  Duration of Need: 30 days  Days Supply: 30 days  Start Date: 2022  Starter Kit:ancillary   Qualifying wound/Debridement: yes/yes      Dressing Type Brand Size Number of pieces Frequency of change   Primary Silvercel   4.25''x4.25'' 3 sheets  Twice a week     Sof form roll gauze   4''x75'' 8 rolls Twice  A week    Square gauze   4''x4'' 2 loafs  Twice a week    Paper tape   1'' 2 rolls Twice a week     No substitutions preferred. Call [phone].       OK to forward to covered supplier.    Electronically Signed Physician: MAKAYLA WEBB                          Date: 5/18/2022

## 2022-05-18 NOTE — PATIENT INSTRUCTIONS
"  If your supplies were ordered today through [employer] and you are not receiving your supplies or have a question on your bill please contact Ani Toscano at [phone]        Wound Care Instructions    1-2 TIMES PER WEEK and as needed, Cleanse your Bilateral leg wound(s) with Dilute hibiclens 30cc in 500cc NS.    Pat Dry with non-sterile gauze    Apply Lotion to the intact skin surrounding your wound and other dry skin locations. Some good lotions include: Remedy Skin Repair Cream, Sarna, Vanicream or Cetaphil    Primary Dressing: Apply silvercel into/onto the wounds; ok to soak off with saline bullets    Secondary dressing: Cover with gauze     Secure with non-sterile roll gauze (4\" x 75\" roll) and tape (1\" roll tape) as needed; avoid adhesive directly on the skin    Compression: none    It is not ok to get your wound wet in the bath or shower      If for some reason you are not able to get your dressing(s) changed as outlined above (due to illness, lack of supplies, lack of help) please do the following: remove old, soiled dressings; wash the wounds with saline; pat dry; apply ABD pad or other absorbant pad and secure with rolled gauze; avoid tape directly on your skin; Call the clinic as soon as possible to let us know what the current issues are in receiving wound care [phone].      SEEK MEDICAL CARE IF:  You have an increase in swelling, pain, or redness around the wound.  You have an increase in the amount of pus coming from the wound.  There is a bad smell coming from the wound.  The wound appears to be worsening/enlarging  You have a fever greater than 101.5 F      It is ok to continue current wound care treatment/products for the next 2-3 days until new wound care supplies are ordered and arrive. If longer than this please contact our office at [phone].    If you have a 2 layer or 4 layer compression wrap on these are safe to have on for ONLY 7 days. If for some reason you are not able to get " the wrap(s) changed (due to illness; lack of supplies, lack of help, lack of transportation) please do the following: unwrap the old 2 or 4 layer compression wrap; avoid using scissors as you could cut your skin and cause wounds; use tubular compression when available. Call to reschedule your home care or clinic visit appointment as soon as possible.    Please NOTE: if you are 15 minutes late to your clinic appointment you will have to be rescheduled. Please call our clinic as soon as possible if you know you will not be able to get to your appointment at [phone].    If you fail to show up to 3 scheduled clinic appointments you will be dismissed from our clinic.              We want to hear from you!  In the next few weeks, you should receive a call or email to complete a survey about your visit at [facility]. Please help us improve your appointment experience by letting us know how we did today. We strive to make your experience good and value any ways in which we could do better.      We value your input and suggestions.    Thank you for choosing the [facility]!           It is recommended that you do not get your ulcer wet when showering.  Listed below are several ways of keeping it dry when you shower.     1. Wrap it with Press and Seal plastic wrap.  It can be found in the stores where the plastic wraps or tin foil is kept.               2.  Some people take a bath and hang their leg/foot out of the tub.                        3  Put your leg in a plastic bag and tape it on.           4. You can purchase a shower cover for casts at some pharmacies and through the Internet.            5. Take a Bed Bath or wash up at the sink

## 2022-06-07 ENCOUNTER — OFFICE VISIT (OUTPATIENT)
Dept: VASCULAR SURGERY | Facility: CLINIC | Age: 87
End: 2022-06-07
Attending: NURSE PRACTITIONER
Payer: MEDICARE

## 2022-06-07 VITALS
DIASTOLIC BLOOD PRESSURE: 80 MMHG | TEMPERATURE: 98.4 F | HEART RATE: 72 BPM | BODY MASS INDEX: 27 KG/M2 | RESPIRATION RATE: 16 BRPM | SYSTOLIC BLOOD PRESSURE: 144 MMHG | WEIGHT: 148.8 LBS

## 2022-06-07 DIAGNOSIS — R60.9 DEPENDENT EDEMA: ICD-10-CM

## 2022-06-07 DIAGNOSIS — Z79.52 LONG TERM SYSTEMIC STEROID USER: ICD-10-CM

## 2022-06-07 DIAGNOSIS — S80.11XD HEMATOMA OF LEG, RIGHT, SUBSEQUENT ENCOUNTER: ICD-10-CM

## 2022-06-07 DIAGNOSIS — L97.222 CALF ULCER, LEFT, WITH FAT LAYER EXPOSED (H): Primary | ICD-10-CM

## 2022-06-07 DIAGNOSIS — Z79.01 CURRENT USE OF LONG TERM ANTICOAGULATION: ICD-10-CM

## 2022-06-07 DIAGNOSIS — I73.9 PAD (PERIPHERAL ARTERY DISEASE) (H): ICD-10-CM

## 2022-06-07 PROCEDURE — 87205 SMEAR GRAM STAIN: CPT | Performed by: NURSE PRACTITIONER

## 2022-06-07 PROCEDURE — 87070 CULTURE OTHR SPECIMN AEROBIC: CPT | Performed by: NURSE PRACTITIONER

## 2022-06-07 PROCEDURE — 11042 DBRDMT SUBQ TIS 1ST 20SQCM/<: CPT

## 2022-06-07 RX ADMIN — LIDOCAINE HYDROCHLORIDE: 20 JELLY TOPICAL at 09:08

## 2022-06-07 ASSESSMENT — PAIN SCALES - GENERAL: PAINLEVEL: NO PAIN (0)

## 2022-06-07 NOTE — PATIENT INSTRUCTIONS
"  If your supplies were ordered today through [employer] and you are not receiving your supplies or have a question on your bill please contact Ani Toscano at [phone]      Keep pressure off the calf area    We took a culture today this will take 4-5 days to get results; we will call you with these      Wound Care Instructions    1-2 TIMES PER WEEK and as needed, Cleanse your Bilateral leg wound(s) with Dilute hibiclens 30cc in 500cc NS.    Pat Dry with non-sterile gauze    Apply Lotion to the intact skin surrounding your wound and other dry skin locations. Some good lotions include: Remedy Skin Repair Cream, Sarna, Vanicream or Cetaphil    Primary Dressing: Apply silvercel into/onto the wounds; ok to soak off with saline bullets    Secondary dressing: Cover with gauze     Secure with non-sterile roll gauze (4\" x 75\" roll) and tape (1\" roll tape) as needed; avoid adhesive directly on the skin    Compression: none    It is not ok to get your wound wet in the bath or shower      If for some reason you are not able to get your dressing(s) changed as outlined above (due to illness, lack of supplies, lack of help) please do the following: remove old, soiled dressings; wash the wounds with saline; pat dry; apply ABD pad or other absorbant pad and secure with rolled gauze; avoid tape directly on your skin; Call the clinic as soon as possible to let us know what the current issues are in receiving wound care [phone].      SEEK MEDICAL CARE IF:  You have an increase in swelling, pain, or redness around the wound.  You have an increase in the amount of pus coming from the wound.  There is a bad smell coming from the wound.  The wound appears to be worsening/enlarging  You have a fever greater than 101.5 F      It is ok to continue current wound care treatment/products for the next 2-3 days until new wound care supplies are ordered and arrive. If longer than this please contact our office at [phone].    If you " have a 2 layer or 4 layer compression wrap on these are safe to have on for ONLY 7 days. If for some reason you are not able to get the wrap(s) changed (due to illness; lack of supplies, lack of help, lack of transportation) please do the following: unwrap the old 2 or 4 layer compression wrap; avoid using scissors as you could cut your skin and cause wounds; use tubular compression when available. Call to reschedule your home care or clinic visit appointment as soon as possible.    Please NOTE: if you are 15 minutes late to your clinic appointment you will have to be rescheduled. Please call our clinic as soon as possible if you know you will not be able to get to your appointment at [phone].    If you fail to show up to 3 scheduled clinic appointments you will be dismissed from our clinic.              We want to hear from you!  In the next few weeks, you should receive a call or email to complete a survey about your visit at [facility]. Please help us improve your appointment experience by letting us know how we did today. We strive to make your experience good and value any ways in which we could do better.      We value your input and suggestions.    Thank you for choosing the [facility]!           It is recommended that you do not get your ulcer wet when showering.  Listed below are several ways of keeping it dry when you shower.     1. Wrap it with Press and Seal plastic wrap.  It can be found in the stores where the plastic wraps or tin foil is kept.               2.  Some people take a bath and hang their leg/foot out of the tub.                        3  Put your leg in a plastic bag and tape it on.           4. You can purchase a shower cover for casts at some pharmacies and through the Internet.            5. Take a Bed Bath or wash up at the sink

## 2022-06-07 NOTE — PROGRESS NOTES
Follow up Vascular Visit       Date of Service:06/07/22      Chief Complaint: BLE swelling and wounds      Pt returns to [facility] with regards to their BLE swelling and wounds complicated by chronic systemic steroid use and PAD.  They arrive today with nephew. They are currently using silvercel; gauze; rolled gauze to the wounds. This is being done by nephew every few days. They are using nothing for compression. They are feeling well today. Denies fevers, chills. No shortness of breath. She reports the wound started  4 weeks ago; on the right she ran into something and caused a blood blister on the left her nephew accidentally dropped a box on her leg. We had her seen by Dr. Appiah in the past due to her PAD; he wanted to hold off on angiointervention at that time due to age and risk and we were able to get her healed. She continues on asa, prednisone due to polymyalgia rheuamtica, dyazide and statin.     Allergies: No Known Allergies    Medications:   Current Outpatient Medications:      amLODIPine (NORVASC) 2.5 MG tablet, TAKE 1 TABLET (2.5 MG TOTAL) BY MOUTH DAILY. FOR HIGH BLOOD PRESSURE., Disp: 90 tablet, Rfl: 3     aspirin - buffered (ASCRIPTIN) 325 MG TABS tablet, Take 325 mg by mouth daily, Disp: , Rfl:      atorvastatin (LIPITOR) 20 MG tablet, Take 1 tablet (20 mg) by mouth daily, Disp: 90 tablet, Rfl: 3     dorzolamide (TRUSOPT) 2 % ophthalmic solution, [DORZOLAMIDE (TRUSOPT) 2 % OPHTHALMIC SOLUTION] 1 drop 3 (three) times a day., Disp: , Rfl:      metoprolol tartrate (LOPRESSOR) 50 MG tablet, TAKE 1 TABLET BY MOUTH TWICE A DAY, Disp: 180 tablet, Rfl: 3     omeprazole (PRILOSEC) 40 MG DR capsule, TAKE 1 CAPSULE BY MOUTH TWICE A DAY, Disp: 180 capsule, Rfl: 3     predniSONE (DELTASONE) 5 MG tablet, TAKE 2 TABLETS BY MOUTH EVERY DAY, Disp: 180 tablet, Rfl: 1     timolol maleate (ISTALOL) 0.5 % DrpD, [TIMOLOL MALEATE (ISTALOL) 0.5 % DRPD] Apply to eye Daily at 8:00 am.., Disp: ,  Rfl:      triamterene-HCTZ (DYAZIDE) 37.5-25 MG capsule, Take 1 capsule by mouth every morning, Disp: 90 capsule, Rfl: 3    Current Facility-Administered Medications:      lidocaine (XYLOCAINE) 2 % external gel, , Topical, Daily PRN, Makayla Huynh, NP, Given at 06/07/22 0908    History:   Past Medical History:   Diagnosis Date     Anxiety      Autoimmune hepatitis (H) 2006    Resolved     Breast cyst 1975     Chronic kidney disease, stage 3 (H) 7/27/2021     Controlled substance agreement signed 2/13/2018     Esophageal dysphagia      Fibrocystic breast      Former smoker      GERD (gastroesophageal reflux disease)      HLD (hyperlipidemia)      HTN (hypertension)      Hypercalcemia      Hyperparathyroidism (H)     Subclinical      Insomnia      OA (osteoarthritis) 5/27/2016     PMR (polymyalgia rheumatica) (H)      Presbyesophagus      Senile purpura (H) 4/18/2019     Unspecified glaucoma      Unspecified glaucoma(365.9)        Physical Exam:    BP (!) 144/80   Pulse 72   Temp 98.4  F (36.9  C)   Resp 16   Wt 148 lb 12.8 oz (67.5 kg)   LMP  (LMP Unknown)   BMI 27.00 kg/m      General:  Patient presents to clinic in no apparent distress.  Head: normocephalic atraumatic  Psychiatric:  Alert and oriented x3.   Respiratory: unlabored breathing; no cough  Integumentary:  Skin is uniformly warm, dry and pink.    Wound #1 Location: left calf  Size: 2L x 3W x 0.1depth.  No sinus tract present, Wound base: initially with slough; and surrounding eschar this was removed to reveal 100% viable wound bed   No undermining present. Wound is full thickness. There is moderate green drainage. Periwound: no denudement, erythema, induration, maceration or warmth.      Right shin with scattered areas of dry eschar; this was preserved    VASC Wound Lt calf (Active)   Pre Size Length 2 06/07/22 0800   Pre Size Width 3 06/07/22 0800   Pre Size Depth 0.1 06/07/22 0800   Pre Total Sq cm 6 06/07/22 0800   Number of days: 20             Circumferential volume measures:      Circumferential Measures 1/12/2022   Right just above MTP 19.5   Right Ankle 20.2   Right Widest Calf 34.5   Right Knee to Ankle 34   Left - just above MTP 18.5   Left Ankle 19   Left Widest Calf 35   Left Knee to Ankle 34       Labs:    I personally reviewed the following lab results today and those on care everywhere    CRP   Date Value Ref Range Status   12/14/2021 1.3 (H) 0.0-<0.8 mg/dL Final      Erythrocyte Sedimentation Rate   Date Value Ref Range Status   12/14/2021 21 (H) 0 - 20 mm/hr Final      Last Renal Panel:  Sodium   Date Value Ref Range Status   12/14/2021 139 136 - 145 mmol/L Final     Potassium   Date Value Ref Range Status   12/14/2021 4.0 3.5 - 5.0 mmol/L Final     Chloride   Date Value Ref Range Status   12/14/2021 104 98 - 107 mmol/L Final     Carbon Dioxide (CO2)   Date Value Ref Range Status   12/14/2021 23 22 - 31 mmol/L Final     Anion Gap   Date Value Ref Range Status   12/14/2021 12 5 - 18 mmol/L Final     Glucose   Date Value Ref Range Status   12/14/2021 84 70 - 125 mg/dL Final     Urea Nitrogen   Date Value Ref Range Status   12/14/2021 20 8 - 28 mg/dL Final     Creatinine   Date Value Ref Range Status   12/14/2021 0.85 0.60 - 1.10 mg/dL Final     GFR Estimate   Date Value Ref Range Status   12/14/2021 60 (L) >60 mL/min/1.73m2 Final     Comment:     As of July 11, 2021, eGFR is calculated by the CKD-EPI creatinine equation, without race adjustment. eGFR can be influenced by muscle mass, exercise, and diet. The reported eGFR is an estimation only and is only applicable if the renal function is stable.   04/09/2021 59 (L) >60 mL/min/1.73m2 Final     Calcium   Date Value Ref Range Status   12/14/2021 10.4 8.5 - 10.5 mg/dL Final     Albumin   Date Value Ref Range Status   12/14/2021 3.9 3.5 - 5.0 g/dL Final      Lab Results   Component Value Date    WBC 9.7 12/14/2021     Lab Results   Component Value Date    RBC 4.58 12/14/2021     Lab Results    Component Value Date    HGB 13.6 12/14/2021     Lab Results   Component Value Date    HCT 42.1 12/14/2021     No components found for: MCT  Lab Results   Component Value Date    MCV 92 12/14/2021     Lab Results   Component Value Date    MCH 29.7 12/14/2021     Lab Results   Component Value Date    MCHC 32.3 12/14/2021     Lab Results   Component Value Date    RDW 14.6 12/14/2021     Lab Results   Component Value Date     12/14/2021      Lab Results   Component Value Date    A1C 5.9 03/17/2010      TSH   Date Value Ref Range Status   09/21/2021 2.35 0.30 - 5.00 uIU/mL Final      No results found for: VITDT                Impression:  Encounter Diagnoses   Name Primary?     Calf ulcer, left, with fat layer exposed (H) Yes     Dependent edema      Long term systemic steroid user      Hematoma of leg, right, subsequent encounter      Current use of long term anticoagulation      PAD (peripheral artery disease) (H)                                Are any of these wounds new today: No; Location: na    Assessment/Plan:          1. Debridement: After discussion of risk factors and verbal consent was obtained 2% Lidocaine HCL jelly was applied, under clean conditions, the left calf ulceration(s) were debrided using currette. Devitalized and nonviable tissue, along with any fibrin and slough, was removed to improve granulation tissue formation, stimulate wound healing, decrease overall bacteria load, disrupt biofilm formation and decrease edge senescence.  Total excisional debridement was 6 sq cm from the epidermis/dermis area and into the subcutaneous tissue with a depth of 0.1 cm.   Ulcers were improved afterwards and .  Measures were unchanged after debridement.       2.  Wound treatment: wound treatment will include irrigation and dressings to promote autolytic debridement which will include:educated nephew and how to safely cleanse the wound; educated him on not leaving the wound open to the air; will  continue to dress with silvercel; gauze; rolled gauze; avoid adhesive on her skin; due to the new green drainage today obtained a culture will hold off on antibiotics until sensitivities are back. Her wound is slightly smaller today so will hold off on referral to Dr. Appiah again at this time and see if we can get her healed with conservative measures.  If for some reason the patient is not able to get their dressing(s) changed as outlined above (due to illness, lack of supplies, lack of help) please do the following: remove old, soiled dressings; wash the wounds with saline; pat dry; apply ABD pad or other absorbant pad and secure with rolled gauze; avoid tape directly on your skin; patient instructed to call the clinic as soon as possible to let us know what the current issues are in receiving wound care. Stable            3. Edema: PAD; does not tolerate; encouraged elevation. The compression wraps were applied today in clinic.     If a 2 layer or 4 layer compression wrap is being used; these are safe to have on for ONLY 7 days. If for some reason the patient is not able to get the wrap(s) changed (due to illness; lack of supplies, lack of help, lack of transportation) please do the following: unwrap the old 2 or 4 layer compression wrap; avoid using scissors as you could cut your skin and cause wounds; use tubular compression when available. Call to reschedule your home care or clinic visit appointment as soon as possible.  Stable            4. Nutrition: low sodium diet           5. Offloading: keep pressure off the calf area; put the leg up on pillow or cushion     Patient will follow up with me in 4-6 weeks for reevaluation. They were instructed to call the clinic sooner with any signs or symptoms of infection or any further questions/concerns. Answered all questions.          Makayla Huynh DNP, RN, CNP, CWOCN, CFCN, CLT  [facility]   [phone]        This note was electronically signed  by Makayla Huynh, NP

## 2022-06-09 LAB
BACTERIA WND CULT: NO GROWTH
GRAM STAIN RESULT: NORMAL
GRAM STAIN RESULT: NORMAL

## 2022-06-10 ENCOUNTER — TELEPHONE (OUTPATIENT)
Dept: VASCULAR SURGERY | Facility: CLINIC | Age: 87
End: 2022-06-10
Payer: MEDICARE

## 2022-06-10 NOTE — TELEPHONE ENCOUNTER
----- Message from Makayla Huynh NP sent at 6/10/2022  8:29 AM CDT -----  Can you call the patient and let them know that her culture was negative and doesn't need to go on any antibiotics.    LK

## 2022-07-06 ENCOUNTER — TRANSFERRED RECORDS (OUTPATIENT)
Dept: HEALTH INFORMATION MANAGEMENT | Facility: CLINIC | Age: 87
End: 2022-07-06

## 2022-07-11 ENCOUNTER — OFFICE VISIT (OUTPATIENT)
Dept: VASCULAR SURGERY | Facility: CLINIC | Age: 87
End: 2022-07-11
Attending: NURSE PRACTITIONER
Payer: MEDICARE

## 2022-07-11 VITALS
BODY MASS INDEX: 26.85 KG/M2 | SYSTOLIC BLOOD PRESSURE: 160 MMHG | DIASTOLIC BLOOD PRESSURE: 82 MMHG | WEIGHT: 148 LBS | RESPIRATION RATE: 16 BRPM | TEMPERATURE: 97.8 F | HEART RATE: 80 BPM

## 2022-07-11 DIAGNOSIS — Z79.01 CURRENT USE OF LONG TERM ANTICOAGULATION: ICD-10-CM

## 2022-07-11 DIAGNOSIS — Z79.52 LONG TERM SYSTEMIC STEROID USER: ICD-10-CM

## 2022-07-11 DIAGNOSIS — L97.222 CALF ULCER, LEFT, WITH FAT LAYER EXPOSED (H): ICD-10-CM

## 2022-07-11 DIAGNOSIS — R60.9 DEPENDENT EDEMA: ICD-10-CM

## 2022-07-11 DIAGNOSIS — I73.9 PAD (PERIPHERAL ARTERY DISEASE) (H): ICD-10-CM

## 2022-07-11 DIAGNOSIS — S80.11XD HEMATOMA OF LEG, RIGHT, SUBSEQUENT ENCOUNTER: Primary | ICD-10-CM

## 2022-07-11 PROCEDURE — 97597 DBRDMT OPN WND 1ST 20 CM/<: CPT | Performed by: NURSE PRACTITIONER

## 2022-07-11 RX ADMIN — LIDOCAINE HYDROCHLORIDE: 20 JELLY TOPICAL at 08:03

## 2022-07-11 ASSESSMENT — PAIN SCALES - GENERAL: PAINLEVEL: NO PAIN (0)

## 2022-07-11 NOTE — PROGRESS NOTES
Follow up Vascular Visit       Date of Service:07/11/22      Chief Complaint: BLE ulcers and edema      Pt returns to [facility] with regards to their BLE ulcers and edema; complicated by chronic systemic steroid use and PAD.  They arrive today with her nephew. They are currently using silvercel to the wounds. This is being done by the patient every 2-4 days. They are using nothing for compression. They are feeling well today. Denies fevers, chills. No shortness of breath. We had her seen by Dr. Appiah in the past due to her PAD; he wanted to hold off on angiointervention at that time due to age and risk and we were able to get her healed. She continues on asa, prednisone due to polymyalgia rheuamtica, dyazide and statin.     Allergies: No Known Allergies    Medications:   Current Outpatient Medications:      amLODIPine (NORVASC) 2.5 MG tablet, TAKE 1 TABLET (2.5 MG TOTAL) BY MOUTH DAILY. FOR HIGH BLOOD PRESSURE., Disp: 90 tablet, Rfl: 3     aspirin - buffered (ASCRIPTIN) 325 MG TABS tablet, Take 325 mg by mouth daily, Disp: , Rfl:      atorvastatin (LIPITOR) 20 MG tablet, Take 1 tablet (20 mg) by mouth daily, Disp: 90 tablet, Rfl: 3     dorzolamide (TRUSOPT) 2 % ophthalmic solution, [DORZOLAMIDE (TRUSOPT) 2 % OPHTHALMIC SOLUTION] 1 drop 3 (three) times a day., Disp: , Rfl:      metoprolol tartrate (LOPRESSOR) 50 MG tablet, TAKE 1 TABLET BY MOUTH TWICE A DAY, Disp: 180 tablet, Rfl: 3     omeprazole (PRILOSEC) 40 MG DR capsule, TAKE 1 CAPSULE BY MOUTH TWICE A DAY, Disp: 180 capsule, Rfl: 3     predniSONE (DELTASONE) 5 MG tablet, TAKE 2 TABLETS BY MOUTH EVERY DAY, Disp: 180 tablet, Rfl: 1     timolol maleate (ISTALOL) 0.5 % DrpD, [TIMOLOL MALEATE (ISTALOL) 0.5 % DRPD] Apply to eye Daily at 8:00 am.., Disp: , Rfl:      triamterene-HCTZ (DYAZIDE) 37.5-25 MG capsule, Take 1 capsule by mouth every morning, Disp: 90 capsule, Rfl: 3    Current Facility-Administered Medications:      lidocaine  (XYLOCAINE) 2 % external gel, , Topical, Daily PRN, Lino, Makayla, NP, Given at 07/11/22 0803    History:   Past Medical History:   Diagnosis Date     Anxiety      Autoimmune hepatitis (H) 2006    Resolved     Breast cyst 1975     Chronic kidney disease, stage 3 (H) 7/27/2021     Controlled substance agreement signed 2/13/2018     Esophageal dysphagia      Fibrocystic breast      Former smoker      GERD (gastroesophageal reflux disease)      HLD (hyperlipidemia)      HTN (hypertension)      Hypercalcemia      Hyperparathyroidism (H)     Subclinical      Insomnia      OA (osteoarthritis) 5/27/2016     PMR (polymyalgia rheumatica) (H)      Presbyesophagus      Senile purpura (H) 4/18/2019     Unspecified glaucoma      Unspecified glaucoma(365.9)        Physical Exam:    BP (!) 160/82   Pulse 80   Temp 97.8  F (36.6  C)   Resp 16   Wt 148 lb (67.1 kg)   LMP  (LMP Unknown)   BMI 26.85 kg/m      General:  Patient presents to clinic in no apparent distress.  Head: normocephalic atraumatic  Psychiatric:  Alert and oriented x3.   Respiratory: unlabored breathing; no cough  Integumentary:  Skin is uniformly warm, dry and pink.    Wound #1 Location: left calf  Size: 2L x 2.6W x 0depth.  nosinus tract present, Wound base: initially presents as thick eschar which was lifting around the edges the was removed to reveal intact skin underneath; her skin is thin and fragile on the leg  No undermining present. Wound is healed thickness. There is no drainage. Periwound: no denudement, erythema, induration, maceration or warmth.      Wound #2 Location: right shin  Size: 1.4L x 1W x 0depth.  No sinus tract present, Wound base: initially presents as a thick eschar which was lifting on the edges this was removed to reveal 1x0.5x0.1cm full thickness red wound bed ulcer  No underminingpresent. Wound is full thickness. There is moderate drainage. Periwound: no denudement, erythema, induration, maceration or warmth.      Wound #3  Location: right lateral leg Size: 1x1.3x0.1 x 0depth.  No sinus tract present, Wound base: initially presents as a thick eschar which was lifting on the edges this was removed to reveal 1x0.3x0.1cm full thickness red wound bed ulcer  No underminingpresent. Wound is full thickness. There is moderate drainage. Periwound: no denudement, erythema, induration, maceration or warmth.        VASC Wound Lt calf (Active)   Pre Size Length 2 07/11/22 0800   Pre Size Width 2.6 07/11/22 0800   Pre Size Depth 0.1 06/07/22 0800   Pre Total Sq cm 5.2 07/11/22 0800   Post Size Length 0 07/11/22 0800   Post Size Width 0 07/11/22 0800   Post Size Depth 0 07/11/22 0800   Post Total Sq cm 0 07/11/22 0800   Undermined 0 07/11/22 0800   Tunneling 0 07/11/22 0800   Description scab 07/11/22 0800   Number of days: 54       VASC Wound Rt shin (Active)   Pre Size Length 1.4 07/11/22 0800   Pre Size Width 1 07/11/22 0800   Pre Total Sq cm 1.4 07/11/22 0800   Post Size Length 1 07/11/22 0800   Post Size Width 0.5 07/11/22 0800   Post Size Depth 0.1 07/11/22 0800   Post Total Sq cm 0.5 07/11/22 0800   Description scab 07/11/22 0800   Number of days: 0       VASC Wound Rt lateral leg (Active)   Pre Size Length 1 07/11/22 0800   Pre Size Width 1.3 07/11/22 0800   Pre Total Sq cm 1.3 07/11/22 0800   Post Size Length 1 07/11/22 0800   Post Size Width 0.3 07/11/22 0800   Post Size Depth 0.1 07/11/22 0800   Post Total Sq cm 0.3 07/11/22 0800   Description scab 07/11/22 0800   Number of days: 0            Circumferential volume measures:      Circumferential Measures 1/12/2022   Right just above MTP 19.5   Right Ankle 20.2   Right Widest Calf 34.5   Right Knee to Ankle 34   Left - just above MTP 18.5   Left Ankle 19   Left Widest Calf 35   Left Knee to Ankle 34       Labs:    I personally reviewed the following lab results today and those on care everywhere    CRP   Date Value Ref Range Status   12/14/2021 1.3 (H) 0.0-<0.8 mg/dL Final      Erythrocyte  Sedimentation Rate   Date Value Ref Range Status   12/14/2021 21 (H) 0 - 20 mm/hr Final      Last Renal Panel:  Sodium   Date Value Ref Range Status   12/14/2021 139 136 - 145 mmol/L Final     Potassium   Date Value Ref Range Status   12/14/2021 4.0 3.5 - 5.0 mmol/L Final     Chloride   Date Value Ref Range Status   12/14/2021 104 98 - 107 mmol/L Final     Carbon Dioxide (CO2)   Date Value Ref Range Status   12/14/2021 23 22 - 31 mmol/L Final     Anion Gap   Date Value Ref Range Status   12/14/2021 12 5 - 18 mmol/L Final     Glucose   Date Value Ref Range Status   12/14/2021 84 70 - 125 mg/dL Final     Urea Nitrogen   Date Value Ref Range Status   12/14/2021 20 8 - 28 mg/dL Final     Creatinine   Date Value Ref Range Status   12/14/2021 0.85 0.60 - 1.10 mg/dL Final     GFR Estimate   Date Value Ref Range Status   12/14/2021 60 (L) >60 mL/min/1.73m2 Final     Comment:     As of July 11, 2021, eGFR is calculated by the CKD-EPI creatinine equation, without race adjustment. eGFR can be influenced by muscle mass, exercise, and diet. The reported eGFR is an estimation only and is only applicable if the renal function is stable.   04/09/2021 59 (L) >60 mL/min/1.73m2 Final     Calcium   Date Value Ref Range Status   12/14/2021 10.4 8.5 - 10.5 mg/dL Final     Albumin   Date Value Ref Range Status   12/14/2021 3.9 3.5 - 5.0 g/dL Final      Lab Results   Component Value Date    WBC 9.7 12/14/2021     Lab Results   Component Value Date    RBC 4.58 12/14/2021     Lab Results   Component Value Date    HGB 13.6 12/14/2021     Lab Results   Component Value Date    HCT 42.1 12/14/2021     No components found for: MCT  Lab Results   Component Value Date    MCV 92 12/14/2021     Lab Results   Component Value Date    MCH 29.7 12/14/2021     Lab Results   Component Value Date    MCHC 32.3 12/14/2021     Lab Results   Component Value Date    RDW 14.6 12/14/2021     Lab Results   Component Value Date     12/14/2021      Lab Results    Component Value Date    A1C 5.9 03/17/2010      TSH   Date Value Ref Range Status   09/21/2021 2.35 0.30 - 5.00 uIU/mL Final      No results found for: VITDT                Impression:  Encounter Diagnoses   Name Primary?     Hematoma of leg, right, subsequent encounter Yes     Calf ulcer, left, with fat layer exposed (H)      Current use of long term anticoagulation      Long term systemic steroid user      Dependent edema      PAD (peripheral artery disease) (H)           7/11/2022 left calf pre-андрей     7/11/2022 right shin pre-андрей    7/11/2022 right lateral leg pre андрей      7/11/2022 left calf post debridement    7/11/2022 right shin          Are any of these wounds new today: No; Location: na    Assessment/Plan:          1. Debridement: After discussion of risk factors and verbal consent was obtained 2% Lidocaine HCL jelly was applied, under clean conditions, the BLE ulceration(s) were debrided using currette. Devitalized and nonviable tissue, along with any fibrin and slough, was removed to improve granulation tissue formation, stimulate wound healing, decrease overall bacteria load, disrupt biofilm formation and decrease edge senescence.  Total excisional debridement was 7.9 sq cm from the epidermis/dermis area with a depth of 0-0.1 cm.   Ulcers were improved afterwards and .  Measures were as noted on the flow sheet.       2.  Wound treatment: wound treatment will include irrigation and dressings to promote autolytic debridement which will include:the left leg is now healed; no dressings needed; apply lotion 1-2 times per day; the right leg will need to continue with silvercel; gauze; rolled gauze; no compression due to PAD; no acute s/sx of infection today.     If for some reason the patient is not able to get their dressing(s) changed as outlined above (due to illness, lack of supplies, lack of help) please do the following: remove old, soiled dressings; wash the wounds with saline; pat dry; apply  ABD pad or other absorbant pad and secure with rolled gauze; avoid tape directly on your skin; patient instructed to call the clinic as soon as possible to let us know what the current issues are in receiving wound care. Improved            3. Edema: no compression due to PAD; reminded pt to elevate periodically throughout the day. The compression wraps were applied today in clinic.     If a 2 layer or 4 layer compression wrap is being used; these are safe to have on for ONLY 7 days. If for some reason the patient is not able to get the wrap(s) changed (due to illness; lack of supplies, lack of help, lack of transportation) please do the following: unwrap the old 2 or 4 layer compression wrap; avoid using scissors as you could cut your skin and cause wounds; use tubular compression when available. Call to reschedule your home care or clinic visit appointment as soon as possible.  Stable            4. Nutrition: no issues           5. Offloading: na     Patient will follow up with me in 3-4 weeks for reevaluation. They were instructed to call the clinic sooner with any signs or symptoms of infection or any further questions/concerns. Answered all questions.          Makayla Huynh DNP, RN, CNP, CWOCN, CFCN, CLT  [facility]   [phone]        This note was electronically signed by Makayla Huynh NP

## 2022-07-11 NOTE — PATIENT INSTRUCTIONS
"      Wound Care Instructions    1-2 TIMES PER WEEK and as needed, Cleanse your right  leg wound(s) with Dilute hibiclens 30cc in 500cc NS.    Pat Dry with non-sterile gauze    Apply Lotion to the intact skin surrounding your wound and other dry skin locations. Some good lotions include: Remedy Skin Repair Cream, Sarna, Vanicream or Cetaphil    Primary Dressing: Apply silvercel into/onto the wounds; ok to soak off with saline bullets    Secondary dressing: Cover with gauze     Secure with non-sterile roll gauze (4\" x 75\" roll) and tape (1\" roll tape) as needed; avoid adhesive directly on the skin    Compression: none    It is not ok to get your wound wet in the bath or shower      If for some reason you are not able to get your dressing(s) changed as outlined above (due to illness, lack of supplies, lack of help) please do the following: remove old, soiled dressings; wash the wounds with saline; pat dry; apply ABD pad or other absorbant pad and secure with rolled gauze; avoid tape directly on your skin; Call the clinic as soon as possible to let us know what the current issues are in receiving wound care [phone].      SEEK MEDICAL CARE IF:  You have an increase in swelling, pain, or redness around the wound.  You have an increase in the amount of pus coming from the wound.  There is a bad smell coming from the wound.  The wound appears to be worsening/enlarging  You have a fever greater than 101.5 F      It is ok to continue current wound care treatment/products for the next 2-3 days until new wound care supplies are ordered and arrive. If longer than this please contact our office at [phone].    If you have a 2 layer or 4 layer compression wrap on these are safe to have on for ONLY 7 days. If for some reason you are not able to get the wrap(s) changed (due to illness; lack of supplies, lack of help, lack of transportation) please do the following: unwrap the old 2 or 4 layer compression wrap; avoid using " scissors as you could cut your skin and cause wounds; use tubular compression when available. Call to reschedule your home care or clinic visit appointment as soon as possible.    Please NOTE: if you are 15 minutes late to your clinic appointment you will have to be rescheduled. Please call our clinic as soon as possible if you know you will not be able to get to your appointment at [phone].    If you fail to show up to 3 scheduled clinic appointments you will be dismissed from our clinic.              We want to hear from you!  In the next few weeks, you should receive a call or email to complete a survey about your visit at [facility]. Please help us improve your appointment experience by letting us know how we did today. We strive to make your experience good and value any ways in which we could do better.      We value your input and suggestions.    Thank you for choosing the [facility]!           It is recommended that you do not get your ulcer wet when showering.  Listed below are several ways of keeping it dry when you shower.     1. Wrap it with Press and Seal plastic wrap.  It can be found in the stores where the plastic wraps or tin foil is kept.               2.  Some people take a bath and hang their leg/foot out of the tub.                        3  Put your leg in a plastic bag and tape it on.           4. You can purchase a shower cover for casts at some pharmacies and through the Internet.            5. Take a Bed Bath or wash up at the sink

## 2022-07-23 ENCOUNTER — HEALTH MAINTENANCE LETTER (OUTPATIENT)
Age: 87
End: 2022-07-23

## 2022-08-01 ENCOUNTER — OFFICE VISIT (OUTPATIENT)
Dept: VASCULAR SURGERY | Facility: CLINIC | Age: 87
End: 2022-08-01
Attending: NURSE PRACTITIONER
Payer: MEDICARE

## 2022-08-01 VITALS
WEIGHT: 148 LBS | TEMPERATURE: 97.9 F | HEART RATE: 80 BPM | BODY MASS INDEX: 26.85 KG/M2 | RESPIRATION RATE: 16 BRPM | DIASTOLIC BLOOD PRESSURE: 84 MMHG | SYSTOLIC BLOOD PRESSURE: 152 MMHG

## 2022-08-01 DIAGNOSIS — S80.11XD HEMATOMA OF LEG, RIGHT, SUBSEQUENT ENCOUNTER: Primary | ICD-10-CM

## 2022-08-01 DIAGNOSIS — I73.9 PAD (PERIPHERAL ARTERY DISEASE) (H): ICD-10-CM

## 2022-08-01 DIAGNOSIS — R60.9 DEPENDENT EDEMA: ICD-10-CM

## 2022-08-01 DIAGNOSIS — Z79.52 LONG TERM SYSTEMIC STEROID USER: ICD-10-CM

## 2022-08-01 DIAGNOSIS — Z79.52 CURRENT CHRONIC USE OF SYSTEMIC STEROIDS: ICD-10-CM

## 2022-08-01 DIAGNOSIS — L97.222 CALF ULCER, LEFT, WITH FAT LAYER EXPOSED (H): ICD-10-CM

## 2022-08-01 DIAGNOSIS — Z79.01 CURRENT USE OF LONG TERM ANTICOAGULATION: ICD-10-CM

## 2022-08-01 PROCEDURE — G0463 HOSPITAL OUTPT CLINIC VISIT: HCPCS | Performed by: NURSE PRACTITIONER

## 2022-08-01 PROCEDURE — 99213 OFFICE O/P EST LOW 20 MIN: CPT | Performed by: NURSE PRACTITIONER

## 2022-08-01 ASSESSMENT — PAIN SCALES - GENERAL: PAINLEVEL: NO PAIN (0)

## 2022-08-01 NOTE — PROGRESS NOTES
Follow up Vascular Visit       Date of Service:08/01/22      Chief Complaint: right leg ulcers; and recent history of left leg ulcers; PAD      Pt returns to [facility] with regards to their right leg ulcers; and recent history of left leg ulcers; PAD.  They arrive today with nephew. They are currently using silvercel to the wounds; they felt the wounds were healed and left this all open to the air. This is being done by nephew. They are using nothing for compression. They are feeling well today. Denies fevers, chills. No shortness of breath. We had her seen by Dr. Appiah in the past due to her PAD; he wanted to hold off on angiointervention at that time due to age and risk and we were able to get her healed. She continues on asa, prednisone due to polymyalgia rheuamtica, dyazide and statin.         Allergies: No Known Allergies    Medications:   Current Outpatient Medications:      amLODIPine (NORVASC) 2.5 MG tablet, TAKE 1 TABLET (2.5 MG TOTAL) BY MOUTH DAILY. FOR HIGH BLOOD PRESSURE., Disp: 90 tablet, Rfl: 3     aspirin - buffered (ASCRIPTIN) 325 MG TABS tablet, Take 325 mg by mouth daily, Disp: , Rfl:      atorvastatin (LIPITOR) 20 MG tablet, Take 1 tablet (20 mg) by mouth daily, Disp: 90 tablet, Rfl: 3     dorzolamide (TRUSOPT) 2 % ophthalmic solution, [DORZOLAMIDE (TRUSOPT) 2 % OPHTHALMIC SOLUTION] 1 drop 3 (three) times a day., Disp: , Rfl:      metoprolol tartrate (LOPRESSOR) 50 MG tablet, TAKE 1 TABLET BY MOUTH TWICE A DAY, Disp: 180 tablet, Rfl: 3     omeprazole (PRILOSEC) 40 MG DR capsule, TAKE 1 CAPSULE BY MOUTH TWICE A DAY, Disp: 180 capsule, Rfl: 3     predniSONE (DELTASONE) 5 MG tablet, TAKE 2 TABLETS BY MOUTH EVERY DAY, Disp: 180 tablet, Rfl: 1     timolol maleate (ISTALOL) 0.5 % DrpD, [TIMOLOL MALEATE (ISTALOL) 0.5 % DRPD] Apply to eye Daily at 8:00 am.., Disp: , Rfl:      triamterene-HCTZ (DYAZIDE) 37.5-25 MG capsule, Take 1 capsule by mouth every morning, Disp: 90  capsule, Rfl: 3    Current Facility-Administered Medications:      lidocaine (XYLOCAINE) 2 % external gel, , Topical, Daily PRN, Lino, Makayla, NP, Given at 07/11/22 0803    History:   Past Medical History:   Diagnosis Date     Anxiety      Autoimmune hepatitis (H) 2006    Resolved     Breast cyst 1975     Chronic kidney disease, stage 3 (H) 7/27/2021     Controlled substance agreement signed 2/13/2018     Esophageal dysphagia      Fibrocystic breast      Former smoker      GERD (gastroesophageal reflux disease)      HLD (hyperlipidemia)      HTN (hypertension)      Hypercalcemia      Hyperparathyroidism (H)     Subclinical      Insomnia      OA (osteoarthritis) 5/27/2016     PMR (polymyalgia rheumatica) (H)      Presbyesophagus      Senile purpura (H) 4/18/2019     Unspecified glaucoma      Unspecified glaucoma(365.9)        Physical Exam:    BP (!) 152/84   Pulse 80   Temp 97.9  F (36.6  C)   Resp 16   Wt 148 lb (67.1 kg)   LMP  (LMP Unknown)   BMI 26.85 kg/m      General:  Patient presents to clinic in no apparent distress.  Head: normocephalic atraumatic  Psychiatric:  Alert and oriented x3.   Respiratory: unlabored breathing; no cough  Integumentary:  Skin is uniformly warm, dry and pink.    Wound #1 Location: right lateral leg  Size: 0.5L x 1W x 0depth.  No sinus tract present, Wound base: stable small eschar  No undermining present. Wound is ? thickness. There is no drainage. Periwound: no denudement, erythema, induration, maceration or warmth.      No edema    Scattered ecchymoses on the legs; fragile skin      VASC Wound Lt calf (Active)   Pre Size Length 2 07/11/22 0800   Pre Size Width 2.6 07/11/22 0800   Pre Size Depth 0.1 06/07/22 0800   Pre Total Sq cm 5.2 07/11/22 0800   Post Size Length 0 07/11/22 0800   Post Size Width 0 07/11/22 0800   Post Size Depth 0 07/11/22 0800   Post Total Sq cm 0 07/11/22 0800   Undermined 0 07/11/22 0800   Tunneling 0 07/11/22 0800   Description scab 07/11/22 0800    Number of days: 75       VASC Wound Rt shin (Active)   Pre Size Length 1.4 07/11/22 0800   Pre Size Width 1 07/11/22 0800   Pre Total Sq cm 1.4 07/11/22 0800   Post Size Length 1 07/11/22 0800   Post Size Width 0.5 07/11/22 0800   Post Size Depth 0.1 07/11/22 0800   Post Total Sq cm 0.5 07/11/22 0800   Description scab 07/11/22 0800   Number of days: 21       VASC Wound Rt lateral leg (Active)   Pre Size Length 1 07/11/22 0800   Pre Size Width 1.3 07/11/22 0800   Pre Total Sq cm 1.3 07/11/22 0800   Post Size Length 1 07/11/22 0800   Post Size Width 0.3 07/11/22 0800   Post Size Depth 0.1 07/11/22 0800   Post Total Sq cm 0.3 07/11/22 0800   Description scab 08/01/22 0700   Number of days: 21            Circumferential volume measures:      Circumferential Measures 1/12/2022   Right just above MTP 19.5   Right Ankle 20.2   Right Widest Calf 34.5   Right Knee to Ankle 34   Left - just above MTP 18.5   Left Ankle 19   Left Widest Calf 35   Left Knee to Ankle 34       Labs:    I personally reviewed the following lab results today and those on care everywhere    CRP   Date Value Ref Range Status   12/14/2021 1.3 (H) 0.0-<0.8 mg/dL Final      Erythrocyte Sedimentation Rate   Date Value Ref Range Status   12/14/2021 21 (H) 0 - 20 mm/hr Final      Last Renal Panel:  Sodium   Date Value Ref Range Status   12/14/2021 139 136 - 145 mmol/L Final     Potassium   Date Value Ref Range Status   12/14/2021 4.0 3.5 - 5.0 mmol/L Final     Chloride   Date Value Ref Range Status   12/14/2021 104 98 - 107 mmol/L Final     Carbon Dioxide (CO2)   Date Value Ref Range Status   12/14/2021 23 22 - 31 mmol/L Final     Anion Gap   Date Value Ref Range Status   12/14/2021 12 5 - 18 mmol/L Final     Glucose   Date Value Ref Range Status   12/14/2021 84 70 - 125 mg/dL Final     Urea Nitrogen   Date Value Ref Range Status   12/14/2021 20 8 - 28 mg/dL Final     Creatinine   Date Value Ref Range Status   12/14/2021 0.85 0.60 - 1.10 mg/dL Final      GFR Estimate   Date Value Ref Range Status   12/14/2021 60 (L) >60 mL/min/1.73m2 Final     Comment:     As of July 11, 2021, eGFR is calculated by the CKD-EPI creatinine equation, without race adjustment. eGFR can be influenced by muscle mass, exercise, and diet. The reported eGFR is an estimation only and is only applicable if the renal function is stable.   04/09/2021 59 (L) >60 mL/min/1.73m2 Final     Calcium   Date Value Ref Range Status   12/14/2021 10.4 8.5 - 10.5 mg/dL Final     Albumin   Date Value Ref Range Status   12/14/2021 3.9 3.5 - 5.0 g/dL Final      Lab Results   Component Value Date    WBC 9.7 12/14/2021     Lab Results   Component Value Date    RBC 4.58 12/14/2021     Lab Results   Component Value Date    HGB 13.6 12/14/2021     Lab Results   Component Value Date    HCT 42.1 12/14/2021     No components found for: MCT  Lab Results   Component Value Date    MCV 92 12/14/2021     Lab Results   Component Value Date    MCH 29.7 12/14/2021     Lab Results   Component Value Date    MCHC 32.3 12/14/2021     Lab Results   Component Value Date    RDW 14.6 12/14/2021     Lab Results   Component Value Date     12/14/2021      Lab Results   Component Value Date    A1C 5.9 03/17/2010      TSH   Date Value Ref Range Status   09/21/2021 2.35 0.30 - 5.00 uIU/mL Final      No results found for: VITDT                Impression:  Encounter Diagnoses   Name Primary?     Hematoma of leg, right, subsequent encounter Yes     Calf ulcer, left, with fat layer exposed (H)      Current use of long term anticoagulation      Long term systemic steroid user      Dependent edema      Current chronic use of systemic steroids      PAD (peripheral artery disease) (H)         8/1/2022 right lateral leg          6/2022 left calf             Are any of these wounds new today: No; Location: na    Assessment/Plan:          1. Debridement: na     2.  Wound treatment: wound treatment will include irrigation and dressings to  promote autolytic debridement which will include:lotion 1-2 times per day. No dressings.     If for some reason the patient is not able to get their dressing(s) changed as outlined above (due to illness, lack of supplies, lack of help) please do the following: remove old, soiled dressings; wash the wounds with saline; pat dry; apply ABD pad or other absorbant pad and secure with rolled gauze; avoid tape directly on your skin; patient instructed to call the clinic as soon as possible to let us know what the current issues are in receiving wound care. Stable            3. Edema: PAD; no edema; elevation 2-4 times per day. The compression wraps were applied today in clinic.     If a 2 layer or 4 layer compression wrap is being used; these are safe to have on for ONLY 7 days. If for some reason the patient is not able to get the wrap(s) changed (due to illness; lack of supplies, lack of help, lack of transportation) please do the following: unwrap the old 2 or 4 layer compression wrap; avoid using scissors as you could cut your skin and cause wounds; use tubular compression when available. Call to reschedule your home care or clinic visit appointment as soon as possible.  Stable            4. Nutrition: healthy weight           5. Offloading: na     Patient will follow up with me PRN weeks for reevaluation. They were instructed to call the clinic sooner with any signs or symptoms of infection or any further questions/concerns. Answered all questions.          Makayla Huynh DNP, RN, CNP, CWOCN, CFCN, CLT  [facility]   [phone]        This note was electronically signed by Makayla Huynh NP

## 2022-08-01 NOTE — PATIENT INSTRUCTIONS
Your wounds are healed    No dressings needed    Lotion to arms and legs 1-2 times per day    Elevate the legs 2-4 times per day for 20-30 minutes    Shower per usual routine

## 2022-08-16 ENCOUNTER — TRANSFERRED RECORDS (OUTPATIENT)
Dept: HEALTH INFORMATION MANAGEMENT | Facility: CLINIC | Age: 87
End: 2022-08-16

## 2022-08-25 DIAGNOSIS — K21.00 GASTROESOPHAGEAL REFLUX DISEASE WITH ESOPHAGITIS WITHOUT HEMORRHAGE: ICD-10-CM

## 2022-08-26 RX ORDER — OMEPRAZOLE 40 MG/1
CAPSULE, DELAYED RELEASE ORAL
Qty: 180 CAPSULE | Refills: 2 | Status: SHIPPED | OUTPATIENT
Start: 2022-08-26 | End: 2023-01-04

## 2022-08-26 NOTE — TELEPHONE ENCOUNTER
"Last Written Prescription Date:  8/30/21  Last Fill Quantity: 180,  # refills: 3   Last office visit provider:  4/25/22     Requested Prescriptions   Pending Prescriptions Disp Refills     omeprazole (PRILOSEC) 40 MG DR capsule [Pharmacy Med Name: OMEPRAZOLE DR 40 MG CAPSULE] 180 capsule 3     Sig: TAKE 1 CAPSULE BY MOUTH TWICE A DAY       PPI Protocol Passed - 8/26/2022 11:53 AM        Passed - Not on Clopidogrel (unless Pantoprazole ordered)        Passed - No diagnosis of osteoporosis on record        Passed - Recent (12 mo) or future (30 days) visit within the authorizing provider's specialty     Patient has had an office visit with the authorizing provider or a provider within the authorizing providers department within the previous 12 mos or has a future within next 30 days. See \"Patient Info\" tab in inbasket, or \"Choose Columns\" in Meds & Orders section of the refill encounter.              Passed - Medication is active on med list        Passed - Patient is age 18 or older        Passed - No active pregnacy on record        Passed - No positive pregnancy test in past 12 months             Yemi Jackson RN 08/26/22 11:53 AM  "

## 2022-08-29 ENCOUNTER — HOSPITAL ENCOUNTER (OUTPATIENT)
Dept: GENERAL RADIOLOGY | Facility: HOSPITAL | Age: 87
Discharge: HOME | End: 2022-08-29
Attending: INTERNAL MEDICINE | Admitting: INTERNAL MEDICINE
Payer: MEDICARE

## 2022-08-29 ENCOUNTER — OFFICE VISIT (OUTPATIENT)
Dept: INTERNAL MEDICINE | Facility: CLINIC | Age: 87
End: 2022-08-29
Payer: MEDICARE

## 2022-08-29 VITALS
DIASTOLIC BLOOD PRESSURE: 85 MMHG | HEIGHT: 62 IN | SYSTOLIC BLOOD PRESSURE: 145 MMHG | BODY MASS INDEX: 24.14 KG/M2 | WEIGHT: 131.2 LBS | HEART RATE: 68 BPM | TEMPERATURE: 97.9 F | OXYGEN SATURATION: 96 % | RESPIRATION RATE: 18 BRPM

## 2022-08-29 DIAGNOSIS — R06.00 DYSPNEA, UNSPECIFIED TYPE: ICD-10-CM

## 2022-08-29 DIAGNOSIS — R53.83 FATIGUE, UNSPECIFIED TYPE: Primary | ICD-10-CM

## 2022-08-29 DIAGNOSIS — M35.3 PMR (POLYMYALGIA RHEUMATICA) (H): ICD-10-CM

## 2022-08-29 DIAGNOSIS — M17.0 PRIMARY OSTEOARTHRITIS OF BOTH KNEES: ICD-10-CM

## 2022-08-29 DIAGNOSIS — R63.4 WEIGHT LOSS: ICD-10-CM

## 2022-08-29 DIAGNOSIS — E78.2 MIXED HYPERLIPIDEMIA: ICD-10-CM

## 2022-08-29 DIAGNOSIS — M25.50 MULTIPLE JOINT PAIN: ICD-10-CM

## 2022-08-29 DIAGNOSIS — Z79.899 OTHER LONG TERM (CURRENT) DRUG THERAPY: ICD-10-CM

## 2022-08-29 LAB
ALBUMIN SERPL BCG-MCNC: 4.6 G/DL (ref 3.5–5.2)
ALP SERPL-CCNC: 75 U/L (ref 35–104)
ALT SERPL W P-5'-P-CCNC: 20 U/L (ref 10–35)
ANION GAP SERPL CALCULATED.3IONS-SCNC: 14 MMOL/L (ref 7–15)
AST SERPL W P-5'-P-CCNC: 25 U/L (ref 10–35)
BASOPHILS # BLD AUTO: 0 10E3/UL (ref 0–0.2)
BASOPHILS NFR BLD AUTO: 0 %
BILIRUB SERPL-MCNC: 0.4 MG/DL
BNP SERPL-MCNC: 71 PG/ML (ref 0–167)
BUN SERPL-MCNC: 33.5 MG/DL (ref 8–23)
CALCIUM SERPL-MCNC: 11.1 MG/DL (ref 8.2–9.6)
CHLORIDE SERPL-SCNC: 96 MMOL/L (ref 98–107)
CHOLEST SERPL-MCNC: 305 MG/DL
CK SERPL-CCNC: 45 U/L (ref 26–192)
CREAT SERPL-MCNC: 1.08 MG/DL (ref 0.51–0.95)
CRP SERPL-MCNC: 10.4 MG/L
DEPRECATED CALCIDIOL+CALCIFEROL SERPL-MC: 30 UG/L (ref 20–75)
DEPRECATED HCO3 PLAS-SCNC: 26 MMOL/L (ref 22–29)
EOSINOPHIL # BLD AUTO: 0 10E3/UL (ref 0–0.7)
EOSINOPHIL NFR BLD AUTO: 0 %
ERYTHROCYTE [DISTWIDTH] IN BLOOD BY AUTOMATED COUNT: 14.2 % (ref 10–15)
ERYTHROCYTE [SEDIMENTATION RATE] IN BLOOD BY WESTERGREN METHOD: 8 MM/HR (ref 0–20)
GFR SERPL CREATININE-BSD FRML MDRD: 48 ML/MIN/1.73M2
GLUCOSE SERPL-MCNC: 102 MG/DL (ref 70–99)
HCT VFR BLD AUTO: 44.1 % (ref 35–47)
HDLC SERPL-MCNC: 68 MG/DL
HGB BLD-MCNC: 14.3 G/DL (ref 11.7–15.7)
IMM GRANULOCYTES # BLD: 0.1 10E3/UL
IMM GRANULOCYTES NFR BLD: 1 %
LDLC SERPL CALC-MCNC: 197 MG/DL
LYMPHOCYTES # BLD AUTO: 3.1 10E3/UL (ref 0.8–5.3)
LYMPHOCYTES NFR BLD AUTO: 28 %
MAGNESIUM SERPL-MCNC: 1.9 MG/DL (ref 1.7–2.3)
MCH RBC QN AUTO: 29.8 PG (ref 26.5–33)
MCHC RBC AUTO-ENTMCNC: 32.4 G/DL (ref 31.5–36.5)
MCV RBC AUTO: 92 FL (ref 78–100)
MONOCYTES # BLD AUTO: 0.9 10E3/UL (ref 0–1.3)
MONOCYTES NFR BLD AUTO: 8 %
NEUTROPHILS # BLD AUTO: 6.9 10E3/UL (ref 1.6–8.3)
NEUTROPHILS NFR BLD AUTO: 62 %
NONHDLC SERPL-MCNC: 237 MG/DL
PLATELET # BLD AUTO: 242 10E3/UL (ref 150–450)
POTASSIUM SERPL-SCNC: 3.7 MMOL/L (ref 3.4–5.3)
PROT SERPL-MCNC: 7.1 G/DL (ref 6.4–8.3)
RBC # BLD AUTO: 4.8 10E6/UL (ref 3.8–5.2)
SODIUM SERPL-SCNC: 136 MMOL/L (ref 136–145)
TRIGL SERPL-MCNC: 199 MG/DL
TSH SERPL DL<=0.005 MIU/L-ACNC: 2.29 UIU/ML (ref 0.3–4.2)
VIT B12 SERPL-MCNC: 526 PG/ML (ref 232–1245)
WBC # BLD AUTO: 11.1 10E3/UL (ref 4–11)

## 2022-08-29 PROCEDURE — 71046 X-RAY EXAM CHEST 2 VIEWS: CPT | Mod: FY

## 2022-08-29 PROCEDURE — 0064A COVID-19,PF,MODERNA (18+ YRS BOOSTER .25ML): CPT | Performed by: INTERNAL MEDICINE

## 2022-08-29 PROCEDURE — 85652 RBC SED RATE AUTOMATED: CPT | Performed by: INTERNAL MEDICINE

## 2022-08-29 PROCEDURE — 80061 LIPID PANEL: CPT | Performed by: INTERNAL MEDICINE

## 2022-08-29 PROCEDURE — 83735 ASSAY OF MAGNESIUM: CPT | Performed by: INTERNAL MEDICINE

## 2022-08-29 PROCEDURE — 82607 VITAMIN B-12: CPT | Performed by: INTERNAL MEDICINE

## 2022-08-29 PROCEDURE — 83921 ORGANIC ACID SINGLE QUANT: CPT | Performed by: INTERNAL MEDICINE

## 2022-08-29 PROCEDURE — 80050 GENERAL HEALTH PANEL: CPT | Performed by: INTERNAL MEDICINE

## 2022-08-29 PROCEDURE — 86140 C-REACTIVE PROTEIN: CPT | Performed by: INTERNAL MEDICINE

## 2022-08-29 PROCEDURE — 36415 COLL VENOUS BLD VENIPUNCTURE: CPT | Performed by: INTERNAL MEDICINE

## 2022-08-29 PROCEDURE — 83880 ASSAY OF NATRIURETIC PEPTIDE: CPT | Performed by: INTERNAL MEDICINE

## 2022-08-29 PROCEDURE — 82306 VITAMIN D 25 HYDROXY: CPT | Performed by: INTERNAL MEDICINE

## 2022-08-29 PROCEDURE — 99215 OFFICE O/P EST HI 40 MIN: CPT | Mod: 25 | Performed by: INTERNAL MEDICINE

## 2022-08-29 PROCEDURE — 82550 ASSAY OF CK (CPK): CPT | Performed by: INTERNAL MEDICINE

## 2022-08-29 PROCEDURE — 91306 COVID-19,PF,MODERNA (18+ YRS BOOSTER .25ML): CPT | Performed by: INTERNAL MEDICINE

## 2022-08-29 NOTE — PROGRESS NOTES
Wt Readings from Last 20 Encounters:   08/29/22 59.5 kg (131 lb 3.2 oz)   08/01/22 67.1 kg (148 lb)   07/11/22 67.1 kg (148 lb)   06/07/22 67.5 kg (148 lb 12.8 oz)   04/25/22 65.6 kg (144 lb 11.2 oz)   02/28/22 65.3 kg (144 lb)   02/14/22 66.7 kg (147 lb)   01/12/22 67.4 kg (148 lb 9.6 oz)   01/03/22 66.7 kg (147 lb)   12/14/21 67.6 kg (149 lb)   09/21/21 66.2 kg (146 lb)   07/27/21 69.5 kg (153 lb 3.2 oz)   04/09/21 71.2 kg (157 lb)   09/15/20 67.6 kg (149 lb)   03/09/20 67.1 kg (148 lb)   12/16/19 68 kg (150 lb)   08/19/19 67.6 kg (149 lb)   04/18/19 68 kg (150 lb)   12/17/18 68.5 kg (151 lb)   08/17/18 69.4 kg (153 lb)     BP Readings from Last 20 Encounters:   08/29/22 (!) 145/85   08/01/22 (!) 152/84   07/11/22 (!) 160/82   06/07/22 (!) 144/80   05/18/22 (!) 168/86   04/25/22 (!) 154/84   02/28/22 (!) 156/86   02/14/22 (!) 198/92   01/27/22 (!) 182/84   01/19/22 (!) 172/76   01/12/22 (!) 168/88   01/03/22 (!) 180/90   12/14/21 (!) 152/84   09/21/21 (!) 154/76   07/27/21 (!) 140/70   04/09/21 138/86   09/15/20 132/84   03/09/20 138/70   12/16/19 136/84      Pulse Readings from Last 20 Encounters:   08/29/22 68   08/01/22 80   07/11/22 80   06/07/22 72   05/18/22 76   04/25/22 75   02/28/22 80   02/14/22 81   01/27/22 88   01/19/22 92   01/12/22 80   01/03/22 85   12/14/21 74   09/21/21 70   07/27/21 76   04/09/21 77   09/15/20 76   03/09/20 70   12/16/19 72     SpO2 Readings from Last 20 Encounters:   08/29/22 96%   04/25/22 98%   02/14/22 97%   01/03/22 97%   12/14/21 98%   09/21/21 97%   04/09/21 95%   09/15/20 96%   12/16/19 96%

## 2022-08-29 NOTE — PROGRESS NOTES
[facility] - [facility]ists    Comprehensive and complex medical care - Chronic disease management - Shared decision making - Care coordination - Compassionate care    Patient advocacy - Rational deprescribing - Minimally disruptive medicine - Ethical focus - Customized care         Date of Service: 8/29/2022  Primary Provider: Jersey Metcalf    Patient Care Team:  Jersey Metcalf MD as PCP - General  Jersey Metcalf MD as Assigned PCP  Giovanny Koo MD as MD (Orthopaedic Surgery)  Malissa Mcclellan as MD (Dermatology)  Vannesa Madrigal MD as MD (Ophthalmology)  Makayla Huynh NP as Assigned Surgical Provider  Hali Appiah MD as Assigned Heart and Vascular Provider          Patient's Pharmacy:    [facility]/pharmacy #[id number] - [address] [address]  Phone: [phone] Fax: [phone]     Patient's Contacts:  Name Home Phone Work Phone Mobile Phone Relationship Lgl Ej   SHIMON CROSS [phone]   Nephew      Patient's Insurance:    Payor: MEDICARE / Plan: MEDICARE / Product Type: Medicare /            Active Problem List:  Problem List as of 8/29/2022 Reviewed: 4/25/2022  7:57 AM by Jersey Metcalf MD       High    Former smoker-quit at age 30    DNR (do not resuscitate)       Medium    PAD (peripheral artery disease) (H)       Low    Unspecified glaucoma    Hyperparathyroidism (H)    GERD (gastroesophageal reflux disease)    Esophageal dysphagia    Insomnia    Senile purpura (H)       Other    HTN (hypertension)    Anxiety    Presbyesophagus       Other    OA (osteoarthritis)-fingers    PMR (polymyalgia rheumatica) (H) - August 2016    Chronic kidney disease, stage 3 (H)       Other    HLD (hyperlipidemia)           Current Outpatient Medications   Medication Instructions     amLODIPine (NORVASC) 2.5 MG tablet TAKE 1 TABLET (2.5 MG TOTAL) BY MOUTH DAILY. FOR HIGH BLOOD PRESSURE.     aspirin - buffered (ASCRIPTIN) 325 MG  TABS tablet 325 mg, Oral, DAILY     dorzolamide (TRUSOPT) 2 % ophthalmic solution 1 drop, 3 TIMES DAILY     metoprolol tartrate (LOPRESSOR) 50 MG tablet TAKE 1 TABLET BY MOUTH TWICE A DAY     omeprazole (PRILOSEC) 40 MG DR capsule TAKE 1 CAPSULE BY MOUTH TWICE A DAY     predniSONE (DELTASONE) 5 MG tablet TAKE 2 TABLETS BY MOUTH EVERY DAY     timolol maleate (ISTALOL) 0.5 % DrpD DAILY     triamterene-HCTZ (DYAZIDE) 37.5-25 MG capsule 1 capsule, Oral, EVERY MORNING      Social History     Social History Narrative    Moved out of her home in 2018 and moved to the [facility].    , no children, lives alone.    Volunteer at [employer] in the past.       Subjective:     Manuela Olivas is a 89 year old female who comes in today for:    Chief Complaint   Patient presents with     Clinic Care Coordination - Follow-up     Patient's son states that the patient is very fatigued, cannot do much of what she used to do, he thinks it is related to the statin that she is on. Son states that he thinks maybe she is low in certain vitamins, would like some labs. Increased fatigue began about 3 weeks ago.       Patient comes in today with her nephew.  She has had some significant changes in the negative direction.    We reviewed the swelling and sores on her legs.  Her legs sores have healed up.  She is happy with how this part is going.    She has had a fair amount of pain in her knees.  She has known arthritis here.  She has had difficulty getting around more.    We reviewed her hip pain.  She has been having some pain in her right hip where she had the hip replacement.  She has been seen at [facility] for this.    She feels tired and exhausted.  This is more of a symptom for her recently.  She does not have the same amount of energy.  She does not necessarily complain of shortness of breath.    We reviewed her hyperlipidemia and they would like to try off the statin to see if this would help her overall.  I have no  "problems with trying this out.    She is generally using a walker in her home.    She denies any cough.    Her weight is down quite significantly.  She has been eating fine.  She denies any stomach pain, diarrhea, or constipation.  No blood in her stools.    We reviewed her other issues noted in the assessment but not specifically addressed in the HPI above.     Objective:     Wt Readings from Last 3 Encounters:   08/29/22 59.5 kg (131 lb 3.2 oz)   08/01/22 67.1 kg (148 lb)   07/11/22 67.1 kg (148 lb)     BP Readings from Last 3 Encounters:   08/29/22 (!) 145/85   08/01/22 (!) 152/84   07/11/22 (!) 160/82     BP (!) 145/85 (BP Location: Left arm, Patient Position: Sitting, Cuff Size: Adult Regular)   Pulse 68   Temp 97.9  F (36.6  C)   Resp 18   Ht 1.581 m (5' 2.25\")   Wt 59.5 kg (131 lb 3.2 oz)   LMP  (LMP Unknown)   SpO2 96%   Breastfeeding No   BMI 23.80 kg/m     The patient is mildly uncomfortable, no acute distress.  Mood good.  Insight good.  Eyes are nonicteric.  Neck is supple without mass.  No cervical adenopathy.  No thyromegaly. Heart regular rate and rhythm.  Lungs clear to auscultation bilaterally.  Respiratory effort is good.  Abdomen soft and nontender.  No hepatosplenomegaly.  Extremities no edema.      Diagnostics:     Office Visit on 06/07/2022   Component Date Value Ref Range Status     Culture 06/07/2022 No Growth   Final     Gram Stain Result 06/07/2022 No organisms seen   Final     Gram Stain Result 06/07/2022 1+ WBC seen   Final       Results for orders placed or performed during the hospital encounter of 08/29/22   XR Chest 2 Views     Status: None    Narrative    EXAM: XR CHEST 2 VIEWS  LOCATION: [facility]  DATE/TIME: 8/29/2022 8:37 AM    INDICATION:  Weight loss  COMPARISON: None.      Impression    IMPRESSION: Negative chest. Lungs clear.   Results for orders placed or performed in visit on 08/29/22   CK total     Status: Normal   Result Value Ref Range "    CK 45 26 - 192 U/L   Vitamin D Deficiency     Status: Normal   Result Value Ref Range    Vitamin D, Total (25-Hydroxy) 30 20 - 75 ug/L    Narrative    Season, race, dietary intake, and treatment affect the concentration of 25-hydroxy-Vitamin D. Values may decrease during winter months and increase during summer months. Values 20-29 ug/L may indicate Vitamin D insufficiency and values <20 ug/L may indicate Vitamin D deficiency.    Vitamin D determination is routinely performed by an immunoassay specific for 25 hydroxyvitamin D3.  If an individual is on vitamin D2(ergocalciferol) supplementation, please specify 25 OH vitamin D2 and D3 level determination by LCMSMS test VITD23.     TSH     Status: Normal   Result Value Ref Range    TSH 2.29 0.30 - 4.20 uIU/mL   B-Type Natriuretic Peptide ([facility] Only)     Status: Normal   Result Value Ref Range    BNP 71 0 - 167 pg/mL   CRP inflammation     Status: Abnormal   Result Value Ref Range    CRP Inflammation 10.40 (H) <5.00 mg/L   Erythrocyte sedimentation rate auto     Status: Normal   Result Value Ref Range    Erythrocyte Sedimentation Rate 8 0 - 20 mm/hr   Lipid panel reflex to direct LDL Fasting     Status: Abnormal   Result Value Ref Range    Cholesterol 305 (H) <200 mg/dL    Triglycerides 199 (H) <150 mg/dL    Direct Measure HDL 68 >=50 mg/dL    LDL Cholesterol Calculated 197 (H) <=100 mg/dL    Non HDL Cholesterol 237 (H) <130 mg/dL    Narrative    Cholesterol  Desirable:  <200 mg/dL    Triglycerides  Normal:  Less than 150 mg/dL  Borderline High:  150-199 mg/dL  High:  200-499 mg/dL  Very High:  Greater than or equal to 500 mg/dL    Direct Measure HDL  Female:  Greater than or equal to 50 mg/dL   Male:  Greater than or equal to 40 mg/dL    LDL Cholesterol  Desirable:  <100mg/dL  Above Desirable:  100-129 mg/dL   Borderline High:  130-159 mg/dL   High:  160-189 mg/dL   Very High:  >= 190 mg/dL    Non HDL Cholesterol  Desirable:  130 mg/dL  Above Desirable:  130-159  mg/dL  Borderline High:  160-189 mg/dL  High:  190-219 mg/dL  Very High:  Greater than or equal to 220 mg/dL   Magnesium     Status: Normal   Result Value Ref Range    Magnesium 1.9 1.7 - 2.3 mg/dL   Comprehensive metabolic panel     Status: Abnormal   Result Value Ref Range    Sodium 136 136 - 145 mmol/L    Potassium 3.7 3.4 - 5.3 mmol/L    Creatinine 1.08 (H) 0.51 - 0.95 mg/dL    Urea Nitrogen 33.5 (H) 8.0 - 23.0 mg/dL    Chloride 96 (L) 98 - 107 mmol/L    Carbon Dioxide (CO2) 26 22 - 29 mmol/L    Anion Gap 14 7 - 15 mmol/L    Glucose 102 (H) 70 - 99 mg/dL    Calcium 11.1 (H) 8.2 - 9.6 mg/dL    Protein Total 7.1 6.4 - 8.3 g/dL    Albumin 4.6 3.5 - 5.2 g/dL    Bilirubin Total 0.4 <=1.2 mg/dL    Alkaline Phosphatase 75 35 - 104 U/L    AST 25 10 - 35 U/L    ALT 20 10 - 35 U/L    GFR Estimate 48 (L) >60 mL/min/1.73m2   Vitamin B12     Status: Normal   Result Value Ref Range    Vitamin B12 526 232 - 1,245 pg/mL   Methylmalonic Acid     Status: Normal   Result Value Ref Range    Methylmalonic Acid 0.33 0.00 - 0.40 umol/L    Narrative    This test was developed and its performance characteristics determined by the [facility],  Special Chemistry Laboratory. It has not been cleared or approved by the FDA. The laboratory is regulated under CLIA as qualified to perform high-complexity testing. This test is used for clinical purposes. It should not be regarded as investigational or for research.   CBC with platelets and differential     Status: Abnormal   Result Value Ref Range    WBC Count 11.1 (H) 4.0 - 11.0 10e3/uL    RBC Count 4.80 3.80 - 5.20 10e6/uL    Hemoglobin 14.3 11.7 - 15.7 g/dL    Hematocrit 44.1 35.0 - 47.0 %    MCV 92 78 - 100 fL    MCH 29.8 26.5 - 33.0 pg    MCHC 32.4 31.5 - 36.5 g/dL    RDW 14.2 10.0 - 15.0 %    Platelet Count 242 150 - 450 10e3/uL    % Neutrophils 62 %    % Lymphocytes 28 %    % Monocytes 8 %    % Eosinophils 0 %    % Basophils 0 %    % Immature Granulocytes 1 %     Absolute Neutrophils 6.9 1.6 - 8.3 10e3/uL    Absolute Lymphocytes 3.1 0.8 - 5.3 10e3/uL    Absolute Monocytes 0.9 0.0 - 1.3 10e3/uL    Absolute Eosinophils 0.0 0.0 - 0.7 10e3/uL    Absolute Basophils 0.0 0.0 - 0.2 10e3/uL    Absolute Immature Granulocytes 0.1 <=0.4 10e3/uL   CBC with Platelets & Differential     Status: Abnormal    Narrative    The following orders were created for panel order CBC with Platelets & Differential.  Procedure                               Abnormality         Status                     ---------                               -----------         ------                     CBC with platelets and d...[[id number]]  Abnormal            Final result                 Please view results for these tests on the individual orders.        Assessment:     1. Fatigue, unspecified type    2. Weight loss    3. Multiple joint pain    4. Mixed hyperlipidemia    5. PMR (polymyalgia rheumatica) (H) - August 2016    6. Dyspnea, unspecified type    7. Other long term (current) drug therapy     8. Primary osteoarthritis of both knees        Plan:     1. Blood work done today.  X-ray done today.  2. COVID booster done today.  3. Try off of cholesterol medication.  4. Close follow-up.  5. May need to do further evaluation of her weight loss.  6. Continue current medications otherwise.  7. Follow up sooner if issues.    Orders Placed This Encounter   Procedures     XR Chest 2 Views     COVID-19,PF,MODERNA (18+ YRS BOOSTER .25ML)     Methylmalonic Acid     Vitamin B12     Comprehensive metabolic panel     Magnesium     Lipid panel reflex to direct LDL Fasting     Erythrocyte sedimentation rate auto     CRP inflammation     B-Type Natriuretic Peptide ([facility] Only)     TSH     Vitamin D Deficiency     CK total     CBC with platelets and differential     CBC with Platelets & Differential        40 minutes or greater was spent today on the patient's care on the day of service.      This includes time for chart  preparation, reviewing medical tests done before or during the visit, talking with the patient, review of quality indicators, required documentation, and other elements of care.        Jersey Metcalf MD  General Internal Medicine  [facility]    Return in about 6 weeks (around 10/10/2022), or if symptoms worsen or fail to improve, for follow up visit.     Future Appointments   Date Time Provider Department Center   9/18/2022 10:00 AM [facility]   10/10/2022  8:30 AM [facility]

## 2022-09-01 LAB — METHYLMALONATE SERPL-SCNC: 0.33 UMOL/L (ref 0–0.4)

## 2022-09-06 ENCOUNTER — TELEPHONE (OUTPATIENT)
Dept: INTERNAL MEDICINE | Facility: CLINIC | Age: 87
End: 2022-09-06

## 2022-09-06 DIAGNOSIS — R79.82 ELEVATED C-REACTIVE PROTEIN (CRP): ICD-10-CM

## 2022-09-06 DIAGNOSIS — D72.829 LEUKOCYTOSIS, UNSPECIFIED TYPE: ICD-10-CM

## 2022-09-06 DIAGNOSIS — R63.4 WEIGHT LOSS: Primary | ICD-10-CM

## 2022-09-06 NOTE — TELEPHONE ENCOUNTER
Contacted pt and relayed Dr. Metcalf's message. Scheduled future appt. Pt verbalized understanding. Would like to do the CT scan.

## 2022-09-06 NOTE — TELEPHONE ENCOUNTER
Please call patient -    ______________________________________________________________________     Home phone:  [phone] (home)     Cell phone:   No relevant phone numbers on file.       Other contacts:  Name Home Phone Work Phone Mobile Phone Relationship Lgl Grd   SHIMON CROSS [phone]   Nephew       ______________________________________________________________________     A muscle enzyme test (which can be affected by cholesterol medication) was normal.     Your vitamin D level was normal.  Your thyroid tests are excellent.     No signs of congestive heart failure (CHF) in blood work.  Chest x-ray (CXR) normal.    Your blood counts are normal and you are not anemic.  Your vitamin B12 levels are very good.     Cholesterol high but still recommend going off cholesterol medication to see if this helps for her symptoms.    Kidneys and liver are okay.  Maybe slightly dehydrated.    One marker of inflammation normal and one slightly elevated.    With the weight loss, we could consider a CT scan of her chest abdomen and pelvis to see if there is anything internally going on.    See what she would like to do.  We should see her back in 5-6 weeks irregardless.  Thank you,    Jersey Metcalf MD  [facility]  9/6/2022, 12:47 PM     ______________________________________________________________________    Recent Results (from the past 240 hour(s))   CK total    Collection Time: 08/29/22  8:37 AM   Result Value Ref Range    CK 45 26 - 192 U/L   Vitamin D Deficiency    Collection Time: 08/29/22  8:37 AM   Result Value Ref Range    Vitamin D, Total (25-Hydroxy) 30 20 - 75 ug/L   TSH    Collection Time: 08/29/22  8:37 AM   Result Value Ref Range    TSH 2.29 0.30 - 4.20 uIU/mL   CRP inflammation    Collection Time: 08/29/22  8:37 AM   Result Value Ref Range    CRP Inflammation 10.40 (H) <5.00 mg/L   Erythrocyte sedimentation rate auto    Collection Time: 08/29/22  8:37 AM   Result Value Ref  Range    Erythrocyte Sedimentation Rate 8 0 - 20 mm/hr   Lipid panel reflex to direct LDL Fasting    Collection Time: 08/29/22  8:37 AM   Result Value Ref Range    Cholesterol 305 (H) <200 mg/dL    Triglycerides 199 (H) <150 mg/dL    Direct Measure HDL 68 >=50 mg/dL    LDL Cholesterol Calculated 197 (H) <=100 mg/dL    Non HDL Cholesterol 237 (H) <130 mg/dL   Magnesium    Collection Time: 08/29/22  8:37 AM   Result Value Ref Range    Magnesium 1.9 1.7 - 2.3 mg/dL   Comprehensive metabolic panel    Collection Time: 08/29/22  8:37 AM   Result Value Ref Range    Sodium 136 136 - 145 mmol/L    Potassium 3.7 3.4 - 5.3 mmol/L    Creatinine 1.08 (H) 0.51 - 0.95 mg/dL    Urea Nitrogen 33.5 (H) 8.0 - 23.0 mg/dL    Chloride 96 (L) 98 - 107 mmol/L    Carbon Dioxide (CO2) 26 22 - 29 mmol/L    Anion Gap 14 7 - 15 mmol/L    Glucose 102 (H) 70 - 99 mg/dL    Calcium 11.1 (H) 8.2 - 9.6 mg/dL    Protein Total 7.1 6.4 - 8.3 g/dL    Albumin 4.6 3.5 - 5.2 g/dL    Bilirubin Total 0.4 <=1.2 mg/dL    Alkaline Phosphatase 75 35 - 104 U/L    AST 25 10 - 35 U/L    ALT 20 10 - 35 U/L    GFR Estimate 48 (L) >60 mL/min/1.73m2   Vitamin B12    Collection Time: 08/29/22  8:37 AM   Result Value Ref Range    Vitamin B12 526 232 - 1,245 pg/mL   Methylmalonic Acid    Collection Time: 08/29/22  8:37 AM   Result Value Ref Range    Methylmalonic Acid 0.33 0.00 - 0.40 umol/L   CBC with platelets and differential    Collection Time: 08/29/22  8:37 AM   Result Value Ref Range    WBC Count 11.1 (H) 4.0 - 11.0 10e3/uL    RBC Count 4.80 3.80 - 5.20 10e6/uL    Hemoglobin 14.3 11.7 - 15.7 g/dL    Hematocrit 44.1 35.0 - 47.0 %    MCV 92 78 - 100 fL    MCH 29.8 26.5 - 33.0 pg    MCHC 32.4 31.5 - 36.5 g/dL    RDW 14.2 10.0 - 15.0 %    Platelet Count 242 150 - 450 10e3/uL    % Neutrophils 62 %    % Lymphocytes 28 %    % Monocytes 8 %    % Eosinophils 0 %    % Basophils 0 %    % Immature Granulocytes 1 %    Absolute Neutrophils 6.9 1.6 - 8.3 10e3/uL    Absolute  Lymphocytes 3.1 0.8 - 5.3 10e3/uL    Absolute Monocytes 0.9 0.0 - 1.3 10e3/uL    Absolute Eosinophils 0.0 0.0 - 0.7 10e3/uL    Absolute Basophils 0.0 0.0 - 0.2 10e3/uL    Absolute Immature Granulocytes 0.1 <=0.4 10e3/uL   B-Type Natriuretic Peptide ([facility] Only)    Collection Time: 08/29/22  8:45 AM   Result Value Ref Range    BNP 71 0 - 167 pg/mL      ______________________________________________________________________     Pertinent radiology for this visit includes the following:    XR Chest 2 Views  Narrative: EXAM: XR CHEST 2 VIEWS  LOCATION: [facility]  DATE/TIME: 8/29/2022 8:37 AM    INDICATION:  Weight loss  COMPARISON: None.  Impression: IMPRESSION: Negative chest. Lungs clear.      ______________________________________________________________________

## 2022-09-08 NOTE — PATIENT INSTRUCTIONS
Future Appointments   Date Time Provider Department Center   9/18/2022 10:00 AM [facility]   10/10/2022  8:30 AM [facility]

## 2022-09-18 ENCOUNTER — HOSPITAL ENCOUNTER (OUTPATIENT)
Dept: CT IMAGING | Facility: CLINIC | Age: 87
Discharge: HOME | End: 2022-09-18
Attending: INTERNAL MEDICINE | Admitting: INTERNAL MEDICINE
Payer: MEDICARE

## 2022-09-18 DIAGNOSIS — R63.4 WEIGHT LOSS: ICD-10-CM

## 2022-09-18 DIAGNOSIS — D72.829 LEUKOCYTOSIS, UNSPECIFIED TYPE: ICD-10-CM

## 2022-09-18 DIAGNOSIS — R79.82 ELEVATED C-REACTIVE PROTEIN (CRP): ICD-10-CM

## 2022-09-18 PROCEDURE — G1010 CDSM STANSON: HCPCS

## 2022-09-18 PROCEDURE — 250N000011 HC RX IP 250 OP 636: Performed by: INTERNAL MEDICINE

## 2022-09-18 RX ORDER — IOPAMIDOL 755 MG/ML
100 INJECTION, SOLUTION INTRAVASCULAR ONCE
Status: COMPLETED | OUTPATIENT
Start: 2022-09-18 | End: 2022-09-18

## 2022-09-18 RX ADMIN — IOPAMIDOL 75 ML: 755 INJECTION, SOLUTION INTRAVENOUS at 10:10

## 2022-09-19 ENCOUNTER — TELEPHONE (OUTPATIENT)
Dept: INTERNAL MEDICINE | Facility: CLINIC | Age: 87
End: 2022-09-19

## 2022-09-19 NOTE — TELEPHONE ENCOUNTER
Please call patient -    ______________________________________________________________________     Home Phone:  [phone] (home)     Cell phone:   No relevant phone numbers on file.     ______________________________________________________________________     II am calling her about her CT scan results.    There were no signs of cancer on the scan.    There is a kidney stone in her urinary system on the left.  Please see if she has any symptoms related to this.  She did not have at her recent visit.    There is a lot of fluid near her hip and bone changes in her right hip.  One possible concern is that she has an infection around the hip.    I would recommend that she be seen by her orthopedist as soon as possible related to this.  Please help her as necessary.    Future Appointments   Date Time Provider Department Center   10/10/2022  8:30 AM Jersey Metcalf MD [facility]     Follow up sooner if issues.    Jersey Metcalf MD  [facility]  9/19/2022, 11:10 AM   ______________________________________________________________________    Pertinent radiology for this visit includes the following:    CT Chest/Abdomen/Pelvis w Contrast  Narrative: EXAM: CT CHEST/ABDOMEN/PELVIS WITH CONTRAST  LOCATION: [facility]  DATE/TIME: 09/18/2022, 10:22 AM    INDICATION: Unexpected weight loss. Elevated CRP and WBC.  COMPARISON: 11/05/2015.  TECHNIQUE: CT scan of the chest, abdomen, and pelvis was performed following injection of IV contrast. Multiplanar reformats were obtained. Dose reduction techniques were used.   CONTRAST: 75 mL Isovue 370.    FINDINGS:   LUNGS AND PLEURA: Scattered calcified granulomas and punctate right lower lobe nodule are unchanged from previous and therefore benign. Lungs are otherwise clear. No pleural effusion.    MEDIASTINUM/AXILLAE: Moderate-sized hiatal hernia.    CORONARY ARTERY CALCIFICATION: Severe.    HEPATOBILIARY: Benign cyst in the  left hepatic lobe.    PANCREAS: Normal.    SPLEEN: Normal.    ADRENAL GLANDS: Normal.    KIDNEYS/BLADDER: Benign renal cysts. 6 mm nonobstructing left renal calculus. There is a 4 x 3 x 6 mm calculus in the proximal left ureter, without current hydronephrosis.    BOWEL: Sigmoid colonic diverticulosis without diverticulitis. No obstruction or inflammatory changes.    LYMPH NODES: Normal.    VASCULATURE: Unremarkable.    PELVIC ORGANS: Normal.    MUSCULOSKELETAL: Right hip arthroplasty. There is new periprosthetic lucency in the proximal right femur, with osteolysis, and expansion of the proximal femoral shaft. Also a large surrounding fluid collection measuring 10 cm in greatest diameter.  Impression: IMPRESSION:  1.  New osteolysis, femoral lucency, and large periprostatic fluid collection surrounding the right hip arthroplasty. Orthopedic consultation recommended.  2.  6 mm proximal left ureteral calculus, without current hydronephrosis.      ______________________________________________________________________

## 2022-09-27 ENCOUNTER — TRANSFERRED RECORDS (OUTPATIENT)
Dept: HEALTH INFORMATION MANAGEMENT | Facility: CLINIC | Age: 87
End: 2022-09-27

## 2022-10-01 ENCOUNTER — HEALTH MAINTENANCE LETTER (OUTPATIENT)
Age: 87
End: 2022-10-01

## 2022-10-05 ENCOUNTER — TRANSITIONAL CARE UNIT VISIT (OUTPATIENT)
Dept: GERIATRICS | Facility: CLINIC | Age: 87
End: 2022-10-05
Payer: MEDICARE

## 2022-10-05 VITALS
BODY MASS INDEX: 22.52 KG/M2 | DIASTOLIC BLOOD PRESSURE: 71 MMHG | OXYGEN SATURATION: 94 % | SYSTOLIC BLOOD PRESSURE: 113 MMHG | TEMPERATURE: 98.4 F | RESPIRATION RATE: 18 BRPM | HEIGHT: 64 IN | HEART RATE: 99 BPM

## 2022-10-05 DIAGNOSIS — Z96.649 HISTORY OF REVISION OF TOTAL HIP ARTHROPLASTY: Primary | ICD-10-CM

## 2022-10-05 DIAGNOSIS — I10 PRIMARY HYPERTENSION: ICD-10-CM

## 2022-10-05 DIAGNOSIS — S72.21XD CLOSED DISPLACED SUBTROCHANTERIC FRACTURE OF RIGHT FEMUR WITH ROUTINE HEALING, SUBSEQUENT ENCOUNTER: ICD-10-CM

## 2022-10-05 DIAGNOSIS — K21.00 GASTROESOPHAGEAL REFLUX DISEASE WITH ESOPHAGITIS, UNSPECIFIED WHETHER HEMORRHAGE: ICD-10-CM

## 2022-10-05 PROCEDURE — 99310 SBSQ NF CARE HIGH MDM 45: CPT | Performed by: FAMILY MEDICINE

## 2022-10-05 RX ORDER — POLYETHYLENE GLYCOL 3350 17 G/17G
17 POWDER, FOR SOLUTION ORAL 2 TIMES DAILY PRN
COMMUNITY

## 2022-10-05 RX ORDER — OXYCODONE HYDROCHLORIDE 5 MG/1
2.5-5 TABLET ORAL EVERY 4 HOURS PRN
COMMUNITY
End: 2022-10-17

## 2022-10-05 RX ORDER — ACETAMINOPHEN 325 MG/1
650 TABLET ORAL 3 TIMES DAILY
COMMUNITY
End: 2022-10-21 | Stop reason: DRUGHIGH

## 2022-10-05 RX ORDER — AMOXICILLIN 250 MG
1 CAPSULE ORAL 2 TIMES DAILY
COMMUNITY

## 2022-10-05 RX ORDER — ASPIRIN 81 MG/1
81 TABLET, CHEWABLE ORAL DAILY
COMMUNITY
End: 2022-10-05

## 2022-10-05 RX ORDER — METHOCARBAMOL 500 MG/1
500 TABLET, FILM COATED ORAL EVERY 6 HOURS PRN
COMMUNITY
End: 2022-10-19

## 2022-10-05 NOTE — LETTER
10/5/2022        RE: Manuela Olivas  [address]  [facility] [id number]        [facility] GERIATRIC SERVICES    Facility:   [facility] ([facility]) [[id number]]   Code Status: FULL CODE      HPI:   Manuela is a 89 year old female who was hospitalized September 29, 2022 secondary to right hip pain.  She was reaching something when she fell backwards onto her right side.  She did hit her head but did not lose loss of consciousness.  The pain was immediate to the right hip and unable to bear weight.  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  X-rays did reveal a right hip arthroplasty in place but a comminuted subtrochanteric fracture involving the proximal right femur which involves the femoral portion of the right hip arthroplasty and no dislocation.  The distal portion of the subtrochanteric fracture fragment is displaced anteriorly with the proximal aspect of the femoral shaft she also does have degenerative changes involving the lower spine and therefore did undergo revision hip arthroplasty.  Her work-up on September 29 did show a sodium 134, potassium 4.2, BUN 26 and creatinine 0.94 with a white cell count of 16.3 and hemoglobin 12.8 and platelets 260.  Her EKG did show sinus tachycardia as well as left ventricular hypertrophy with repolarization abnormality.  His CT of the head did not show any intracranial hemorrhage mass lesions or hydrocephalus.  The CT of the cervical spine did not show any acute fractures or posttraumatic subluxations.  Other chronic medical conditions addressed was her hypertension, CAD, PMR, GERD.  She is now currently in the transitional care minutes which we talked about the roles and the goals of the transitional care unit.  Very nice lady.  Had wonderful conversation and particularly about enjoying the good old days.  She currently lives at the [facility] [place].  She went  to [employer] in [place].  Back in the old days she did like to go bowling as well as golfing.  Her  is now  he worked at .  She also did work at  for about 12 years eventually went to be a  and worked at [employer].  The only blood pressure taken so far was today had a blood pressure 144/77 and no weights she has been afebrile and also on room air.  We talked about her nutrition or appetite she does not feel to be the best we will get dietary involved.  For the pain we talked about her Tylenol as needed she prefer to have that schedule III times a day at 650 mg and 1 extra as needed.  She also does have Robaxin oxycodone as needed.  At home she does use a four-wheel walker.  For anticoagulation she is on a scattered dose of aspirin 81 mg we will just put her on the 325 mg tab once a day.  She also does like her pills crushed.    Past Medical History:  Past Medical History:   Diagnosis Date     Anxiety      Autoimmune hepatitis (H)     Resolved     Breast cyst      Chronic kidney disease, stage 3 (H) 2021     Controlled substance agreement signed 2018     Esophageal dysphagia      Fibrocystic breast      Former smoker      GERD (gastroesophageal reflux disease)      HLD (hyperlipidemia)      HTN (hypertension)      Hypercalcemia      Hyperparathyroidism (H)     Subclinical      Insomnia      OA (osteoarthritis) 2016     PMR (polymyalgia rheumatica) (H)      Presbyesophagus      Senile purpura (H) 2019     Unspecified glaucoma      Unspecified glaucoma(365.9)            Surgical History:  Past Surgical History:   Procedure Laterality Date     BIOPSY BREAST Right      BREAST CYST ASPIRATION Bilateral      CATARACT EXTRACTION, BILATERAL       HYSTERECTOMY  1967     OOPHORECTOMY Right      TOTAL HIP ARTHROPLASTY Right        Family History:   Family History   Problem Relation Age of Onset     Cerebrovascular Disease Mother       "Hyperlipidemia Mother      Coronary Artery Disease Father        Social History:    Social History     Socioeconomic History     Marital status:      Number of children: 0   Tobacco Use     Smoking status: Former Smoker     Smokeless tobacco: Never Used   Vaping Use     Vaping Use: Never used   Substance and Sexual Activity     Alcohol use: No     Drug use: Never     Sexual activity: Never   Social History Narrative    Moved out of her home in 2018 and moved to the [facility].    , no children, lives alone.    Volunteer at [employer] in the past.        REVIEW OF SYSTEM:  Currently she denies any new symptoms of fever cough or cold sore throat postnasal drip allergies shortness of breath dyspnea wheezing chest pain dizziness vertigo nausea vomiting diarrhea dysuria frequency urgency headaches or unusual myalgias or arthralgias.    PHYSICAL EXAM:   Pleasant female in no acute distress.  Head is normocephalic.  Conjunctiva is pink sclerae is clear.  Oropharynx pink and moist.  Speech is clear.  Neck is supple without adenopathy.  Lung sounds are clear throughout.  Cardiovascular S1 is 2 regular rate and rhythm and no lower extremity edema.  Gastrointestinal soft and nontender with positive bowel sounds.  Musculoskeletal the right hip looks good with good CMS to her right leg.  Psychiatric: Pleasant affect.    Vitals:    10/05/22 0856   BP: 113/71   Pulse: 99   Resp: 18   Temp: 98.4  F (36.9  C)   SpO2: 94%   Height: 1.626 m (5' 4\")         Medication List:  Current Outpatient Medications   Medication Sig     acetaminophen (TYLENOL) 325 MG tablet Take 650 mg by mouth 3 times daily     amLODIPine (NORVASC) 2.5 MG tablet TAKE 1 TABLET (2.5 MG TOTAL) BY MOUTH DAILY. FOR HIGH BLOOD PRESSURE.     aspirin (ASA) 325 MG EC tablet Take 325 mg by mouth daily     calcium carbonate-vitamin D (OSCAL W/D) 500-200 MG-UNIT tablet Take 1 tablet by mouth 3 times daily (with meals)     dorzolamide (TRUSOPT) 2 % " ophthalmic solution Place 1 drop into both eyes 2 times daily     methocarbamol (ROBAXIN) 500 MG tablet Take 500 mg by mouth every 6 hours as needed for muscle spasms     metoprolol tartrate (LOPRESSOR) 50 MG tablet TAKE 1 TABLET BY MOUTH TWICE A DAY     omeprazole (PRILOSEC) 40 MG DR capsule TAKE 1 CAPSULE BY MOUTH TWICE A DAY     oxyCODONE (ROXICODONE) 5 MG tablet Take 2.5-5 mg by mouth every 4 hours as needed for severe pain     polyethylene glycol (MIRALAX) 17 g packet Take 17 g by mouth 2 times daily     predniSONE (DELTASONE) 5 MG tablet TAKE 2 TABLETS BY MOUTH EVERY DAY     senna-docusate (SENOKOT-S/PERICOLACE) 8.6-50 MG tablet Take 1 tablet by mouth daily     timolol maleate (ISTALOL) 0.5 % DrpD [TIMOLOL MALEATE (ISTALOL) 0.5 % DRPD] Apply to eye Daily at 8:00 am..     triamterene-HCTZ (DYAZIDE) 37.5-25 MG capsule Take 1 capsule by mouth every morning     Current Facility-Administered Medications   Medication     lidocaine (XYLOCAINE) 2 % external gel        Labs:  CBC RESULTS: Recent Labs   Lab Test 08/29/22  0837   WBC 11.1*   RBC 4.80   HGB 14.3   HCT 44.1   MCV 92   MCH 29.8   MCHC 32.4   RDW 14.2        Last Comprehensive Metabolic Panel:  Sodium   Date Value Ref Range Status   08/29/2022 136 136 - 145 mmol/L Final     Potassium   Date Value Ref Range Status   08/29/2022 3.7 3.4 - 5.3 mmol/L Final   12/14/2021 4.0 3.5 - 5.0 mmol/L Final     Chloride   Date Value Ref Range Status   08/29/2022 96 (L) 98 - 107 mmol/L Final   12/14/2021 104 98 - 107 mmol/L Final     Carbon Dioxide (CO2)   Date Value Ref Range Status   08/29/2022 26 22 - 29 mmol/L Final   12/14/2021 23 22 - 31 mmol/L Final     Anion Gap   Date Value Ref Range Status   08/29/2022 14 7 - 15 mmol/L Final   12/14/2021 12 5 - 18 mmol/L Final     Glucose   Date Value Ref Range Status   08/29/2022 102 (H) 70 - 99 mg/dL Final   12/14/2021 84 70 - 125 mg/dL Final     Urea Nitrogen   Date Value Ref Range Status   08/29/2022 33.5 (H) 8.0 - 23.0  mg/dL Final   12/14/2021 20 8 - 28 mg/dL Final     Creatinine   Date Value Ref Range Status   08/29/2022 1.08 (H) 0.51 - 0.95 mg/dL Final     GFR Estimate   Date Value Ref Range Status   08/29/2022 48 (L) >60 mL/min/1.73m2 Final     Comment:     Effective December 21, 2021 eGFRcr in adults is calculated using the 2021 CKD-EPI creatinine equation which includes age and gender (Jannet et al., NE, DOI: 10.1056/IUOFvf6056913)   04/09/2021 59 (L) >60 mL/min/1.73m2 Final     Calcium   Date Value Ref Range Status   08/29/2022 11.1 (H) 8.2 - 9.6 mg/dL Final         Assessment:   (Z96.649) History of revision of total hip arthroplasty  (primary encounter diagnosis)  Comment: Currently doing well  Plan: Work with therapy    (S72.21XD) Closed displaced subtrochanteric fracture of right femur with routine healing, subsequent encounter  Comment: Revision right total hip  Plan: Work with therapy    (I10) Primary hypertension  Comment: Monitor  Plan: Monitor    (K21.00) Gastroesophageal reflux disease with esophagitis, unspecified whether hemorrhage  Comment: Currently on omeprazole 40 mg twice daily  Plan: Currently asymptomatic      PLAN:    No laboratory studies are necessary with this visit.  She is doing well from a revision total hip arthroplasty.  Continue to monitor blood pressures as well as get her involved with the dietary program so they can monitor and calculate her nutritional needs.  Also change up the Tylenol instead of as needed to scheduled 650 mg 3 times daily.  Home she is able to use a four-wheel walker.  Talk to therapy she is already ambulating about 40 feet.  Change the aspirin to 325 mg rather than the current scattered 81 mg throughout the day.  At any rate she is excited do the therapy process.  She does not expect herself to be here for very long period of time.  She did not have any other questions.    Total visit 40 minutes which over 50% was spent with the patient going over her hospitalization, her  hip, surgery, revision, pain management, blood pressure, medications, nutrition, ambulation and the rehabilitation process.      Electronically signed by: Hermelindo Crabtree NP          Sincerely,        Hermelindo Crabtree NP

## 2022-10-05 NOTE — PROGRESS NOTES
[facility] GERIATRIC SERVICES    Facility:   [facility] ([facility]) [[id number]]   Code Status: FULL CODE      HPI:   Manuela is a 89 year old female who was hospitalized September 29, 2022 secondary to right hip pain.  She was reaching something when she fell backwards onto her right side.  She did hit her head but did not lose loss of consciousness.  The pain was immediate to the right hip and unable to bear weight.  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  X-rays did reveal a right hip arthroplasty in place but a comminuted subtrochanteric fracture involving the proximal right femur which involves the femoral portion of the right hip arthroplasty and no dislocation.  The distal portion of the subtrochanteric fracture fragment is displaced anteriorly with the proximal aspect of the femoral shaft she also does have degenerative changes involving the lower spine and therefore did undergo revision hip arthroplasty.  Her work-up on September 29 did show a sodium 134, potassium 4.2, BUN 26 and creatinine 0.94 with a white cell count of 16.3 and hemoglobin 12.8 and platelets 260.  Her EKG did show sinus tachycardia as well as left ventricular hypertrophy with repolarization abnormality.  His CT of the head did not show any intracranial hemorrhage mass lesions or hydrocephalus.  The CT of the cervical spine did not show any acute fractures or posttraumatic subluxations.  Other chronic medical conditions addressed was her hypertension, CAD, PMR, GERD.  She is now currently in the transitional care minutes which we talked about the roles and the goals of the transitional care unit.  Very nice lady.  Had wonderful conversation and particularly about enjoying the good old days.  She currently lives at the [facility] [place].  She went to [employer] school in [place].  Back in the old days she did like to go bowling as well as  [employer].  Her  is now  he worked at [employer].  She also did work at [employer] for about 12 years eventually went to be a  and worked at [employer].  The only blood pressure taken so far was today had a blood pressure 144/77 and no weights she has been afebrile and also on room air.  We talked about her nutrition or appetite she does not feel to be the best we will get dietary involved.  For the pain we talked about her Tylenol as needed she prefer to have that schedule III times a day at 650 mg and 1 extra as needed.  She also does have Robaxin oxycodone as needed.  At home she does use a four-wheel walker.  For anticoagulation she is on a scattered dose of aspirin 81 mg we will just put her on the 325 mg tab once a day.  She also does like her pills crushed.    Past Medical History:  Past Medical History:   Diagnosis Date     Anxiety      Autoimmune hepatitis (H)     Resolved     Breast cyst      Chronic kidney disease, stage 3 (H) 2021     Controlled substance agreement signed 2018     Esophageal dysphagia      Fibrocystic breast      Former smoker      GERD (gastroesophageal reflux disease)      HLD (hyperlipidemia)      HTN (hypertension)      Hypercalcemia      Hyperparathyroidism (H)     Subclinical      Insomnia      OA (osteoarthritis) 2016     PMR (polymyalgia rheumatica) (H)      Presbyesophagus      Senile purpura (H) 2019     Unspecified glaucoma      Unspecified glaucoma(365.9)            Surgical History:  Past Surgical History:   Procedure Laterality Date     BIOPSY BREAST Right      BREAST CYST ASPIRATION Bilateral      CATARACT EXTRACTION, BILATERAL       HYSTERECTOMY  1967     OOPHORECTOMY Right      TOTAL HIP ARTHROPLASTY Right 2010       Family History:   Family History   Problem Relation Age of Onset     Cerebrovascular Disease Mother      Hyperlipidemia Mother      Coronary Artery Disease Father        Social History:    Social History  "    Socioeconomic History     Marital status:      Number of children: 0   Tobacco Use     Smoking status: Former Smoker     Smokeless tobacco: Never Used   Vaping Use     Vaping Use: Never used   Substance and Sexual Activity     Alcohol use: No     Drug use: Never     Sexual activity: Never   Social History Narrative    Moved out of her home in 2018 and moved to the [facility].    , no children, lives alone.    Volunteer at [employer] in the past.        REVIEW OF SYSTEM:  Currently she denies any new symptoms of fever cough or cold sore throat postnasal drip allergies shortness of breath dyspnea wheezing chest pain dizziness vertigo nausea vomiting diarrhea dysuria frequency urgency headaches or unusual myalgias or arthralgias.    PHYSICAL EXAM:   Pleasant female in no acute distress.  Head is normocephalic.  Conjunctiva is pink sclerae is clear.  Oropharynx pink and moist.  Speech is clear.  Neck is supple without adenopathy.  Lung sounds are clear throughout.  Cardiovascular S1 is 2 regular rate and rhythm and no lower extremity edema.  Gastrointestinal soft and nontender with positive bowel sounds.  Musculoskeletal the right hip looks good with good CMS to her right leg.  Psychiatric: Pleasant affect.    Vitals:    10/05/22 0856   BP: 113/71   Pulse: 99   Resp: 18   Temp: 98.4  F (36.9  C)   SpO2: 94%   Height: 1.626 m (5' 4\")         Medication List:  Current Outpatient Medications   Medication Sig     acetaminophen (TYLENOL) 325 MG tablet Take 650 mg by mouth 3 times daily     amLODIPine (NORVASC) 2.5 MG tablet TAKE 1 TABLET (2.5 MG TOTAL) BY MOUTH DAILY. FOR HIGH BLOOD PRESSURE.     aspirin (ASA) 325 MG EC tablet Take 325 mg by mouth daily     calcium carbonate-vitamin D (OSCAL W/D) 500-200 MG-UNIT tablet Take 1 tablet by mouth 3 times daily (with meals)     dorzolamide (TRUSOPT) 2 % ophthalmic solution Place 1 drop into both eyes 2 times daily     methocarbamol (ROBAXIN) 500 MG " tablet Take 500 mg by mouth every 6 hours as needed for muscle spasms     metoprolol tartrate (LOPRESSOR) 50 MG tablet TAKE 1 TABLET BY MOUTH TWICE A DAY     omeprazole (PRILOSEC) 40 MG DR capsule TAKE 1 CAPSULE BY MOUTH TWICE A DAY     oxyCODONE (ROXICODONE) 5 MG tablet Take 2.5-5 mg by mouth every 4 hours as needed for severe pain     polyethylene glycol (MIRALAX) 17 g packet Take 17 g by mouth 2 times daily     predniSONE (DELTASONE) 5 MG tablet TAKE 2 TABLETS BY MOUTH EVERY DAY     senna-docusate (SENOKOT-S/PERICOLACE) 8.6-50 MG tablet Take 1 tablet by mouth daily     timolol maleate (ISTALOL) 0.5 % DrpD [TIMOLOL MALEATE (ISTALOL) 0.5 % DRPD] Apply to eye Daily at 8:00 am..     triamterene-HCTZ (DYAZIDE) 37.5-25 MG capsule Take 1 capsule by mouth every morning     Current Facility-Administered Medications   Medication     lidocaine (XYLOCAINE) 2 % external gel        Labs:  CBC RESULTS: Recent Labs   Lab Test 08/29/22  0837   WBC 11.1*   RBC 4.80   HGB 14.3   HCT 44.1   MCV 92   MCH 29.8   MCHC 32.4   RDW 14.2        Last Comprehensive Metabolic Panel:  Sodium   Date Value Ref Range Status   08/29/2022 136 136 - 145 mmol/L Final     Potassium   Date Value Ref Range Status   08/29/2022 3.7 3.4 - 5.3 mmol/L Final   12/14/2021 4.0 3.5 - 5.0 mmol/L Final     Chloride   Date Value Ref Range Status   08/29/2022 96 (L) 98 - 107 mmol/L Final   12/14/2021 104 98 - 107 mmol/L Final     Carbon Dioxide (CO2)   Date Value Ref Range Status   08/29/2022 26 22 - 29 mmol/L Final   12/14/2021 23 22 - 31 mmol/L Final     Anion Gap   Date Value Ref Range Status   08/29/2022 14 7 - 15 mmol/L Final   12/14/2021 12 5 - 18 mmol/L Final     Glucose   Date Value Ref Range Status   08/29/2022 102 (H) 70 - 99 mg/dL Final   12/14/2021 84 70 - 125 mg/dL Final     Urea Nitrogen   Date Value Ref Range Status   08/29/2022 33.5 (H) 8.0 - 23.0 mg/dL Final   12/14/2021 20 8 - 28 mg/dL Final     Creatinine   Date Value Ref Range Status    08/29/2022 1.08 (H) 0.51 - 0.95 mg/dL Final     GFR Estimate   Date Value Ref Range Status   08/29/2022 48 (L) >60 mL/min/1.73m2 Final     Comment:     Effective December 21, 2021 eGFRcr in adults is calculated using the 2021 CKD-EPI creatinine equation which includes age and gender (Jannet et al., NE, DOI: 10.1056/UWRPyj5608174)   04/09/2021 59 (L) >60 mL/min/1.73m2 Final     Calcium   Date Value Ref Range Status   08/29/2022 11.1 (H) 8.2 - 9.6 mg/dL Final         Assessment:   (Z96.649) History of revision of total hip arthroplasty  (primary encounter diagnosis)  Comment: Currently doing well  Plan: Work with therapy    (S72.21XD) Closed displaced subtrochanteric fracture of right femur with routine healing, subsequent encounter  Comment: Revision right total hip  Plan: Work with therapy    (I10) Primary hypertension  Comment: Monitor  Plan: Monitor    (K21.00) Gastroesophageal reflux disease with esophagitis, unspecified whether hemorrhage  Comment: Currently on omeprazole 40 mg twice daily  Plan: Currently asymptomatic      PLAN:    No laboratory studies are necessary with this visit.  She is doing well from a revision total hip arthroplasty.  Continue to monitor blood pressures as well as get her involved with the dietary program so they can monitor and calculate her nutritional needs.  Also change up the Tylenol instead of as needed to scheduled 650 mg 3 times daily.  Home she is able to use a four-wheel walker.  Talk to therapy she is already ambulating about 40 feet.  Change the aspirin to 325 mg rather than the current scattered 81 mg throughout the day.  At any rate she is excited do the therapy process.  She does not expect herself to be here for very long period of time.  She did not have any other questions.    Total visit 40 minutes which over 50% was spent with the patient going over her hospitalization, her hip, surgery, revision, pain management, blood pressure, medications, nutrition, ambulation  and the rehabilitation process.      Electronically signed by: Hermelindo Crabtree NP

## 2022-10-07 ENCOUNTER — TELEPHONE (OUTPATIENT)
Dept: GERIATRICS | Facility: CLINIC | Age: 87
End: 2022-10-07

## 2022-10-10 ENCOUNTER — TELEPHONE (OUTPATIENT)
Dept: INTERNAL MEDICINE | Facility: CLINIC | Age: 87
End: 2022-10-10

## 2022-10-10 NOTE — TELEPHONE ENCOUNTER
Patients nephew Hermelindo Brendon is calling to let the provider know that they are looking for the provider to go over blood work from the post op.    Hermelindo doesn't have Const to Comm so he said to use Angelo Olivas who does to call back and talk with.    - Possibly set up appt    Call Back  Angelo   [phone]

## 2022-10-11 NOTE — TELEPHONE ENCOUNTER
Please see if there is something specific to address.  Please call.    Jersey Metcalf MD  General Internal Medicine  [facility]  10/11/2022, 12:22 PM

## 2022-10-12 NOTE — TELEPHONE ENCOUNTER
Left voicemail for patients victoria Angelo (C2C) to return call to clinic.     Wanting to follow up on call placed a few days ago and see what it is they are looking to discuss with provider.

## 2022-10-13 ENCOUNTER — TELEPHONE (OUTPATIENT)
Dept: GERIATRICS | Facility: CLINIC | Age: 87
End: 2022-10-13

## 2022-10-13 PROCEDURE — 81001 URINALYSIS AUTO W/SCOPE: CPT | Performed by: FAMILY MEDICINE

## 2022-10-13 NOTE — TELEPHONE ENCOUNTER
[facility] Triage Nurse Telephone Encounter    Provider: ISHAN Martinez  Facility: [facility]  Facility Type:  TCU    Caller: Jacinta  Call Back Number:     Allergies:  No Known Allergies     Reason for call: Family came to visit patient this morning and are concerned that no labs have been done since admission and patient is dealing with weakness and poor appetite.  Patient needs strong encouragement to participate in therapy.  VS: 120/67, 99, 20, 98.9, and O2 94% on RA.        Verbal Order/Direction given by Provider: CBC and BMP, hold Amlodipine until further notice, and obtain UA/UC.    Provider giving Order:  KSENIA Melendez CNP    Verbal Order given to: Jacinta Saucedo RN

## 2022-10-14 ENCOUNTER — LAB REQUISITION (OUTPATIENT)
Dept: LAB | Facility: CLINIC | Age: 87
End: 2022-10-14

## 2022-10-14 ENCOUNTER — TRANSITIONAL CARE UNIT VISIT (OUTPATIENT)
Dept: GERIATRICS | Facility: CLINIC | Age: 87
End: 2022-10-14
Payer: MEDICARE

## 2022-10-14 VITALS
RESPIRATION RATE: 20 BRPM | HEART RATE: 96 BPM | TEMPERATURE: 98.9 F | DIASTOLIC BLOOD PRESSURE: 67 MMHG | BODY MASS INDEX: 23.63 KG/M2 | OXYGEN SATURATION: 94 % | WEIGHT: 138.4 LBS | HEIGHT: 64 IN | SYSTOLIC BLOOD PRESSURE: 120 MMHG

## 2022-10-14 DIAGNOSIS — Z47.89 AFTERCARE FOLLOWING SURGERY OF THE MUSCULOSKELETAL SYSTEM: ICD-10-CM

## 2022-10-14 DIAGNOSIS — N17.9 ACUTE RENAL FAILURE, UNSPECIFIED ACUTE RENAL FAILURE TYPE (H): ICD-10-CM

## 2022-10-14 DIAGNOSIS — D62 ANEMIA DUE TO BLOOD LOSS, ACUTE: ICD-10-CM
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
## 2022-10-14 DIAGNOSIS — M35.3 PMR (POLYMYALGIA RHEUMATICA) (H): ICD-10-CM

## 2022-10-14 DIAGNOSIS — R53.1 WEAKNESS: ICD-10-CM

## 2022-10-14 DIAGNOSIS — K59.01 SLOW TRANSIT CONSTIPATION: ICD-10-CM

## 2022-10-14 DIAGNOSIS — R53.81 PHYSICAL DECONDITIONING: ICD-10-CM

## 2022-10-14 DIAGNOSIS — I73.9 PAD (PERIPHERAL ARTERY DISEASE) (H): ICD-10-CM

## 2022-10-14 DIAGNOSIS — Z96.649 PERIPROSTHETIC FRACTURE OF HIP, SUBSEQUENT ENCOUNTER: Primary | ICD-10-CM

## 2022-10-14 DIAGNOSIS — E87.1 HYPONATREMIA: ICD-10-CM

## 2022-10-14 DIAGNOSIS — I10 PRIMARY HYPERTENSION: ICD-10-CM

## 2022-10-14 DIAGNOSIS — M97.8XXD PERIPROSTHETIC FRACTURE OF HIP, SUBSEQUENT ENCOUNTER: Primary | ICD-10-CM

## 2022-10-14 LAB
ALBUMIN UR-MCNC: 20 MG/DL
ANION GAP SERPL CALCULATED.3IONS-SCNC: 12 MMOL/L (ref 7–15)
APPEARANCE UR: ABNORMAL
BACTERIA #/AREA URNS HPF: ABNORMAL /HPF
BILIRUB UR QL STRIP: NEGATIVE
BUN SERPL-MCNC: 50.7 MG/DL (ref 8–23)
CALCIUM SERPL-MCNC: 9.9 MG/DL (ref 8.2–9.6)
CHLORIDE SERPL-SCNC: 96 MMOL/L (ref 98–107)
COLOR UR AUTO: YELLOW
CREAT SERPL-MCNC: 1.43 MG/DL (ref 0.51–0.95)
DEPRECATED HCO3 PLAS-SCNC: 24 MMOL/L (ref 22–29)
ERYTHROCYTE [DISTWIDTH] IN BLOOD BY AUTOMATED COUNT: 15.9 % (ref 10–15)
GFR SERPL CREATININE-BSD FRML MDRD: 34 ML/MIN/1.73M2
GLUCOSE SERPL-MCNC: 79 MG/DL (ref 70–99)
GLUCOSE UR STRIP-MCNC: NEGATIVE MG/DL
HCT VFR BLD AUTO: 28.6 % (ref 35–47)
HGB BLD-MCNC: 8.4 G/DL (ref 11.7–15.7)
HGB UR QL STRIP: NEGATIVE
KETONES UR STRIP-MCNC: NEGATIVE MG/DL
LEUKOCYTE ESTERASE UR QL STRIP: ABNORMAL
MCH RBC QN AUTO: 28.7 PG (ref 26.5–33)
MCHC RBC AUTO-ENTMCNC: 29.4 G/DL (ref 31.5–36.5)
MCV RBC AUTO: 98 FL (ref 78–100)
NITRATE UR QL: NEGATIVE
PH UR STRIP: 5 [PH] (ref 5–7)
PLATELET # BLD AUTO: 425 10E3/UL (ref 150–450)
POTASSIUM SERPL-SCNC: 3.8 MMOL/L (ref 3.4–5.3)
RBC # BLD AUTO: 2.93 10E6/UL (ref 3.8–5.2)
RBC URINE: 1 /HPF
SODIUM SERPL-SCNC: 132 MMOL/L (ref 136–145)
SP GR UR STRIP: 1.02 (ref 1–1.03)
SQUAMOUS EPITHELIAL: 20 /HPF
TRANSITIONAL EPI: <1 /HPF
UROBILINOGEN UR STRIP-MCNC: NORMAL MG/DL
WBC # BLD AUTO: 10.7 10E3/UL (ref 4–11)
WBC URINE: 4 /HPF

## 2022-10-14 PROCEDURE — 85014 HEMATOCRIT: CPT | Performed by: FAMILY MEDICINE

## 2022-10-14 PROCEDURE — P9604 ONE-WAY ALLOW PRORATED TRIP: HCPCS | Performed by: FAMILY MEDICINE

## 2022-10-14 PROCEDURE — 99306 1ST NF CARE HIGH MDM 50: CPT | Performed by: INTERNAL MEDICINE

## 2022-10-14 PROCEDURE — 36415 COLL VENOUS BLD VENIPUNCTURE: CPT | Performed by: FAMILY MEDICINE

## 2022-10-14 PROCEDURE — 82310 ASSAY OF CALCIUM: CPT | Performed by: FAMILY MEDICINE

## 2022-10-14 NOTE — LETTER
10/14/2022        RE: Manuela Olivas  [address]  [facility] [address]        M [facility] GERIATRICS  INITIAL VISIT NOTE  October 14, 2022    PRIMARY CARE PROVIDER AND CLINIC:  Jersey Metcalf [address] [facility] [address] / [facility] [address]    M [facility] Medical Record Number:  [id number]  Place of Service where encounter took place:  [facility] ([facility]) [[id number]]    Chief Complaint   Patient presents with     Hospital F/U       HPI:    Manuela Olivas is a 89 year old  (8/2/1930) female seen today at [facility] Medical history is notable for CAD, HTN, PMR and hyperparathyroidism. She was hospitalized at [facility] from 9/29/22 to 10/4/22 where she presented with right hip pain after a fall and was found to have a right periprosthetic femur fracture s/p ORIF and revision total right hip arthroplasty (10/1/22). Surgery without complication. She developed acute blood loss anemia (Hgb 12.8 -> 8). Endocrine consulted for fragility fracture. She was admitted to this facility for  rehab, medical management and nursing care.      History obtained from: facility chart records, facility staff, patient report, [facility] chart review and Care Everywhere [facility] chart review.      In reviewing chart, recent PCP notes with fatigue, weight loss. CT CAP planned to evaluate for possible underlying etiologies.     Today, Ms. Olivas is seen in her room sitting in a recliner. She notes she is very tired and her appetite is down. Her mouth is dry. Pain is controlled in her hip and leg swelling is improved. No chest pain. No dyspnea, cough, sore throat or congestion. No abdominal pain, diarrhea or constipation.     CODE STATUS: CPR/Full code     ALLERGIES:  No Known Allergies    PAST MEDICAL HISTORY:   Past Medical History:   Diagnosis Date     Anxiety      Autoimmune hepatitis (H) 2006    Resolved     Breast cyst 1975     Chronic kidney disease, stage 3 (H) 7/27/2021     Controlled  substance agreement signed 2/13/2018     Esophageal dysphagia      Fibrocystic breast      Former smoker      GERD (gastroesophageal reflux disease)      HLD (hyperlipidemia)      HTN (hypertension)      Hypercalcemia      Hyperparathyroidism (H)     Subclinical      Insomnia      OA (osteoarthritis) 5/27/2016     PMR (polymyalgia rheumatica) (H)      Presbyesophagus      Senile purpura (H) 4/18/2019     Unspecified glaucoma      Unspecified glaucoma(365.9)        PAST SURGICAL HISTORY:   Past Surgical History:   Procedure Laterality Date     BIOPSY BREAST Right 1975     BREAST CYST ASPIRATION Bilateral 1975     CATARACT EXTRACTION, BILATERAL       HYSTERECTOMY  1967     OOPHORECTOMY Right 1975     TOTAL HIP ARTHROPLASTY Right 2010       FAMILY HISTORY:   Family History   Problem Relation Age of Onset     Cerebrovascular Disease Mother      Hyperlipidemia Mother      Coronary Artery Disease Father        SOCIAL HISTORY:   Patient's living condition: lives in an assisted living facility    MEDICATIONS:  Post Discharge Medication Reconciliation Status: discharge medications reconciled and changed, per note/orders.  Current Outpatient Medications   Medication Sig Dispense Refill     acetaminophen (TYLENOL) 325 MG tablet Take 650 mg by mouth 3 times daily And daily PRN. Max 4g/24h       amLODIPine (NORVASC) 2.5 MG tablet TAKE 1 TABLET (2.5 MG TOTAL) BY MOUTH DAILY. FOR HIGH BLOOD PRESSURE. 90 tablet 3     aspirin (ASA) 325 MG EC tablet Take 325 mg by mouth daily       calcium carbonate-vitamin D (OSCAL W/D) 500-200 MG-UNIT tablet Take 1 tablet by mouth 3 times daily (with meals)       dorzolamide (TRUSOPT) 2 % ophthalmic solution Place 1 drop into both eyes 2 times daily       methocarbamol (ROBAXIN) 500 MG tablet Take 500 mg by mouth every 6 hours as needed for muscle spasms       metoprolol tartrate (LOPRESSOR) 50 MG tablet TAKE 1 TABLET BY MOUTH TWICE A  tablet 3     omeprazole (PRILOSEC) 40 MG DR capsule TAKE  "1 CAPSULE BY MOUTH TWICE A  capsule 2     oxyCODONE (ROXICODONE) 5 MG tablet Take 2.5-5 mg by mouth every 4 hours as needed for severe pain       polyethylene glycol (MIRALAX) 17 g packet Take 17 g by mouth 2 times daily       predniSONE (DELTASONE) 5 MG tablet TAKE 2 TABLETS BY MOUTH EVERY  tablet 1     senna-docusate (SENOKOT-S/PERICOLACE) 8.6-50 MG tablet Take 1 tablet by mouth 2 times daily       timolol maleate (ISTALOL) 0.5 % DrpD [TIMOLOL MALEATE (ISTALOL) 0.5 % DRPD] Apply to eye Daily at 8:00 am..       triamterene-HCTZ (DYAZIDE) 37.5-25 MG capsule Take 1 capsule by mouth every morning 90 capsule 3       ROS:  10 point ROS neg other than the symptoms noted above in the HPI.    PHYSICAL EXAM:  /67   Pulse 96   Temp 98.9  F (37.2  C)   Resp 20   Ht 1.626 m (5' 4\")   Wt 62.8 kg (138 lb 6.4 oz)   LMP  (LMP Unknown)   SpO2 94%   BMI 23.76 kg/m    Gen: sitting in recliner, alert, cooperative and in no acute distress  HEENT: normocephalic; good hearing acuity; mouth is dry, no thrush; eyes without scleral icterus or scleral injection  Card: RRR, S1, S2, no murmurs  Resp: lungs clear to auscultation bilaterally, no crackles or wheezes anteriorly or laterally   GI: abdomen soft, not-tender +BS  Ext: tubigrips in place to knees bilaterally; no LE edema  Neuro: CX II-XII grossly in tact; ROM in all four extremities grossly in tact  Psych: alert and oriented to self and general situation; normal affect  Skin: unable to see hip incision due to positioning; dressing c/d/i over RLE wound she reports improving      LABORATORY/IMAGING DATA:  [facility]    ASSESSMENT/PLAN:    Right Periprosthetic Femur Fracture s/p ORIF and Revision R Total Hip Arthroplasty (10/1/22)  Secondary to a fall. Concern for fragility fracture.   -- WBAT  -- analgesia with APAP 650 mg TID and daily PRN, methocarbamol 500 mg q6h PRN, oxycodone 2.5-5 mg q4h PRN   -- DVT ppx with  mg " daily (home med)  -- Ca and D supplements  -- PT/OT  -- follow up with ortho as scheduled   -- follow up with endo per PCP     MARTELL  CR 1.43 today - baseline around 1. She is on Dyazide and is not eating well so likely hypovolemic.   -- hold triamterene-hydrochlorothiazide 37.5-25 mg x3 days (Sat, Sun, Mon)  -- BMP on 10/17/22    Hyponatremia  Na 132. She is on Dyazide and isn't eating well.   -- see MARTELL above     Acute Blood Loss Anemia  Secondary to above. No evidence of ongoing bleeding. Hgb 12.8 --> 8. Hgb 8.4 today.   -- follow clinically; could consider starting Fe supplement    HTN  SBPs labile 100s-130s.  -- triamterene-hydrochlorothiazide 37.5-25 mg on hold as above  -- amlodipine 2.5 mg daily -- add hold for SBP <105  -- metoprolol 50 mg BID   -- follow BPs and adjust medications as needed    PMR  Diagnosed 2016 per chart review. Does not gice current clinical picture of increased prox muscle weakness.   -- prednisone 10 mg daily    PAD  --  mg daily  -- recent trial off statin per PCP given weakness    Glaucoma  -- dorzolamide, timolol gtts as ordered     GERD  -- omeprazole 40 mg BID    Slow Transit Constipation  -- Miralax 17g BID and Senna-S 1 tab BID   -- adjust bowel regimen as needed    Fall  Physical Deconditioning  In setting of hospitalization and underlying medical conditions  -- ongoing PT/OT    Electronically signed by:  Elsa Hidalgo MD                            Sincerely,        Elsa Hidalgo MD

## 2022-10-14 NOTE — PROGRESS NOTES
[facility] GERIATRICS  INITIAL VISIT NOTE  October 14, 2022    PRIMARY CARE PROVIDER AND CLINIC:  Jersey Metcalf [address] [facility] [address] / [facility] [address]    [facility] Medical Record Number:  [id number]  Place of Service where encounter took place:  [facility] ([facility]) [[id number]]    Chief Complaint   Patient presents with     Hospital F/U       HPI:    Manuela Olivas is a 89 year old  (8/2/1930) female seen today at [facility] Medical history is notable for CAD, HTN, PMR and hyperparathyroidism. She was hospitalized at [facility] from 9/29/22 to 10/4/22 where she presented with right hip pain after a fall and was found to have a right periprosthetic femur fracture s/p ORIF and revision total right hip arthroplasty (10/1/22). Surgery without complication. She developed acute blood loss anemia (Hgb 12.8 -> 8). Endocrine consulted for fragility fracture. She was admitted to this facility for  rehab, medical management and nursing care.      History obtained from: facility chart records, facility staff, patient report, [facility] chart review and Care Everywhere [facility] chart review.      In reviewing chart, recent PCP notes with fatigue, weight loss. CT CAP planned to evaluate for possible underlying etiologies.     Today, Ms. Olivas is seen in her room sitting in a recliner. She notes she is very tired and her appetite is down. Her mouth is dry. Pain is controlled in her hip and leg swelling is improved. No chest pain. No dyspnea, cough, sore throat or congestion. No abdominal pain, diarrhea or constipation.     CODE STATUS: CPR/Full code     ALLERGIES:  No Known Allergies    PAST MEDICAL HISTORY:   Past Medical History:   Diagnosis Date     Anxiety      Autoimmune hepatitis (H) 2006    Resolved     Breast cyst 1975     Chronic kidney disease, stage 3 (H) 7/27/2021     Controlled substance agreement signed 2/13/2018     Esophageal dysphagia      Fibrocystic breast      Former smoker       GERD (gastroesophageal reflux disease)      HLD (hyperlipidemia)      HTN (hypertension)      Hypercalcemia      Hyperparathyroidism (H)     Subclinical      Insomnia      OA (osteoarthritis) 5/27/2016     PMR (polymyalgia rheumatica) (H)      Presbyesophagus      Senile purpura (H) 4/18/2019     Unspecified glaucoma      Unspecified glaucoma(365.9)        PAST SURGICAL HISTORY:   Past Surgical History:   Procedure Laterality Date     BIOPSY BREAST Right 1975     BREAST CYST ASPIRATION Bilateral 1975     CATARACT EXTRACTION, BILATERAL       HYSTERECTOMY  1967     OOPHORECTOMY Right 1975     TOTAL HIP ARTHROPLASTY Right 2010       FAMILY HISTORY:   Family History   Problem Relation Age of Onset     Cerebrovascular Disease Mother      Hyperlipidemia Mother      Coronary Artery Disease Father        SOCIAL HISTORY:   Patient's living condition: lives in an assisted living facility    MEDICATIONS:  Post Discharge Medication Reconciliation Status: discharge medications reconciled and changed, per note/orders.  Current Outpatient Medications   Medication Sig Dispense Refill     acetaminophen (TYLENOL) 325 MG tablet Take 650 mg by mouth 3 times daily And daily PRN. Max 4g/24h       amLODIPine (NORVASC) 2.5 MG tablet TAKE 1 TABLET (2.5 MG TOTAL) BY MOUTH DAILY. FOR HIGH BLOOD PRESSURE. 90 tablet 3     aspirin (ASA) 325 MG EC tablet Take 325 mg by mouth daily       calcium carbonate-vitamin D (OSCAL W/D) 500-200 MG-UNIT tablet Take 1 tablet by mouth 3 times daily (with meals)       dorzolamide (TRUSOPT) 2 % ophthalmic solution Place 1 drop into both eyes 2 times daily       methocarbamol (ROBAXIN) 500 MG tablet Take 500 mg by mouth every 6 hours as needed for muscle spasms       metoprolol tartrate (LOPRESSOR) 50 MG tablet TAKE 1 TABLET BY MOUTH TWICE A  tablet 3     omeprazole (PRILOSEC) 40 MG DR capsule TAKE 1 CAPSULE BY MOUTH TWICE A  capsule 2     oxyCODONE (ROXICODONE) 5 MG tablet Take 2.5-5 mg by  "mouth every 4 hours as needed for severe pain       polyethylene glycol (MIRALAX) 17 g packet Take 17 g by mouth 2 times daily       predniSONE (DELTASONE) 5 MG tablet TAKE 2 TABLETS BY MOUTH EVERY  tablet 1     senna-docusate (SENOKOT-S/PERICOLACE) 8.6-50 MG tablet Take 1 tablet by mouth 2 times daily       timolol maleate (ISTALOL) 0.5 % DrpD [TIMOLOL MALEATE (ISTALOL) 0.5 % DRPD] Apply to eye Daily at 8:00 am..       triamterene-HCTZ (DYAZIDE) 37.5-25 MG capsule Take 1 capsule by mouth every morning 90 capsule 3       ROS:  10 point ROS neg other than the symptoms noted above in the HPI.    PHYSICAL EXAM:  /67   Pulse 96   Temp 98.9  F (37.2  C)   Resp 20   Ht 1.626 m (5' 4\")   Wt 62.8 kg (138 lb 6.4 oz)   LMP  (LMP Unknown)   SpO2 94%   BMI 23.76 kg/m    Gen: sitting in recliner, alert, cooperative and in no acute distress  HEENT: normocephalic; good hearing acuity; mouth is dry, no thrush; eyes without scleral icterus or scleral injection  Card: RRR, S1, S2, no murmurs  Resp: lungs clear to auscultation bilaterally, no crackles or wheezes anteriorly or laterally   GI: abdomen soft, not-tender +BS  Ext: tubigrips in place to knees bilaterally; no LE edema  Neuro: CX II-XII grossly in tact; ROM in all four extremities grossly in tact  Psych: alert and oriented to self and general situation; normal affect  Skin: unable to see hip incision due to positioning; dressing c/d/i over RLE wound she reports improving      LABORATORY/IMAGING DATA:  Reviewed as per [facility] and/or [facility]    ASSESSMENT/PLAN:    Right Periprosthetic Femur Fracture s/p ORIF and Revision R Total Hip Arthroplasty (10/1/22)  Secondary to a fall. Concern for fragility fracture.   -- WBAT  -- analgesia with APAP 650 mg TID and daily PRN, methocarbamol 500 mg q6h PRN, oxycodone 2.5-5 mg q4h PRN   -- DVT ppx with  mg daily (home med)  -- Ca and D supplements  -- PT/OT  -- follow up with ortho as scheduled   -- follow " up with endo per PCP     MARTELL  CR 1.43 today - baseline around 1. She is on Dyazide and is not eating well so likely hypovolemic.   -- hold triamterene-hydrochlorothiazide 37.5-25 mg x3 days (Sat, Sun, Mon)  -- BMP on 10/17/22    Hyponatremia  Na 132. She is on Dyazide and isn't eating well.   -- see MARTELL above     Acute Blood Loss Anemia  Secondary to above. No evidence of ongoing bleeding. Hgb 12.8 --> 8. Hgb 8.4 today.   -- follow clinically; could consider starting Fe supplement    HTN  SBPs labile 100s-130s.  -- triamterene-hydrochlorothiazide 37.5-25 mg on hold as above  -- amlodipine 2.5 mg daily -- add hold for SBP <105  -- metoprolol 50 mg BID   -- follow BPs and adjust medications as needed    PMR  Diagnosed 2016 per chart review. Does not gice current clinical picture of increased prox muscle weakness.   -- prednisone 10 mg daily    PAD  --  mg daily  -- recent trial off statin per PCP given weakness    Glaucoma  -- dorzolamide, timolol gtts as ordered     GERD  -- omeprazole 40 mg BID    Slow Transit Constipation  -- Miralax 17g BID and Senna-S 1 tab BID   -- adjust bowel regimen as needed    Fall  Physical Deconditioning  In setting of hospitalization and underlying medical conditions  -- ongoing PT/OT    Electronically signed by:  Elsa Hidalgo MD

## 2022-10-15 ENCOUNTER — LAB REQUISITION (OUTPATIENT)
Dept: LAB | Facility: CLINIC | Age: 87
End: 2022-10-15
Payer: MEDICARE

## 2022-10-15 DIAGNOSIS — N17.0 ACUTE KIDNEY FAILURE WITH TUBULAR NECROSIS (H): ICD-10-CM

## 2022-10-17 ENCOUNTER — TELEPHONE (OUTPATIENT)
Dept: GERIATRICS | Facility: CLINIC | Age: 87
End: 2022-10-17

## 2022-10-17 DIAGNOSIS — R52 PAIN: Primary | ICD-10-CM

## 2022-10-17 LAB
ANION GAP SERPL CALCULATED.3IONS-SCNC: 13 MMOL/L (ref 7–15)
BUN SERPL-MCNC: 50 MG/DL (ref 8–23)
CALCIUM SERPL-MCNC: 10 MG/DL (ref 8.2–9.6)
CHLORIDE SERPL-SCNC: 95 MMOL/L (ref 98–107)
CREAT SERPL-MCNC: 1.14 MG/DL (ref 0.51–0.95)
DEPRECATED HCO3 PLAS-SCNC: 25 MMOL/L (ref 22–29)
GFR SERPL CREATININE-BSD FRML MDRD: 45 ML/MIN/1.73M2
GLUCOSE SERPL-MCNC: 82 MG/DL (ref 70–99)
POTASSIUM SERPL-SCNC: 3.2 MMOL/L (ref 3.4–5.3)
SODIUM SERPL-SCNC: 133 MMOL/L (ref 136–145)

## 2022-10-17 PROCEDURE — P9604 ONE-WAY ALLOW PRORATED TRIP: HCPCS | Performed by: INTERNAL MEDICINE

## 2022-10-17 PROCEDURE — 80048 BASIC METABOLIC PNL TOTAL CA: CPT | Performed by: INTERNAL MEDICINE

## 2022-10-17 PROCEDURE — 36415 COLL VENOUS BLD VENIPUNCTURE: CPT | Performed by: INTERNAL MEDICINE

## 2022-10-17 RX ORDER — OXYCODONE HYDROCHLORIDE 5 MG/1
2.5-5 TABLET ORAL EVERY 4 HOURS PRN
Qty: 30 TABLET | Refills: 0 | Status: SHIPPED | OUTPATIENT
Start: 2022-10-17 | End: 2022-10-22

## 2022-10-17 NOTE — TELEPHONE ENCOUNTER
[facility] Geriatrics Triage Nurse Telephone Encounter    Provider: ISHAN Martinez  Facility: [facility]  Facility Type:  TCU    Caller: Swati   Call Back Number:     Allergies:  No Known Allergies     Reason for call:   Amlodipine has been on hold, dyazide was on hold x3 days. Vitals: BP:  103/65  P:: 78  R:: 20  SPO2: 96% R/A Temp.:  97.5            Verbal Order/Direction given by Provider: Discontinue amlodipine, continue to hold Dyazide, KCL 20meq po x1 today. NP to see pt Wed. Call for any s/s of fluid overload.      Provider giving Order:  ISHAN Martinez    Verbal Order given to: Angelo Coe RN

## 2022-10-18 VITALS
HEIGHT: 65 IN | WEIGHT: 136 LBS | TEMPERATURE: 96.4 F | OXYGEN SATURATION: 95 % | BODY MASS INDEX: 22.66 KG/M2 | SYSTOLIC BLOOD PRESSURE: 106 MMHG | DIASTOLIC BLOOD PRESSURE: 65 MMHG | RESPIRATION RATE: 20 BRPM | HEART RATE: 76 BPM

## 2022-10-18 PROBLEM — S72.91XA CLOSED FRACTURE OF RIGHT FEMUR (H): Status: ACTIVE | Noted: 2022-09-29

## 2022-10-19 ENCOUNTER — LAB REQUISITION (OUTPATIENT)
Dept: LAB | Facility: CLINIC | Age: 87
End: 2022-10-19
Payer: MEDICARE

## 2022-10-19 ENCOUNTER — TRANSITIONAL CARE UNIT VISIT (OUTPATIENT)
Dept: GERIATRICS | Facility: CLINIC | Age: 87
End: 2022-10-19
Payer: MEDICARE

## 2022-10-19 DIAGNOSIS — I10 PRIMARY HYPERTENSION: ICD-10-CM

## 2022-10-19 DIAGNOSIS — N18.9 CHRONIC KIDNEY DISEASE, UNSPECIFIED: ICD-10-CM

## 2022-10-19 DIAGNOSIS — S72.91XD CLOSED FRACTURE OF RIGHT FEMUR WITH ROUTINE HEALING, UNSPECIFIED FRACTURE MORPHOLOGY, UNSPECIFIED PORTION OF FEMUR, SUBSEQUENT ENCOUNTER: ICD-10-CM

## 2022-10-19 DIAGNOSIS — D64.9 ANEMIA, UNSPECIFIED TYPE: Primary | ICD-10-CM

## 2022-10-19 DIAGNOSIS — D64.9 ANEMIA, UNSPECIFIED: ICD-10-CM

## 2022-10-19 DIAGNOSIS — N18.31 STAGE 3A CHRONIC KIDNEY DISEASE (H): ICD-10-CM

## 2022-10-19 PROCEDURE — 99310 SBSQ NF CARE HIGH MDM 45: CPT | Performed by: FAMILY MEDICINE

## 2022-10-19 NOTE — LETTER
10/19/2022        RE: Manuela Olivas  [address]  [facility] [address]        M [facility] GERIATRIC SERVICES    Facility:   [facility] ([facility]) [[id number]]   Code Status: FULL CODE      CHIEF COMPLAINT/REASON FOR VISIT:  Chief Complaint   Patient presents with     RECHECK       HISTORY:      HPI: Manuela is a 89 year old female who I had the pleasure revisiting with once again today not only secondary to her hospitalization September 29, 2022 secondary right hip pain secondary to a fall and did receive a comminuted subtrochanteric fracture involving the proximal right femur which involves the femoral portion of the right hip arthroplasty and no dislocation who did undergo right hip revision arthroplasty as well as today's discussion of her laboratory studies, rehabilitation, nutrition.  Regarding nutrition she is getting extra nutrient supplements including boost and a Protostat.  Appetite not the best her weight 136 pounds back on October 14 139 pounds.  Does not have a significant appetite.  Does have a history of chronic pain and RA currently on prednisone.  The Dyazide has been on hold we will now discontinue that.  The amlodipine has now been discontinued she is on metoprolol 50 mg twice daily.  Her systolic blood pressures ranging  she has been afebrile and also on room air.  For pain is on scheduled Tylenol 3 times a day no Robaxin as needed so that will be discontinued also oxycodone as needed did take a dose on the 19th of prior to that the 16th.  Went over her laboratory studies from October 14 a BUN of 50 creatinine 1.43 in comparison to October 17 with a BUN of 50 and creatinine 1.14 we will recheck that again tomorrow along with her hemoglobin.  Her hemoglobin last week was 8.4 is now on ferrous sulfate every other day.    Past Medical History:   Diagnosis Date     Anxiety      Autoimmune hepatitis (H) 2006    Resolved     Breast cyst 1975     Chronic kidney disease,  stage 3 (H) 7/27/2021     Controlled substance agreement signed 2/13/2018     Esophageal dysphagia      Fibrocystic breast      Former smoker      GERD (gastroesophageal reflux disease)      HLD (hyperlipidemia)      HTN (hypertension)      Hypercalcemia      Hyperparathyroidism (H)     Subclinical      Insomnia      OA (osteoarthritis) 5/27/2016     PMR (polymyalgia rheumatica) (H)      Presbyesophagus      Senile purpura (H) 4/18/2019     Unspecified glaucoma      Unspecified glaucoma(365.9)             Family History   Problem Relation Age of Onset     Cerebrovascular Disease Mother      Hyperlipidemia Mother      Coronary Artery Disease Father       Social History     Socioeconomic History     Marital status:      Number of children: 0   Tobacco Use     Smoking status: Former     Smokeless tobacco: Never   Vaping Use     Vaping Use: Never used   Substance and Sexual Activity     Alcohol use: No     Drug use: Never     Sexual activity: Never   Social History Narrative    Moved out of her home in 2018 and moved to the [place] [facility].    , no children, lives alone.    Volunteer at [employer] in the past.      No new changes to the review of systems or physical exam since our last visit on October 5 however she states she is having periods of being defeated as well as some fatigue  REVIEW OF SYSTEM:  Currently she denies any new symptoms of fever cough or cold sore throat postnasal drip allergies shortness of breath dyspnea wheezing chest pain dizziness vertigo nausea vomiting diarrhea dysuria frequency urgency headaches or unusual myalgias or arthralgias    PHYSICAL EXAM:   Pleasant female in no acute distress.  Head is normocephalic.  Conjunctiva is pink sclerae is clear.  Oropharynx pink and moist.  Speech is clear.  Neck is supple without adenopathy.  Lung sounds are clear throughout.  Cardiovascular S1 is 2 regular rate and rhythm and no lower extremity edema.  Gastrointestinal soft and  "nontender with positive bowel sounds.  Musculoskeletal the right hip looks good with good CMS to her right leg.  Psychiatric: Pleasant affect.    Current Outpatient Medications:      acetaminophen (TYLENOL) 325 MG tablet, Take 650 mg by mouth 3 times daily And daily PRN. Max 4g/24h, Disp: , Rfl:      aspirin (ASA) 325 MG EC tablet, Take 325 mg by mouth daily, Disp: , Rfl:      calcium carbonate-vitamin D (OSCAL W/D) 500-200 MG-UNIT tablet, Take 1 tablet by mouth 3 times daily (with meals), Disp: , Rfl:      dorzolamide (TRUSOPT) 2 % ophthalmic solution, Place 1 drop into both eyes 2 times daily, Disp: , Rfl:      metoprolol tartrate (LOPRESSOR) 50 MG tablet, TAKE 1 TABLET BY MOUTH TWICE A DAY, Disp: 180 tablet, Rfl: 3     omeprazole (PRILOSEC) 40 MG DR capsule, TAKE 1 CAPSULE BY MOUTH TWICE A DAY, Disp: 180 capsule, Rfl: 2     oxyCODONE (ROXICODONE) 5 MG tablet, Take 0.5-1 tablets (2.5-5 mg) by mouth every 4 hours as needed for severe pain, Disp: 30 tablet, Rfl: 0     polyethylene glycol (MIRALAX) 17 g packet, Take 17 g by mouth 2 times daily, Disp: , Rfl:      predniSONE (DELTASONE) 5 MG tablet, TAKE 2 TABLETS BY MOUTH EVERY DAY, Disp: 180 tablet, Rfl: 1     senna-docusate (SENOKOT-S/PERICOLACE) 8.6-50 MG tablet, Take 1 tablet by mouth 2 times daily, Disp: , Rfl:      timolol maleate (ISTALOL) 0.5 % DrpD, [TIMOLOL MALEATE (ISTALOL) 0.5 % DRPD] Apply to eye Daily at 8:00 am.., Disp: , Rfl:        /65   Pulse 76   Temp (!) 96.4  F (35.8  C)   Resp 20   Ht 1.651 m (5' 5\")   Wt 61.7 kg (136 lb)   LMP  (LMP Unknown)   SpO2 95%   BMI 22.63 kg/m      LABS:   Last Comprehensive Metabolic Panel:  Sodium   Date Value Ref Range Status   10/17/2022 133 (L) 136 - 145 mmol/L Final     Potassium   Date Value Ref Range Status   10/17/2022 3.2 (L) 3.4 - 5.3 mmol/L Final   12/14/2021 4.0 3.5 - 5.0 mmol/L Final     Chloride   Date Value Ref Range Status   10/17/2022 95 (L) 98 - 107 mmol/L Final   12/14/2021 104 98 - 107 " mmol/L Final     Carbon Dioxide (CO2)   Date Value Ref Range Status   10/17/2022 25 22 - 29 mmol/L Final   12/14/2021 23 22 - 31 mmol/L Final     Anion Gap   Date Value Ref Range Status   10/17/2022 13 7 - 15 mmol/L Final   12/14/2021 12 5 - 18 mmol/L Final     Glucose   Date Value Ref Range Status   10/17/2022 82 70 - 99 mg/dL Final   12/14/2021 84 70 - 125 mg/dL Final     Urea Nitrogen   Date Value Ref Range Status   10/17/2022 50.0 (H) 8.0 - 23.0 mg/dL Final   12/14/2021 20 8 - 28 mg/dL Final     Creatinine   Date Value Ref Range Status   10/17/2022 1.14 (H) 0.51 - 0.95 mg/dL Final     GFR Estimate   Date Value Ref Range Status   10/17/2022 45 (L) >60 mL/min/1.73m2 Final     Comment:     Effective December 21, 2021 eGFRcr in adults is calculated using the 2021 CKD-EPI creatinine equation which includes age and gender (Jannet et al., NEJ, DOI: 10.1056/XOEJwo7012907)   04/09/2021 59 (L) >60 mL/min/1.73m2 Final     Calcium   Date Value Ref Range Status   10/17/2022 10.0 (H) 8.2 - 9.6 mg/dL Final     CBC RESULTS: Recent Labs   Lab Test 10/14/22  0650   WBC 10.7   RBC 2.93*   HGB 8.4*   HCT 28.6*   MCV 98   MCH 28.7   MCHC 29.4*   RDW 15.9*              ASSESSMENT:    Encounter Diagnoses   Name Primary?     Anemia, unspecified type Yes     Primary hypertension      Closed fracture of right femur with routine healing, unspecified fracture morphology, unspecified portion of femur, subsequent encounter      Stage 3a chronic kidney disease (H)        PLAN:    Rechecking this CBC as well as a BMP tomorrow on the 20th.  Is on ferrous sulfate now every other day.  The Dyazide has now been discontinued the amlodipine discontinued is on metoprolol 50 mg twice daily.  No heartburn or reflux currently on omeprazole.  Had a chance to talk to she and her nephew today as well.    For documentation purposes total visit 43 minutes which over 50% was spent with the patient going over her care as well as her laboratory studies  her symptoms her blood pressures pain management her hip therapy in the main of the time was spent talking with her and her nephew going over all the aforementioned including upcoming laboratory studies.        Electronically signed by: Hermelindo Crabtree NP          Sincerely,        Hermelindo Crabtree NP

## 2022-10-19 NOTE — PROGRESS NOTES
[facility] GERIATRIC SERVICES    Facility:   [facility] ([facility]) [[id number]]   Code Status: FULL CODE      CHIEF COMPLAINT/REASON FOR VISIT:  Chief Complaint   Patient presents with     RECHECK       HISTORY:      HPI: Manuela is a 89 year old female who I had the pleasure revisiting with once again today not only secondary to her hospitalization September 29, 2022 secondary right hip pain secondary to a fall and did receive a comminuted subtrochanteric fracture involving the proximal right femur which involves the femoral portion of the right hip arthroplasty and no dislocation who did undergo right hip revision arthroplasty as well as today's discussion of her laboratory studies, rehabilitation, nutrition.  Regarding nutrition she is getting extra nutrient supplements including boost and a Protostat.  Appetite not the best her weight 136 pounds back on October 14 139 pounds.  Does not have a significant appetite.  Does have a history of chronic pain and RA currently on prednisone.  The Dyazide has been on hold we will now discontinue that.  The amlodipine has now been discontinued she is on metoprolol 50 mg twice daily.  Her systolic blood pressures ranging  she has been afebrile and also on room air.  For pain is on scheduled Tylenol 3 times a day no Robaxin as needed so that will be discontinued also oxycodone as needed did take a dose on the 19th of prior to that the 16th.  Went over her laboratory studies from October 14 a BUN of 50 creatinine 1.43 in comparison to October 17 with a BUN of 50 and creatinine 1.14 we will recheck that again tomorrow along with her hemoglobin.  Her hemoglobin last week was 8.4 is now on ferrous sulfate every other day.    Past Medical History:   Diagnosis Date     Anxiety      Autoimmune hepatitis (H) 2006    Resolved     Breast cyst 1975     Chronic kidney disease, stage 3 (H) 7/27/2021     Controlled substance agreement signed 2/13/2018     Esophageal dysphagia       Fibrocystic breast      Former smoker      GERD (gastroesophageal reflux disease)      HLD (hyperlipidemia)      HTN (hypertension)      Hypercalcemia      Hyperparathyroidism (H)     Subclinical      Insomnia      OA (osteoarthritis) 5/27/2016     PMR (polymyalgia rheumatica) (H)      Presbyesophagus      Senile purpura (H) 4/18/2019     Unspecified glaucoma      Unspecified glaucoma(365.9)             Family History   Problem Relation Age of Onset     Cerebrovascular Disease Mother      Hyperlipidemia Mother      Coronary Artery Disease Father       Social History     Socioeconomic History     Marital status:      Number of children: 0   Tobacco Use     Smoking status: Former     Smokeless tobacco: Never   Vaping Use     Vaping Use: Never used   Substance and Sexual Activity     Alcohol use: No     Drug use: Never     Sexual activity: Never   Social History Narrative    Moved out of her home in 2018 and moved to the [place] [facility].    , no children, lives alone.    Volunteer at [employer] in the past.      No new changes to the review of systems or physical exam since our last visit on October 5 however she states she is having periods of being defeated as well as some fatigue  REVIEW OF SYSTEM:  Currently she denies any new symptoms of fever cough or cold sore throat postnasal drip allergies shortness of breath dyspnea wheezing chest pain dizziness vertigo nausea vomiting diarrhea dysuria frequency urgency headaches or unusual myalgias or arthralgias    PHYSICAL EXAM:   Pleasant female in no acute distress.  Head is normocephalic.  Conjunctiva is pink sclerae is clear.  Oropharynx pink and moist.  Speech is clear.  Neck is supple without adenopathy.  Lung sounds are clear throughout.  Cardiovascular S1 is 2 regular rate and rhythm and no lower extremity edema.  Gastrointestinal soft and nontender with positive bowel sounds.  Musculoskeletal the right hip looks good with good CMS to her right  "leg.  Psychiatric: Pleasant affect.    Current Outpatient Medications:      acetaminophen (TYLENOL) 325 MG tablet, Take 650 mg by mouth 3 times daily And daily PRN. Max 4g/24h, Disp: , Rfl:      aspirin (ASA) 325 MG EC tablet, Take 325 mg by mouth daily, Disp: , Rfl:      calcium carbonate-vitamin D (OSCAL W/D) 500-200 MG-UNIT tablet, Take 1 tablet by mouth 3 times daily (with meals), Disp: , Rfl:      dorzolamide (TRUSOPT) 2 % ophthalmic solution, Place 1 drop into both eyes 2 times daily, Disp: , Rfl:      metoprolol tartrate (LOPRESSOR) 50 MG tablet, TAKE 1 TABLET BY MOUTH TWICE A DAY, Disp: 180 tablet, Rfl: 3     omeprazole (PRILOSEC) 40 MG DR capsule, TAKE 1 CAPSULE BY MOUTH TWICE A DAY, Disp: 180 capsule, Rfl: 2     oxyCODONE (ROXICODONE) 5 MG tablet, Take 0.5-1 tablets (2.5-5 mg) by mouth every 4 hours as needed for severe pain, Disp: 30 tablet, Rfl: 0     polyethylene glycol (MIRALAX) 17 g packet, Take 17 g by mouth 2 times daily, Disp: , Rfl:      predniSONE (DELTASONE) 5 MG tablet, TAKE 2 TABLETS BY MOUTH EVERY DAY, Disp: 180 tablet, Rfl: 1     senna-docusate (SENOKOT-S/PERICOLACE) 8.6-50 MG tablet, Take 1 tablet by mouth 2 times daily, Disp: , Rfl:      timolol maleate (ISTALOL) 0.5 % DrpD, [TIMOLOL MALEATE (ISTALOL) 0.5 % DRPD] Apply to eye Daily at 8:00 am.., Disp: , Rfl:        /65   Pulse 76   Temp (!) 96.4  F (35.8  C)   Resp 20   Ht 1.651 m (5' 5\")   Wt 61.7 kg (136 lb)   LMP  (LMP Unknown)   SpO2 95%   BMI 22.63 kg/m      LABS:   Last Comprehensive Metabolic Panel:  Sodium   Date Value Ref Range Status   10/17/2022 133 (L) 136 - 145 mmol/L Final     Potassium   Date Value Ref Range Status   10/17/2022 3.2 (L) 3.4 - 5.3 mmol/L Final   12/14/2021 4.0 3.5 - 5.0 mmol/L Final     Chloride   Date Value Ref Range Status   10/17/2022 95 (L) 98 - 107 mmol/L Final   12/14/2021 104 98 - 107 mmol/L Final     Carbon Dioxide (CO2)   Date Value Ref Range Status   10/17/2022 25 22 - 29 mmol/L Final "   12/14/2021 23 22 - 31 mmol/L Final     Anion Gap   Date Value Ref Range Status   10/17/2022 13 7 - 15 mmol/L Final   12/14/2021 12 5 - 18 mmol/L Final     Glucose   Date Value Ref Range Status   10/17/2022 82 70 - 99 mg/dL Final   12/14/2021 84 70 - 125 mg/dL Final     Urea Nitrogen   Date Value Ref Range Status   10/17/2022 50.0 (H) 8.0 - 23.0 mg/dL Final   12/14/2021 20 8 - 28 mg/dL Final     Creatinine   Date Value Ref Range Status   10/17/2022 1.14 (H) 0.51 - 0.95 mg/dL Final     GFR Estimate   Date Value Ref Range Status   10/17/2022 45 (L) >60 mL/min/1.73m2 Final     Comment:     Effective December 21, 2021 eGFRcr in adults is calculated using the 2021 CKD-EPI creatinine equation which includes age and gender (Jannet et al., NE, DOI: 10.1056/KJAYcg9649798)   04/09/2021 59 (L) >60 mL/min/1.73m2 Final     Calcium   Date Value Ref Range Status   10/17/2022 10.0 (H) 8.2 - 9.6 mg/dL Final     CBC RESULTS: Recent Labs   Lab Test 10/14/22  0650   WBC 10.7   RBC 2.93*   HGB 8.4*   HCT 28.6*   MCV 98   MCH 28.7   MCHC 29.4*   RDW 15.9*              ASSESSMENT:    Encounter Diagnoses   Name Primary?     Anemia, unspecified type Yes     Primary hypertension      Closed fracture of right femur with routine healing, unspecified fracture morphology, unspecified portion of femur, subsequent encounter      Stage 3a chronic kidney disease (H)        PLAN:    Rechecking this CBC as well as a BMP tomorrow on the 20th.  Is on ferrous sulfate now every other day.  The Dyazide has now been discontinued the amlodipine discontinued is on metoprolol 50 mg twice daily.  No heartburn or reflux currently on omeprazole.  Had a chance to talk to she and her nephew today as well.    For documentation purposes total visit 43 minutes which over 50% was spent with the patient going over her care as well as her laboratory studies her symptoms her blood pressures pain management her hip therapy in the main of the time was spent talking  with her and her nephew going over all the aforementioned including upcoming laboratory studies.        Electronically signed by: Hermelindo Crabtree NP

## 2022-10-21 ENCOUNTER — TELEPHONE (OUTPATIENT)
Dept: GERIATRICS | Facility: CLINIC | Age: 87
End: 2022-10-21

## 2022-10-21 LAB
ANION GAP SERPL CALCULATED.3IONS-SCNC: 11 MMOL/L (ref 7–15)
BASOPHILS # BLD AUTO: 0 10E3/UL (ref 0–0.2)
BASOPHILS NFR BLD AUTO: 0 %
BUN SERPL-MCNC: 38.7 MG/DL (ref 8–23)
CALCIUM SERPL-MCNC: 9.8 MG/DL (ref 8.2–9.6)
CHLORIDE SERPL-SCNC: 99 MMOL/L (ref 98–107)
CREAT SERPL-MCNC: 0.91 MG/DL (ref 0.51–0.95)
DEPRECATED HCO3 PLAS-SCNC: 25 MMOL/L (ref 22–29)
EOSINOPHIL # BLD AUTO: 0 10E3/UL (ref 0–0.7)
EOSINOPHIL NFR BLD AUTO: 0 %
ERYTHROCYTE [DISTWIDTH] IN BLOOD BY AUTOMATED COUNT: 16.3 % (ref 10–15)
GFR SERPL CREATININE-BSD FRML MDRD: 59 ML/MIN/1.73M2
GLUCOSE SERPL-MCNC: 78 MG/DL (ref 70–99)
HCT VFR BLD AUTO: 29.9 % (ref 35–47)
HGB BLD-MCNC: 9 G/DL (ref 11.7–15.7)
IMM GRANULOCYTES # BLD: 0.2 10E3/UL
IMM GRANULOCYTES NFR BLD: 3 %
LYMPHOCYTES # BLD AUTO: 1.6 10E3/UL (ref 0.8–5.3)
LYMPHOCYTES NFR BLD AUTO: 20 %
MCH RBC QN AUTO: 29.1 PG (ref 26.5–33)
MCHC RBC AUTO-ENTMCNC: 30.1 G/DL (ref 31.5–36.5)
MCV RBC AUTO: 97 FL (ref 78–100)
MONOCYTES # BLD AUTO: 0.5 10E3/UL (ref 0–1.3)
MONOCYTES NFR BLD AUTO: 6 %
NEUTROPHILS # BLD AUTO: 5.7 10E3/UL (ref 1.6–8.3)
NEUTROPHILS NFR BLD AUTO: 71 %
NRBC # BLD AUTO: 0 10E3/UL
NRBC BLD AUTO-RTO: 0 /100
PLATELET # BLD AUTO: 393 10E3/UL (ref 150–450)
POTASSIUM SERPL-SCNC: 3.5 MMOL/L (ref 3.4–5.3)
RBC # BLD AUTO: 3.09 10E6/UL (ref 3.8–5.2)
SODIUM SERPL-SCNC: 135 MMOL/L (ref 136–145)
WBC # BLD AUTO: 8 10E3/UL (ref 4–11)

## 2022-10-21 PROCEDURE — 85004 AUTOMATED DIFF WBC COUNT: CPT | Performed by: FAMILY MEDICINE

## 2022-10-21 PROCEDURE — P9604 ONE-WAY ALLOW PRORATED TRIP: HCPCS | Performed by: FAMILY MEDICINE

## 2022-10-21 PROCEDURE — 36415 COLL VENOUS BLD VENIPUNCTURE: CPT | Performed by: FAMILY MEDICINE

## 2022-10-21 PROCEDURE — 80048 BASIC METABOLIC PNL TOTAL CA: CPT | Performed by: FAMILY MEDICINE

## 2022-10-21 RX ORDER — ACETAMINOPHEN 325 MG/1
650 TABLET ORAL 4 TIMES DAILY
COMMUNITY
Start: 2022-10-21

## 2022-10-21 NOTE — TELEPHONE ENCOUNTER
[facility] Geriatrics Triage Nurse Telephone Encounter    Provider: ISHAN Martinez  Facility: [facility]  Facility Type:  TCU    Caller: Swati  Call Back Number: [phone]    Allergies:  No Known Allergies     Reason for call: Nurse called to report lab results.      Verbal Order/Direction given by Provider: SONG regarding lab results.  Discontinue current Tylenol orders and start Tylenol 975 mg po TID and put Oxycodone on hold x 3 days.      Provider giving Order:  ISHAN Martinez    Verbal Order given to: Swati Saucedo RN

## 2022-10-22 ENCOUNTER — TELEPHONE (OUTPATIENT)
Dept: GERIATRICS | Facility: CLINIC | Age: 87
End: 2022-10-22

## 2022-10-22 DIAGNOSIS — R52 PAIN: ICD-10-CM

## 2022-10-22 RX ORDER — OXYCODONE HYDROCHLORIDE 5 MG/1
TABLET ORAL
Qty: 30 TABLET | Refills: 0 | Status: SHIPPED | OUTPATIENT
Start: 2022-10-22 | End: 2023-01-01

## 2022-10-22 NOTE — TELEPHONE ENCOUNTER
[place] GERIATRIC SERVICES TELEPHONE ENCOUNTER    Manuela Olivas is a 89 year old  (8/2/1930),Nurse called today to report: yesterday patients nephew had concerns over patient getting too much oxycodone and it was put on hold. Today nephew would like the patient to be able to have oxycodone PRN. Patient is having acute pain.     ASSESSMENT/PLAN  Pain  - oxyCODONE (ROXICODONE) 5 MG tablet; Take 0.5 tablets (2.5 mg) by mouth At Bedtime. May also take 0.5 tablets (2.5 mg) every 4 hours as needed for severe pain.      Eloisa Maravilla, NP

## 2022-10-25 VITALS
DIASTOLIC BLOOD PRESSURE: 72 MMHG | WEIGHT: 137 LBS | HEIGHT: 65 IN | HEART RATE: 92 BPM | BODY MASS INDEX: 22.82 KG/M2 | SYSTOLIC BLOOD PRESSURE: 136 MMHG | OXYGEN SATURATION: 97 % | TEMPERATURE: 98 F | RESPIRATION RATE: 18 BRPM

## 2022-10-26 ENCOUNTER — TRANSITIONAL CARE UNIT VISIT (OUTPATIENT)
Dept: GERIATRICS | Facility: CLINIC | Age: 87
End: 2022-10-26
Payer: MEDICARE

## 2022-10-26 DIAGNOSIS — I10 PRIMARY HYPERTENSION: ICD-10-CM

## 2022-10-26 DIAGNOSIS — M35.3 PMR (POLYMYALGIA RHEUMATICA) (H): ICD-10-CM

## 2022-10-26 DIAGNOSIS — N18.31 STAGE 3A CHRONIC KIDNEY DISEASE (H): ICD-10-CM

## 2022-10-26 DIAGNOSIS — S72.91XD CLOSED FRACTURE OF RIGHT FEMUR WITH ROUTINE HEALING, UNSPECIFIED FRACTURE MORPHOLOGY, UNSPECIFIED PORTION OF FEMUR, SUBSEQUENT ENCOUNTER: Primary | ICD-10-CM

## 2022-10-26 PROCEDURE — 99316 NF DSCHRG MGMT 30 MIN+: CPT | Performed by: FAMILY MEDICINE

## 2022-10-26 NOTE — LETTER
10/26/2022        RE: Manuela Olivas  [address]  [facility] [address]        [facility] GERIATRIC SERVICES    Facility:   [facility] ([facility]) [[id number]]   Code Status: FULL CODE      CHIEF COMPLAINT/REASON FOR VISIT:  Chief Complaint   Patient presents with     Discharge Summary Nursing Home       HISTORY:      HPI: Manuela is a 89 year old female who was hospitalized September 29, 2022 secondary to right hip pain.  She was reaching something when she fell backwards onto her right side.  She did hit her head but did not lose loss of consciousness.  The pain was immediate to the right hip and unable to bear weight.  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  X-rays did reveal a right hip arthroplasty in place but a comminuted subtrochanteric fracture involving the proximal right femur which involves the femoral portion of the right hip arthroplasty and no dislocation.  The distal portion of the subtrochanteric fracture fragment is displaced anteriorly with the proximal aspect of the femoral shaft she also does have degenerative changes involving the lower spine and therefore did undergo revision hip arthroplasty.  Her work-up on September 29 did show a sodium 134, potassium 4.2, BUN 26 and creatinine 0.94 with a white cell count of 16.3 and hemoglobin 12.8 and platelets 260.  Her EKG did show sinus tachycardia as well as left ventricular hypertrophy with repolarization abnormality.  His CT of the head did not show any intracranial hemorrhage mass lesions or hydrocephalus.  The CT of the cervical spine did not show any acute fractures or posttraumatic subluxations.  Other chronic medical conditions addressed was her hypertension, CAD, PMR, GERD.  She has been in the transitional care unit and overall she does not feel that she is made the significant progress that she thinks that she should be making by  now.  She does admit to intermittent periods of improvement however nothing terribly consistent.  During her stay she has been normotensive and afebrile and also on room air.  Blood pressures were low at times and the amlodipine and the triamterene/HCTZ 37-25 mg tabs were discontinued she does continue with the metoprolol 50 mg twice daily.  Current systolic blood pressures over the past few days have been ranging anywhere from 100-136.  Her weight 136 pounds in comparison to October 18 also 136 pounds.  Her appetite does seem to wax and wane.  Getting extra nutrient supplements including Protostat and boost.  She is on prednisone.  We have also made some minor adjustments to her medications including now the Tylenol 975 mg 3 times daily given oxycodone at night 2.5 mg for bedtime.  She is on omeprazole 40 mg twice daily.  Her laboratory studies see results below on October 21 hemoglobin in 9.0 whereas prior is 8.4.  See results below over most recent BMP which did improve prior to that the BUN of 50 creatinine 1.14.  Past Medical History:   Diagnosis Date     Anxiety      Autoimmune hepatitis (H) 2006    Resolved     Breast cyst 1975     Chronic kidney disease, stage 3 (H) 7/27/2021     Controlled substance agreement signed 2/13/2018     Esophageal dysphagia      Fibrocystic breast      Former smoker      GERD (gastroesophageal reflux disease)      HLD (hyperlipidemia)      HTN (hypertension)      Hypercalcemia      Hyperparathyroidism (H)     Subclinical      Insomnia      OA (osteoarthritis) 5/27/2016     PMR (polymyalgia rheumatica) (H)      Presbyesophagus      Senile purpura (H) 4/18/2019     Unspecified glaucoma      Unspecified glaucoma(365.9)             Family History   Problem Relation Age of Onset     Cerebrovascular Disease Mother      Hyperlipidemia Mother      Coronary Artery Disease Father       Social History     Socioeconomic History     Marital status:      Number of children: 0   Tobacco  Use     Smoking status: Former     Smokeless tobacco: Never   Vaping Use     Vaping Use: Never used   Substance and Sexual Activity     Alcohol use: No     Drug use: Never     Sexual activity: Never   Social History Narrative    Moved out of her home in 2018 and moved to the [place] [facility] [place].    , no children, lives alone.    Volunteer at [employer] in the past.        REVIEW OF SYSTEM:  Currently she denies any new symptoms of fever cough or cold sore throat postnasal drip allergies shortness of breath dyspnea wheezing chest pain dizziness vertigo nausea vomiting diarrhea dysuria frequency urgency headaches or unusual myalgias or arthralgias    PHYSICAL EXAM:   Pleasant female in no acute distress.  Head is normocephalic.  Conjunctiva is pink sclerae is clear.  Speech is clear.  Neck is supple without adenopathy.  Lung sounds are clear throughout.  Cardiovascular S1 is 2 regular rate and rhythm and no lower extremity edema.  Gastrointestinal soft and nontender with positive bowel sounds.  Musculoskeletal the right hip looks good with good CMS to her right leg.  Psychiatric: Pleasant affect.    Current Outpatient Medications:      acetaminophen (TYLENOL) 325 MG tablet, Take 975 mg by mouth 3 times daily, Disp: , Rfl:      aspirin (ASA) 325 MG EC tablet, Take 325 mg by mouth daily, Disp: , Rfl:      calcium carbonate-vitamin D (OSCAL W/D) 500-200 MG-UNIT tablet, Take 1 tablet by mouth 3 times daily (with meals), Disp: , Rfl:      dorzolamide (TRUSOPT) 2 % ophthalmic solution, Place 1 drop into both eyes 2 times daily, Disp: , Rfl:      metoprolol tartrate (LOPRESSOR) 50 MG tablet, TAKE 1 TABLET BY MOUTH TWICE A DAY, Disp: 180 tablet, Rfl: 3     omeprazole (PRILOSEC) 40 MG DR capsule, TAKE 1 CAPSULE BY MOUTH TWICE A DAY, Disp: 180 capsule, Rfl: 2     oxyCODONE (ROXICODONE) 5 MG tablet, Take 0.5 tablets (2.5 mg) by mouth At Bedtime. May also take 0.5 tablets (2.5 mg) every 4 hours as needed for severe  "pain., Disp: 30 tablet, Rfl: 0     polyethylene glycol (MIRALAX) 17 g packet, Take 17 g by mouth 2 times daily, Disp: , Rfl:      predniSONE (DELTASONE) 5 MG tablet, TAKE 2 TABLETS BY MOUTH EVERY DAY, Disp: 180 tablet, Rfl: 1     senna-docusate (SENOKOT-S/PERICOLACE) 8.6-50 MG tablet, Take 1 tablet by mouth 2 times daily, Disp: , Rfl:      timolol maleate (ISTALOL) 0.5 % DrpD, [TIMOLOL MALEATE (ISTALOL) 0.5 % DRPD] Apply to eye Daily at 8:00 am.., Disp: , Rfl:     /72   Pulse 92   Temp 98  F (36.7  C)   Resp 18   Ht 1.651 m (5' 5\")   Wt 62.1 kg (137 lb)   LMP  (LMP Unknown)   SpO2 97%   BMI 22.80 kg/m        LABS:   CBC RESULTS: Recent Labs   Lab Test 10/21/22  0719   WBC 8.0   RBC 3.09*   HGB 9.0*   HCT 29.9*   MCV 97   MCH 29.1   MCHC 30.1*   RDW 16.3*        Last Comprehensive Metabolic Panel:  Sodium   Date Value Ref Range Status   10/21/2022 135 (L) 136 - 145 mmol/L Final     Potassium   Date Value Ref Range Status   10/21/2022 3.5 3.4 - 5.3 mmol/L Final   12/14/2021 4.0 3.5 - 5.0 mmol/L Final     Chloride   Date Value Ref Range Status   10/21/2022 99 98 - 107 mmol/L Final   12/14/2021 104 98 - 107 mmol/L Final     Carbon Dioxide (CO2)   Date Value Ref Range Status   10/21/2022 25 22 - 29 mmol/L Final   12/14/2021 23 22 - 31 mmol/L Final     Anion Gap   Date Value Ref Range Status   10/21/2022 11 7 - 15 mmol/L Final   12/14/2021 12 5 - 18 mmol/L Final     Glucose   Date Value Ref Range Status   10/21/2022 78 70 - 99 mg/dL Final   12/14/2021 84 70 - 125 mg/dL Final     Urea Nitrogen   Date Value Ref Range Status   10/21/2022 38.7 (H) 8.0 - 23.0 mg/dL Final   12/14/2021 20 8 - 28 mg/dL Final     Creatinine   Date Value Ref Range Status   10/21/2022 0.91 0.51 - 0.95 mg/dL Final     GFR Estimate   Date Value Ref Range Status   10/21/2022 59 (L) >60 mL/min/1.73m2 Final     Comment:     Effective December 21, 2021 eGFRcr in adults is calculated using the 2021 CKD-EPI creatinine equation which " includes age and gender (Jannet gonzalez al., NEJ, DOI: 10.1056/HOKJsr0813511)   04/09/2021 59 (L) >60 mL/min/1.73m2 Final     Calcium   Date Value Ref Range Status   10/21/2022 9.8 (H) 8.2 - 9.6 mg/dL Final     Last Comprehensive Metabolic Panel:  Sodium   Date Value Ref Range Status   10/21/2022 135 (L) 136 - 145 mmol/L Final     Potassium   Date Value Ref Range Status   10/21/2022 3.5 3.4 - 5.3 mmol/L Final   12/14/2021 4.0 3.5 - 5.0 mmol/L Final     Chloride   Date Value Ref Range Status   10/21/2022 99 98 - 107 mmol/L Final   12/14/2021 104 98 - 107 mmol/L Final     Carbon Dioxide (CO2)   Date Value Ref Range Status   10/21/2022 25 22 - 29 mmol/L Final   12/14/2021 23 22 - 31 mmol/L Final     Anion Gap   Date Value Ref Range Status   10/21/2022 11 7 - 15 mmol/L Final   12/14/2021 12 5 - 18 mmol/L Final     Glucose   Date Value Ref Range Status   10/21/2022 78 70 - 99 mg/dL Final   12/14/2021 84 70 - 125 mg/dL Final     Urea Nitrogen   Date Value Ref Range Status   10/21/2022 38.7 (H) 8.0 - 23.0 mg/dL Final   12/14/2021 20 8 - 28 mg/dL Final     Creatinine   Date Value Ref Range Status   10/21/2022 0.91 0.51 - 0.95 mg/dL Final     GFR Estimate   Date Value Ref Range Status   10/21/2022 59 (L) >60 mL/min/1.73m2 Final     Comment:     Effective December 21, 2021 eGFRcr in adults is calculated using the 2021 CKD-EPI creatinine equation which includes age and gender (Jannet gonzalez al., NEJ, DOI: 10.1056/DTSHfc5604629)   04/09/2021 59 (L) >60 mL/min/1.73m2 Final     Calcium   Date Value Ref Range Status   10/21/2022 9.8 (H) 8.2 - 9.6 mg/dL Final     Bilirubin Total   Date Value Ref Range Status   08/29/2022 0.4 <=1.2 mg/dL Final     Alkaline Phosphatase   Date Value Ref Range Status   08/29/2022 75 35 - 104 U/L Final     ALT   Date Value Ref Range Status   08/29/2022 20 10 - 35 U/L Final     AST   Date Value Ref Range Status   08/29/2022 25 10 - 35 U/L Final                 ASSESSMENT:    Encounter Diagnoses   Name Primary?      Closed fracture of right femur with routine healing, unspecified fracture morphology, unspecified portion of femur, subsequent encounter Yes     Stage 3a chronic kidney disease (H)      Primary hypertension      PMR (polymyalgia rheumatica) (H) - August 2016        MEDICAL EQUIPMENT NEEDS:  None    DISCHARGE PLAN/FACE TO FACE:  I certify that services are/were furnished while this patient was under the care of a physician and that a physician or an allowed non-physician practitioner (NPP), had a face-to-face encounter that meets the physician face-to-face encounter requirements. The encounter was in whole, or in part, related to the primary reason for home health. The patient is confined to his/her home and needs intermittent skilled nursing, physical therapy, speech-language pathology, or the continued need for occupational therapy. A plan of care has been established by a physician and is periodically reviewed by a physician.  Date of Face-to-Face Encounter: October 26, 2022    I certify that, based on my findings, the following services are medically necessary home health services: She will be discharging to [facility] for further evaluation of her chronic medical conditions and achieve the desired services at the facility.    My clinical findings support the need for the above skilled services because: (Please write a brief narrative summary that describes what the RN, PT, SLP, or other services will be doing in the home. A list of diagnoses in this section does not meet the CMS requirements.)  She will continue to require various services secondary to generalized weakness and debility along with nursing for medication management    This patient is homebound because: (Please write a brief narrative summary describing the functional limitations as to why this patient is homebound and specifically what makes this patient homebound.)  Secondary to chronic medical conditions    The patient is, or  has been, under my care and I have initiated the establishment of the plan of care. This patient will be followed by a physician who will periodically review the plan of care.    Schedule follow up visit with primary care provider within 7 days to reestablish care.  She overall was not dissatisfied with the facility however she feels that perhaps a smaller facility may be more beneficial to her current rehabilitation needs.  She did admit to intermittent periods of improvement but nothing consistent and was not completely and consistently getting stronger.  She denies any pain in her visit today.  We did go over her laboratory studies.  Went over her nutrition.  She will follow-up with the primary care team at the new facility.  Discharge coordination of care greater than 30 minutes    Electronically signed by: Hermelindo Crabtree NP          Sincerely,        Hermelindo Crabtree NP

## 2022-10-26 NOTE — PROGRESS NOTES
[facility] GERIATRIC SERVICES    Facility:   [facility] ([facility]) [[id number]]   Code Status: FULL CODE      CHIEF COMPLAINT/REASON FOR VISIT:  Chief Complaint   Patient presents with     Discharge Summary Nursing Home       HISTORY:      HPI: Manuela is a 89 year old female who was hospitalized September 29, 2022 secondary to right hip pain.  She was reaching something when she fell backwards onto her right side.  She did hit her head but did not lose loss of consciousness.  The pain was immediate to the right hip and unable to bear weight.  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  X-rays did reveal a right hip arthroplasty in place but a comminuted subtrochanteric fracture involving the proximal right femur which involves the femoral portion of the right hip arthroplasty and no dislocation.  The distal portion of the subtrochanteric fracture fragment is displaced anteriorly with the proximal aspect of the femoral shaft she also does have degenerative changes involving the lower spine and therefore did undergo revision hip arthroplasty.  Her work-up on September 29 did show a sodium 134, potassium 4.2, BUN 26 and creatinine 0.94 with a white cell count of 16.3 and hemoglobin 12.8 and platelets 260.  Her EKG did show sinus tachycardia as well as left ventricular hypertrophy with repolarization abnormality.  His CT of the head did not show any intracranial hemorrhage mass lesions or hydrocephalus.  The CT of the cervical spine did not show any acute fractures or posttraumatic subluxations.  Other chronic medical conditions addressed was her hypertension, CAD, PMR, GERD.  She has been in the transitional care unit and overall she does not feel that she is made the significant progress that she thinks that she should be making by now.  She does admit to intermittent periods of improvement however nothing terribly consistent.   During her stay she has been normotensive and afebrile and also on room air.  Blood pressures were low at times and the amlodipine and the triamterene/HCTZ 37-25 mg tabs were discontinued she does continue with the metoprolol 50 mg twice daily.  Current systolic blood pressures over the past few days have been ranging anywhere from 100-136.  Her weight 136 pounds in comparison to October 18 also 136 pounds.  Her appetite does seem to wax and wane.  Getting extra nutrient supplements including Protostat and boost.  She is on prednisone.  We have also made some minor adjustments to her medications including now the Tylenol 975 mg 3 times daily given oxycodone at night 2.5 mg for bedtime.  She is on omeprazole 40 mg twice daily.  Her laboratory studies see results below on October 21 hemoglobin in 9.0 whereas prior is 8.4.  See results below over most recent BMP which did improve prior to that the BUN of 50 creatinine 1.14.  Past Medical History:   Diagnosis Date     Anxiety      Autoimmune hepatitis (H) 2006    Resolved     Breast cyst 1975     Chronic kidney disease, stage 3 (H) 7/27/2021     Controlled substance agreement signed 2/13/2018     Esophageal dysphagia      Fibrocystic breast      Former smoker      GERD (gastroesophageal reflux disease)      HLD (hyperlipidemia)      HTN (hypertension)      Hypercalcemia      Hyperparathyroidism (H)     Subclinical      Insomnia      OA (osteoarthritis) 5/27/2016     PMR (polymyalgia rheumatica) (H)      Presbyesophagus      Senile purpura (H) 4/18/2019     Unspecified glaucoma      Unspecified glaucoma(365.9)             Family History   Problem Relation Age of Onset     Cerebrovascular Disease Mother      Hyperlipidemia Mother      Coronary Artery Disease Father       Social History     Socioeconomic History     Marital status:      Number of children: 0   Tobacco Use     Smoking status: Former     Smokeless tobacco: Never   Vaping Use     Vaping Use: Never used    Substance and Sexual Activity     Alcohol use: No     Drug use: Never     Sexual activity: Never   Social History Narrative    Moved out of her home in 2018 and moved to the [facility].    , no children, lives alone.    Volunteer at [employer] in the past.        REVIEW OF SYSTEM:  Currently she denies any new symptoms of fever cough or cold sore throat postnasal drip allergies shortness of breath dyspnea wheezing chest pain dizziness vertigo nausea vomiting diarrhea dysuria frequency urgency headaches or unusual myalgias or arthralgias    PHYSICAL EXAM:   Pleasant female in no acute distress.  Head is normocephalic.  Conjunctiva is pink sclerae is clear.  Speech is clear.  Neck is supple without adenopathy.  Lung sounds are clear throughout.  Cardiovascular S1 is 2 regular rate and rhythm and no lower extremity edema.  Gastrointestinal soft and nontender with positive bowel sounds.  Musculoskeletal the right hip looks good with good CMS to her right leg.  Psychiatric: Pleasant affect.    Current Outpatient Medications:      acetaminophen (TYLENOL) 325 MG tablet, Take 975 mg by mouth 3 times daily, Disp: , Rfl:      aspirin (ASA) 325 MG EC tablet, Take 325 mg by mouth daily, Disp: , Rfl:      calcium carbonate-vitamin D (OSCAL W/D) 500-200 MG-UNIT tablet, Take 1 tablet by mouth 3 times daily (with meals), Disp: , Rfl:      dorzolamide (TRUSOPT) 2 % ophthalmic solution, Place 1 drop into both eyes 2 times daily, Disp: , Rfl:      metoprolol tartrate (LOPRESSOR) 50 MG tablet, TAKE 1 TABLET BY MOUTH TWICE A DAY, Disp: 180 tablet, Rfl: 3     omeprazole (PRILOSEC) 40 MG DR capsule, TAKE 1 CAPSULE BY MOUTH TWICE A DAY, Disp: 180 capsule, Rfl: 2     oxyCODONE (ROXICODONE) 5 MG tablet, Take 0.5 tablets (2.5 mg) by mouth At Bedtime. May also take 0.5 tablets (2.5 mg) every 4 hours as needed for severe pain., Disp: 30 tablet, Rfl: 0     polyethylene glycol (MIRALAX) 17 g packet, Take 17 g by mouth 2 times  "daily, Disp: , Rfl:      predniSONE (DELTASONE) 5 MG tablet, TAKE 2 TABLETS BY MOUTH EVERY DAY, Disp: 180 tablet, Rfl: 1     senna-docusate (SENOKOT-S/PERICOLACE) 8.6-50 MG tablet, Take 1 tablet by mouth 2 times daily, Disp: , Rfl:      timolol maleate (ISTALOL) 0.5 % DrpD, [TIMOLOL MALEATE (ISTALOL) 0.5 % DRPD] Apply to eye Daily at 8:00 am.., Disp: , Rfl:     /72   Pulse 92   Temp 98  F (36.7  C)   Resp 18   Ht 1.651 m (5' 5\")   Wt 62.1 kg (137 lb)   LMP  (LMP Unknown)   SpO2 97%   BMI 22.80 kg/m        LABS:   CBC RESULTS: Recent Labs   Lab Test 10/21/22  0719   WBC 8.0   RBC 3.09*   HGB 9.0*   HCT 29.9*   MCV 97   MCH 29.1   MCHC 30.1*   RDW 16.3*        Last Comprehensive Metabolic Panel:  Sodium   Date Value Ref Range Status   10/21/2022 135 (L) 136 - 145 mmol/L Final     Potassium   Date Value Ref Range Status   10/21/2022 3.5 3.4 - 5.3 mmol/L Final   12/14/2021 4.0 3.5 - 5.0 mmol/L Final     Chloride   Date Value Ref Range Status   10/21/2022 99 98 - 107 mmol/L Final   12/14/2021 104 98 - 107 mmol/L Final     Carbon Dioxide (CO2)   Date Value Ref Range Status   10/21/2022 25 22 - 29 mmol/L Final   12/14/2021 23 22 - 31 mmol/L Final     Anion Gap   Date Value Ref Range Status   10/21/2022 11 7 - 15 mmol/L Final   12/14/2021 12 5 - 18 mmol/L Final     Glucose   Date Value Ref Range Status   10/21/2022 78 70 - 99 mg/dL Final   12/14/2021 84 70 - 125 mg/dL Final     Urea Nitrogen   Date Value Ref Range Status   10/21/2022 38.7 (H) 8.0 - 23.0 mg/dL Final   12/14/2021 20 8 - 28 mg/dL Final     Creatinine   Date Value Ref Range Status   10/21/2022 0.91 0.51 - 0.95 mg/dL Final     GFR Estimate   Date Value Ref Range Status   10/21/2022 59 (L) >60 mL/min/1.73m2 Final     Comment:     Effective December 21, 2021 eGFRcr in adults is calculated using the 2021 CKD-EPI creatinine equation which includes age and gender (Jannet gonzalez al., NEJM, DOI: 10.1056/KMBYgp4931477)   04/09/2021 59 (L) >60 " mL/min/1.73m2 Final     Calcium   Date Value Ref Range Status   10/21/2022 9.8 (H) 8.2 - 9.6 mg/dL Final     Last Comprehensive Metabolic Panel:  Sodium   Date Value Ref Range Status   10/21/2022 135 (L) 136 - 145 mmol/L Final     Potassium   Date Value Ref Range Status   10/21/2022 3.5 3.4 - 5.3 mmol/L Final   12/14/2021 4.0 3.5 - 5.0 mmol/L Final     Chloride   Date Value Ref Range Status   10/21/2022 99 98 - 107 mmol/L Final   12/14/2021 104 98 - 107 mmol/L Final     Carbon Dioxide (CO2)   Date Value Ref Range Status   10/21/2022 25 22 - 29 mmol/L Final   12/14/2021 23 22 - 31 mmol/L Final     Anion Gap   Date Value Ref Range Status   10/21/2022 11 7 - 15 mmol/L Final   12/14/2021 12 5 - 18 mmol/L Final     Glucose   Date Value Ref Range Status   10/21/2022 78 70 - 99 mg/dL Final   12/14/2021 84 70 - 125 mg/dL Final     Urea Nitrogen   Date Value Ref Range Status   10/21/2022 38.7 (H) 8.0 - 23.0 mg/dL Final   12/14/2021 20 8 - 28 mg/dL Final     Creatinine   Date Value Ref Range Status   10/21/2022 0.91 0.51 - 0.95 mg/dL Final     GFR Estimate   Date Value Ref Range Status   10/21/2022 59 (L) >60 mL/min/1.73m2 Final     Comment:     Effective December 21, 2021 eGFRcr in adults is calculated using the 2021 CKD-EPI creatinine equation which includes age and gender (Jannet et al., NEJM, DOI: 10.1056/ZLLPnx9555399)   04/09/2021 59 (L) >60 mL/min/1.73m2 Final     Calcium   Date Value Ref Range Status   10/21/2022 9.8 (H) 8.2 - 9.6 mg/dL Final     Bilirubin Total   Date Value Ref Range Status   08/29/2022 0.4 <=1.2 mg/dL Final     Alkaline Phosphatase   Date Value Ref Range Status   08/29/2022 75 35 - 104 U/L Final     ALT   Date Value Ref Range Status   08/29/2022 20 10 - 35 U/L Final     AST   Date Value Ref Range Status   08/29/2022 25 10 - 35 U/L Final                 ASSESSMENT:    Encounter Diagnoses   Name Primary?     Closed fracture of right femur with routine healing, unspecified fracture morphology,  unspecified portion of femur, subsequent encounter Yes     Stage 3a chronic kidney disease (H)      Primary hypertension      PMR (polymyalgia rheumatica) (H) - August 2016        MEDICAL EQUIPMENT NEEDS:  None    DISCHARGE PLAN/FACE TO FACE:  I certify that services are/were furnished while this patient was under the care of a physician and that a physician or an allowed non-physician practitioner (NPP), had a face-to-face encounter that meets the physician face-to-face encounter requirements. The encounter was in whole, or in part, related to the primary reason for home health. The patient is confined to his/her home and needs intermittent skilled nursing, physical therapy, speech-language pathology, or the continued need for occupational therapy. A plan of care has been established by a physician and is periodically reviewed by a physician.  Date of Face-to-Face Encounter: October 26, 2022    I certify that, based on my findings, the following services are medically necessary home health services: She will be discharging to [facility] for further evaluation of her chronic medical conditions and achieve the desired services at the facility.    My clinical findings support the need for the above skilled services because: (Please write a brief narrative summary that describes what the RN, PT, SLP, or other services will be doing in the home. A list of diagnoses in this section does not meet the CMS requirements.)  She will continue to require various services secondary to generalized weakness and debility along with nursing for medication management    This patient is homebound because: (Please write a brief narrative summary describing the functional limitations as to why this patient is homebound and specifically what makes this patient homebound.)  Secondary to chronic medical conditions    The patient is, or has been, under my care and I have initiated the establishment of the plan of care. This  patient will be followed by a physician who will periodically review the plan of care.    Schedule follow up visit with primary care provider within 7 days to reestablish care.  She overall was not dissatisfied with the facility however she feels that perhaps a smaller facility may be more beneficial to her current rehabilitation needs.  She did admit to intermittent periods of improvement but nothing consistent and was not completely and consistently getting stronger.  She denies any pain in her visit today.  We did go over her laboratory studies.  Went over her nutrition.  She will follow-up with the primary care team at the new facility.  Discharge coordination of care greater than 30 minutes    Electronically signed by: Hermelindo Crabtree NP

## 2022-11-03 ENCOUNTER — LAB REQUISITION (OUTPATIENT)
Dept: LAB | Facility: CLINIC | Age: 87
End: 2022-11-03
Payer: MEDICARE

## 2022-11-03 DIAGNOSIS — I10 ESSENTIAL (PRIMARY) HYPERTENSION: ICD-10-CM

## 2022-11-03 DIAGNOSIS — D64.9 ANEMIA, UNSPECIFIED: ICD-10-CM

## 2022-11-07 LAB
ANION GAP SERPL CALCULATED.3IONS-SCNC: 13 MMOL/L (ref 7–15)
BUN SERPL-MCNC: 24.7 MG/DL (ref 8–23)
CALCIUM SERPL-MCNC: 9.4 MG/DL (ref 8.2–9.6)
CHLORIDE SERPL-SCNC: 104 MMOL/L (ref 98–107)
CREAT SERPL-MCNC: 0.76 MG/DL (ref 0.51–0.95)
DEPRECATED HCO3 PLAS-SCNC: 22 MMOL/L (ref 22–29)
ERYTHROCYTE [DISTWIDTH] IN BLOOD BY AUTOMATED COUNT: 16.8 % (ref 10–15)
GFR SERPL CREATININE-BSD FRML MDRD: 73 ML/MIN/1.73M2
GLUCOSE SERPL-MCNC: 70 MG/DL (ref 70–99)
HCT VFR BLD AUTO: 30.5 % (ref 35–47)
HGB BLD-MCNC: 8.8 G/DL (ref 11.7–15.7)
MCH RBC QN AUTO: 28.5 PG (ref 26.5–33)
MCHC RBC AUTO-ENTMCNC: 28.9 G/DL (ref 31.5–36.5)
MCV RBC AUTO: 99 FL (ref 78–100)
PLATELET # BLD AUTO: 371 10E3/UL (ref 150–450)
POTASSIUM SERPL-SCNC: 3.5 MMOL/L (ref 3.4–5.3)
RBC # BLD AUTO: 3.09 10E6/UL (ref 3.8–5.2)
SODIUM SERPL-SCNC: 139 MMOL/L (ref 136–145)
WBC # BLD AUTO: 8.9 10E3/UL (ref 4–11)

## 2022-11-07 PROCEDURE — 36415 COLL VENOUS BLD VENIPUNCTURE: CPT | Performed by: INTERNAL MEDICINE

## 2022-11-07 PROCEDURE — 85027 COMPLETE CBC AUTOMATED: CPT | Performed by: INTERNAL MEDICINE

## 2022-11-07 PROCEDURE — 80048 BASIC METABOLIC PNL TOTAL CA: CPT | Performed by: INTERNAL MEDICINE

## 2022-11-07 PROCEDURE — P9604 ONE-WAY ALLOW PRORATED TRIP: HCPCS | Performed by: INTERNAL MEDICINE

## 2022-11-10 ENCOUNTER — LAB REQUISITION (OUTPATIENT)
Dept: LAB | Facility: CLINIC | Age: 87
End: 2022-11-10
Payer: MEDICARE

## 2022-11-10 ENCOUNTER — LAB REQUISITION (OUTPATIENT)
Dept: LAB | Facility: CLINIC | Age: 87
End: 2022-11-10

## 2022-11-10 LAB
ALBUMIN UR-MCNC: 10 MG/DL
APPEARANCE UR: ABNORMAL
BACTERIA #/AREA URNS HPF: ABNORMAL /HPF
BILIRUB UR QL STRIP: NEGATIVE
CAOX CRY #/AREA URNS HPF: ABNORMAL /HPF
COLOR UR AUTO: ABNORMAL
GLUCOSE UR STRIP-MCNC: NEGATIVE MG/DL
HGB UR QL STRIP: ABNORMAL
KETONES UR STRIP-MCNC: NEGATIVE MG/DL
LEUKOCYTE ESTERASE UR QL STRIP: ABNORMAL
MUCOUS THREADS #/AREA URNS LPF: PRESENT /LPF
NITRATE UR QL: NEGATIVE
PH UR STRIP: 5 [PH] (ref 5–7)
RBC URINE: 2 /HPF
SP GR UR STRIP: 1.02 (ref 1–1.03)
SQUAMOUS EPITHELIAL: <1 /HPF
UROBILINOGEN UR STRIP-MCNC: NORMAL MG/DL
WBC URINE: 16 /HPF

## 2022-11-10 PROCEDURE — 81001 URINALYSIS AUTO W/SCOPE: CPT | Performed by: INTERNAL MEDICINE

## 2022-11-10 PROCEDURE — 87088 URINE BACTERIA CULTURE: CPT | Performed by: INTERNAL MEDICINE

## 2022-11-11 ENCOUNTER — LAB REQUISITION (OUTPATIENT)
Dept: LAB | Facility: CLINIC | Age: 87
End: 2022-11-11
Payer: MEDICARE

## 2022-11-11 DIAGNOSIS — D64.9 ANEMIA, UNSPECIFIED: ICD-10-CM

## 2022-11-11 DIAGNOSIS — I10 ESSENTIAL (PRIMARY) HYPERTENSION: ICD-10-CM

## 2022-11-14 LAB
ANION GAP SERPL CALCULATED.3IONS-SCNC: 16 MMOL/L (ref 7–15)
BACTERIA UR CULT: ABNORMAL
BUN SERPL-MCNC: 25.6 MG/DL (ref 8–23)
CALCIUM SERPL-MCNC: 9.9 MG/DL (ref 8.2–9.6)
CHLORIDE SERPL-SCNC: 103 MMOL/L (ref 98–107)
CREAT SERPL-MCNC: 0.73 MG/DL (ref 0.51–0.95)
DEPRECATED HCO3 PLAS-SCNC: 17 MMOL/L (ref 22–29)
ERYTHROCYTE [DISTWIDTH] IN BLOOD BY AUTOMATED COUNT: 16.4 % (ref 10–15)
GFR SERPL CREATININE-BSD FRML MDRD: 77 ML/MIN/1.73M2
GLUCOSE SERPL-MCNC: 110 MG/DL (ref 70–99)
HCT VFR BLD AUTO: 32 % (ref 35–47)
HGB BLD-MCNC: 9.2 G/DL (ref 11.7–15.7)
MCH RBC QN AUTO: 28.4 PG (ref 26.5–33)
MCHC RBC AUTO-ENTMCNC: 28.8 G/DL (ref 31.5–36.5)
MCV RBC AUTO: 99 FL (ref 78–100)
PLATELET # BLD AUTO: 317 10E3/UL (ref 150–450)
POTASSIUM SERPL-SCNC: 3.9 MMOL/L (ref 3.4–5.3)
RBC # BLD AUTO: 3.24 10E6/UL (ref 3.8–5.2)
SODIUM SERPL-SCNC: 136 MMOL/L (ref 136–145)
WBC # BLD AUTO: 8.8 10E3/UL (ref 4–11)

## 2022-11-14 PROCEDURE — P9604 ONE-WAY ALLOW PRORATED TRIP: HCPCS | Performed by: INTERNAL MEDICINE

## 2022-11-14 PROCEDURE — 85027 COMPLETE CBC AUTOMATED: CPT | Performed by: INTERNAL MEDICINE

## 2022-11-14 PROCEDURE — 36415 COLL VENOUS BLD VENIPUNCTURE: CPT | Performed by: INTERNAL MEDICINE

## 2022-11-14 PROCEDURE — 80048 BASIC METABOLIC PNL TOTAL CA: CPT | Performed by: INTERNAL MEDICINE

## 2022-11-17 ENCOUNTER — LAB REQUISITION (OUTPATIENT)
Dept: LAB | Facility: CLINIC | Age: 87
End: 2022-11-17
Payer: MEDICARE

## 2022-11-17 DIAGNOSIS — R60.9 EDEMA, UNSPECIFIED: ICD-10-CM

## 2022-11-17 DIAGNOSIS — T50.1X5A: ICD-10-CM

## 2022-11-18 LAB
ANION GAP SERPL CALCULATED.3IONS-SCNC: 10 MMOL/L (ref 7–15)
BUN SERPL-MCNC: 24.4 MG/DL (ref 8–23)
CALCIUM SERPL-MCNC: 9.4 MG/DL (ref 8.2–9.6)
CHLORIDE SERPL-SCNC: 103 MMOL/L (ref 98–107)
CREAT SERPL-MCNC: 0.87 MG/DL (ref 0.51–0.95)
DEPRECATED HCO3 PLAS-SCNC: 25 MMOL/L (ref 22–29)
GFR SERPL CREATININE-BSD FRML MDRD: 62 ML/MIN/1.73M2
GLUCOSE SERPL-MCNC: 83 MG/DL (ref 70–99)
POTASSIUM SERPL-SCNC: 3.2 MMOL/L (ref 3.4–5.3)
SODIUM SERPL-SCNC: 138 MMOL/L (ref 136–145)

## 2022-11-18 PROCEDURE — P9604 ONE-WAY ALLOW PRORATED TRIP: HCPCS | Performed by: INTERNAL MEDICINE

## 2022-11-18 PROCEDURE — 80048 BASIC METABOLIC PNL TOTAL CA: CPT | Performed by: INTERNAL MEDICINE

## 2022-11-18 PROCEDURE — 36415 COLL VENOUS BLD VENIPUNCTURE: CPT | Performed by: INTERNAL MEDICINE

## 2022-11-19 ENCOUNTER — LAB REQUISITION (OUTPATIENT)
Dept: LAB | Facility: CLINIC | Age: 87
End: 2022-11-19
Payer: MEDICARE

## 2022-11-19 DIAGNOSIS — E87.6 HYPOKALEMIA: ICD-10-CM

## 2022-11-21 LAB
ANION GAP SERPL CALCULATED.3IONS-SCNC: 11 MMOL/L (ref 7–15)
BUN SERPL-MCNC: 24.5 MG/DL (ref 8–23)
CALCIUM SERPL-MCNC: 10 MG/DL (ref 8.2–9.6)
CHLORIDE SERPL-SCNC: 101 MMOL/L (ref 98–107)
CREAT SERPL-MCNC: 0.83 MG/DL (ref 0.51–0.95)
DEPRECATED HCO3 PLAS-SCNC: 24 MMOL/L (ref 22–29)
GFR SERPL CREATININE-BSD FRML MDRD: 66 ML/MIN/1.73M2
GLUCOSE SERPL-MCNC: 82 MG/DL (ref 70–99)
POTASSIUM SERPL-SCNC: 3.8 MMOL/L (ref 3.4–5.3)
SODIUM SERPL-SCNC: 136 MMOL/L (ref 136–145)

## 2022-11-21 PROCEDURE — 80048 BASIC METABOLIC PNL TOTAL CA: CPT | Performed by: INTERNAL MEDICINE

## 2022-11-21 PROCEDURE — P9604 ONE-WAY ALLOW PRORATED TRIP: HCPCS | Performed by: INTERNAL MEDICINE

## 2022-11-21 PROCEDURE — 36415 COLL VENOUS BLD VENIPUNCTURE: CPT | Performed by: INTERNAL MEDICINE

## 2022-12-08 ENCOUNTER — TELEPHONE (OUTPATIENT)
Dept: INTERNAL MEDICINE | Facility: CLINIC | Age: 87
End: 2022-12-08

## 2022-12-08 NOTE — TELEPHONE ENCOUNTER
Fax received from Kenzie Sparks at [facility] regarding patient considering moving into assisted living.    Fax says urgent, patient discharging 12/12/22.    Required materials printed and placed in PCP inbox.     Needs to be faxed to [phone] when completed.

## 2022-12-08 NOTE — TELEPHONE ENCOUNTER
Done.    Jersey Metcalf MD  General Internal Medicine  [facility]  12/8/2022, 5:25 PM

## 2022-12-09 ENCOUNTER — MEDICAL CORRESPONDENCE (OUTPATIENT)
Dept: HEALTH INFORMATION MANAGEMENT | Facility: CLINIC | Age: 87
End: 2022-12-09

## 2022-12-15 DIAGNOSIS — M35.3 PMR (POLYMYALGIA RHEUMATICA) (H): ICD-10-CM

## 2022-12-15 RX ORDER — ASPIRIN 325 MG
TABLET ORAL
Qty: 0.0001 TABLET | Refills: 0 | Status: SHIPPED | OUTPATIENT
Start: 2022-12-15 | End: 2023-01-01

## 2022-12-15 RX ORDER — PREDNISONE 5 MG/1
TABLET ORAL
Qty: 0.001 TABLET | Refills: 0 | Status: SHIPPED | OUTPATIENT
Start: 2022-12-15 | End: 2023-01-01

## 2022-12-22 ENCOUNTER — LAB REQUISITION (OUTPATIENT)
Dept: LAB | Facility: CLINIC | Age: 87
End: 2022-12-22

## 2022-12-22 DIAGNOSIS — G90.9 DISORDER OF THE AUTONOMIC NERVOUS SYSTEM, UNSPECIFIED: ICD-10-CM

## 2022-12-22 DIAGNOSIS — M35.3 POLYMYALGIA RHEUMATICA (H): ICD-10-CM

## 2022-12-22 DIAGNOSIS — I10 ESSENTIAL (PRIMARY) HYPERTENSION: ICD-10-CM

## 2022-12-26 ENCOUNTER — LAB REQUISITION (OUTPATIENT)
Dept: LAB | Facility: CLINIC | Age: 87
End: 2022-12-26
Payer: MEDICARE

## 2022-12-26 DIAGNOSIS — M35.3 POLYMYALGIA RHEUMATICA (H): ICD-10-CM

## 2022-12-26 DIAGNOSIS — G90.9 DISORDER OF THE AUTONOMIC NERVOUS SYSTEM, UNSPECIFIED: ICD-10-CM

## 2022-12-26 DIAGNOSIS — I10 ESSENTIAL (PRIMARY) HYPERTENSION: ICD-10-CM

## 2022-12-26 LAB
ALBUMIN SERPL BCG-MCNC: 3.7 G/DL (ref 3.5–5.2)
ALP SERPL-CCNC: 89 U/L (ref 35–104)
ALT SERPL W P-5'-P-CCNC: 31 U/L (ref 10–35)
ANION GAP SERPL CALCULATED.3IONS-SCNC: 15 MMOL/L (ref 7–15)
AST SERPL W P-5'-P-CCNC: 25 U/L (ref 10–35)
BASOPHILS # BLD AUTO: 0 10E3/UL (ref 0–0.2)
BASOPHILS NFR BLD AUTO: 0 %
BILIRUB SERPL-MCNC: 0.3 MG/DL
BUN SERPL-MCNC: 8.6 MG/DL (ref 8–23)
CALCIUM SERPL-MCNC: 10.1 MG/DL (ref 8.2–9.6)
CHLORIDE SERPL-SCNC: 104 MMOL/L (ref 98–107)
CREAT SERPL-MCNC: 0.79 MG/DL (ref 0.51–0.95)
DEPRECATED HCO3 PLAS-SCNC: 24 MMOL/L (ref 22–29)
EOSINOPHIL # BLD AUTO: 0 10E3/UL (ref 0–0.7)
EOSINOPHIL NFR BLD AUTO: 0 %
ERYTHROCYTE [DISTWIDTH] IN BLOOD BY AUTOMATED COUNT: 15.5 % (ref 10–15)
ERYTHROCYTE [SEDIMENTATION RATE] IN BLOOD BY WESTERGREN METHOD: 22 MM/HR (ref 0–30)
GFR SERPL CREATININE-BSD FRML MDRD: 70 ML/MIN/1.73M2
GLUCOSE SERPL-MCNC: 71 MG/DL (ref 70–99)
HCT VFR BLD AUTO: 36.5 % (ref 35–47)
HGB BLD-MCNC: 10.7 G/DL (ref 11.7–15.7)
IMM GRANULOCYTES # BLD: 0.1 10E3/UL
IMM GRANULOCYTES NFR BLD: 1 %
LYMPHOCYTES # BLD AUTO: 3.2 10E3/UL (ref 0.8–5.3)
LYMPHOCYTES NFR BLD AUTO: 36 %
MCH RBC QN AUTO: 26.9 PG (ref 26.5–33)
MCHC RBC AUTO-ENTMCNC: 29.3 G/DL (ref 31.5–36.5)
MCV RBC AUTO: 92 FL (ref 78–100)
MONOCYTES # BLD AUTO: 0.9 10E3/UL (ref 0–1.3)
MONOCYTES NFR BLD AUTO: 10 %
NEUTROPHILS # BLD AUTO: 4.6 10E3/UL (ref 1.6–8.3)
NEUTROPHILS NFR BLD AUTO: 53 %
NRBC # BLD AUTO: 0 10E3/UL
NRBC BLD AUTO-RTO: 0 /100
PLATELET # BLD AUTO: 290 10E3/UL (ref 150–450)
POTASSIUM SERPL-SCNC: 2.7 MMOL/L (ref 3.4–5.3)
PROT SERPL-MCNC: 6.1 G/DL (ref 6.4–8.3)
RBC # BLD AUTO: 3.98 10E6/UL (ref 3.8–5.2)
SODIUM SERPL-SCNC: 143 MMOL/L (ref 136–145)
TSH SERPL DL<=0.005 MIU/L-ACNC: 2.05 UIU/ML (ref 0.3–4.2)
VIT B12 SERPL-MCNC: 301 PG/ML (ref 232–1245)
WBC # BLD AUTO: 8.8 10E3/UL (ref 4–11)

## 2022-12-26 PROCEDURE — 84443 ASSAY THYROID STIM HORMONE: CPT | Mod: ORL | Performed by: INTERNAL MEDICINE

## 2022-12-26 PROCEDURE — 85652 RBC SED RATE AUTOMATED: CPT | Mod: ORL | Performed by: INTERNAL MEDICINE

## 2022-12-26 PROCEDURE — 85025 COMPLETE CBC W/AUTO DIFF WBC: CPT | Mod: ORL | Performed by: INTERNAL MEDICINE

## 2022-12-26 PROCEDURE — 82607 VITAMIN B-12: CPT | Mod: ORL | Performed by: INTERNAL MEDICINE

## 2022-12-26 PROCEDURE — P9604 ONE-WAY ALLOW PRORATED TRIP: HCPCS | Mod: ORL | Performed by: INTERNAL MEDICINE

## 2022-12-26 PROCEDURE — 36415 COLL VENOUS BLD VENIPUNCTURE: CPT | Mod: ORL | Performed by: INTERNAL MEDICINE

## 2022-12-26 PROCEDURE — 80053 COMPREHEN METABOLIC PANEL: CPT | Mod: ORL | Performed by: INTERNAL MEDICINE

## 2022-12-28 ENCOUNTER — LAB REQUISITION (OUTPATIENT)
Dept: LAB | Facility: CLINIC | Age: 87
End: 2022-12-28
Payer: MEDICARE

## 2022-12-28 DIAGNOSIS — I10 ESSENTIAL (PRIMARY) HYPERTENSION: ICD-10-CM

## 2023-01-01 ENCOUNTER — LAB REQUISITION (OUTPATIENT)
Dept: LAB | Facility: CLINIC | Age: 88
End: 2023-01-01
Payer: MEDICARE

## 2023-01-01 ENCOUNTER — HOSPITAL ENCOUNTER (EMERGENCY)
Facility: HOSPITAL | Age: 88
Discharge: HOME | End: 2023-10-21
Attending: EMERGENCY MEDICINE | Admitting: EMERGENCY MEDICINE
Payer: MEDICARE

## 2023-01-01 ENCOUNTER — APPOINTMENT (OUTPATIENT)
Dept: RADIOLOGY | Facility: HOSPITAL | Age: 88
End: 2023-01-01
Attending: EMERGENCY MEDICINE
Payer: MEDICARE

## 2023-01-01 ENCOUNTER — APPOINTMENT (OUTPATIENT)
Dept: OCCUPATIONAL THERAPY | Facility: HOSPITAL | Age: 88
DRG: 543 | End: 2023-01-01
Payer: MEDICARE

## 2023-01-01 ENCOUNTER — DOCUMENTATION ONLY (OUTPATIENT)
Dept: OTHER | Facility: CLINIC | Age: 88
End: 2023-01-01
Payer: MEDICARE

## 2023-01-01 ENCOUNTER — APPOINTMENT (OUTPATIENT)
Dept: MRI IMAGING | Facility: HOSPITAL | Age: 88
DRG: 543 | End: 2023-01-01
Payer: MEDICARE

## 2023-01-01 ENCOUNTER — APPOINTMENT (OUTPATIENT)
Dept: CT IMAGING | Facility: HOSPITAL | Age: 88
End: 2023-01-01
Attending: EMERGENCY MEDICINE
Payer: MEDICARE

## 2023-01-01 ENCOUNTER — APPOINTMENT (OUTPATIENT)
Dept: PHYSICAL THERAPY | Facility: HOSPITAL | Age: 88
DRG: 543 | End: 2023-01-01
Payer: MEDICARE

## 2023-01-01 ENCOUNTER — HOSPITAL ENCOUNTER (INPATIENT)
Facility: HOSPITAL | Age: 88
LOS: 12 days | Discharge: HOSPICE | DRG: 543 | End: 2023-11-21
Attending: EMERGENCY MEDICINE | Admitting: FAMILY MEDICINE
Payer: MEDICARE

## 2023-01-01 ENCOUNTER — APPOINTMENT (OUTPATIENT)
Dept: RADIOLOGY | Facility: HOSPITAL | Age: 88
DRG: 543 | End: 2023-01-01
Payer: MEDICARE

## 2023-01-01 ENCOUNTER — APPOINTMENT (OUTPATIENT)
Dept: CARDIOLOGY | Facility: HOSPITAL | Age: 88
DRG: 543 | End: 2023-01-01
Payer: MEDICARE

## 2023-01-01 VITALS
RESPIRATION RATE: 16 BRPM | SYSTOLIC BLOOD PRESSURE: 110 MMHG | DIASTOLIC BLOOD PRESSURE: 50 MMHG | HEART RATE: 72 BPM | OXYGEN SATURATION: 92 % | TEMPERATURE: 98 F | HEIGHT: 66 IN | BODY MASS INDEX: 22 KG/M2 | WEIGHT: 136.91 LBS

## 2023-01-01 VITALS
OXYGEN SATURATION: 95 % | HEIGHT: 64 IN | HEART RATE: 89 BPM | RESPIRATION RATE: 16 BRPM | WEIGHT: 150 LBS | SYSTOLIC BLOOD PRESSURE: 125 MMHG | DIASTOLIC BLOOD PRESSURE: 60 MMHG | BODY MASS INDEX: 25.61 KG/M2 | TEMPERATURE: 98.4 F

## 2023-01-01 DIAGNOSIS — R30.0 DYSURIA: ICD-10-CM

## 2023-01-01 DIAGNOSIS — I10 ESSENTIAL (PRIMARY) HYPERTENSION: ICD-10-CM

## 2023-01-01 DIAGNOSIS — Z76.0 ENCOUNTER FOR MEDICATION REFILL: Primary | ICD-10-CM

## 2023-01-01 DIAGNOSIS — R53.1 GENERALIZED WEAKNESS: ICD-10-CM

## 2023-01-01 DIAGNOSIS — L89.619 PRESSURE INJURY OF SKIN OF BOTH HEELS: ICD-10-CM

## 2023-01-01 DIAGNOSIS — S32.10XA CLOSED FRACTURE OF SACRUM, UNSPECIFIED PORTION OF SACRUM, INITIAL ENCOUNTER (H): Primary | ICD-10-CM

## 2023-01-01 DIAGNOSIS — F32.1 CURRENT MODERATE EPISODE OF MAJOR DEPRESSIVE DISORDER WITHOUT PRIOR EPISODE (H): ICD-10-CM

## 2023-01-01 DIAGNOSIS — G90.9 DISORDER OF THE AUTONOMIC NERVOUS SYSTEM, UNSPECIFIED: ICD-10-CM
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
## 2023-01-01 DIAGNOSIS — L89.629 PRESSURE INJURY OF SKIN OF BOTH HEELS: ICD-10-CM

## 2023-01-01 LAB
ALBUMIN SERPL BCG-MCNC: 3.6 G/DL (ref 3.5–5.2)
ALBUMIN SERPL BCG-MCNC: 3.9 G/DL (ref 3.5–5.2)
ALBUMIN UR-MCNC: 100 MG/DL
ALBUMIN UR-MCNC: NEGATIVE MG/DL
ALP SERPL-CCNC: 138 U/L (ref 35–104)
ALP SERPL-CCNC: 73 U/L (ref 35–104)
ALT SERPL W P-5'-P-CCNC: 20 U/L (ref 0–50)
ALT SERPL W P-5'-P-CCNC: 21 U/L (ref 0–50)
AMORPH CRY #/AREA URNS HPF: ABNORMAL /HPF
ANION GAP SERPL CALCULATED.3IONS-SCNC: 11 MMOL/L (ref 7–15)
ANION GAP SERPL CALCULATED.3IONS-SCNC: 12 MMOL/L (ref 7–15)
ANION GAP SERPL CALCULATED.3IONS-SCNC: 13 MMOL/L (ref 7–15)
ANION GAP SERPL CALCULATED.3IONS-SCNC: 14 MMOL/L (ref 7–15)
ANION GAP SERPL CALCULATED.3IONS-SCNC: 9 MMOL/L (ref 7–15)
APPEARANCE UR: ABNORMAL
APPEARANCE UR: CLEAR
APPEARANCE UR: CLEAR
AST SERPL W P-5'-P-CCNC: 22 U/L (ref 0–45)
AST SERPL W P-5'-P-CCNC: 23 U/L (ref 0–45)
ATRIAL RATE - MUSE: 94 BPM
BACTERIA #/AREA URNS HPF: ABNORMAL /HPF
BACTERIA #/AREA URNS HPF: ABNORMAL /HPF
BACTERIA UR CULT: ABNORMAL
BACTERIA UR CULT: NORMAL
BASO+EOS+MONOS # BLD AUTO: ABNORMAL 10*3/UL
BASO+EOS+MONOS # BLD AUTO: ABNORMAL 10*3/UL
BASO+EOS+MONOS NFR BLD AUTO: ABNORMAL %
BASO+EOS+MONOS NFR BLD AUTO: ABNORMAL %
BASOPHILS # BLD AUTO: 0 10E3/UL (ref 0–0.2)
BASOPHILS # BLD AUTO: 0.1 10E3/UL (ref 0–0.2)
BASOPHILS NFR BLD AUTO: 0 %
BASOPHILS NFR BLD AUTO: 1 %
BILIRUB DIRECT SERPL-MCNC: <0.2 MG/DL (ref 0–0.3)
BILIRUB SERPL-MCNC: 0.3 MG/DL
BILIRUB SERPL-MCNC: 0.5 MG/DL
BILIRUB UR QL STRIP: NEGATIVE
BUN SERPL-MCNC: 22.7 MG/DL (ref 8–23)
BUN SERPL-MCNC: 26 MG/DL (ref 8–23)
BUN SERPL-MCNC: 26.7 MG/DL (ref 8–23)
BUN SERPL-MCNC: 27.3 MG/DL (ref 8–23)
BUN SERPL-MCNC: 27.8 MG/DL (ref 8–23)
BUN SERPL-MCNC: 28.6 MG/DL (ref 8–23)
BUN SERPL-MCNC: 38.9 MG/DL (ref 8–23)
CALCIUM SERPL-MCNC: 10.3 MG/DL (ref 8.2–9.6)
CALCIUM SERPL-MCNC: 10.4 MG/DL (ref 8.2–9.6)
CALCIUM SERPL-MCNC: 10.4 MG/DL (ref 8.2–9.6)
CALCIUM SERPL-MCNC: 10.5 MG/DL (ref 8.2–9.6)
CALCIUM SERPL-MCNC: 10.6 MG/DL (ref 8.2–9.6)
CALCIUM SERPL-MCNC: 10.9 MG/DL (ref 8.2–9.6)
CALCIUM SERPL-MCNC: 9.9 MG/DL (ref 8.2–9.6)
CHLORIDE SERPL-SCNC: 100 MMOL/L (ref 98–107)
CHLORIDE SERPL-SCNC: 103 MMOL/L (ref 98–107)
CHLORIDE SERPL-SCNC: 98 MMOL/L (ref 98–107)
CHLORIDE SERPL-SCNC: 99 MMOL/L (ref 98–107)
CK SERPL-CCNC: 142 U/L (ref 26–192)
COLOR UR AUTO: ABNORMAL
COLOR UR AUTO: NORMAL
CREAT SERPL-MCNC: 0.99 MG/DL (ref 0.51–0.95)
CREAT SERPL-MCNC: 1.04 MG/DL (ref 0.51–0.95)
CREAT SERPL-MCNC: 1.06 MG/DL (ref 0.51–0.95)
CREAT SERPL-MCNC: 1.08 MG/DL (ref 0.51–0.95)
CREAT SERPL-MCNC: 1.09 MG/DL (ref 0.51–0.95)
CREAT SERPL-MCNC: 1.1 MG/DL (ref 0.51–0.95)
CREAT SERPL-MCNC: 1.17 MG/DL (ref 0.51–0.95)
DEPRECATED HCO3 PLAS-SCNC: 22 MMOL/L (ref 22–29)
DEPRECATED HCO3 PLAS-SCNC: 23 MMOL/L (ref 22–29)
DEPRECATED HCO3 PLAS-SCNC: 23 MMOL/L (ref 22–29)
DEPRECATED HCO3 PLAS-SCNC: 24 MMOL/L (ref 22–29)
DEPRECATED HCO3 PLAS-SCNC: 24 MMOL/L (ref 22–29)
DEPRECATED HCO3 PLAS-SCNC: 25 MMOL/L (ref 22–29)
DEPRECATED HCO3 PLAS-SCNC: 26 MMOL/L (ref 22–29)
DIASTOLIC BLOOD PRESSURE - MUSE: 75 MMHG
EGFRCR SERPLBLD CKD-EPI 2021: 47 ML/MIN/1.73M2
EGFRCR SERPLBLD CKD-EPI 2021: 47 ML/MIN/1.73M2
EGFRCR SERPLBLD CKD-EPI 2021: 48 ML/MIN/1.73M2
EGFRCR SERPLBLD CKD-EPI 2021: 49 ML/MIN/1.73M2
EGFRCR SERPLBLD CKD-EPI 2021: 50 ML/MIN/1.73M2
EOSINOPHIL # BLD AUTO: 0 10E3/UL (ref 0–0.7)
EOSINOPHIL NFR BLD AUTO: 0 %
ERYTHROCYTE [DISTWIDTH] IN BLOOD BY AUTOMATED COUNT: 13.8 % (ref 10–15)
ERYTHROCYTE [DISTWIDTH] IN BLOOD BY AUTOMATED COUNT: 13.8 % (ref 10–15)
ERYTHROCYTE [DISTWIDTH] IN BLOOD BY AUTOMATED COUNT: 13.9 % (ref 10–15)
ERYTHROCYTE [DISTWIDTH] IN BLOOD BY AUTOMATED COUNT: 14.9 % (ref 10–15)
ERYTHROCYTE [DISTWIDTH] IN BLOOD BY AUTOMATED COUNT: 16.8 % (ref 10–15)
GFR SERPL CREATININE-BSD FRML MDRD: 44 ML/MIN/1.73M2
GFR SERPL CREATININE-BSD FRML MDRD: 53 ML/MIN/1.73M2
GLUCOSE SERPL-MCNC: 111 MG/DL (ref 70–99)
GLUCOSE SERPL-MCNC: 112 MG/DL (ref 70–99)
GLUCOSE SERPL-MCNC: 135 MG/DL (ref 70–99)
GLUCOSE SERPL-MCNC: 90 MG/DL (ref 70–99)
GLUCOSE SERPL-MCNC: 91 MG/DL (ref 70–99)
GLUCOSE SERPL-MCNC: 98 MG/DL (ref 70–99)
GLUCOSE SERPL-MCNC: 98 MG/DL (ref 70–99)
GLUCOSE UR STRIP-MCNC: NEGATIVE MG/DL
HCT VFR BLD AUTO: 40.1 % (ref 35–47)
HCT VFR BLD AUTO: 41.2 % (ref 35–47)
HCT VFR BLD AUTO: 41.3 % (ref 35–47)
HCT VFR BLD AUTO: 43 % (ref 35–47)
HCT VFR BLD AUTO: 45.5 % (ref 35–47)
HGB BLD-MCNC: 12.7 G/DL (ref 11.7–15.7)
HGB BLD-MCNC: 12.8 G/DL (ref 11.7–15.7)
HGB BLD-MCNC: 12.9 G/DL (ref 11.7–15.7)
HGB BLD-MCNC: 13.1 G/DL (ref 11.7–15.7)
HGB BLD-MCNC: 13.8 G/DL (ref 11.7–15.7)
HGB UR QL STRIP: ABNORMAL
HGB UR QL STRIP: NEGATIVE
HOLD SPECIMEN: NORMAL
HYALINE CASTS: 1 /LPF
IMM GRANULOCYTES # BLD: 0.1 10E3/UL
IMM GRANULOCYTES # BLD: 0.1 10E3/UL
IMM GRANULOCYTES # BLD: 0.2 10E3/UL
IMM GRANULOCYTES # BLD: 0.3 10E3/UL
IMM GRANULOCYTES NFR BLD: 1 %
IMM GRANULOCYTES NFR BLD: 1 %
IMM GRANULOCYTES NFR BLD: 2 %
IMM GRANULOCYTES NFR BLD: 3 %
INTERPRETATION ECG - MUSE: NORMAL
KETONES UR STRIP-MCNC: NEGATIVE MG/DL
LEUKOCYTE ESTERASE UR QL STRIP: ABNORMAL
LEUKOCYTE ESTERASE UR QL STRIP: NEGATIVE
LEUKOCYTE ESTERASE UR QL STRIP: NEGATIVE
LIPASE SERPL-CCNC: 34 U/L (ref 13–60)
LVEF ECHO: NORMAL
LYMPHOCYTES # BLD AUTO: 1.1 10E3/UL (ref 0.8–5.3)
LYMPHOCYTES # BLD AUTO: 1.4 10E3/UL (ref 0.8–5.3)
LYMPHOCYTES # BLD AUTO: 2.5 10E3/UL (ref 0.8–5.3)
LYMPHOCYTES # BLD AUTO: 2.5 10E3/UL (ref 0.8–5.3)
LYMPHOCYTES NFR BLD AUTO: 16 %
LYMPHOCYTES NFR BLD AUTO: 22 %
LYMPHOCYTES NFR BLD AUTO: 27 %
LYMPHOCYTES NFR BLD AUTO: 9 %
MAGNESIUM SERPL-MCNC: 2 MG/DL (ref 1.7–2.3)
MCH RBC QN AUTO: 28.2 PG (ref 26.5–33)
MCH RBC QN AUTO: 30 PG (ref 26.5–33)
MCH RBC QN AUTO: 30.6 PG (ref 26.5–33)
MCH RBC QN AUTO: 30.7 PG (ref 26.5–33)
MCH RBC QN AUTO: 30.8 PG (ref 26.5–33)
MCHC RBC AUTO-ENTMCNC: 30.3 G/DL (ref 31.5–36.5)
MCHC RBC AUTO-ENTMCNC: 30.5 G/DL (ref 31.5–36.5)
MCHC RBC AUTO-ENTMCNC: 30.8 G/DL (ref 31.5–36.5)
MCHC RBC AUTO-ENTMCNC: 31.1 G/DL (ref 31.5–36.5)
MCHC RBC AUTO-ENTMCNC: 32.2 G/DL (ref 31.5–36.5)
MCV RBC AUTO: 100 FL (ref 78–100)
MCV RBC AUTO: 101 FL (ref 78–100)
MCV RBC AUTO: 93 FL (ref 78–100)
MCV RBC AUTO: 96 FL (ref 78–100)
MCV RBC AUTO: 97 FL (ref 78–100)
MONOCYTES # BLD AUTO: 0.5 10E3/UL (ref 0–1.3)
MONOCYTES # BLD AUTO: 0.5 10E3/UL (ref 0–1.3)
MONOCYTES # BLD AUTO: 0.6 10E3/UL (ref 0–1.3)
MONOCYTES # BLD AUTO: 0.8 10E3/UL (ref 0–1.3)
MONOCYTES NFR BLD AUTO: 4 %
MONOCYTES NFR BLD AUTO: 4 %
MONOCYTES NFR BLD AUTO: 8 %
MONOCYTES NFR BLD AUTO: 9 %
MUCOUS THREADS #/AREA URNS LPF: PRESENT /LPF
NEUTROPHILS # BLD AUTO: 10.6 10E3/UL (ref 1.6–8.3)
NEUTROPHILS # BLD AUTO: 6 10E3/UL (ref 1.6–8.3)
NEUTROPHILS # BLD AUTO: 6.2 10E3/UL (ref 1.6–8.3)
NEUTROPHILS # BLD AUTO: 8.5 10E3/UL (ref 1.6–8.3)
NEUTROPHILS NFR BLD AUTO: 62 %
NEUTROPHILS NFR BLD AUTO: 72 %
NEUTROPHILS NFR BLD AUTO: 73 %
NEUTROPHILS NFR BLD AUTO: 86 %
NITRATE UR QL: NEGATIVE
NITRATE UR QL: POSITIVE
NRBC # BLD AUTO: 0 10E3/UL
NRBC BLD AUTO-RTO: 0 /100
P AXIS - MUSE: 61 DEGREES
PH UR STRIP: 5.5 [PH] (ref 5–7)
PLATELET # BLD AUTO: 199 10E3/UL (ref 150–450)
PLATELET # BLD AUTO: 243 10E3/UL (ref 150–450)
PLATELET # BLD AUTO: 257 10E3/UL (ref 150–450)
PLATELET # BLD AUTO: 281 10E3/UL (ref 150–450)
PLATELET # BLD AUTO: 284 10E3/UL (ref 150–450)
POTASSIUM SERPL-SCNC: 3.7 MMOL/L (ref 3.4–5.3)
POTASSIUM SERPL-SCNC: 4 MMOL/L (ref 3.4–5.3)
POTASSIUM SERPL-SCNC: 4.2 MMOL/L (ref 3.4–5.3)
POTASSIUM SERPL-SCNC: 4.4 MMOL/L (ref 3.4–5.3)
POTASSIUM SERPL-SCNC: 4.7 MMOL/L (ref 3.4–5.3)
PR INTERVAL - MUSE: 132 MS
PROT SERPL-MCNC: 6.3 G/DL (ref 6.4–8.3)
PROT SERPL-MCNC: 6.5 G/DL (ref 6.4–8.3)
QRS DURATION - MUSE: 72 MS
QT - MUSE: 358 MS
QTC - MUSE: 447 MS
R AXIS - MUSE: 7 DEGREES
RBC # BLD AUTO: 4.15 10E6/UL (ref 3.8–5.2)
RBC # BLD AUTO: 4.19 10E6/UL (ref 3.8–5.2)
RBC # BLD AUTO: 4.27 10E6/UL (ref 3.8–5.2)
RBC # BLD AUTO: 4.27 10E6/UL (ref 3.8–5.2)
RBC # BLD AUTO: 4.89 10E6/UL (ref 3.8–5.2)
RBC URINE: 3 /HPF
RBC URINE: 5 /HPF
RBC URINE: 6 /HPF
RBC URINE: 61 /HPF
RBC URINE: <1 /HPF
SARS-COV-2 RNA RESP QL NAA+PROBE: NEGATIVE
SODIUM SERPL-SCNC: 134 MMOL/L (ref 135–145)
SODIUM SERPL-SCNC: 135 MMOL/L (ref 135–145)
SODIUM SERPL-SCNC: 136 MMOL/L (ref 136–145)
SODIUM SERPL-SCNC: 137 MMOL/L (ref 136–145)
SODIUM SERPL-SCNC: 138 MMOL/L (ref 135–145)
SP GR UR STRIP: 1.01 (ref 1–1.03)
SP GR UR STRIP: 1.02 (ref 1–1.03)
SP GR UR STRIP: 1.03 (ref 1–1.03)
SQUAMOUS EPITHELIAL: 1 /HPF
SQUAMOUS EPITHELIAL: 1 /HPF
SQUAMOUS EPITHELIAL: 24 /HPF
SQUAMOUS EPITHELIAL: <1 /HPF
SYSTOLIC BLOOD PRESSURE - MUSE: 134 MMHG
T AXIS - MUSE: 53 DEGREES
TRANSITIONAL EPI: 1 /HPF
TRANSITIONAL EPI: <1 /HPF
TROPONIN T SERPL HS-MCNC: 27 NG/L
TSH SERPL DL<=0.005 MIU/L-ACNC: 2.48 UIU/ML (ref 0.3–4.2)
UROBILINOGEN UR STRIP-MCNC: <2 MG/DL
UROBILINOGEN UR STRIP-MCNC: NORMAL MG/DL
VENTRICULAR RATE- MUSE: 94 BPM
VIT B12 SERPL-MCNC: 314 PG/ML (ref 232–1245)
WBC # BLD AUTO: 11.7 10E3/UL (ref 4–11)
WBC # BLD AUTO: 12.2 10E3/UL (ref 4–11)
WBC # BLD AUTO: 7.4 10E3/UL (ref 4–11)
WBC # BLD AUTO: 8.5 10E3/UL (ref 4–11)
WBC # BLD AUTO: 9.6 10E3/UL (ref 4–11)
WBC CLUMPS #/AREA URNS HPF: PRESENT /HPF
WBC URINE: 1 /HPF
WBC URINE: 1 /HPF
WBC URINE: 57 /HPF
WBC URINE: 57 /HPF
WBC URINE: >182 /HPF

## 2023-01-01 PROCEDURE — 250N000013 HC RX MED GY IP 250 OP 250 PS 637

## 2023-01-01 PROCEDURE — 73030 X-RAY EXAM OF SHOULDER: CPT | Mod: LT

## 2023-01-01 PROCEDURE — 250N000012 HC RX MED GY IP 250 OP 636 PS 637

## 2023-01-01 PROCEDURE — 99285 EMERGENCY DEPT VISIT HI MDM: CPT | Mod: 25

## 2023-01-01 PROCEDURE — 250N000009 HC RX 250: Performed by: EMERGENCY MEDICINE

## 2023-01-01 PROCEDURE — P9603 ONE-WAY ALLOW PRORATED MILES: HCPCS | Mod: ORL | Performed by: INTERNAL MEDICINE

## 2023-01-01 PROCEDURE — 36415 COLL VENOUS BLD VENIPUNCTURE: CPT | Mod: ORL | Performed by: INTERNAL MEDICINE

## 2023-01-01 PROCEDURE — 99232 SBSQ HOSP IP/OBS MODERATE 35: CPT | Mod: GC | Performed by: MASSAGE THERAPIST

## 2023-01-01 PROCEDURE — 85025 COMPLETE CBC W/AUTO DIFF WBC: CPT | Mod: ORL | Performed by: INTERNAL MEDICINE

## 2023-01-01 PROCEDURE — 99232 SBSQ HOSP IP/OBS MODERATE 35: CPT | Mod: GC

## 2023-01-01 PROCEDURE — 81001 URINALYSIS AUTO W/SCOPE: CPT | Mod: ORL | Performed by: INTERNAL MEDICINE

## 2023-01-01 PROCEDURE — 85014 HEMATOCRIT: CPT

## 2023-01-01 PROCEDURE — 87086 URINE CULTURE/COLONY COUNT: CPT | Mod: ORL | Performed by: INTERNAL MEDICINE

## 2023-01-01 PROCEDURE — 120N000001 HC R&B MED SURG/OB

## 2023-01-01 PROCEDURE — 36415 COLL VENOUS BLD VENIPUNCTURE: CPT

## 2023-01-01 PROCEDURE — P9604 ONE-WAY ALLOW PRORATED TRIP: HCPCS | Mod: ORL | Performed by: INTERNAL MEDICINE

## 2023-01-01 PROCEDURE — 250N000013 HC RX MED GY IP 250 OP 250 PS 637: Performed by: STUDENT IN AN ORGANIZED HEALTH CARE EDUCATION/TRAINING PROGRAM

## 2023-01-01 PROCEDURE — 99233 SBSQ HOSP IP/OBS HIGH 50: CPT | Performed by: STUDENT IN AN ORGANIZED HEALTH CARE EDUCATION/TRAINING PROGRAM

## 2023-01-01 PROCEDURE — G0463 HOSPITAL OUTPT CLINIC VISIT: HCPCS

## 2023-01-01 PROCEDURE — 80053 COMPREHEN METABOLIC PANEL: CPT | Mod: ORL | Performed by: INTERNAL MEDICINE

## 2023-01-01 PROCEDURE — 250N000009 HC RX 250

## 2023-01-01 PROCEDURE — 80048 BASIC METABOLIC PNL TOTAL CA: CPT | Mod: ORL | Performed by: INTERNAL MEDICINE

## 2023-01-01 PROCEDURE — 250N000013 HC RX MED GY IP 250 OP 250 PS 637: Performed by: FAMILY MEDICINE

## 2023-01-01 PROCEDURE — 82248 BILIRUBIN DIRECT: CPT

## 2023-01-01 PROCEDURE — 70450 CT HEAD/BRAIN W/O DYE: CPT | Mod: ME

## 2023-01-01 PROCEDURE — 80048 BASIC METABOLIC PNL TOTAL CA: CPT

## 2023-01-01 PROCEDURE — 97165 OT EVAL LOW COMPLEX 30 MIN: CPT | Mod: GO

## 2023-01-01 PROCEDURE — 83690 ASSAY OF LIPASE: CPT

## 2023-01-01 PROCEDURE — G0378 HOSPITAL OBSERVATION PER HR: HCPCS

## 2023-01-01 PROCEDURE — 82550 ASSAY OF CK (CPK): CPT | Performed by: EMERGENCY MEDICINE

## 2023-01-01 PROCEDURE — 85025 COMPLETE CBC W/AUTO DIFF WBC: CPT

## 2023-01-01 PROCEDURE — 72125 CT NECK SPINE W/O DYE: CPT | Mod: ME

## 2023-01-01 PROCEDURE — 97530 THERAPEUTIC ACTIVITIES: CPT | Mod: GP | Performed by: PHYSICAL THERAPIST

## 2023-01-01 PROCEDURE — 81001 URINALYSIS AUTO W/SCOPE: CPT

## 2023-01-01 PROCEDURE — 72149 MRI LUMBAR SPINE W/DYE: CPT | Mod: MF

## 2023-01-01 PROCEDURE — 87635 SARS-COV-2 COVID-19 AMP PRB: CPT

## 2023-01-01 PROCEDURE — 99223 1ST HOSP IP/OBS HIGH 75: CPT | Performed by: FAMILY MEDICINE

## 2023-01-01 PROCEDURE — 82607 VITAMIN B-12: CPT | Mod: ORL | Performed by: INTERNAL MEDICINE

## 2023-01-01 PROCEDURE — 85025 COMPLETE CBC W/AUTO DIFF WBC: CPT | Performed by: EMERGENCY MEDICINE

## 2023-01-01 PROCEDURE — 99231 SBSQ HOSP IP/OBS SF/LOW 25: CPT | Mod: GC | Performed by: MASSAGE THERAPIST

## 2023-01-01 PROCEDURE — 250N000013 HC RX MED GY IP 250 OP 250 PS 637: Performed by: EMERGENCY MEDICINE

## 2023-01-01 PROCEDURE — G1010 CDSM STANSON: HCPCS

## 2023-01-01 PROCEDURE — 93005 ELECTROCARDIOGRAM TRACING: CPT | Performed by: EMERGENCY MEDICINE

## 2023-01-01 PROCEDURE — 84484 ASSAY OF TROPONIN QUANT: CPT

## 2023-01-01 PROCEDURE — 99232 SBSQ HOSP IP/OBS MODERATE 35: CPT | Performed by: STUDENT IN AN ORGANIZED HEALTH CARE EDUCATION/TRAINING PROGRAM

## 2023-01-01 PROCEDURE — 83735 ASSAY OF MAGNESIUM: CPT | Mod: ORL | Performed by: INTERNAL MEDICINE

## 2023-01-01 PROCEDURE — 93306 TTE W/DOPPLER COMPLETE: CPT

## 2023-01-01 PROCEDURE — 255N000002 HC RX 255 OP 636: Mod: JZ | Performed by: FAMILY MEDICINE

## 2023-01-01 PROCEDURE — 80053 COMPREHEN METABOLIC PANEL: CPT

## 2023-01-01 PROCEDURE — 80048 BASIC METABOLIC PNL TOTAL CA: CPT | Performed by: EMERGENCY MEDICINE

## 2023-01-01 PROCEDURE — 97162 PT EVAL MOD COMPLEX 30 MIN: CPT | Mod: GP

## 2023-01-01 PROCEDURE — 84443 ASSAY THYROID STIM HORMONE: CPT | Mod: ORL | Performed by: INTERNAL MEDICINE

## 2023-01-01 PROCEDURE — 99222 1ST HOSP IP/OBS MODERATE 55: CPT | Mod: AI

## 2023-01-01 PROCEDURE — A9585 GADOBUTROL INJECTION: HCPCS | Mod: JZ | Performed by: FAMILY MEDICINE

## 2023-01-01 PROCEDURE — 93306 TTE W/DOPPLER COMPLETE: CPT | Mod: 26 | Performed by: INTERNAL MEDICINE

## 2023-01-01 PROCEDURE — 72100 X-RAY EXAM L-S SPINE 2/3 VWS: CPT

## 2023-01-01 PROCEDURE — 36415 COLL VENOUS BLD VENIPUNCTURE: CPT | Performed by: EMERGENCY MEDICINE

## 2023-01-01 RX ORDER — IBUPROFEN 200 MG
CAPSULE ORAL EVERY MORNING
Status: ON HOLD | COMMUNITY
End: 2023-01-01

## 2023-01-01 RX ORDER — LANOLIN ALCOHOL/MO/W.PET/CERES
CREAM (GRAM) TOPICAL 2 TIMES DAILY
COMMUNITY

## 2023-01-01 RX ORDER — DORZOLAMIDE HCL 20 MG/ML
1 SOLUTION/ DROPS OPHTHALMIC 2 TIMES DAILY
Status: DISCONTINUED | OUTPATIENT
Start: 2023-01-01 | End: 2023-01-01 | Stop reason: HOSPADM

## 2023-01-01 RX ORDER — TIMOLOL MALEATE 5 MG/ML
1 SOLUTION/ DROPS OPHTHALMIC 2 TIMES DAILY
Status: DISCONTINUED | OUTPATIENT
Start: 2023-01-01 | End: 2023-01-01 | Stop reason: HOSPADM

## 2023-01-01 RX ORDER — SPIRONOLACTONE 25 MG/1
25 TABLET ORAL EVERY MORNING
Status: DISCONTINUED | OUTPATIENT
Start: 2023-01-01 | End: 2023-01-01

## 2023-01-01 RX ORDER — METOPROLOL TARTRATE 50 MG
50 TABLET ORAL 2 TIMES DAILY
Status: ON HOLD | COMMUNITY
End: 2023-01-01

## 2023-01-01 RX ORDER — SERTRALINE HYDROCHLORIDE 25 MG/1
25 TABLET, FILM COATED ORAL DAILY
Qty: 30 TABLET | Refills: 0 | Status: CANCELLED | OUTPATIENT
Start: 2023-01-01

## 2023-01-01 RX ORDER — LANOLIN ALCOHOL/MO/W.PET/CERES
2 CREAM (GRAM) TOPICAL AT BEDTIME
COMMUNITY
Start: 2023-01-01

## 2023-01-01 RX ORDER — LOPERAMIDE HCL 2 MG
2 CAPSULE ORAL 3 TIMES DAILY PRN
COMMUNITY

## 2023-01-01 RX ORDER — GABAPENTIN 100 MG/1
100 CAPSULE ORAL 3 TIMES DAILY
Qty: 90 CAPSULE | Refills: 0 | Status: CANCELLED | OUTPATIENT
Start: 2023-01-01

## 2023-01-01 RX ORDER — OXYCODONE HYDROCHLORIDE 5 MG/1
5 TABLET ORAL EVERY 4 HOURS PRN
Status: DISCONTINUED | OUTPATIENT
Start: 2023-01-01 | End: 2023-01-01 | Stop reason: HOSPADM

## 2023-01-01 RX ORDER — PREDNISONE 5 MG/1
10 TABLET ORAL EVERY MORNING
COMMUNITY

## 2023-01-01 RX ORDER — ACETAMINOPHEN 650 MG/1
650 SUPPOSITORY RECTAL EVERY 4 HOURS PRN
Status: DISCONTINUED | OUTPATIENT
Start: 2023-01-01 | End: 2023-01-01 | Stop reason: HOSPADM

## 2023-01-01 RX ORDER — ACETAMINOPHEN 325 MG/1
650 TABLET ORAL EVERY 4 HOURS PRN
Status: DISCONTINUED | OUTPATIENT
Start: 2023-01-01 | End: 2023-01-01 | Stop reason: HOSPADM

## 2023-01-01 RX ORDER — ONDANSETRON 4 MG/1
4 TABLET, ORALLY DISINTEGRATING ORAL EVERY 6 HOURS PRN
Status: DISCONTINUED | OUTPATIENT
Start: 2023-01-01 | End: 2023-01-01 | Stop reason: HOSPADM

## 2023-01-01 RX ORDER — PANTOPRAZOLE SODIUM 40 MG/1
40 TABLET, DELAYED RELEASE ORAL
Status: DISCONTINUED | OUTPATIENT
Start: 2023-01-01 | End: 2023-01-01 | Stop reason: HOSPADM

## 2023-01-01 RX ORDER — LOPERAMIDE HCL 2 MG
2 CAPSULE ORAL 3 TIMES DAILY PRN
Status: DISCONTINUED | OUTPATIENT
Start: 2023-01-01 | End: 2023-01-01 | Stop reason: HOSPADM

## 2023-01-01 RX ORDER — NALOXONE HYDROCHLORIDE 0.4 MG/ML
0.4 INJECTION, SOLUTION INTRAMUSCULAR; INTRAVENOUS; SUBCUTANEOUS
Status: DISCONTINUED | OUTPATIENT
Start: 2023-01-01 | End: 2023-01-01 | Stop reason: HOSPADM

## 2023-01-01 RX ORDER — GABAPENTIN 100 MG/1
100 CAPSULE ORAL 3 TIMES DAILY
Status: DISCONTINUED | OUTPATIENT
Start: 2023-01-01 | End: 2023-01-01 | Stop reason: HOSPADM

## 2023-01-01 RX ORDER — AMOXICILLIN 250 MG
2 CAPSULE ORAL 2 TIMES DAILY PRN
Status: DISCONTINUED | OUTPATIENT
Start: 2023-01-01 | End: 2023-01-01 | Stop reason: HOSPADM

## 2023-01-01 RX ORDER — POLYETHYLENE GLYCOL 3350 17 G/17G
0.5 POWDER, FOR SOLUTION ORAL EVERY MORNING
COMMUNITY

## 2023-01-01 RX ORDER — CEFDINIR 300 MG/1
300 CAPSULE ORAL 2 TIMES DAILY
Status: ON HOLD | COMMUNITY
Start: 2023-01-01 | End: 2023-01-01

## 2023-01-01 RX ORDER — CEFDINIR 300 MG/1
300 CAPSULE ORAL 2 TIMES DAILY
Status: COMPLETED | OUTPATIENT
Start: 2023-01-01 | End: 2023-01-01

## 2023-01-01 RX ORDER — SERTRALINE HYDROCHLORIDE 25 MG/1
25 TABLET, FILM COATED ORAL DAILY
Status: DISCONTINUED | OUTPATIENT
Start: 2023-01-01 | End: 2023-01-01

## 2023-01-01 RX ORDER — BISACODYL 10 MG
10 SUPPOSITORY, RECTAL RECTAL ONCE
Status: COMPLETED | OUTPATIENT
Start: 2023-01-01 | End: 2023-01-01

## 2023-01-01 RX ORDER — BISACODYL 10 MG
10 SUPPOSITORY, RECTAL RECTAL ONCE
Qty: 1 SUPPOSITORY | Refills: 0 | Status: COMPLETED | OUTPATIENT
Start: 2023-01-01 | End: 2023-01-01

## 2023-01-01 RX ORDER — TRAZODONE HYDROCHLORIDE 50 MG/1
50 TABLET, FILM COATED ORAL
COMMUNITY
Start: 2023-01-01

## 2023-01-01 RX ORDER — FUROSEMIDE 20 MG
20 TABLET ORAL EVERY MORNING
Status: ON HOLD | COMMUNITY
Start: 2023-01-01 | End: 2023-01-01

## 2023-01-01 RX ORDER — TIMOLOL MALEATE 5 MG/ML
1 SOLUTION/ DROPS OPHTHALMIC 2 TIMES DAILY
COMMUNITY

## 2023-01-01 RX ORDER — ACETAMINOPHEN 325 MG/1
650 TABLET ORAL 4 TIMES DAILY
Status: DISCONTINUED | OUTPATIENT
Start: 2023-01-01 | End: 2023-01-01 | Stop reason: HOSPADM

## 2023-01-01 RX ORDER — ACETAMINOPHEN 325 MG/1
650 TABLET ORAL ONCE
Status: COMPLETED | OUTPATIENT
Start: 2023-01-01 | End: 2023-01-01

## 2023-01-01 RX ORDER — METHOCARBAMOL 500 MG/1
500 TABLET, FILM COATED ORAL 2 TIMES DAILY PRN
Status: DISCONTINUED | OUTPATIENT
Start: 2023-01-01 | End: 2023-01-01

## 2023-01-01 RX ORDER — NALOXONE HYDROCHLORIDE 0.4 MG/ML
0.2 INJECTION, SOLUTION INTRAMUSCULAR; INTRAVENOUS; SUBCUTANEOUS
Status: DISCONTINUED | OUTPATIENT
Start: 2023-01-01 | End: 2023-01-01 | Stop reason: HOSPADM

## 2023-01-01 RX ORDER — METOPROLOL TARTRATE 25 MG/1
50 TABLET, FILM COATED ORAL 2 TIMES DAILY
Status: DISCONTINUED | OUTPATIENT
Start: 2023-01-01 | End: 2023-01-01

## 2023-01-01 RX ORDER — DORZOLAMIDE HCL 20 MG/ML
SOLUTION/ DROPS OPHTHALMIC
Qty: 0.001 ML | Refills: 0 | Status: SHIPPED | OUTPATIENT
Start: 2023-01-01 | End: 2023-01-01

## 2023-01-01 RX ORDER — FUROSEMIDE 20 MG
20 TABLET ORAL EVERY MORNING
Status: DISCONTINUED | OUTPATIENT
Start: 2023-01-01 | End: 2023-01-01

## 2023-01-01 RX ORDER — SENNOSIDES 8.6 MG
8.6 TABLET ORAL 2 TIMES DAILY
Status: DISCONTINUED | OUTPATIENT
Start: 2023-01-01 | End: 2023-01-01 | Stop reason: HOSPADM

## 2023-01-01 RX ORDER — AMOXICILLIN 250 MG
1 CAPSULE ORAL 2 TIMES DAILY PRN
Status: DISCONTINUED | OUTPATIENT
Start: 2023-01-01 | End: 2023-01-01 | Stop reason: HOSPADM

## 2023-01-01 RX ORDER — OMEPRAZOLE 40 MG/1
40 CAPSULE, DELAYED RELEASE ORAL 2 TIMES DAILY
COMMUNITY

## 2023-01-01 RX ORDER — GABAPENTIN 100 MG/1
100 CAPSULE ORAL 3 TIMES DAILY
Qty: 90 CAPSULE | Refills: 0 | Status: SHIPPED | OUTPATIENT
Start: 2023-01-01

## 2023-01-01 RX ORDER — OXYCODONE HYDROCHLORIDE 5 MG/1
2.5-5 TABLET ORAL EVERY 4 HOURS PRN
Qty: 30 TABLET | Refills: 0 | Status: SHIPPED | OUTPATIENT
Start: 2023-01-01

## 2023-01-01 RX ORDER — METOPROLOL TARTRATE 25 MG/1
25 TABLET, FILM COATED ORAL 2 TIMES DAILY
Status: DISCONTINUED | OUTPATIENT
Start: 2023-01-01 | End: 2023-01-01

## 2023-01-01 RX ORDER — PREDNISONE 5 MG/1
10 TABLET ORAL EVERY MORNING
Status: DISCONTINUED | OUTPATIENT
Start: 2023-01-01 | End: 2023-01-01 | Stop reason: HOSPADM

## 2023-01-01 RX ORDER — SPIRONOLACTONE 25 MG/1
25 TABLET ORAL EVERY MORNING
Status: ON HOLD | COMMUNITY
End: 2023-01-01

## 2023-01-01 RX ORDER — GADOBUTROL 604.72 MG/ML
6 INJECTION INTRAVENOUS ONCE
Status: COMPLETED | OUTPATIENT
Start: 2023-01-01 | End: 2023-01-01

## 2023-01-01 RX ORDER — OXYCODONE HYDROCHLORIDE 5 MG/1
2.5-5 TABLET ORAL EVERY 4 HOURS PRN
Qty: 30 TABLET | Refills: 0 | Status: CANCELLED | OUTPATIENT
Start: 2023-01-01

## 2023-01-01 RX ORDER — DORZOLAMIDE HCL 20 MG/ML
1 SOLUTION/ DROPS OPHTHALMIC 2 TIMES DAILY
COMMUNITY

## 2023-01-01 RX ORDER — GINSENG 100 MG
CAPSULE ORAL ONCE
Status: COMPLETED | OUTPATIENT
Start: 2023-01-01 | End: 2023-01-01

## 2023-01-01 RX ORDER — TRAZODONE HYDROCHLORIDE 50 MG/1
50 TABLET, FILM COATED ORAL
Status: DISCONTINUED | OUTPATIENT
Start: 2023-01-01 | End: 2023-01-01 | Stop reason: HOSPADM

## 2023-01-01 RX ORDER — POLYETHYLENE GLYCOL 3350 17 G/17G
8.5 POWDER, FOR SOLUTION ORAL EVERY MORNING
Status: DISCONTINUED | OUTPATIENT
Start: 2023-01-01 | End: 2023-01-01 | Stop reason: HOSPADM

## 2023-01-01 RX ORDER — ONDANSETRON 2 MG/ML
4 INJECTION INTRAMUSCULAR; INTRAVENOUS EVERY 6 HOURS PRN
Status: DISCONTINUED | OUTPATIENT
Start: 2023-01-01 | End: 2023-01-01 | Stop reason: HOSPADM

## 2023-01-01 RX ADMIN — TIMOLOL MALEATE 1 DROP: 5 SOLUTION/ DROPS OPHTHALMIC at 08:23

## 2023-01-01 RX ADMIN — METOPROLOL TARTRATE 50 MG: 25 TABLET, FILM COATED ORAL at 09:17

## 2023-01-01 RX ADMIN — TIMOLOL MALEATE 1 DROP: 5 SOLUTION/ DROPS OPHTHALMIC at 07:42

## 2023-01-01 RX ADMIN — ACETAMINOPHEN 650 MG: 325 TABLET ORAL at 16:38

## 2023-01-01 RX ADMIN — OXYCODONE HYDROCHLORIDE 2.5 MG: 5 TABLET ORAL at 10:21

## 2023-01-01 RX ADMIN — OXYCODONE HYDROCHLORIDE 5 MG: 5 TABLET ORAL at 12:08

## 2023-01-01 RX ADMIN — TIMOLOL MALEATE 1 DROP: 5 SOLUTION/ DROPS OPHTHALMIC at 08:32

## 2023-01-01 RX ADMIN — FUROSEMIDE 20 MG: 20 TABLET ORAL at 07:59

## 2023-01-01 RX ADMIN — POLYETHYLENE GLYCOL 3350 8.5 G: 17 POWDER, FOR SOLUTION ORAL at 09:18

## 2023-01-01 RX ADMIN — CEFDINIR 300 MG: 300 CAPSULE ORAL at 08:03

## 2023-01-01 RX ADMIN — METOPROLOL TARTRATE 12.5 MG: 25 TABLET, FILM COATED ORAL at 08:30

## 2023-01-01 RX ADMIN — ACETAMINOPHEN 650 MG: 325 TABLET ORAL at 13:03

## 2023-01-01 RX ADMIN — TIMOLOL MALEATE 1 DROP: 5 SOLUTION/ DROPS OPHTHALMIC at 19:16

## 2023-01-01 RX ADMIN — SENNOSIDES 8.6 MG: 8.6 TABLET, FILM COATED ORAL at 21:51

## 2023-01-01 RX ADMIN — TIMOLOL MALEATE 1 DROP: 5 SOLUTION/ DROPS OPHTHALMIC at 08:27

## 2023-01-01 RX ADMIN — METOPROLOL TARTRATE 50 MG: 25 TABLET, FILM COATED ORAL at 20:47

## 2023-01-01 RX ADMIN — PANTOPRAZOLE SODIUM 40 MG: 40 TABLET, DELAYED RELEASE ORAL at 16:07

## 2023-01-01 RX ADMIN — OXYCODONE HYDROCHLORIDE 2.5 MG: 5 TABLET ORAL at 04:27

## 2023-01-01 RX ADMIN — DORZOLAMIDE HYDROCHLORIDE 1 DROP: 20 SOLUTION OPHTHALMIC at 20:19

## 2023-01-01 RX ADMIN — PREDNISONE 10 MG: 5 TABLET ORAL at 08:22

## 2023-01-01 RX ADMIN — OXYCODONE HYDROCHLORIDE 5 MG: 5 TABLET ORAL at 20:29

## 2023-01-01 RX ADMIN — ACETAMINOPHEN 650 MG: 325 TABLET ORAL at 16:15

## 2023-01-01 RX ADMIN — PANTOPRAZOLE SODIUM 40 MG: 40 TABLET, DELAYED RELEASE ORAL at 06:51

## 2023-01-01 RX ADMIN — METOPROLOL TARTRATE 25 MG: 25 TABLET, FILM COATED ORAL at 07:59

## 2023-01-01 RX ADMIN — OXYCODONE HYDROCHLORIDE 5 MG: 5 TABLET ORAL at 03:27

## 2023-01-01 RX ADMIN — METOPROLOL TARTRATE 25 MG: 25 TABLET, FILM COATED ORAL at 07:41

## 2023-01-01 RX ADMIN — POLYETHYLENE GLYCOL 3350 8.5 G: 17 POWDER, FOR SOLUTION ORAL at 08:31

## 2023-01-01 RX ADMIN — ACETAMINOPHEN 650 MG: 325 TABLET ORAL at 08:25

## 2023-01-01 RX ADMIN — DORZOLAMIDE HYDROCHLORIDE 1 DROP: 20 SOLUTION OPHTHALMIC at 08:25

## 2023-01-01 RX ADMIN — TIMOLOL MALEATE 1 DROP: 5 SOLUTION/ DROPS OPHTHALMIC at 21:02

## 2023-01-01 RX ADMIN — ACETAMINOPHEN 650 MG: 325 TABLET ORAL at 16:14

## 2023-01-01 RX ADMIN — DORZOLAMIDE HYDROCHLORIDE 1 DROP: 20 SOLUTION OPHTHALMIC at 08:23

## 2023-01-01 RX ADMIN — GABAPENTIN 100 MG: 100 CAPSULE ORAL at 16:48

## 2023-01-01 RX ADMIN — ACETAMINOPHEN 650 MG: 325 TABLET ORAL at 08:21

## 2023-01-01 RX ADMIN — OXYCODONE HYDROCHLORIDE 2.5 MG: 5 TABLET ORAL at 21:31

## 2023-01-01 RX ADMIN — TIMOLOL MALEATE 1 DROP: 5 SOLUTION/ DROPS OPHTHALMIC at 19:13

## 2023-01-01 RX ADMIN — DORZOLAMIDE HYDROCHLORIDE 1 DROP: 20 SOLUTION OPHTHALMIC at 21:48

## 2023-01-01 RX ADMIN — DORZOLAMIDE HYDROCHLORIDE 1 DROP: 20 SOLUTION OPHTHALMIC at 21:00

## 2023-01-01 RX ADMIN — GABAPENTIN 100 MG: 100 CAPSULE ORAL at 08:35

## 2023-01-01 RX ADMIN — PANTOPRAZOLE SODIUM 40 MG: 40 TABLET, DELAYED RELEASE ORAL at 06:26

## 2023-01-01 RX ADMIN — BISACODYL 10 MG: 10 SUPPOSITORY RECTAL at 22:07

## 2023-01-01 RX ADMIN — SENNOSIDES 8.6 MG: 8.6 TABLET, FILM COATED ORAL at 20:27

## 2023-01-01 RX ADMIN — PANTOPRAZOLE SODIUM 40 MG: 40 TABLET, DELAYED RELEASE ORAL at 06:31

## 2023-01-01 RX ADMIN — SPIRONOLACTONE 25 MG: 25 TABLET, FILM COATED ORAL at 08:26

## 2023-01-01 RX ADMIN — DORZOLAMIDE HYDROCHLORIDE 1 DROP: 20 SOLUTION OPHTHALMIC at 08:37

## 2023-01-01 RX ADMIN — OXYCODONE HYDROCHLORIDE 5 MG: 5 TABLET ORAL at 16:13

## 2023-01-01 RX ADMIN — OXYCODONE HYDROCHLORIDE 5 MG: 5 TABLET ORAL at 22:52

## 2023-01-01 RX ADMIN — ACETAMINOPHEN 650 MG: 325 TABLET ORAL at 20:58

## 2023-01-01 RX ADMIN — OXYCODONE HYDROCHLORIDE 2.5 MG: 5 TABLET ORAL at 08:06

## 2023-01-01 RX ADMIN — POLYETHYLENE GLYCOL 3350 8.5 G: 17 POWDER, FOR SOLUTION ORAL at 08:02

## 2023-01-01 RX ADMIN — METOPROLOL TARTRATE 25 MG: 25 TABLET, FILM COATED ORAL at 21:49

## 2023-01-01 RX ADMIN — SPIRONOLACTONE 25 MG: 25 TABLET, FILM COATED ORAL at 09:17

## 2023-01-01 RX ADMIN — Medication 1 MG: at 00:20

## 2023-01-01 RX ADMIN — TIMOLOL MALEATE 1 DROP: 5 SOLUTION/ DROPS OPHTHALMIC at 08:16

## 2023-01-01 RX ADMIN — ACETAMINOPHEN 650 MG: 325 TABLET ORAL at 12:08

## 2023-01-01 RX ADMIN — DORZOLAMIDE HYDROCHLORIDE 1 DROP: 20 SOLUTION OPHTHALMIC at 07:56

## 2023-01-01 RX ADMIN — GADOBUTROL 6 ML: 604.72 INJECTION INTRAVENOUS at 13:06

## 2023-01-01 RX ADMIN — SENNOSIDES 8.6 MG: 8.6 TABLET, FILM COATED ORAL at 08:22

## 2023-01-01 RX ADMIN — METOPROLOL TARTRATE 25 MG: 25 TABLET, FILM COATED ORAL at 21:02

## 2023-01-01 RX ADMIN — SERTRALINE HYDROCHLORIDE 25 MG: 25 TABLET ORAL at 08:39

## 2023-01-01 RX ADMIN — PREDNISONE 10 MG: 5 TABLET ORAL at 08:36

## 2023-01-01 RX ADMIN — METOPROLOL TARTRATE 25 MG: 25 TABLET, FILM COATED ORAL at 08:22

## 2023-01-01 RX ADMIN — METOPROLOL TARTRATE 50 MG: 25 TABLET, FILM COATED ORAL at 20:15

## 2023-01-01 RX ADMIN — TIMOLOL MALEATE 1 DROP: 5 SOLUTION/ DROPS OPHTHALMIC at 07:56

## 2023-01-01 RX ADMIN — TIMOLOL MALEATE 1 DROP: 5 SOLUTION/ DROPS OPHTHALMIC at 21:00

## 2023-01-01 RX ADMIN — GABAPENTIN 100 MG: 100 CAPSULE ORAL at 20:19

## 2023-01-01 RX ADMIN — OXYCODONE HYDROCHLORIDE 2.5 MG: 5 TABLET ORAL at 12:11

## 2023-01-01 RX ADMIN — ACETAMINOPHEN 650 MG: 325 TABLET ORAL at 12:09

## 2023-01-01 RX ADMIN — OXYCODONE HYDROCHLORIDE 5 MG: 5 TABLET ORAL at 22:39

## 2023-01-01 RX ADMIN — PREDNISONE 10 MG: 5 TABLET ORAL at 08:00

## 2023-01-01 RX ADMIN — SPIRONOLACTONE 25 MG: 25 TABLET, FILM COATED ORAL at 08:23

## 2023-01-01 RX ADMIN — SENNOSIDES 8.6 MG: 8.6 TABLET, FILM COATED ORAL at 21:00

## 2023-01-01 RX ADMIN — ACETAMINOPHEN 650 MG: 325 TABLET ORAL at 13:41

## 2023-01-01 RX ADMIN — METHOCARBAMOL 500 MG: 500 TABLET ORAL at 20:46

## 2023-01-01 RX ADMIN — GABAPENTIN 100 MG: 100 CAPSULE ORAL at 13:03

## 2023-01-01 RX ADMIN — PANTOPRAZOLE SODIUM 40 MG: 40 TABLET, DELAYED RELEASE ORAL at 06:40

## 2023-01-01 RX ADMIN — GABAPENTIN 100 MG: 100 CAPSULE ORAL at 08:21

## 2023-01-01 RX ADMIN — ACETAMINOPHEN 650 MG: 325 TABLET ORAL at 19:14

## 2023-01-01 RX ADMIN — POLYETHYLENE GLYCOL 3350 8.5 G: 17 POWDER, FOR SOLUTION ORAL at 08:23

## 2023-01-01 RX ADMIN — PREDNISONE 10 MG: 5 TABLET ORAL at 09:17

## 2023-01-01 RX ADMIN — SENNOSIDES 8.6 MG: 8.6 TABLET, FILM COATED ORAL at 08:30

## 2023-01-01 RX ADMIN — OXYCODONE HYDROCHLORIDE 5 MG: 5 TABLET ORAL at 06:23

## 2023-01-01 RX ADMIN — PANTOPRAZOLE SODIUM 40 MG: 40 TABLET, DELAYED RELEASE ORAL at 16:15

## 2023-01-01 RX ADMIN — OXYCODONE HYDROCHLORIDE 2.5 MG: 5 TABLET ORAL at 14:27

## 2023-01-01 RX ADMIN — TIMOLOL MALEATE 1 DROP: 5 SOLUTION/ DROPS OPHTHALMIC at 20:19

## 2023-01-01 RX ADMIN — METOPROLOL TARTRATE 12.5 MG: 25 TABLET, FILM COATED ORAL at 20:23

## 2023-01-01 RX ADMIN — PREDNISONE 10 MG: 5 TABLET ORAL at 08:16

## 2023-01-01 RX ADMIN — GABAPENTIN 100 MG: 100 CAPSULE ORAL at 22:08

## 2023-01-01 RX ADMIN — GABAPENTIN 100 MG: 100 CAPSULE ORAL at 14:22

## 2023-01-01 RX ADMIN — TIMOLOL MALEATE 1 DROP: 5 SOLUTION/ DROPS OPHTHALMIC at 08:35

## 2023-01-01 RX ADMIN — ACETAMINOPHEN 650 MG: 325 TABLET ORAL at 17:31

## 2023-01-01 RX ADMIN — OXYCODONE HYDROCHLORIDE 5 MG: 5 TABLET ORAL at 12:07

## 2023-01-01 RX ADMIN — ACETAMINOPHEN 650 MG: 325 TABLET ORAL at 08:36

## 2023-01-01 RX ADMIN — ACETAMINOPHEN 650 MG: 325 TABLET ORAL at 14:13

## 2023-01-01 RX ADMIN — BACITRACIN 1 PACKET: 500 OINTMENT TOPICAL at 08:57

## 2023-01-01 RX ADMIN — FUROSEMIDE 20 MG: 20 TABLET ORAL at 08:36

## 2023-01-01 RX ADMIN — DORZOLAMIDE HYDROCHLORIDE 1 DROP: 20 SOLUTION OPHTHALMIC at 20:39

## 2023-01-01 RX ADMIN — GABAPENTIN 100 MG: 100 CAPSULE ORAL at 07:42

## 2023-01-01 RX ADMIN — SENNOSIDES 8.6 MG: 8.6 TABLET, FILM COATED ORAL at 09:18

## 2023-01-01 RX ADMIN — DORZOLAMIDE HYDROCHLORIDE 1 DROP: 20 SOLUTION OPHTHALMIC at 21:02

## 2023-01-01 RX ADMIN — Medication 1 MG: at 21:51

## 2023-01-01 RX ADMIN — PREDNISONE 10 MG: 5 TABLET ORAL at 08:21

## 2023-01-01 RX ADMIN — ACETAMINOPHEN 650 MG: 325 TABLET ORAL at 00:21

## 2023-01-01 RX ADMIN — GABAPENTIN 100 MG: 100 CAPSULE ORAL at 14:14

## 2023-01-01 RX ADMIN — TIMOLOL MALEATE 1 DROP: 5 SOLUTION/ DROPS OPHTHALMIC at 20:46

## 2023-01-01 RX ADMIN — ACETAMINOPHEN 650 MG: 325 TABLET ORAL at 08:49

## 2023-01-01 RX ADMIN — OXYCODONE HYDROCHLORIDE 5 MG: 5 TABLET ORAL at 03:08

## 2023-01-01 RX ADMIN — PANTOPRAZOLE SODIUM 40 MG: 40 TABLET, DELAYED RELEASE ORAL at 16:25

## 2023-01-01 RX ADMIN — SENNOSIDES 8.6 MG: 8.6 TABLET, FILM COATED ORAL at 21:02

## 2023-01-01 RX ADMIN — SENNOSIDES 8.6 MG: 8.6 TABLET, FILM COATED ORAL at 08:20

## 2023-01-01 RX ADMIN — ACETAMINOPHEN 650 MG: 325 TABLET ORAL at 20:25

## 2023-01-01 RX ADMIN — TRAZODONE HYDROCHLORIDE 50 MG: 50 TABLET ORAL at 22:52

## 2023-01-01 RX ADMIN — PANTOPRAZOLE SODIUM 40 MG: 40 TABLET, DELAYED RELEASE ORAL at 06:38

## 2023-01-01 RX ADMIN — OXYCODONE HYDROCHLORIDE 5 MG: 5 TABLET ORAL at 07:19

## 2023-01-01 RX ADMIN — PREDNISONE 10 MG: 5 TABLET ORAL at 08:26

## 2023-01-01 RX ADMIN — METOPROLOL TARTRATE 50 MG: 25 TABLET, FILM COATED ORAL at 08:26

## 2023-01-01 RX ADMIN — ACETAMINOPHEN 650 MG: 325 TABLET ORAL at 15:52

## 2023-01-01 RX ADMIN — ACETAMINOPHEN 650 MG: 325 TABLET ORAL at 13:09

## 2023-01-01 RX ADMIN — METHOCARBAMOL 500 MG: 500 TABLET ORAL at 22:25

## 2023-01-01 RX ADMIN — PANTOPRAZOLE SODIUM 40 MG: 40 TABLET, DELAYED RELEASE ORAL at 15:52

## 2023-01-01 RX ADMIN — FUROSEMIDE 20 MG: 20 TABLET ORAL at 08:22

## 2023-01-01 RX ADMIN — DORZOLAMIDE HYDROCHLORIDE 1 DROP: 20 SOLUTION OPHTHALMIC at 20:46

## 2023-01-01 RX ADMIN — GABAPENTIN 100 MG: 100 CAPSULE ORAL at 21:00

## 2023-01-01 RX ADMIN — SENNOSIDES 8.6 MG: 8.6 TABLET, FILM COATED ORAL at 20:19

## 2023-01-01 RX ADMIN — DORZOLAMIDE HYDROCHLORIDE 1 DROP: 20 SOLUTION OPHTHALMIC at 19:16

## 2023-01-01 RX ADMIN — ACETAMINOPHEN 650 MG: 325 TABLET ORAL at 08:20

## 2023-01-01 RX ADMIN — GABAPENTIN 100 MG: 100 CAPSULE ORAL at 08:02

## 2023-01-01 RX ADMIN — TIMOLOL MALEATE 1 DROP: 5 SOLUTION/ DROPS OPHTHALMIC at 08:17

## 2023-01-01 RX ADMIN — METHOCARBAMOL 500 MG: 500 TABLET ORAL at 08:16

## 2023-01-01 RX ADMIN — FUROSEMIDE 20 MG: 20 TABLET ORAL at 09:17

## 2023-01-01 RX ADMIN — POLYETHYLENE GLYCOL 3350 8.5 G: 17 POWDER, FOR SOLUTION ORAL at 07:41

## 2023-01-01 RX ADMIN — TIMOLOL MALEATE 1 DROP: 5 SOLUTION/ DROPS OPHTHALMIC at 08:25

## 2023-01-01 RX ADMIN — GABAPENTIN 100 MG: 100 CAPSULE ORAL at 14:32

## 2023-01-01 RX ADMIN — OXYCODONE HYDROCHLORIDE 2.5 MG: 5 TABLET ORAL at 12:36

## 2023-01-01 RX ADMIN — GABAPENTIN 100 MG: 100 CAPSULE ORAL at 08:30

## 2023-01-01 RX ADMIN — Medication 1 MG: at 03:08

## 2023-01-01 RX ADMIN — PANTOPRAZOLE SODIUM 40 MG: 40 TABLET, DELAYED RELEASE ORAL at 16:24

## 2023-01-01 RX ADMIN — PANTOPRAZOLE SODIUM 40 MG: 40 TABLET, DELAYED RELEASE ORAL at 17:49

## 2023-01-01 RX ADMIN — GABAPENTIN 100 MG: 100 CAPSULE ORAL at 09:17

## 2023-01-01 RX ADMIN — FUROSEMIDE 20 MG: 20 TABLET ORAL at 08:16

## 2023-01-01 RX ADMIN — TIMOLOL MALEATE 1 DROP: 5 SOLUTION/ DROPS OPHTHALMIC at 08:37

## 2023-01-01 RX ADMIN — METOPROLOL TARTRATE 50 MG: 25 TABLET, FILM COATED ORAL at 08:17

## 2023-01-01 RX ADMIN — ACETAMINOPHEN 650 MG: 325 TABLET ORAL at 20:47

## 2023-01-01 RX ADMIN — POLYETHYLENE GLYCOL 3350 8.5 G: 17 POWDER, FOR SOLUTION ORAL at 08:17

## 2023-01-01 RX ADMIN — ACETAMINOPHEN 650 MG: 325 TABLET ORAL at 08:16

## 2023-01-01 RX ADMIN — ACETAMINOPHEN 650 MG: 325 TABLET ORAL at 21:02

## 2023-01-01 RX ADMIN — SENNOSIDES 8.6 MG: 8.6 TABLET, FILM COATED ORAL at 08:36

## 2023-01-01 RX ADMIN — ACETAMINOPHEN 650 MG: 325 TABLET ORAL at 16:24

## 2023-01-01 RX ADMIN — OXYCODONE HYDROCHLORIDE 2.5 MG: 5 TABLET ORAL at 17:34

## 2023-01-01 RX ADMIN — OXYCODONE HYDROCHLORIDE 5 MG: 5 TABLET ORAL at 09:41

## 2023-01-01 RX ADMIN — METOPROLOL TARTRATE 50 MG: 25 TABLET, FILM COATED ORAL at 19:15

## 2023-01-01 RX ADMIN — SENNOSIDES 8.6 MG: 8.6 TABLET, FILM COATED ORAL at 08:00

## 2023-01-01 RX ADMIN — OXYCODONE HYDROCHLORIDE 2.5 MG: 5 TABLET ORAL at 16:47

## 2023-01-01 RX ADMIN — SPIRONOLACTONE 25 MG: 25 TABLET, FILM COATED ORAL at 08:28

## 2023-01-01 RX ADMIN — ACETAMINOPHEN 650 MG: 325 TABLET ORAL at 08:00

## 2023-01-01 RX ADMIN — METOPROLOL TARTRATE 50 MG: 25 TABLET, FILM COATED ORAL at 08:35

## 2023-01-01 RX ADMIN — PANTOPRAZOLE SODIUM 40 MG: 40 TABLET, DELAYED RELEASE ORAL at 17:31

## 2023-01-01 RX ADMIN — PANTOPRAZOLE SODIUM 40 MG: 40 TABLET, DELAYED RELEASE ORAL at 09:16

## 2023-01-01 RX ADMIN — PREDNISONE 10 MG: 5 TABLET ORAL at 07:41

## 2023-01-01 RX ADMIN — ACETAMINOPHEN 650 MG: 325 TABLET ORAL at 21:48

## 2023-01-01 RX ADMIN — GABAPENTIN 100 MG: 100 CAPSULE ORAL at 21:51

## 2023-01-01 RX ADMIN — POLYETHYLENE GLYCOL 3350 8.5 G: 17 POWDER, FOR SOLUTION ORAL at 08:37

## 2023-01-01 RX ADMIN — ACETAMINOPHEN 650 MG: 325 TABLET ORAL at 16:25

## 2023-01-01 RX ADMIN — METHOCARBAMOL 500 MG: 500 TABLET ORAL at 22:08

## 2023-01-01 RX ADMIN — METOPROLOL TARTRATE 25 MG: 25 TABLET, FILM COATED ORAL at 08:20

## 2023-01-01 RX ADMIN — ACETAMINOPHEN 650 MG: 325 TABLET ORAL at 20:15

## 2023-01-01 RX ADMIN — ACETAMINOPHEN 650 MG: 325 TABLET ORAL at 16:13

## 2023-01-01 RX ADMIN — METOPROLOL TARTRATE 50 MG: 25 TABLET, FILM COATED ORAL at 20:58

## 2023-01-01 RX ADMIN — SENNOSIDES AND DOCUSATE SODIUM 1 TABLET: 8.6; 5 TABLET ORAL at 16:48

## 2023-01-01 RX ADMIN — TIMOLOL MALEATE 1 DROP: 5 SOLUTION/ DROPS OPHTHALMIC at 20:31

## 2023-01-01 RX ADMIN — GABAPENTIN 100 MG: 100 CAPSULE ORAL at 14:03

## 2023-01-01 RX ADMIN — ACETAMINOPHEN 650 MG: 325 TABLET ORAL at 22:25

## 2023-01-01 RX ADMIN — ACETAMINOPHEN 650 MG: 325 TABLET ORAL at 08:29

## 2023-01-01 RX ADMIN — SERTRALINE HYDROCHLORIDE 25 MG: 25 TABLET ORAL at 08:07

## 2023-01-01 RX ADMIN — TIMOLOL MALEATE 1 DROP: 5 SOLUTION/ DROPS OPHTHALMIC at 09:24

## 2023-01-01 RX ADMIN — METHOCARBAMOL 500 MG: 500 TABLET ORAL at 08:23

## 2023-01-01 RX ADMIN — DORZOLAMIDE HYDROCHLORIDE 1 DROP: 20 SOLUTION OPHTHALMIC at 20:29

## 2023-01-01 RX ADMIN — ACETAMINOPHEN 650 MG: 325 TABLET ORAL at 12:36

## 2023-01-01 RX ADMIN — OXYCODONE HYDROCHLORIDE 5 MG: 5 TABLET ORAL at 14:32

## 2023-01-01 RX ADMIN — SENNOSIDES 8.6 MG: 8.6 TABLET, FILM COATED ORAL at 22:08

## 2023-01-01 RX ADMIN — DORZOLAMIDE HYDROCHLORIDE 1 DROP: 20 SOLUTION OPHTHALMIC at 08:34

## 2023-01-01 RX ADMIN — FUROSEMIDE 20 MG: 20 TABLET ORAL at 08:29

## 2023-01-01 RX ADMIN — OXYCODONE HYDROCHLORIDE 2.5 MG: 5 TABLET ORAL at 08:49

## 2023-01-01 RX ADMIN — ACETAMINOPHEN 650 MG: 325 TABLET ORAL at 05:02

## 2023-01-01 RX ADMIN — PANTOPRAZOLE SODIUM 40 MG: 40 TABLET, DELAYED RELEASE ORAL at 06:52

## 2023-01-01 RX ADMIN — Medication 1 MG: at 22:25

## 2023-01-01 RX ADMIN — METOPROLOL TARTRATE 50 MG: 25 TABLET, FILM COATED ORAL at 19:05

## 2023-01-01 RX ADMIN — Medication 1 MG: at 00:42

## 2023-01-01 RX ADMIN — GABAPENTIN 100 MG: 100 CAPSULE ORAL at 20:27

## 2023-01-01 RX ADMIN — DORZOLAMIDE HYDROCHLORIDE 1 DROP: 20 SOLUTION OPHTHALMIC at 09:12

## 2023-01-01 RX ADMIN — ACETAMINOPHEN 650 MG: 325 TABLET ORAL at 12:15

## 2023-01-01 RX ADMIN — DORZOLAMIDE HYDROCHLORIDE 1 DROP: 20 SOLUTION OPHTHALMIC at 08:30

## 2023-01-01 RX ADMIN — SENNOSIDES 8.6 MG: 8.6 TABLET, FILM COATED ORAL at 08:26

## 2023-01-01 RX ADMIN — FUROSEMIDE 20 MG: 20 TABLET ORAL at 08:03

## 2023-01-01 RX ADMIN — CEFDINIR 300 MG: 300 CAPSULE ORAL at 20:46

## 2023-01-01 RX ADMIN — PREDNISONE 10 MG: 5 TABLET ORAL at 08:03

## 2023-01-01 RX ADMIN — SPIRONOLACTONE 25 MG: 25 TABLET, FILM COATED ORAL at 08:03

## 2023-01-01 RX ADMIN — CEFDINIR 300 MG: 300 CAPSULE ORAL at 20:25

## 2023-01-01 RX ADMIN — PANTOPRAZOLE SODIUM 40 MG: 40 TABLET, DELAYED RELEASE ORAL at 17:23

## 2023-01-01 RX ADMIN — METOPROLOL TARTRATE 50 MG: 25 TABLET, FILM COATED ORAL at 08:03

## 2023-01-01 RX ADMIN — ACETAMINOPHEN 650 MG: 325 TABLET ORAL at 07:39

## 2023-01-01 RX ADMIN — DORZOLAMIDE HYDROCHLORIDE 1 DROP: 20 SOLUTION OPHTHALMIC at 08:15

## 2023-01-01 RX ADMIN — GABAPENTIN 100 MG: 100 CAPSULE ORAL at 21:02

## 2023-01-01 RX ADMIN — DORZOLAMIDE HYDROCHLORIDE 1 DROP: 20 SOLUTION OPHTHALMIC at 08:27

## 2023-01-01 RX ADMIN — METOPROLOL TARTRATE 25 MG: 25 TABLET, FILM COATED ORAL at 20:28

## 2023-01-01 RX ADMIN — METOPROLOL TARTRATE 50 MG: 25 TABLET, FILM COATED ORAL at 08:22

## 2023-01-01 RX ADMIN — ACETAMINOPHEN 650 MG: 325 TABLET ORAL at 20:18

## 2023-01-01 RX ADMIN — PANTOPRAZOLE SODIUM 40 MG: 40 TABLET, DELAYED RELEASE ORAL at 08:16

## 2023-01-01 RX ADMIN — TRAZODONE HYDROCHLORIDE 50 MG: 50 TABLET ORAL at 20:25

## 2023-01-01 RX ADMIN — TRAZODONE HYDROCHLORIDE 50 MG: 50 TABLET ORAL at 21:31

## 2023-01-01 RX ADMIN — ACETAMINOPHEN 650 MG: 325 TABLET ORAL at 20:59

## 2023-01-01 RX ADMIN — OXYCODONE HYDROCHLORIDE 5 MG: 5 TABLET ORAL at 10:29

## 2023-01-01 RX ADMIN — PREDNISONE 10 MG: 5 TABLET ORAL at 08:30

## 2023-01-01 RX ADMIN — DORZOLAMIDE HYDROCHLORIDE 1 DROP: 20 SOLUTION OPHTHALMIC at 19:12

## 2023-01-01 RX ADMIN — ACETAMINOPHEN 650 MG: 325 TABLET ORAL at 09:17

## 2023-01-01 RX ADMIN — ACETAMINOPHEN 650 MG: 325 TABLET ORAL at 19:05

## 2023-01-01 RX ADMIN — TIMOLOL MALEATE 1 DROP: 5 SOLUTION/ DROPS OPHTHALMIC at 20:29

## 2023-01-01 RX ADMIN — GABAPENTIN 100 MG: 100 CAPSULE ORAL at 20:58

## 2023-01-01 RX ADMIN — TIMOLOL MALEATE 1 DROP: 5 SOLUTION/ DROPS OPHTHALMIC at 21:48

## 2023-01-01 RX ADMIN — PANTOPRAZOLE SODIUM 40 MG: 40 TABLET, DELAYED RELEASE ORAL at 17:35

## 2023-01-01 RX ADMIN — METHOCARBAMOL 500 MG: 500 TABLET ORAL at 11:08

## 2023-01-01 RX ADMIN — PANTOPRAZOLE SODIUM 40 MG: 40 TABLET, DELAYED RELEASE ORAL at 07:05

## 2023-01-01 RX ADMIN — ACETAMINOPHEN 650 MG: 325 TABLET ORAL at 05:26

## 2023-01-01 RX ADMIN — PREDNISONE 10 MG: 5 TABLET ORAL at 08:29

## 2023-01-01 RX ADMIN — ACETAMINOPHEN 650 MG: 325 TABLET ORAL at 16:07

## 2023-01-01 RX ADMIN — FUROSEMIDE 20 MG: 20 TABLET ORAL at 08:26

## 2023-01-01 RX ADMIN — METOPROLOL TARTRATE 50 MG: 25 TABLET, FILM COATED ORAL at 20:25

## 2023-01-01 RX ADMIN — POLYETHYLENE GLYCOL 3350 8.5 G: 17 POWDER, FOR SOLUTION ORAL at 08:25

## 2023-01-01 RX ADMIN — PANTOPRAZOLE SODIUM 40 MG: 40 TABLET, DELAYED RELEASE ORAL at 06:53

## 2023-01-01 RX ADMIN — OXYCODONE HYDROCHLORIDE 5 MG: 5 TABLET ORAL at 18:59

## 2023-01-01 RX ADMIN — SPIRONOLACTONE 25 MG: 25 TABLET, FILM COATED ORAL at 08:18

## 2023-01-01 RX ADMIN — GABAPENTIN 100 MG: 100 CAPSULE ORAL at 13:44

## 2023-01-01 RX ADMIN — DORZOLAMIDE HYDROCHLORIDE 1 DROP: 20 SOLUTION OPHTHALMIC at 21:12

## 2023-01-01 RX ADMIN — PANTOPRAZOLE SODIUM 40 MG: 40 TABLET, DELAYED RELEASE ORAL at 08:29

## 2023-01-01 RX ADMIN — CEFDINIR 300 MG: 300 CAPSULE ORAL at 08:16

## 2023-01-01 RX ADMIN — ACETAMINOPHEN 650 MG: 325 TABLET ORAL at 11:39

## 2023-01-01 RX ADMIN — DORZOLAMIDE HYDROCHLORIDE 1 DROP: 20 SOLUTION OPHTHALMIC at 20:18

## 2023-01-01 RX ADMIN — ACETAMINOPHEN 650 MG: 325 TABLET ORAL at 17:35

## 2023-01-01 RX ADMIN — TIMOLOL MALEATE 1 DROP: 5 SOLUTION/ DROPS OPHTHALMIC at 20:18

## 2023-01-01 RX ADMIN — DORZOLAMIDE HYDROCHLORIDE 1 DROP: 20 SOLUTION OPHTHALMIC at 08:17

## 2023-01-01 RX ADMIN — DORZOLAMIDE HYDROCHLORIDE 1 DROP: 20 SOLUTION OPHTHALMIC at 07:40

## 2023-01-01 RX ADMIN — GABAPENTIN 100 MG: 100 CAPSULE ORAL at 08:22

## 2023-01-01 RX ADMIN — GABAPENTIN 100 MG: 100 CAPSULE ORAL at 08:26

## 2023-01-01 RX ADMIN — ACETAMINOPHEN 650 MG: 325 TABLET ORAL at 07:58

## 2023-01-01 RX ADMIN — ACETAMINOPHEN 650 MG: 325 TABLET ORAL at 17:49

## 2023-01-01 RX ADMIN — ACETAMINOPHEN 650 MG: 325 TABLET ORAL at 13:42

## 2023-01-01 RX ADMIN — METOPROLOL TARTRATE 50 MG: 25 TABLET, FILM COATED ORAL at 08:29

## 2023-01-01 RX ADMIN — SENNOSIDES 8.6 MG: 8.6 TABLET, FILM COATED ORAL at 07:42

## 2023-01-01 RX ADMIN — SENNOSIDES 8.6 MG: 8.6 TABLET, FILM COATED ORAL at 20:58

## 2023-01-01 ASSESSMENT — ACTIVITIES OF DAILY LIVING (ADL)
ADLS_ACUITY_SCORE: 42
ADLS_ACUITY_SCORE: 43
ADLS_ACUITY_SCORE: 42
ADLS_ACUITY_SCORE: 48
ADLS_ACUITY_SCORE: 44
ADLS_ACUITY_SCORE: 44
ADLS_ACUITY_SCORE: 48
ADLS_ACUITY_SCORE: 46
ADLS_ACUITY_SCORE: 47
ADLS_ACUITY_SCORE: 44
ADLS_ACUITY_SCORE: 42
ADLS_ACUITY_SCORE: 44
ADLS_ACUITY_SCORE: 46
ADLS_ACUITY_SCORE: 39
ADLS_ACUITY_SCORE: 48
ADLS_ACUITY_SCORE: 44
ADLS_ACUITY_SCORE: 43
ADLS_ACUITY_SCORE: 47
ADLS_ACUITY_SCORE: 42
ADLS_ACUITY_SCORE: 44
ADLS_ACUITY_SCORE: 47
ADLS_ACUITY_SCORE: 43
ADLS_ACUITY_SCORE: 43
ADLS_ACUITY_SCORE: 47
ADLS_ACUITY_SCORE: 42
ADLS_ACUITY_SCORE: 47
ADLS_ACUITY_SCORE: 47
ADLS_ACUITY_SCORE: 43
ADLS_ACUITY_SCORE: 47
ADLS_ACUITY_SCORE: 44
ADLS_ACUITY_SCORE: 42
ADLS_ACUITY_SCORE: 44
ADLS_ACUITY_SCORE: 42
ADLS_ACUITY_SCORE: 44
ADLS_ACUITY_SCORE: 42
ADLS_ACUITY_SCORE: 47
ADLS_ACUITY_SCORE: 48
ADLS_ACUITY_SCORE: 44
ADLS_ACUITY_SCORE: 44
ADLS_ACUITY_SCORE: 43
ADLS_ACUITY_SCORE: 42
ADLS_ACUITY_SCORE: 42
ADLS_ACUITY_SCORE: 44
ADLS_ACUITY_SCORE: 42
ADLS_ACUITY_SCORE: 35
ADLS_ACUITY_SCORE: 44
ADLS_ACUITY_SCORE: 39
ADLS_ACUITY_SCORE: 44
ADLS_ACUITY_SCORE: 44
ADLS_ACUITY_SCORE: 42
ADLS_ACUITY_SCORE: 42
ADLS_ACUITY_SCORE: 47
ADLS_ACUITY_SCORE: 42
ADLS_ACUITY_SCORE: 47
ADLS_ACUITY_SCORE: 42
ADLS_ACUITY_SCORE: 44
ADLS_ACUITY_SCORE: 43
ADLS_ACUITY_SCORE: 42
ADLS_ACUITY_SCORE: 42
ADLS_ACUITY_SCORE: 43
ADLS_ACUITY_SCORE: 42
ADLS_ACUITY_SCORE: 47
ADLS_ACUITY_SCORE: 44
ADLS_ACUITY_SCORE: 43
ADLS_ACUITY_SCORE: 43
ADLS_ACUITY_SCORE: 44
ADLS_ACUITY_SCORE: 42
ADLS_ACUITY_SCORE: 44
ADLS_ACUITY_SCORE: 42
ADLS_ACUITY_SCORE: 35
ADLS_ACUITY_SCORE: 42
ADLS_ACUITY_SCORE: 43
ADLS_ACUITY_SCORE: 42
ADLS_ACUITY_SCORE: 44
DEPENDENT_IADLS:: CLEANING;COOKING;LAUNDRY;SHOPPING;MEAL PREPARATION;MEDICATION MANAGEMENT;TRANSPORTATION
ADLS_ACUITY_SCORE: 44
ADLS_ACUITY_SCORE: 43
ADLS_ACUITY_SCORE: 44
ADLS_ACUITY_SCORE: 43
ADLS_ACUITY_SCORE: 44
ADLS_ACUITY_SCORE: 43
ADLS_ACUITY_SCORE: 46
ADLS_ACUITY_SCORE: 47
ADLS_ACUITY_SCORE: 42
ADLS_ACUITY_SCORE: 39
ADLS_ACUITY_SCORE: 42
ADLS_ACUITY_SCORE: 44
ADLS_ACUITY_SCORE: 42
ADLS_ACUITY_SCORE: 42
ADLS_ACUITY_SCORE: 47
ADLS_ACUITY_SCORE: 48
ADLS_ACUITY_SCORE: 42
ADLS_ACUITY_SCORE: 47
ADLS_ACUITY_SCORE: 47
ADLS_ACUITY_SCORE: 42
ADLS_ACUITY_SCORE: 45
ADLS_ACUITY_SCORE: 35
ADLS_ACUITY_SCORE: 44
ADLS_ACUITY_SCORE: 47
ADLS_ACUITY_SCORE: 42
ADLS_ACUITY_SCORE: 42
ADLS_ACUITY_SCORE: 43
ADLS_ACUITY_SCORE: 47
ADLS_ACUITY_SCORE: 43
ADLS_ACUITY_SCORE: 43
ADLS_ACUITY_SCORE: 42
ADLS_ACUITY_SCORE: 43
ADLS_ACUITY_SCORE: 44
ADLS_ACUITY_SCORE: 45
ADLS_ACUITY_SCORE: 47
ADLS_ACUITY_SCORE: 42
ADLS_ACUITY_SCORE: 42
ADLS_ACUITY_SCORE: 43
ADLS_ACUITY_SCORE: 47
ADLS_ACUITY_SCORE: 42
ADLS_ACUITY_SCORE: 44
ADLS_ACUITY_SCORE: 47
ADLS_ACUITY_SCORE: 47
ADLS_ACUITY_SCORE: 39
ADLS_ACUITY_SCORE: 47
ADLS_ACUITY_SCORE: 44
ADLS_ACUITY_SCORE: 42
ADLS_ACUITY_SCORE: 44
ADLS_ACUITY_SCORE: 47
ADLS_ACUITY_SCORE: 44
ADLS_ACUITY_SCORE: 42
ADLS_ACUITY_SCORE: 42
ADLS_ACUITY_SCORE: 44
ADLS_ACUITY_SCORE: 42
ADLS_ACUITY_SCORE: 43
ADLS_ACUITY_SCORE: 42

## 2023-01-01 ASSESSMENT — ENCOUNTER SYMPTOMS
VOMITING: 0
DIARRHEA: 0
FEVER: 0
DYSURIA: 1
WEAKNESS: 1
SHORTNESS OF BREATH: 0
NAUSEA: 0
FLANK PAIN: 0
CHILLS: 0
ABDOMINAL PAIN: 0
FREQUENCY: 1

## 2023-01-02 LAB
ANION GAP SERPL CALCULATED.3IONS-SCNC: 14 MMOL/L (ref 7–15)
BUN SERPL-MCNC: 9.1 MG/DL (ref 8–23)
CALCIUM SERPL-MCNC: 10.1 MG/DL (ref 8.2–9.6)
CHLORIDE SERPL-SCNC: 102 MMOL/L (ref 98–107)
CREAT SERPL-MCNC: 0.84 MG/DL (ref 0.51–0.95)
DEPRECATED HCO3 PLAS-SCNC: 24 MMOL/L (ref 22–29)
GFR SERPL CREATININE-BSD FRML MDRD: 65 ML/MIN/1.73M2
GLUCOSE SERPL-MCNC: 103 MG/DL (ref 70–99)
POTASSIUM SERPL-SCNC: 2.6 MMOL/L (ref 3.4–5.3)
SODIUM SERPL-SCNC: 140 MMOL/L (ref 136–145)

## 2023-01-02 PROCEDURE — P9604 ONE-WAY ALLOW PRORATED TRIP: HCPCS | Mod: ORL | Performed by: INTERNAL MEDICINE

## 2023-01-02 PROCEDURE — 80048 BASIC METABOLIC PNL TOTAL CA: CPT | Mod: ORL | Performed by: INTERNAL MEDICINE

## 2023-01-02 PROCEDURE — 36415 COLL VENOUS BLD VENIPUNCTURE: CPT | Mod: ORL | Performed by: INTERNAL MEDICINE

## 2023-01-03 ENCOUNTER — LAB REQUISITION (OUTPATIENT)
Dept: LAB | Facility: CLINIC | Age: 88
End: 2023-01-03
Payer: MEDICARE

## 2023-01-03 DIAGNOSIS — I10 ESSENTIAL (PRIMARY) HYPERTENSION: ICD-10-CM

## 2023-01-04 DIAGNOSIS — K21.00 GASTROESOPHAGEAL REFLUX DISEASE WITH ESOPHAGITIS WITHOUT HEMORRHAGE: ICD-10-CM

## 2023-01-04 DIAGNOSIS — I10 HTN (HYPERTENSION): ICD-10-CM

## 2023-01-04 RX ORDER — METOPROLOL TARTRATE 50 MG
TABLET ORAL
Qty: 0.001 TABLET | Refills: 0 | Status: SHIPPED | OUTPATIENT
Start: 2023-01-04 | End: 2023-01-01

## 2023-01-04 RX ORDER — OMEPRAZOLE 40 MG/1
CAPSULE, DELAYED RELEASE ORAL
Qty: 0.001 CAPSULE | Refills: 0 | Status: SHIPPED | OUTPATIENT
Start: 2023-01-04 | End: 2023-01-01

## 2023-01-04 NOTE — TELEPHONE ENCOUNTER
FAX from [facility] Refill Request    Omeprazole 40 mg CAP  Metoprolol Tart 50 mg tablets

## 2023-01-05 DIAGNOSIS — K21.00 GASTROESOPHAGEAL REFLUX DISEASE WITH ESOPHAGITIS WITHOUT HEMORRHAGE: ICD-10-CM

## 2023-01-05 DIAGNOSIS — I10 HTN (HYPERTENSION): ICD-10-CM

## 2023-01-05 RX ORDER — METOPROLOL TARTRATE 50 MG
TABLET ORAL
Qty: 56 TABLET | Refills: 11 | OUTPATIENT
Start: 2023-01-05

## 2023-01-05 RX ORDER — OMEPRAZOLE 40 MG/1
CAPSULE, DELAYED RELEASE ORAL
Qty: 56 CAPSULE | Refills: 11 | OUTPATIENT
Start: 2023-01-05

## 2023-01-09 LAB
ANION GAP SERPL CALCULATED.3IONS-SCNC: 14 MMOL/L (ref 7–15)
BUN SERPL-MCNC: 11 MG/DL (ref 8–23)
CALCIUM SERPL-MCNC: 10.2 MG/DL (ref 8.2–9.6)
CHLORIDE SERPL-SCNC: 105 MMOL/L (ref 98–107)
CREAT SERPL-MCNC: 0.95 MG/DL (ref 0.51–0.95)
DEPRECATED HCO3 PLAS-SCNC: 23 MMOL/L (ref 22–29)
GFR SERPL CREATININE-BSD FRML MDRD: 56 ML/MIN/1.73M2
GLUCOSE SERPL-MCNC: 96 MG/DL (ref 70–99)
POTASSIUM SERPL-SCNC: 4.7 MMOL/L (ref 3.4–5.3)
SODIUM SERPL-SCNC: 142 MMOL/L (ref 136–145)

## 2023-01-09 PROCEDURE — 87086 URINE CULTURE/COLONY COUNT: CPT | Mod: ORL | Performed by: INTERNAL MEDICINE

## 2023-01-09 PROCEDURE — 80048 BASIC METABOLIC PNL TOTAL CA: CPT | Mod: ORL | Performed by: INTERNAL MEDICINE

## 2023-01-09 PROCEDURE — P9604 ONE-WAY ALLOW PRORATED TRIP: HCPCS | Mod: ORL | Performed by: INTERNAL MEDICINE

## 2023-01-09 PROCEDURE — 36415 COLL VENOUS BLD VENIPUNCTURE: CPT | Mod: ORL | Performed by: INTERNAL MEDICINE

## 2023-01-09 PROCEDURE — 81001 URINALYSIS AUTO W/SCOPE: CPT | Mod: ORL | Performed by: INTERNAL MEDICINE

## 2023-01-10 ENCOUNTER — LAB REQUISITION (OUTPATIENT)
Dept: LAB | Facility: CLINIC | Age: 88
End: 2023-01-10
Payer: MEDICARE

## 2023-01-10 DIAGNOSIS — R30.0 DYSURIA: ICD-10-CM

## 2023-01-10 LAB
ALBUMIN UR-MCNC: 10 MG/DL
APPEARANCE UR: ABNORMAL
BACTERIA #/AREA URNS HPF: ABNORMAL /HPF
BILIRUB UR QL STRIP: NEGATIVE
COLOR UR AUTO: YELLOW
GLUCOSE UR STRIP-MCNC: NEGATIVE MG/DL
HGB UR QL STRIP: ABNORMAL
KETONES UR STRIP-MCNC: ABNORMAL MG/DL
LEUKOCYTE ESTERASE UR QL STRIP: ABNORMAL
MUCOUS THREADS #/AREA URNS LPF: PRESENT /LPF
NITRATE UR QL: POSITIVE
PH UR STRIP: 6.5 [PH] (ref 5–7)
RBC URINE: 17 /HPF
SP GR UR STRIP: 1.02 (ref 1–1.03)
SQUAMOUS EPITHELIAL: 1 /HPF
TRANSITIONAL EPI: 1 /HPF
UROBILINOGEN UR STRIP-MCNC: NORMAL MG/DL
WBC CLUMPS #/AREA URNS HPF: PRESENT /HPF
WBC URINE: >182 /HPF

## 2023-01-13 LAB — BACTERIA UR CULT: NORMAL

## 2023-01-18 ENCOUNTER — LAB REQUISITION (OUTPATIENT)
Dept: LAB | Facility: CLINIC | Age: 88
End: 2023-01-18
Payer: MEDICARE

## 2023-01-18 DIAGNOSIS — I10 ESSENTIAL (PRIMARY) HYPERTENSION: ICD-10-CM

## 2023-01-23 LAB
ANION GAP SERPL CALCULATED.3IONS-SCNC: 16 MMOL/L (ref 7–15)
BUN SERPL-MCNC: 14.6 MG/DL (ref 8–23)
CALCIUM SERPL-MCNC: 10.2 MG/DL (ref 8.2–9.6)
CHLORIDE SERPL-SCNC: 105 MMOL/L (ref 98–107)
CREAT SERPL-MCNC: 0.83 MG/DL (ref 0.51–0.95)
DEPRECATED HCO3 PLAS-SCNC: 19 MMOL/L (ref 22–29)
GFR SERPL CREATININE-BSD FRML MDRD: 66 ML/MIN/1.73M2
GLUCOSE SERPL-MCNC: 130 MG/DL (ref 70–99)
POTASSIUM SERPL-SCNC: 4 MMOL/L (ref 3.4–5.3)
SODIUM SERPL-SCNC: 140 MMOL/L (ref 136–145)

## 2023-01-23 PROCEDURE — 36415 COLL VENOUS BLD VENIPUNCTURE: CPT | Mod: ORL | Performed by: INTERNAL MEDICINE

## 2023-01-23 PROCEDURE — 80048 BASIC METABOLIC PNL TOTAL CA: CPT | Mod: ORL | Performed by: INTERNAL MEDICINE

## 2023-01-23 PROCEDURE — P9603 ONE-WAY ALLOW PRORATED MILES: HCPCS | Mod: ORL | Performed by: INTERNAL MEDICINE

## 2023-01-24 ENCOUNTER — LAB REQUISITION (OUTPATIENT)
Dept: LAB | Facility: CLINIC | Age: 88
End: 2023-01-24
Payer: MEDICARE

## 2023-01-24 DIAGNOSIS — R30.0 DYSURIA: ICD-10-CM

## 2023-01-25 ENCOUNTER — LAB REQUISITION (OUTPATIENT)
Dept: LAB | Facility: CLINIC | Age: 88
End: 2023-01-25
Payer: MEDICARE

## 2023-01-25 DIAGNOSIS — R30.0 DYSURIA: ICD-10-CM

## 2023-01-25 LAB
ALBUMIN UR-MCNC: 20 MG/DL
APPEARANCE UR: ABNORMAL
BILIRUB UR QL STRIP: NEGATIVE
COLOR UR AUTO: YELLOW
GLUCOSE UR STRIP-MCNC: NEGATIVE MG/DL
HGB UR QL STRIP: ABNORMAL
KETONES UR STRIP-MCNC: NEGATIVE MG/DL
LEUKOCYTE ESTERASE UR QL STRIP: ABNORMAL
MUCOUS THREADS #/AREA URNS LPF: PRESENT /LPF
NITRATE UR QL: POSITIVE
PH UR STRIP: 6.5 [PH] (ref 5–7)
RBC URINE: 11 /HPF
SP GR UR STRIP: 1.02 (ref 1–1.03)
SQUAMOUS EPITHELIAL: 3 /HPF
TRANSITIONAL EPI: <1 /HPF
UROBILINOGEN UR STRIP-MCNC: NORMAL MG/DL
WBC CLUMPS #/AREA URNS HPF: PRESENT /HPF
WBC URINE: >182 /HPF

## 2023-01-25 PROCEDURE — 87086 URINE CULTURE/COLONY COUNT: CPT | Mod: ORL | Performed by: INTERNAL MEDICINE

## 2023-01-25 PROCEDURE — 81001 URINALYSIS AUTO W/SCOPE: CPT | Mod: ORL | Performed by: INTERNAL MEDICINE

## 2023-01-26 LAB — BACTERIA UR CULT: ABNORMAL

## 2023-02-02 ENCOUNTER — LAB REQUISITION (OUTPATIENT)
Dept: LAB | Facility: CLINIC | Age: 88
End: 2023-02-02
Payer: MEDICARE

## 2023-02-02 DIAGNOSIS — I10 ESSENTIAL (PRIMARY) HYPERTENSION: ICD-10-CM

## 2023-02-06 LAB
ANION GAP SERPL CALCULATED.3IONS-SCNC: 12 MMOL/L (ref 7–15)
BUN SERPL-MCNC: 18 MG/DL (ref 8–23)
CALCIUM SERPL-MCNC: 9.8 MG/DL (ref 8.2–9.6)
CHLORIDE SERPL-SCNC: 103 MMOL/L (ref 98–107)
CREAT SERPL-MCNC: 0.89 MG/DL (ref 0.51–0.95)
DEPRECATED HCO3 PLAS-SCNC: 24 MMOL/L (ref 22–29)
GFR SERPL CREATININE-BSD FRML MDRD: 60 ML/MIN/1.73M2
GLUCOSE SERPL-MCNC: 76 MG/DL (ref 70–99)
POTASSIUM SERPL-SCNC: 4 MMOL/L (ref 3.4–5.3)
SODIUM SERPL-SCNC: 139 MMOL/L (ref 136–145)

## 2023-02-06 PROCEDURE — 36415 COLL VENOUS BLD VENIPUNCTURE: CPT | Mod: ORL | Performed by: INTERNAL MEDICINE

## 2023-02-06 PROCEDURE — 80048 BASIC METABOLIC PNL TOTAL CA: CPT | Mod: ORL | Performed by: INTERNAL MEDICINE

## 2023-02-06 PROCEDURE — P9604 ONE-WAY ALLOW PRORATED TRIP: HCPCS | Mod: ORL | Performed by: INTERNAL MEDICINE

## 2023-02-20 NOTE — TELEPHONE ENCOUNTER
Former patient of [facility] & has not established care with another provider.  Please assign refill request to covering provider per clinic standard process.    Routing refill request to provider for review/approval because:  Drug not on the [facility] refill protocol   Break in Medication  No PCP    Last Written Prescription Date:  3/26/2015  Last Fill Quantity: ???,  # refills: ???   Last office visit provider:  8/29/22     Requested Prescriptions   Pending Prescriptions Disp Refills     dorzolamide (TRUSOPT) 2 % ophthalmic solution [Pharmacy Med Name: DORZOLAMIDE 2% EYE DROPS] 10 mL 11     Sig: INSTILL 1 DROP IN BOTH EYES 2 TIMES A DAY       There is no refill protocol information for this order          Ellie Bower RN 02/20/23 9:09 AM

## 2023-10-21 NOTE — ED TRIAGE NOTES
Triage Assessment (Adult)       Row Name 10/21/23 0739          Triage Assessment    Airway WDL WDL        Respiratory WDL    Respiratory WDL WDL        Skin Circulation/Temperature WDL    Skin Circulation/Temperature WDL WDL        Cardiac WDL    Cardiac WDL WDL     Cardiac Rhythm NSR        Peripheral/Neurovascular WDL    Peripheral Neurovascular WDL WDL        Cognitive/Neuro/Behavioral WDL    Cognitive/Neuro/Behavioral WDL WDL

## 2023-10-21 NOTE — ED TRIAGE NOTES
Patient was returning back to the bedroom from the bathroom about midnight when she fell backwards onto her bottom, possibly hitting back of head. Skin tear to right elbow and swelling of left shoulder. Pt was alert and oriented, ABC's intact, Patient complaining of her body aching from laying on the floor for several hours.      Triage Assessment (Adult)       Row Name 10/21/23 0739          Triage Assessment    Airway WDL WDL        Respiratory WDL    Respiratory WDL WDL        Skin Circulation/Temperature WDL    Skin Circulation/Temperature WDL WDL        Cardiac WDL    Cardiac WDL WDL     Cardiac Rhythm NSR        Peripheral/Neurovascular WDL    Peripheral Neurovascular WDL WDL        Cognitive/Neuro/Behavioral WDL    Cognitive/Neuro/Behavioral WDL WDL

## 2023-10-21 NOTE — ED NOTES
Bed: JNED-19  Expected date: 10/21/23  Expected time: 7:27 AM  Means of arrival: Ambulance  Comments:  Daron. 89 yo female. Fall. Down for 7 hours.

## 2023-10-21 NOTE — ED PROVIDER NOTES
EMERGENCY DEPARTMENT ENCOUNTER     NAME: Manuela Olivas   AGE: 89 year old female   YOB: 1930   MRN: [id number]   EVALUATION DATE & TIME: 10/21/2023  7:33 AM   PCP: Antonette Arechiga     Chief Complaint   Patient presents with    Fall     Patient fell back falling to bottom, then falling back and possibly hitting back of head   :    FINAL IMPRESSION       1. Fall, initial encounter           ED COURSE & MEDICAL DECISION MAKING      7:40 AM I met with the patient.     Pertinent Labs & Imaging studies reviewed. (See chart for details)   89 year old female  presents to the Emergency Department for evaluation of a mechanical fall where she was walking with her walker at around midnight, slipped, but then laid on the floor for about 7 hours before someone found her. Initial Vitals Reviewed. Initial exam notable for generally nontoxic elderly patient with normal vitals.  She does have some slight swelling but without tenderness of her left shoulder so I ordered an x-ray of this to rule out fracture and it is negative.  I added a CT of the head and cervical spine though she has no complaints of back or neck pain and fortunately these are negative for injury.  I did check a CK to rule out rhabdomyolysis, creatinine and electrolytes are stable and at baseline.  She has a small skin tear on her right elbow and a Mepilex dressing was applied.  At this time, patient and family member feel comfortable with reassurance and discharge.           At the conclusion of the encounter I discussed the results of all of the tests and the disposition. The questions were answered. The patient or family acknowledged understanding and was agreeable with the care plan.     0 minutes critical care time, see procedure note below for details if relevant    Medical Decision Making    History:  Supplemental history from: Documented in chart, if applicable, EMS, family member  External Record(s) reviewed: Documented in chart, if  applicable.,     Work Up:  Chart documentation includes differential considered and any EKGs or imaging independently interpreted by provider, where specified.  In additional to work up documented, I considered the following work up: Documented in chart, if applicable.    External consultation:  Discussion of management with another provider: Documented in chart, if applicable    Complicating factors:  Care impacted by chronic illness: Hyperlipidemia and Hypertension  Care affected by social determinants of health: access to care    Disposition considerations: Discharge. No recommendations on prescription strength medication(s). I considered admission, but ultimately discharged patient with reassuring work-up.        MEDICATIONS GIVEN IN THE EMERGENCY:   Medications   acetaminophen (TYLENOL) tablet 650 mg (650 mg Oral $Given 10/21/23 3233)   bacitracin ointment (1 packet Topical $Given 10/21/23 0427)      NEW PRESCRIPTIONS STARTED AT TODAY'S ER VISIT   New Prescriptions    No medications on file     ================================================================   HISTORY OF PRESENT ILLNESS       Patient information was obtained from: Patient    Use of Intrepreter: N/A     Manuela Olivas is a 89 year old female with history of HLD and HTN who presents to the ED after a fall.      Per nursing staff, the patient lives at a assistant living facility. She was returning back to the bedroom from the bathroom about midnight and fell. Patient uses a walker and as she was walking walk, she missed a step and fell backward. Possible hitting of the back of the head. Patient endorses skin tear to the right elbows and swelling on her left shoulder. No LOC and patient is alert and oriented.     The patient reports aching from the fall. She can wiggle her fingers and toes. Denies any neck pain or any other associated symptoms right now. No other complaints right now.      ================================================================        PAST HISTORY     PAST MEDICAL HISTORY:   Past Medical History:   Diagnosis Date    Anxiety     Autoimmune hepatitis (H) 2006    Resolved    Breast cyst 1975    Chronic kidney disease, stage 3 (H) 7/27/2021    Controlled substance agreement signed 2/13/2018    Esophageal dysphagia     Fibrocystic breast     Former smoker     GERD (gastroesophageal reflux disease)     HLD (hyperlipidemia)     HTN (hypertension)     Hypercalcemia     Hyperparathyroidism (H24)     Subclinical     Insomnia     OA (osteoarthritis) 5/27/2016    PMR (polymyalgia rheumatica) (H24)     Presbyesophagus     Senile purpura (H24) 4/18/2019    Unspecified glaucoma     Unspecified glaucoma(365.9)       PAST SURGICAL HISTORY:   Past Surgical History:   Procedure Laterality Date    BIOPSY BREAST Right 1975    BREAST CYST ASPIRATION Bilateral 1975    CATARACT EXTRACTION, BILATERAL      HYSTERECTOMY  1967    OOPHORECTOMY Right 1975    TOTAL HIP ARTHROPLASTY Right 2010      CURRENT MEDICATIONS:   acetaminophen (TYLENOL) 325 MG tablet  aspirin (ASA) 325 MG EC tablet  aspirin (ASA) 325 MG tablet  calcium carbonate-vitamin D (OSCAL W/D) 500-200 MG-UNIT tablet  dorzolamide (TRUSOPT) 2 % ophthalmic solution  metoprolol tartrate (LOPRESSOR) 50 MG tablet  omeprazole (PRILOSEC) 40 MG DR capsule  oxyCODONE (ROXICODONE) 5 MG tablet  polyethylene glycol (MIRALAX) 17 g packet  predniSONE (DELTASONE) 5 MG tablet  senna-docusate (SENOKOT-S/PERICOLACE) 8.6-50 MG tablet  timolol maleate (ISTALOL) 0.5 % DrpD      ALLERGIES:   No Known Allergies   FAMILY HISTORY:   Family History   Problem Relation Age of Onset    Cerebrovascular Disease Mother     Hyperlipidemia Mother     Coronary Artery Disease Father       SOCIAL HISTORY:   Social History     Socioeconomic History    Marital status:     Number of children: 0   Tobacco Use    Smoking status: Former    Smokeless tobacco: Never   Vaping  "Use    Vaping Use: Never used   Substance and Sexual Activity    Alcohol use: No    Drug use: Never    Sexual activity: Never   Social History Narrative    Moved out of her home in 2018 and moved to the [facility].    , no children, lives alone.    Volunteer at [employer] in the past.        VITALS  Patient Vitals for the past 24 hrs:   BP Temp Temp src Pulse Resp SpO2 Height Weight   10/21/23 0903 134/57 -- -- 89 22 95 % -- --   10/21/23 0800 114/54 -- -- 85 20 96 % -- --   10/21/23 0739 (!) 163/70 -- -- 93 21 95 % -- --   10/21/23 0736 134/75 98.4  F (36.9  C) Oral 90 16 -- 1.626 m (5' 4\") 68 kg (150 lb)        ================================================================    PHYSICAL EXAM     VITAL SIGNS: /57   Pulse 89   Temp 98.4  F (36.9  C) (Oral)   Resp 22   Ht 1.626 m (5' 4\")   Wt 68 kg (150 lb)   LMP  (LMP Unknown)   SpO2 95%   BMI 25.75 kg/m     Constitutional:  Awake, no acute distress   HENT:  Atraumatic, oropharynx without exudate or erythema, membranes moist  Lymph:  No adenopathy  Eyes: EOM intact, PERRL, no injection  Neck: Supple  Respiratory:  Clear to auscultation bilaterally, no wheezes or crackles   Cardiovascular:  Regular rate and rhythm, single S1 and S2   GI:  Soft, nontender, nondistended, no rebound or guarding   Musculoskeletal:  Moves all extremities, no lower extremity edema, no deformities, no C-spine tenderness.  Skin:  Warm, dry, skin tear to the right elbow.   Neurologic:  Alert and oriented x3, no focal deficits noted, GCS-15.        ================================================================  LAB       All pertinent labs reviewed and interpreted.   Labs Ordered and Resulted from Time of ED Arrival to Time of ED Departure   BASIC METABOLIC PANEL - Abnormal       Result Value    Sodium 134 (*)     Potassium 3.7      Chloride 98      Carbon Dioxide (CO2) 25      Anion Gap 11      Urea Nitrogen 26.0 (*)     Creatinine 1.06 (*)     GFR Estimate " 49 (*)     Calcium 9.9 (*)     Glucose 135 (*)    CBC WITH PLATELETS AND DIFFERENTIAL - Abnormal    WBC Count 12.2 (*)     RBC Count 4.19      Hemoglobin 12.9      Hematocrit 40.1      MCV 96      MCH 30.8      MCHC 32.2      RDW 13.8      Platelet Count 199      % Neutrophils 86      % Lymphocytes 9      % Monocytes 4      Mids % (Monos, Eos, Basos)        % Eosinophils 0      % Basophils 0      % Immature Granulocytes 1      NRBCs per 100 WBC 0      Absolute Neutrophils 10.6 (*)     Absolute Lymphocytes 1.1      Absolute Monocytes 0.5      Mids Abs (Monos, Eos, Basos)        Absolute Eosinophils 0.0      Absolute Basophils 0.0      Absolute Immature Granulocytes 0.1      Absolute NRBCs 0.0     CK TOTAL - Normal              ===============================================================  RADIOLOGY       Reviewed all pertinent imaging. Please see official radiology report.   XR Shoulder Left G/E 3 Views   Final Result   IMPRESSION: Normal joint alignment. No fracture visualized. Moderate degenerative arthritis in the glenohumeral joint. Large subacromial enthesophyte.      Cervical spine CT w/o contrast   Final Result   IMPRESSION:   HEAD CT:   1.  No CT evidence for acute intracranial process.   2.  Brain atrophy and presumed chronic microvascular ischemic changes as above.      CERVICAL SPINE CT:   1.  No CT evidence for acute fracture or post traumatic subluxation.   2.  Severe bilateral C3-C4, severe right C5-C6, severe bilateral C6-C7 and severe right C7-T1 neural foraminal stenoses due to chronic degeneration.      Head CT w/o contrast   Final Result   IMPRESSION:   HEAD CT:   1.  No CT evidence for acute intracranial process.   2.  Brain atrophy and presumed chronic microvascular ischemic changes as above.      CERVICAL SPINE CT:   1.  No CT evidence for acute fracture or post traumatic subluxation.   2.  Severe bilateral C3-C4, severe right C5-C6, severe bilateral C6-C7 and severe right C7-T1 neural  foraminal stenoses due to chronic degeneration.            ================================================================  EKG     EKG reviewed interpreted by me shows sinus rhythm with a rate of 94, left axis, QTc 447 with no acute ST or T wave changes    I have independently reviewed and interpreted the EKG(s) documented above.     ================================================================  PROCEDURES         I, Geni Geni, am serving as a scribe to document services personally performed by Dr. Sutton based on my observation and the provider's statements to me. I, Caral Sutton MD attest that Geni Arechiga is acting in a scribe capacity, has observed my performance of the services and has documented them in accordance with my direction.     Carla Sutton M.D.   Emergency Medicine   [facility] EMERGENCY DEPARTMENT  [facility] [id number] [phone]  Dept: [phone]       Carla Sutton MD  10/21/23 0924

## 2023-10-21 NOTE — ED NOTES
Cleansed right elbow skin tear with saline.  Noted 2 small skin tears and  and elbow abrasion, bleeding controlled..  Bruising of  right elbow noted.  Pt not complaining of pain but feels some tenderness when area is touched

## 2023-10-21 NOTE — DISCHARGE INSTRUCTIONS
Fortunately, the blood work is reassuring and the images do not show any injuries.  The skin tear on your arm has a dressing but she did not need stitches.  It is safe for you to go home at this time.

## 2023-10-21 NOTE — ED NOTES
Pt assisted to the [employer] stand by assist and pt was able to walk using a walker.  Initially felt shaky but has refused offer of crackers and sandwich.  Pt was not able to urinate.  Pt was able to walk with standby assist using a walker

## 2023-11-09 PROBLEM — R53.1 GENERALIZED WEAKNESS: Status: ACTIVE | Noted: 2023-01-01

## 2023-11-09 NOTE — H&P
[facility]    History and Physical - Hospitalist Service       Date of Admission:  11/9/2023    Assessment & Plan      Manuela Olivas is a 89 year old female w/ pmh of CKD 3a, HTN, GERD admitted on 11/9/2023. She is now admitted for ongoing urinary symptoms as well as generalized weakness.     Generalized weakness  Pt reports progressive generalized weakness over the last month. At baseline she is able to ambulate w/ a walker at her assisted living facility but has been needing a wheelchair to get around for the last week or so. On admission pt is afebrile, no leukocytosis, electrolytes WNL. Unclear cause of ongoing weakness at this point, will very likely require discharge to TCU.  - PT/OT  - AM CBC and BMP  - Covid19 PCR pending    Dysuria  Urinary frequency  Dx w/ UTI 5d ago and started on course of Cefdinir which she had completed by time of admission. Still endorsing urinary frequency, urgency, dysuria, and suprapubic tenderness. UA in the ED non infectious appearing. UC from 11/4 growing out E coli which was pan sensitive to all abx tested. Unclear why patient is having ongoing symptoms at this time. In the ED did note difficulty w/ urination followed by almost 1L of urine out w/ straight cath. Concerning for neurogenic bladder w/ low back symptoms, see below.  - UA done in the ED w/o evidence of infection, will hold off on further abx  - Continue PO Cefdinir (2 days left)    Low back pain  Urinary incontinence  Pt suffered unwitnessed fall overnight last month (10/21) in which she fell backward from her walker onto her backside. Landed on tile floor, did not hit her head. Presented to the ED where she had negative imaging of her head, C spine, and L shoulder. Since then has been having ongoing intermittent stabbing pain in her R right buttock that does occasionally radiate down the R leg. Along w/ this has noticed ongoing urinary incontinence which she has never experienced prior to  "this.   - XR lumbar spine  - Strong consideration for lumbar spine MRI   - Scheduled tylenol for pain  - PRN robaxin    Elevated Alk phos  Alk phos elevated to 138 on admission, LFTs o/w WNL. No RUQ ttp. Unclear significance at this time.     Mental health  Pt repeatedly stating that she wishes there were a pill \"to just end it all\" and that she has nothing to live for. Denies specific plan or intent to harm herself.  - Consider psych evaluation while here    Possible heart failure?  Pt PTA med list including metoprolol, lasix, spironolactone. Unsure of cardiac hx. No echo on chart review.  - Consider echocardiogram  - Continue PTA metoprolol, lasix, spirinolactone    Chorionic condition:  GERD- PTA omeprazole  Polymyalgia rheumatica- PTA prednisone  Glaucoma- PTA eye drops  Insomnia- PTA trazodone          Diet: Combination Diet Regular Diet Adult  DVT Prophylaxis: Pneumatic Compression Devices  Knapp Catheter: Not present  Lines: None     Cardiac Monitoring: None  Code Status: No CPR- Pre-arrest intubation OK    Clinically Significant Risk Factors Present on Admission           # Hypercalcemia: Highest Ca = 10.3 mg/dL in last 2 days, will monitor as appropriate        # Hypertension: Noted on problem list                 Disposition Plan      Expected Discharge Date: 11/10/2023                The patient's care was discussed with the Attending Physician, Dr. Gallagher .    Charles Lockwood MD  Hospitalist Service  [facility]  Securely message with [employer] (more info)  Text page via [facility] Paging/Directory   ______________________________________________________________________    Chief Complaint   Weakness and dysuria    History is obtained from the patient    History of Present Illness   Manuela Olivas is a 89 year old female w/ pmh of CKD IIIa, GERD, htn, glaucoma who presents with one month of progressive generalized weakness and urinary symptoms. Patient reportedly suffered mechanical " fall in which she fell backwards onto her bottom on 10/21. She was seen at that time in the ED where she had imaging of her head, C spine, and L shoulder without any acute fractures or other injuries noted. Pt returned to her assisted living facility, and notes that she has been having continued low back/buttock pain since this incident as well as new urinary incontinence which is not normal for her. In addition to this patient then began noticing urinary symptoms including frequency, urgency, and dysuria approximately one week ago. Seen 5 days ago and diagnosed w/ UTI by [facility] physician, started on course of Cefdinir which she has been taking since then without resolution of her symptoms. Upon arrival to the ED her main complaint is suprapubic discomfort and dysuria/frequency. In addition to this she has been becoming progressively weaker since her fall last month. At baseline she is able to ambulate with a walker, but over the last week now has felt so weak that she is needing a wheelchair to get around.     Patient currently lives at [facility] in [place]. She reports taht she does not drink alcohol or use tobacco products.     Past Medical History    Past Medical History:   Diagnosis Date    Anxiety     Autoimmune hepatitis (H) 2006    Resolved    Breast cyst 1975    Chronic kidney disease, stage 3 (H) 7/27/2021    Controlled substance agreement signed 2/13/2018    Esophageal dysphagia     Fibrocystic breast     Former smoker     GERD (gastroesophageal reflux disease)     HLD (hyperlipidemia)     HTN (hypertension)     Hypercalcemia     Hyperparathyroidism (H24)     Subclinical     Insomnia     OA (osteoarthritis) 5/27/2016    PMR (polymyalgia rheumatica) (H24)     Presbyesophagus     Senile purpura (H24) 4/18/2019    Unspecified glaucoma     Unspecified glaucoma(365.9)      Past Surgical History   Past Surgical History:   Procedure Laterality Date    BIOPSY BREAST Right  1975    BREAST CYST ASPIRATION Bilateral 1975    CATARACT EXTRACTION, BILATERAL      HYSTERECTOMY  1967    OOPHORECTOMY Right 1975    TOTAL HIP ARTHROPLASTY Right 2010     Prior to Admission Medications   Prior to Admission Medications   Prescriptions Last Dose Informant Patient Reported? Taking?   Skin Protectants, Misc. (MINERAL OIL-WHITE PETROLATUM) CREA cream 11/9/2023 at am  Yes Yes   Sig: Apply topically 2 times daily To legs   acetaminophen (TYLENOL) 325 MG tablet 11/9/2023 at am  Yes Yes   Sig: Take 650 mg by mouth 4 times daily   calcium carbonate-vitamin D (OSCAL W/D) 500-200 MG-UNIT tablet 11/9/2023 at am  Yes Yes   Sig: Take 1 tablet by mouth 2 times daily   cefdinir (OMNICEF) 300 MG capsule 11/9/2023 at am  Yes Yes   Sig: Take 300 mg by mouth 2 times daily X 7 days   dimethicone-zinc oxide (SIERRA PROTECT) external cream 11/9/2023 at am  Yes Yes   Sig: Apply topically 2 times daily   dorzolamide (TRUSOPT) 2 % ophthalmic solution 11/9/2023 at am  Yes Yes   Sig: Place 1 drop into both eyes 2 times daily   furosemide (LASIX) 20 MG tablet   Yes Yes   Sig: Take 20 mg by mouth every morning   loperamide (IMODIUM) 2 MG capsule More than a month at prn  Yes Yes   Sig: Take 2 mg by mouth 3 times daily as needed for diarrhea   melatonin 3 MG tablet   Yes Yes   Sig: Take 2 tablets by mouth at bedtime   metoprolol tartrate (LOPRESSOR) 50 MG tablet 11/9/2023 at am  Yes Yes   Sig: Take 50 mg by mouth 2 times daily   omeprazole (PRILOSEC) 40 MG DR capsule 11/9/2023 at am  Yes Yes   Sig: Take 40 mg by mouth 2 times daily   polyethylene glycol (MIRALAX) 17 g packet Unknown at prn  Yes Yes   Sig: Take 17 g by mouth 2 times daily as needed   predniSONE (DELTASONE) 5 MG tablet 11/9/2023 at am  Yes Yes   Sig: Take 10 mg by mouth every morning   senna-docusate (SENOKOT-S/PERICOLACE) 8.6-50 MG tablet Past Month at prn  Yes Yes   Sig: Take 1 tablet by mouth 2 times daily   spironolactone (ALDACTONE) 25 MG tablet 11/9/2023 at am   Yes Yes   Sig: Take 25 mg by mouth every morning   timolol maleate (TIMOPTIC) 0.5 % ophthalmic solution 11/9/2023 at am  Yes Yes   Sig: Place 1 drop into both eyes 2 times daily   traZODone (DESYREL) 50 MG tablet   Yes Yes   Sig: Take 50 mg by mouth nightly as needed      Facility-Administered Medications: None           Physical Exam   Vital Signs: Temp: 98.6  F (37  C) Temp src: Oral BP: (!) 150/70 Pulse: 84   Resp: 16 SpO2: 96 %      Weight: 150 lbs 0 oz    Constitutional: awake, sitting in bed w/ head raised, uncomfortable appearing  Eyes: Lids and lashes normal, pupils equal, round.  ENT: Normocephalic, without obvious abnormality, atraumatic, moist mucous membranes  Respiratory: No increased work of breathing, good air exchange, clear to auscultation bilaterally, no crackles or wheezing  Cardiovascular: Normal apical impulse, regular rate and rhythm, normal S1 and S2, no S3 or S4, and no murmur noted  GI: No scars, normal bowel sounds, soft, non-distended, non-tender, no masses palpated, no hepatosplenomegally  Musculoskeletal: There is no redness, warmth, or swelling of the joints. Bilateral 5/5 strength in bilateral lower extremities, positive straight leg raise on R. Sensation intact.  Neurologic: Awake, alert, oriented to name, place and time.   Neuropsychiatric: General: normal, calm, and normal eye contact  Level of consciousness: alert / normal  Affect: normal    Data     I have personally reviewed the following data over the past 24 hrs:    8.5  \   12.7   / 281     135 100 26.7 (H) /  112 (H)   4.0 23 1.04 (H) \     ALT: 20 AST: 23 AP: 138 (H) TBILI: 0.5   ALB: 3.6 TOT PROTEIN: 6.5 LIPASE: 34       Imaging results reviewed over the past 24 hrs:   No results found for this or any previous visit (from the past 24 hour(s)).

## 2023-11-09 NOTE — ED PROVIDER NOTES
EMERGENCY DEPARTMENT ENCOUNTER      NAME: Manuela Olivas  AGE: 89 year old female  YOB: 1930  MRN: [id number]  EVALUATION DATE & TIME: 11/9/2023 10:04 AM    PCP: Antonette Arechiga    ED PROVIDER: Basilia Burton PA-C      Chief Complaint   Patient presents with    Generalized Weakness         FINAL IMPRESSION:  1. Generalized weakness      ED COURSE & MEDICAL DECISION MAKING:    Pertinent Labs & Imaging studies reviewed. (See chart for details)  89 year old female presents to the Emergency Department for evaluation of lysed weakness.  Per chart review patient was seen on 10/21/2023 in the emergency room after a mechanical fall.  X-ray of her shoulder was negative for fracture.  CT of her head and cervical spine was unremarkable.  Labs were at baseline and stable.  Patient was discharged home.  Patient reports that since her fall she has continued to have weakness. 5 days ago patient was diagnosed with a UTI and started on cefdinir.  Patient reports that she has continued to have urinary symptoms and has felt weak.  She denies any fevers or chills.  Vital signs reviewed patient is hypertensive is baseline for the patient.  Afebrile.  On exam GCS 15.  Cranial nerves II through XII intact.  Normal range of motion, sensation of all 4 extremities.  Patient does have decreased strength in all 4 extremities.  Abdomen is soft and nontender.    CBC shows no white blood cell elevation.  Hemoglobin is stable.  Basic shows an elevated creatinine of 1.04.  2 weeks ago patient's creatinine was also elevated at 1.06.  GFR is 50 which seems to be baseline for the patient.  Lipase is reassuring.  Hepatic shows an elevated alk phosphatase of 138.  UA shows no nitrates or leukocytes.  We attempted to ambulate the patient but unfortunately she was too weak to walk.  I spoke with the hospitalist who agrees to admit the patient.  Family and patient was educated on her results.  Patient agrees with plan.  All questions  answered.    ED COURSE:   10:16 AM  I saw the patient. Staffed with Dr. Salgado.   2:00 PM Patient will be admitted to the hospital.   2:11 PM family and patient was educated on her imaging and labs.  They agree with plan.  ED Course as of 11/09/23 1414   Thu Nov 09, 2023   1023 Seen here for [facility] fall 10/21. Neg ED workup. Dx w uti on cefdinir. Still having urinary sx. Amb w walker at baseline, now more weak, not able to ambulate.         At the conclusion of the encounter I discussed the results of all of the tests and the disposition. The questions were answered. The patient or family acknowledged understanding and was agreeable with the care plan.     0 minutes of critical care time       Additional Documentation    History:  Supplemental history from: Documented in chart, if applicable  External Record(s) reviewed: Documented in chart, if applicable.    Work Up:  Chart documentation includes differential considered and any EKGs or imaging interpreted by provider.  In additional to work up documented, I considered the following work up: Documented in chart, if applicable.    External consultation:  Discussion of management with another provider: Documented in chart, if applicable    Complicating factors:  Care impacted by chronic illness: N/A  Care affected by social determinants of health: N/A    Disposition considerations: Admit.      MEDICATIONS GIVEN IN THE EMERGENCY:  Medications - No data to display    NEW PRESCRIPTIONS STARTED AT TODAY'S ER VISIT  New Prescriptions    No medications on file       =================================================================    HPI    Patient information was obtained from: Patient    Use of : N/A        Manuela Olivas is a 89 year old female with a pertinent history of GERD, hypertension, anxiety, osteoarthritis, chronic kidney disease, PAD, HLD.  Who presents to this ED for evaluation of generalized weakness.     Per chart review patient was seen on  10/21/2023 for a mechanical fall.  Head CT and shoulder x-ray was unremarkable.  Labs were stable and reassuring.  Patient was discharged home.  5 days ago patient was diagnosed with a UTI and started on cefdinir.  Since starting the antibiotics patient has continued to feel urinary symptoms (dysuria, frequency, urgency) and generalized weakness.  She denies recent falls.  She denies being on blood thinners.    No fevers, chills, chest pain, shortness of breath, nausea, vomiting, abdominal pain, flank pain.    REVIEW OF SYSTEMS   Review of Systems   Constitutional:  Negative for chills and fever.   Respiratory:  Negative for shortness of breath.    Cardiovascular:  Negative for chest pain.   Gastrointestinal:  Negative for abdominal pain, diarrhea, nausea and vomiting.   Genitourinary:  Positive for dysuria, frequency and urgency. Negative for flank pain.   Neurological:  Positive for weakness (generalized).       PAST MEDICAL HISTORY:  Past Medical History:   Diagnosis Date    Anxiety     Autoimmune hepatitis (H) 2006    Resolved    Breast cyst 1975    Chronic kidney disease, stage 3 (H) 7/27/2021    Controlled substance agreement signed 2/13/2018    Esophageal dysphagia     Fibrocystic breast     Former smoker     GERD (gastroesophageal reflux disease)     HLD (hyperlipidemia)     HTN (hypertension)     Hypercalcemia     Hyperparathyroidism (H24)     Subclinical     Insomnia     OA (osteoarthritis) 5/27/2016    PMR (polymyalgia rheumatica) (H24)     Presbyesophagus     Senile purpura (H24) 4/18/2019    Unspecified glaucoma     Unspecified glaucoma(365.9)        PAST SURGICAL HISTORY:  Past Surgical History:   Procedure Laterality Date    BIOPSY BREAST Right 1975    BREAST CYST ASPIRATION Bilateral 1975    CATARACT EXTRACTION, BILATERAL      HYSTERECTOMY  1967    OOPHORECTOMY Right 1975    TOTAL HIP ARTHROPLASTY Right 2010           CURRENT MEDICATIONS:    acetaminophen (TYLENOL) 325 MG tablet  aspirin (ASA) 325 MG  "EC tablet  aspirin (ASA) 325 MG tablet  calcium carbonate-vitamin D (OSCAL W/D) 500-200 MG-UNIT tablet  dorzolamide (TRUSOPT) 2 % ophthalmic solution  metoprolol tartrate (LOPRESSOR) 50 MG tablet  omeprazole (PRILOSEC) 40 MG DR capsule  oxyCODONE (ROXICODONE) 5 MG tablet  polyethylene glycol (MIRALAX) 17 g packet  predniSONE (DELTASONE) 5 MG tablet  senna-docusate (SENOKOT-S/PERICOLACE) 8.6-50 MG tablet  timolol maleate (ISTALOL) 0.5 % DrpD        ALLERGIES:  No Known Allergies    FAMILY HISTORY:  Family History   Problem Relation Age of Onset    Cerebrovascular Disease Mother     Hyperlipidemia Mother     Coronary Artery Disease Father        SOCIAL HISTORY:   Social History     Socioeconomic History    Marital status:     Number of children: 0   Tobacco Use    Smoking status: Former    Smokeless tobacco: Never   Vaping Use    Vaping Use: Never used   Substance and Sexual Activity    Alcohol use: No    Drug use: Never    Sexual activity: Never   Social History Narrative    Moved out of her home in 2018 and moved to the [facility].    , no children, lives alone.    Volunteer at [employer] in the past.       VITALS:  BP (!) 150/70   Pulse 84   Temp 98.6  F (37  C) (Oral)   Resp 16   Ht 1.676 m (5' 6\")   Wt 68 kg (150 lb)   LMP  (LMP Unknown)   SpO2 96%   BMI 24.21 kg/m      PHYSICAL EXAM    Physical Exam  Vitals and nursing note reviewed.   Constitutional:       General: She is not in acute distress.     Appearance: Normal appearance. She is not ill-appearing, toxic-appearing or diaphoretic.   HENT:      Head: Atraumatic.      Right Ear: External ear normal.      Left Ear: External ear normal.      Nose: Nose normal.      Mouth/Throat:      Mouth: Mucous membranes are moist.   Eyes:      Conjunctiva/sclera: Conjunctivae normal.      Pupils: Pupils are equal, round, and reactive to light.   Cardiovascular:      Rate and Rhythm: Normal rate and regular rhythm.      Pulses: Normal " pulses.      Heart sounds: Normal heart sounds. No murmur heard.     No friction rub. No gallop.   Pulmonary:      Effort: Pulmonary effort is normal.      Breath sounds: Normal breath sounds. No wheezing or rales.   Abdominal:      General: Abdomen is flat. Bowel sounds are normal.      Tenderness: There is no abdominal tenderness. There is no guarding or rebound.   Musculoskeletal:      Cervical back: Normal range of motion.   Skin:     General: Skin is dry.   Neurological:      General: No focal deficit present.      Mental Status: She is alert and oriented to person, place, and time. Mental status is at baseline.      GCS: GCS eye subscore is 4. GCS verbal subscore is 5. GCS motor subscore is 6.      Cranial Nerves: Cranial nerves 2-12 are intact.      Sensory: Sensation is intact.      Motor: Motor function is intact.      Coordination: Coordination is intact.      Gait: Gait is intact.   Psychiatric:         Mood and Affect: Mood normal.         Thought Content: Thought content normal.          LAB:  All pertinent labs reviewed and interpreted.  Labs Ordered and Resulted from Time of ED Arrival to Time of ED Departure   BASIC METABOLIC PANEL - Abnormal       Result Value    Sodium 135      Potassium 4.0      Chloride 100      Carbon Dioxide (CO2) 23      Anion Gap 12      Urea Nitrogen 26.7 (*)     Creatinine 1.04 (*)     GFR Estimate 50 (*)     Calcium 10.3 (*)     Glucose 112 (*)    HEPATIC FUNCTION PANEL - Abnormal    Protein Total 6.5      Albumin 3.6      Bilirubin Total 0.5      Alkaline Phosphatase 138 (*)     AST 23      ALT 20      Bilirubin Direct <0.20     CBC WITH PLATELETS AND DIFFERENTIAL - Abnormal    WBC Count 8.5      RBC Count 4.15      Hemoglobin 12.7      Hematocrit 41.3            MCH 30.6      MCHC 30.8 (*)     RDW 13.8      Platelet Count 281      % Neutrophils 72      % Lymphocytes 16      % Monocytes 8      % Eosinophils 0      % Basophils 1      % Immature Granulocytes 3       NRBCs per 100 WBC 0      Absolute Neutrophils 6.2      Absolute Lymphocytes 1.4      Absolute Monocytes 0.6      Absolute Eosinophils 0.0      Absolute Basophils 0.1      Absolute Immature Granulocytes 0.3      Absolute NRBCs 0.0     LIPASE - Normal    Lipase 34     ROUTINE UA WITH MICROSCOPIC REFLEX TO CULTURE - Normal    Color Urine Light Yellow      Appearance Urine Clear      Glucose Urine Negative      Bilirubin Urine Negative      Ketones Urine Negative      Specific Gravity Urine 1.011      Blood Urine Negative      pH Urine 5.5      Protein Albumin Urine Negative      Urobilinogen Urine <2.0      Nitrite Urine Negative      Leukocyte Esterase Urine Negative      RBC Urine <1      WBC Urine 1      Squamous Epithelials Urine 1      Hyaline Casts Urine 1          RADIOLOGY:  Reviewed all pertinent imaging. Please see official radiology report.  No orders to display         Basilia Burton PA-C  [facility] EMERGENCY DEPARTMENT  [facility] [id number] [phone]     Basilia Burton PA-C  11/09/23 1410

## 2023-11-09 NOTE — ED PROVIDER NOTES
I, Conchita Salgado have reviewed the documentation, personally taken the patient's history, performed an exam and agree with the physical finds, diagnosis and management plan.    HPI:  Manuela Olivas is a 89 year old female with a pertinent history of GERD, hypertension, anxiety, osteoarthritis, chronic kidney disease, PAD, HLD, who presents to this ED for evaluation of generalized weakness.      Patient was diagnosed with a UTI 5 days ago and started on cefdinir and since starting the antibiotics she has continued to feel urinary symptoms (dysuria, frequency, urgency) and generalized weakness. She denies recent falls.  She denies being on blood thinners. No fevers, chills, chest pain, shortness of breath, nausea, vomiting, abdominal pain, and flank pain.      Physical Exam: Elderly female in no acute distress.  GCS 15, patient appears to have generalized decreased strength in the extremities.  Nothing focal.  Abdomen is soft, nontender.  Regular rate rhythm, no respiratory distress.  Will trial of ambulation patient feels and is not able to ambulate safely.    MDM: Dehydration, electrolyte abnormality, symptomatic anemia, ongoing UTI, failure to thrive    ED Course/workup:   10:16 AM Basilia Burton PA-C staffed patient with me. I agree with their assessment and plan of management, and I will see the patient.  11:00 AM I met with the patient to introduce myself, gather additional history, perform my initial exam, and discuss the plan.     ED work-up reassuring but patient is still unable to ambulate with walker and is not safe for discharge.  Will be admitted for further management.    ED Course as of 11/09/23 1351   Thu Nov 09, 2023   1023 Seen here for [facility] fall 10/21. Neg ED workup. Dx w uti on cefdinir. Still having urinary sx. Amb w walker at baseline, now more weak, not able to ambulate.         Final Diagnosis:     ICD-10-CM    1. Generalized weakness  R53.1             I personally saw the patient and  performed a substantive portion of the visit including all aspects of the medical decision making.     Conchita Salgado MD      The creation of this record is based on the scribe s observations of the work being performed by Conchita Salgado MD and the provider s statements to them. It was created on her behalf by Mary Salmeron, a trained medical scribe. This document has been checked and approved by the attending provider.       Conchita Salgado MD  11/09/23 4140

## 2023-11-09 NOTE — ED NOTES
Tried to ambulate patient and son said she can't walk and he wouldn't let her to even attempt to.  He asked to talk to the provider.  I relayed message to PA.

## 2023-11-09 NOTE — MEDICATION SCRIBE - ADMISSION MEDICATION HISTORY
Medication Scribe Admission Medication History    Admission medication history is complete. The information provided in this note is only as accurate as the sources available at the time of the update.    Information Source(s): Facility ([facility]/) medication list/MAR via N/A    Pertinent Information: Received printed MAR from facility.     Changes made to PTA medication list:  Added: Cefdinir, Haim, Lasix, Imodium, melatonin, Miralax, Skin, Spironolactone, Trazadone  Deleted: Aspirin, Oxycodone,   Changed: Tylenol to 650 mg QID,     Medication Affordability:  Not including over the counter (OTC) medications, was there a time in the past 3 months when you did not take your medications as prescribed because of cost?: No    Allergies reviewed with patient and updates made in EHR: yes    Medication History Completed By: Dario Alcaraz 11/9/2023 3:45 PM    PTA Med List   Medication Sig Note Last Dose    acetaminophen (TYLENOL) 325 MG tablet Take 650 mg by mouth 4 times daily  11/9/2023 at am    calcium carbonate-vitamin D (OSCAL W/D) 500-200 MG-UNIT tablet Take 1 tablet by mouth 2 times daily  11/9/2023 at am    cefdinir (OMNICEF) 300 MG capsule Take 300 mg by mouth 2 times daily X 7 days 11/9/2023: Started 11/5 11/9/2023 at am    dimethicone-zinc oxide (HAIM PROTECT) external cream Apply topically 2 times daily  11/9/2023 at am    dorzolamide (TRUSOPT) 2 % ophthalmic solution Place 1 drop into both eyes 2 times daily  11/9/2023 at am    furosemide (LASIX) 20 MG tablet Take 20 mg by mouth every morning  11/9/2023 at am    loperamide (IMODIUM) 2 MG capsule Take 2 mg by mouth 3 times daily as needed for diarrhea  More than a month at prn    melatonin 3 MG tablet Take 2 tablets by mouth at bedtime  11/8/2023 at pm    metoprolol tartrate (LOPRESSOR) 50 MG tablet Take 50 mg by mouth 2 times daily  11/9/2023 at am    Neomycin-Bacitracin-Polymyxin (TRIPLE ANTIBIOTIC) 3.5-400-5000 OINT ointment Externally apply topically every  morning Apply to left ear until healed  11/9/2023 at am    omeprazole (PRILOSEC) 40 MG DR capsule Take 40 mg by mouth 2 times daily  11/9/2023 at am    polyethylene glycol (MIRALAX) 17 g packet Take 17 g by mouth 2 times daily as needed  Unknown at prn    polyethylene glycol (MIRALAX) 17 GM/Dose powder Take 0.5 Capfuls by mouth every morning  11/9/2023 at am    predniSONE (DELTASONE) 5 MG tablet Take 10 mg by mouth every morning  11/9/2023 at am    senna-docusate (SENOKOT-S/PERICOLACE) 8.6-50 MG tablet Take 1 tablet by mouth 2 times daily  Past Month at prn    Skin Protectants, Misc. (MINERAL OIL-WHITE PETROLATUM) CREA cream Apply topically 2 times daily To legs  11/9/2023 at am    spironolactone (ALDACTONE) 25 MG tablet Take 25 mg by mouth every morning  11/9/2023 at am    timolol maleate (TIMOPTIC) 0.5 % ophthalmic solution Place 1 drop into both eyes 2 times daily  11/9/2023 at am    traZODone (DESYREL) 50 MG tablet Take 50 mg by mouth nightly as needed  Unknown at prn

## 2023-11-09 NOTE — ED NOTES
Attempted to ambulate pt, pt unable to ambulate even with two person and walker. Needs 1 person assist for turning in bed as well.

## 2023-11-09 NOTE — ED NOTES
Bed: [facility]NED[facility]-I  Expected date: 11/9/23  Expected time: 9:55 AM  Means of arrival:   Comments:  Uti diagnosed/getting weaker/ [facility]

## 2023-11-09 NOTE — ED TRIAGE NOTES
Pt brought in by Daron from assisted living. Pt has conformed uti and is on Abx for 4-5 days. Pt reports getting increased weakness and ongoing symptoms of burning with urination and frequency. Pt reports she is usually able to ambulate, but is now needing help to go the bathroom due to increasing weaknesses.  Pt also reports on/off pain in her buttock that she thinks is from a fall few weeks ago.     Triage Assessment (Adult)       Row Name 11/09/23 1016          Triage Assessment    Airway WDL WDL        Respiratory WDL    Respiratory WDL WDL        Cardiac WDL    Cardiac WDL WDL        Peripheral/Neurovascular WDL    Peripheral Neurovascular WDL WDL        Cognitive/Neuro/Behavioral WDL    Cognitive/Neuro/Behavioral WDL WDL        David Coma Scale    Best Eye Response 4-->(E4) spontaneous     Best Motor Response 6-->(M6) obeys commands     Best Verbal Response 5-->(V5) oriented     David Coma Scale Score 15

## 2023-11-09 NOTE — LETTER
[facility]  [address] [address]  Phone: [phone]  Fax: [phone]    November 16, 2023        Manuela Olivas  [address] [facility]  [place] [facility] [address]          To whom it may concern:    RE: Manuela Olivas      Manuela is currently hospitalized at [facility] and is medically ready for discharge. She was accepted to outpatient hospice through [facility]. She is now bedbound.     Please contact me for questions or concerns.      Sincerely,        Elizabeth Talbert MD

## 2023-11-10 NOTE — PLAN OF CARE
PRIMARY DIAGNOSIS: ACUTE PAIN  OUTPATIENT/OBSERVATION GOALS TO BE MET BEFORE DISCHARGE:  1. Pain Status: Improved-controlled with oral pain medications.    2. Return to near baseline physical activity: No    3. Cleared for discharge by consultants (if involved): No    Discharge Planner Nurse   Safe discharge environment identified: Yes  Barriers to discharge: Yes       Entered by: Vimal Ott RN 11/10/2023 1:59 PM     Please review provider order for any additional goals.   Nurse to notify provider when observation goals have been met and patient is ready for discharge.Goal Outcome Evaluation:      Plan of Care Reviewed With: patient, family

## 2023-11-10 NOTE — PROGRESS NOTES
[facility]  OUTPATIENT PHYSICAL THERAPY EVALUATION  PLAN OF TREATMENT FOR OUTPATIENT REHABILITATION  (COMPLETE FOR INITIAL CLAIMS ONLY)  Patient's Last Name, First Name, M.I.  YOB: 1930  Manuela Olivas                        Provider's Name  [facility] Medical Record No.  [id number]                             Onset Date:  11/09/23   Start of Care Date:  11/10/23   Type:     _X_PT   ___OT   ___SLP Medical Diagnosis:  Generalized weakness              PT Diagnosis:  Impaired functional mobility Visits from SOC:  1     See note for plan of treatment, functional goals and certification details    I CERTIFY THE NEED FOR THESE SERVICES FURNISHED UNDER        THIS PLAN OF TREATMENT AND WHILE UNDER MY CARE     (Physician co-signature of this document indicates review and certification of the therapy plan).                11/10/23 0830   Appointment Info   Signing Clinician's Name / Credentials (PT) Monae Silva, PT, DPT   Living Environment   People in Home alone   Current Living Arrangements assisted living   Home Accessibility no concerns   Transportation Anticipated family or friend will provide   Living Environment Comments Patient goes to the dining room for meals.   Self-Care   Usual Activity Tolerance moderate   Current Activity Tolerance poor   Equipment Currently Used at Home walker, rolling;wheelchair, manual  (4WW)   Fall history within last six months yes   Number of times patient has fallen within last six months 1   Activity/Exercise/Self-Care Comment Patient is ambulatory with a 4WW at baseline, however patient has been using a wheelchair recently due to weakness.   General Information   Onset of Illness/Injury or Date of Surgery 11/09/23   Referring Physician Charles Lockwood MD   Patient/Family Therapy Goals Statement (PT) None stated   Pertinent History of Current Problem (include personal factors and/or comorbidities that  "impact the POC) Per chart, \"Manuela Olivas is a 89 year old female w/ pmh of CKD 3a, HTN, GERD admitted on 11/9/2023. She is now admitted for ongoing urinary symptoms as well as generalized weakness.\"   Existing Precautions/Restrictions fall   Posture    Posture Forward head position   Range of Motion (ROM)   Range of Motion ROM deficits secondary to weakness   Strength (Manual Muscle Testing)   Strength (Manual Muscle Testing) Deficits observed during functional mobility   Bed Mobility   Bed Mobility supine-sit;sit-supine   Supine-Sit [place] (Bed Mobility) maximum assist (25% patient effort);2 person assist   Sit-Supine [place] (Bed Mobility) maximum assist (25% patient effort);2 person assist   Bed Mobility Limitations decreased ability to use arms for pushing/pulling;decreased ability to use legs for bridging/pushing   Impairments Contributing to Impaired Bed Mobility impaired balance;pain;decreased ROM;decreased strength   Assistive Device (Bed Mobility) bed rails;draw sheet   Transfers   Transfers sit-stand transfer   Transfer Safety Concerns Noted losing balance backward   Impairments Contributing to Impaired Transfers impaired balance;pain;decreased ROM;decreased strength   Sit-Stand Transfer   Sit-Stand [place] (Transfers) moderate assist (50% patient effort);2 person assist   Assistive Device (Sit-Stand Transfers) walker, front-wheeled   Gait/Stairs (Locomotion)   [place] Level (Gait) unable to assess   Comment, (Gait/Stairs) Patient was unable to ambulate due to significant weakness.   Clinical Impression   Criteria for Skilled Therapeutic Intervention Yes, treatment indicated   PT Diagnosis (PT) Impaired functional mobility   Influenced by the following impairments Fatigue, pain, weakness   Functional limitations due to impairments Bed mobility, transfers, gait   Clinical Presentation (PT Evaluation Complexity) evolving   Clinical Presentation Rationale Patient presents as medically " diagnosed.   Clinical Decision Making (Complexity) moderate complexity   Planned Therapy Interventions (PT) balance training;bed mobility training;gait training;home exercise program;patient/family education;ROM (range of motion);strengthening;transfer training;home program guidelines   Risk & Benefits of therapy have been explained evaluation/treatment results reviewed;care plan/treatment goals reviewed;patient   PT Total Evaluation Time   PT [place], Moderate Complexity Minutes (39112) 20   Physical Therapy Goals   PT Frequency 5x/week   PT Predicted Duration/Target Date for Goal Attainment 11/17/23   PT Goals Bed Mobility;Transfers;Gait   PT: Bed Mobility Moderate assist;Supine to/from sit  (Ax1)   PT: Transfers Moderate assist;Sit to/from stand;Bed to/from chair;Assistive device  (Ax1)   PT: Gait Moderate assist;Assistive device;10 feet  (Ax1)   PT Discharge Planning   PT Plan Progress bed mobility and transfers, gait as able, LE strengthening   PT Discharge Recommendation (DC Rec) Transitional Care Facility   PT Rationale for DC Rec Patient currently requires assist of 2 for bed mobility and sit<>stand transfers. Unable to complete bed<>chair transfers or ambulate at this time.   PT Brief overview of current status Max A of 2 supine<>sit. Mod A of 2 sit<>stand.   PT Equipment Needed at Discharge lift device;walker, rolling;wheelchair;wheelchair cushion   Total Session Time   Total Session Time (sum of timed and untimed services) 20

## 2023-11-10 NOTE — PROGRESS NOTES
[facility]    Medicine Progress Note - Hospitalist Service    Date of Admission:  11/9/2023    Assessment & Plan      Generalized weakness  Pt reports progressive generalized weakness over the last month. At baseline she is able to ambulate w/ a walker at her assisted living facility but has been needing a wheelchair to get around for the last week or so. On admission pt is afebrile, no leukocytosis, electrolytes WNL. Covid19 testing negative. Unclear cause of ongoing weakness at this point, will very likely require discharge to TCU.  - PT/OT  - AM CBC and BMP     Dysuria  Urinary frequency  Dx w/ UTI 5d ago and started on course of Cefdinir which she had completed by time of admission. Still endorsing urinary frequency, urgency, dysuria, and suprapubic tenderness. UA in the ED non infectious appearing. UC from 11/4 growing out E coli which was pan sensitive to all abx tested. Unclear why patient is having ongoing symptoms at this time. In the ED did note difficulty w/ urination followed by almost 1L of urine out w/ straight cath. Concerning for neurogenic bladder w/ low back symptoms, see below.  - UA done in the ED w/o evidence of infection  - Continue PO Cefdinir (1 days left)  - Consider bladder spasm medication if continuing to have symptoms?     Low back pain  Urinary incontinence  Pt suffered unwitnessed fall overnight last month (10/21) in which she fell backward from her walker onto her backside. Landed on tile floor, did not hit her head. Presented to the ED where she had negative imaging of her head, C spine, and L shoulder. Since then has been having ongoing intermittent stabbing pain in her R right buttock that does occasionally radiate down the R leg. Along w/ this has noticed ongoing urinary incontinence which she has never experienced prior to this. Lumbar spine XR 11/9 showing loss of disc space height as well as degenerative end plate spurring at all levels. Given pt's age,  "new onset urinary incontinence, recent fall, and mildly elevated alk phos the etiology of these symptoms includes disc disease, nerve impingement, vs malignancy.  - MRI lumbar spine w/ contrast  - Scheduled tylenol for pain  - PRN robaxin     Elevated Alk phos  Alk phos elevated to 138 on admission, LFTs o/w WNL. No RUQ ttp. Unclear significance at this time.      Mental health  Pt repeatedly stating that she wishes there were a pill \"to just end it all\" and that she has nothing to live for. Denies specific plan or intent to harm herself.  - Consider psych evaluation while here     Possible heart failure?  Pt PTA med list including metoprolol, lasix, spironolactone. Unsure of cardiac hx. No echo on chart review. Will add on trop today given pt reported sob w/ exertion and known atherosclerotic disease.  - echocardiogram pending  - Continue PTA metoprolol, lasix, spirinolactone  - Trop pending     Chorionic condition:  GERD- PTA omeprazole  Polymyalgia rheumatica- PTA prednisone  Glaucoma- PTA eye drops  Insomnia- PTA trazodone          Diet: Combination Diet Regular Diet Adult    DVT Prophylaxis: Pneumatic Compression Devices  Knapp Catheter: Not present  Lines: None     Cardiac Monitoring: None  Code Status: No CPR- Pre-arrest intubation OK      Clinically Significant Risk Factors Present on Admission           # Hypercalcemia: Highest Ca = 10.6 mg/dL in last 2 days, will monitor as appropriate        # Hypertension: Noted on problem list                 Disposition Plan     Expected Discharge Date: 11/10/2023                  The patient's care was discussed with the Attending Physician, Dr. Perez .    Charles Lockwood MD  Hospitalist Service  [facility]  Securely message with [employer] (more info)  Text page via [employer] Paging/Directory   ______________________________________________________________________    Interval History   Pt too weak to work w/ PT this AM, still endorsing R hip/low " back pain. Pt's nephew's at bedside w/ many questions about the plan for today.    Physical Exam   Vital Signs: Temp: 98.3  F (36.8  C) Temp src: Oral BP: 138/64 Pulse: 89   Resp: 18 SpO2: 96 % O2 Device: None (Room air)    Weight: 136 lbs 14.49 oz    Constitutional: awake, lying in bed, uncomfortable appearing  Eyes: Lids and lashes normal, pupils equal, round.  ENT: Normocephalic, without obvious abnormality, atraumatic, moist mucous membranes  Respiratory: No increased work of breathing, good air exchange, clear to auscultation bilaterally, no crackles or wheezing  Cardiovascular: Normal apical impulse, regular rate and rhythm, normal S1 and S2, no S3 or S4, and no murmur noted  GI: No scars, normal bowel sounds, soft, non-distended, non-tender, no masses palpated, no hepatosplenomegally  Musculoskeletal: There is no redness, warmth, or swelling of the joints. Bilateral 5/5 strength in bilateral lower extremities, positive straight leg raise on R. Sensation intact.  Neurologic: Awake, alert, oriented to name, place and time.   Neuropsychiatric: General: normal, calm, and normal eye contact  Level of consciousness: alert / normal  Affect: normal    Data     I have personally reviewed the following data over the past 24 hrs:    7.4  \   12.8   / 284     135 100 22.7 /  91   4.0 26 1.10 (H) \     Trop: 27 (H) BNP: N/A       Imaging results reviewed over the past 24 hrs:   Recent Results (from the past 24 hour(s))   XR Lumbar Spine 2/3 Views    Narrative    EXAM: XR LUMBAR SPINE 2/3 VIEWS  LOCATION: [facility]  DATE: 11/9/2023    INDICATION: Fall 10-21 weeks, ongoing radicular pain and urinary incontinence.    COMPARISON: None.      Impression    IMPRESSION: Five lumbar-type vertebrae. Minimal degenerative retrolisthesis of L1 upon L2 and L2 upon L3. Alignment is otherwise normal. Vertebral body heights normal. No fractures. There is loss of disc space height and degenerative endplate  spurring at   all levels of the lumbar spine.

## 2023-11-10 NOTE — PLAN OF CARE
"  Problem: Adult Inpatient Plan of Care  Goal: Plan of Care Review  Description: The Plan of Care Review/Shift note should be completed every shift.  The Outcome Evaluation is a brief statement about your assessment that the patient is improving, declining, or no change.  This information will be displayed automatically on your shift  note.  Outcome: Progressing  Flowsheets (Taken 11/10/2023 1101)  Plan of Care Reviewed With:   patient   family  Goal: Patient-Specific Goal (Individualized)  Description: You can add care plan individualizations to a care plan. Examples of Individualization might be:  \"Parent requests to be called daily at 9am for status\", \"I have a hard time hearing out of my right ear\", or \"Do not touch me to wake me up as it startles  me\".  Outcome: Progressing  Goal: Absence of Hospital-Acquired Illness or Injury  Outcome: Progressing  Intervention: Prevent Infection  Recent Flowsheet Documentation  Taken 11/10/2023 0926 by Vimal Ott RN  Infection Prevention: rest/sleep promoted  Goal: Optimal Comfort and Wellbeing  Outcome: Progressing  Intervention: Monitor Pain and Promote Comfort  Recent Flowsheet Documentation  Taken 11/10/2023 0800 by Vimla Ott RN  Pain Management Interventions: medication (see MAR)  Goal: Readiness for Transition of Care  Outcome: Progressing     Problem: Risk for Delirium  Goal: Optimal Coping  Outcome: Progressing  Intervention: Optimize Psychosocial Adjustment to Delirium  Recent Flowsheet Documentation  Taken 11/10/2023 0926 by Vimal Ott RN  Supportive Measures: active listening utilized  Goal: Improved Behavioral Control  Outcome: Progressing  Intervention: Prevent and Manage Agitation  Recent Flowsheet Documentation  Taken 11/10/2023 0926 by Vimal Ott RN  Environment Familiarity/Consistency: daily routine followed  Intervention: Minimize Safety Risk  Recent Flowsheet Documentation  Taken 11/10/2023 0926 by Vimal Ott RN  Communication " Enhancement Strategies:   call light answered in person   extra time allowed for response  Goal: Improved Attention and Thought Clarity  Outcome: Progressing  Intervention: Maximize Cognitive Function  Recent Flowsheet Documentation  Taken 11/10/2023 0926 by Vimal Ott RN  Reorientation Measures:   calendar in view   clock in view   reorientation provided  Goal: Improved Sleep  Outcome: Progressing     Problem: Fall Injury Risk  Goal: Absence of Fall and Fall-Related Injury  Outcome: Progressing     Problem: Infection  Goal: Absence of Infection Signs and Symptoms  Outcome: Progressing     Problem: Pain Acute  Goal: Optimal Pain Control and Function  Outcome: Progressing  Intervention: Develop Pain Management Plan  Recent Flowsheet Documentation  Taken 11/10/2023 0800 by Vimal Ott RN  Pain Management Interventions: medication (see MAR)  Intervention: Optimize Psychosocial Wellbeing  Recent Flowsheet Documentation  Taken 11/10/2023 0926 by Vimal Ott RN  Supportive Measures: active listening utilized   Goal Outcome Evaluation:      Plan of Care Reviewed With: patient, family           Pt is alert and oriented x3. Pt is forgetful. Pt's v/s is stable. Pt is med complaint.pt received pain med for the back ache. Pt was up with assist of 1 this morning. Continue to monitor pt.

## 2023-11-10 NOTE — PROGRESS NOTES
Code 44 switched to Obs at 12:43pm; attempted to give moon at 1:15pm 11/10, pt not in room. Unable to provide moon or assess. Will try again.     Attempted again at: 1:50, 2:15, 2:45 and 3:10. Patient still in cardiac testing and unable to be given [place] or assessed.     RABIA Moya  11/10/2023

## 2023-11-10 NOTE — UTILIZATION REVIEW
"Admission Status; Secondary Review Determination         Under the authority of the Utilization Management Committee, the utilization review process indicated a secondary review on the above patient.  The review outcome is based on review of the medical records, discussions with staff, and applying clinical experience noted on the date of the review.          (x) Observation Status Appropriate - This patient does not meet hospital inpatient criteria and is placed in observation status. If this patient's primary payer is Medicare and was admitted as an inpatient, Condition Code 44 should be used and patient status changed to \"observation\".     RATIONALE FOR DETERMINATION:  89 y/o presented with generalized weakness.  No major infection found.  She is finishing antibiotics (oral) for an old infection but repeat UA shows near resolution.    She has some urinary retention also noted.  Her low back pain is being evaluated but thus far nothing has been found to necessitate urgent inpatient interventions.  Would recommend observation status at this time.  If new major changes/findings develop she should be reassessed for inpatient status.    The severity of illness, intensity of service provided, expected LOS and risk for adverse outcome make the care appropriate for further observation; however, doesn't meet criteria for hospital inpatient admission. Dr. Lockwood notified of this determination.    The information on this document is developed by the utilization review team in order for the business office to ensure compliance.  This only denotes the appropriateness of proper admission status and does not reflect the quality of care rendered.         The definitions of Inpatient Status and Observation Status used in making the determination above are those provided in the CMS Coverage Manual, Chapter 1 and Chapter 6, section 70.4.      Sincerely,    Shay Barragan DO, [facility]  Utilization Review  Physician Advisor   "

## 2023-11-10 NOTE — PLAN OF CARE
Problem: Fall Injury Risk  Goal: Absence of Fall and Fall-Related Injury  Outcome: Progressing   Pt unable to ambulate, PT/OT consulted. Transfers with A2/CL. Bed alarm, fall prevention precaution in place.   Problem: Infection  Goal: Absence of Infection Signs and Symptoms  Outcome: Progressing   No fevers, VSS. Continue with the dose of PO abx.   Problem: Pain Acute  Goal: Optimal Pain Control and Function  Outcome: Progressing  Reports chronic back pain and BLE tenderness. Prn tylenol, robaxin given with minimal effect. Turn and repos q2hrs   Goal Outcome Evaluation:       Pt slept well overnight. No acute events reports

## 2023-11-10 NOTE — SIGNIFICANT EVENT
Significant Event Note    Time of event: 4:52 PM November 10, 2023    Description of event:  MRI returned, showing sacral insufficiency fracture along with moderate to severe lumbar spondylosis with effacement of CSF around L2-L3 in the setting of generalized weakness and urinary incontinence after a fall on 10/21.     Plan:  Discussed findings with neurosurgery team to see if urgent evaluation is necessary; results were reviewed and no urgent intervention needed. Recommended orthopedic consult for sacral insuffiencey fracture, and neurosurgery will assess patient in the morning as well.     Discussed with: supervising physician, Dr. Chris Juárez MD  [facility], PGY-3

## 2023-11-11 NOTE — PROGRESS NOTES
Consult received  89 year old ongoing back pain, urinary frequency, recently treated UTI with imaging evidence below     Recs:   -ortho consult sacral insufficiency fractures  -pain management per hospitalist team and pain team  -will consult in AM       Narrative & Impression   EXAM: MR LUMBAR SPINE W CONTRAST  LOCATION: [facility]  DATE: 11/10/2023     INDICATION: fall 3 weeks ago, low back buttock pain since with urinary incontinence  COMPARISON: Lumbar radiographs 11/09/2023  CONTRAST: 6ml gadavist  TECHNIQUE: Routine Lumbar Spine MRI with IV contrast.     FINDINGS:   Nomenclature is based on 5 lumbar type vertebral bodies. Normal lumbar vertebral body heights. Negative for lower thoracic or lumbar fracture. Acute to subacute, bilateral sacral ala insufficiency fractures with transverse component at S2-S3 noted. No   evidence for cortical displacement or sacral canal epidural hematoma. Small amount of presacral space hematoma and edema. Normal distal spinal cord and cauda equina with conus medullaris at L1. No extraspinal abnormality. Unremarkable visualized bony   pelvis. 3 mm degenerative anterolisthesis at L5-S1. 2.5 mm retrolisthesis at L3-L4. 3 mm stepwise retrolisthesis on a degenerative basis at L1-L2 and L2-L3.     Abnormal disc signal at L2-L3 through L5 with vacuum disc and areas of high T2 signal. This may represent vacuum disc with trace intradiscal fluid but there is no evidence for endplate edema or acute endplate erosion. Schmorl's nodes at multiple levels.   No suspicious enhancement. No evidence for epidural abscess or phlegmon. Negative evidence for paraspinous inflammation or fluid.     T11-T12: Small central disc extrusion. Borderline central canal stenosis. Patent foramina.     T12-L1: Disc desiccation.. Small left paracentral disc extrusion extending inferiorly. Patent central canal and foramen.      L1-L2: Disc osteophyte. Adequate central canal. Mild bilateral  foraminal stenosis.     L2-L3: Moderate-sized disc extrusion osteophyte complex. Facet arthropathy. Moderate to severe central canal stenosis with substantial effacement of CSF around the cauda equina nerves. Disc and osteophyte with moderate to severe bilateral foraminal   stenosis. Facet arthropathy of mild degree. Moderate to severe bilateral lateral recess stenosis.     L3-L4: Small to moderate-sized disc extrusion osteophyte.. Facet arthropathy. Moderate central canal stenosis and bilateral lateral recess stenosis. Mild-to-moderate bilateral foraminal stenosis.     L4-L5: Disc osteophyte complex. Facet arthropathy. Moderate left and mild right foraminal stenosis. Moderate left and mild right lateral recess stenosis. Mild central canal stenosis.     L5-S1: Small central disc protrusion. Facet arthropathy. Patent central canal. Mild to moderate bilateral foraminal stenosis.                                                                      IMPRESSION:  1.  Acute to subacute H-type sacral insufficiency fracture     2.  Moderate to severe, multilevel lumbar spondylosis. High-grade central canal stenosis at L2-L3.     3.  Negative evidence for lumbar fracture     4.  Normal appearance of the distal thoracic cord and conus medullaris.

## 2023-11-11 NOTE — PLAN OF CARE
Goal Outcome Evaluation:         Problem: Adult Inpatient Plan of Care  Goal: Optimal Comfort and Wellbeing  Intervention: Monitor Pain and Promote Comfort  Recent Flowsheet Documentation  Taken 11/10/2023 2047 by Angélica Morgan RN  Pain Management Interventions: medication (see MAR)  Taken 11/10/2023 1615 by nAgélica Morgan RN  Pain Management Interventions: (encouraged turning) medication (see MAR)     Problem: Fall Injury Risk  Goal: Absence of Fall and Fall-Related Injury  Outcome: Progressing         Pt alert and oriented, from assisted living.  Taking scheduled tylenol and prn roboxin for pain in her buttocks, said she's been sitting in bed too long.  Pain goes from 6 down to a 3.  Pt has been drowsy later this evening, she stayed in bed and ate half a sandwich for dinner.  Pt had an MRI and echo tonight.  Sons really curious if anything will show up since her pain seems worse, more sharp and spasm like.  I said the MD will give them results tomorrow.  Pt has scattered bruises and scabs on her arms and some wounds on her right lower leg, mepilex's intact, some drainage.  Angélica Morgan, RN

## 2023-11-11 NOTE — UTILIZATION REVIEW
Admission Status; Secondary Review Determination   Under the authority of the Utilization Management Committee, the utilization review process indicated a secondary review on Manuela Olivas. The review outcome is based on review of the medical records, discussions with staff, and applying clinical experience noted on the date of the review.   (x) Inpatient Status Appropriate - This patient's medical care is consistent with medical management for inpatient care and reasonable inpatient medical practice.     RATIONALE FOR DETERMINATION   89 year old female with past medical history of CKD 3a, HTN, GERD who presented to Er with weakness and back pain S/P fall, she is admitted on 11/9/2023. MRI returned, showing sacral insufficiency fracture along with moderate to severe lumbar spondylosis with effacement of CSF around L2-L3 in the setting of generalized weakness and urinary incontinence after a fall on 10/21 , neurosurgery and ortho consult , patient declined surgery, plan for conservative treatment, ongoing PT/OT and pain control, crossing 3rd midnight   At the time of admission with the information available to the attending physician more than 2 nights Hospital complex care was anticipated, based on patient risk of adverse outcome if treated as outpatient and complex care required. Inpatient admission is appropriate based on the Medicare guidelines.   The information on this document is developed by the utilization review team in order for the business office to ensure compliance. This only denotes the appropriateness of proper admission status and does not reflect the quality of care rendered.   The definitions of Inpatient Status and Observation Status used in making the determination above are those provided in the CMS Coverage Manual, Chapter 1 and Chapter 6, section 70.4.   Sincerely,   Oscar Obando MD  Utilization Review  Physician Advisor  [facility]

## 2023-11-11 NOTE — PLAN OF CARE
"  Problem: Adult Inpatient Plan of Care  Goal: Plan of Care Review  Description: The Plan of Care Review/Shift note should be completed every shift.  The Outcome Evaluation is a brief statement about your assessment that the patient is improving, declining, or no change.  This information will be displayed automatically on your shift  note.  Outcome: Progressing  Flowsheets (Taken 11/11/2023 1430)  Plan of Care Reviewed With:   patient   family  Goal: Patient-Specific Goal (Individualized)  Description: You can add care plan individualizations to a care plan. Examples of Individualization might be:  \"Parent requests to be called daily at 9am for status\", \"I have a hard time hearing out of my right ear\", or \"Do not touch me to wake me up as it startles  me\".  Outcome: Progressing  Goal: Absence of Hospital-Acquired Illness or Injury  Outcome: Progressing  Intervention: Identify and Manage Fall Risk  Recent Flowsheet Documentation  Taken 11/11/2023 0931 by Celia Lindsey RN  Safety Promotion/Fall Prevention:   activity supervised   assistive device/personal items within reach   room near nurse's station   supervised activity  Intervention: Prevent Skin Injury  Recent Flowsheet Documentation  Taken 11/11/2023 0805 by Celia Lindsey RN  Body Position: weight shifting  Goal: Optimal Comfort and Wellbeing  Outcome: Progressing  Goal: Readiness for Transition of Care  Outcome: Progressing     Problem: Risk for Delirium  Goal: Optimal Coping  Outcome: Progressing  Goal: Improved Behavioral Control  Outcome: Progressing  Intervention: Minimize Safety Risk  Recent Flowsheet Documentation  Taken 11/11/2023 0931 by Celia Lindsey RN  Communication Enhancement Strategies:   call light answered in person   extra time allowed for response  Goal: Improved Attention and Thought Clarity  Outcome: Progressing  Intervention: Maximize Cognitive Function  Recent Flowsheet Documentation  Taken 11/11/2023 0931 by Celia Lindsey" "RN  Reorientation Measures:   calendar in view   clock in view  Goal: Improved Sleep  Outcome: Progressing     Problem: Fall Injury Risk  Goal: Absence of Fall and Fall-Related Injury  Outcome: Progressing  Intervention: Promote Injury-Free Environment  Recent Flowsheet Documentation  Taken 11/11/2023 0934 by Celia Lindsey RN  Safety Promotion/Fall Prevention:   activity supervised   assistive device/personal items within reach   room near nurse's station   supervised activity     Problem: Infection  Goal: Absence of Infection Signs and Symptoms  Outcome: Progressing     Problem: Pain Acute  Goal: Optimal Pain Control and Function  Outcome: Progressing   Goal Outcome Evaluation:      Plan of Care Reviewed With: patient, family      VSS, pt has denied pain. LS: Few faint crackles in bases, IS initiated. Pt reached 1000. Pt is assist 1-2 for OOB with walker & gait belt. Pt refused to sit up in chair this shift, will continue to encourage activity to improve her feeling stiff with movement.  Pt has fragile skin. Meplix in place on two previous skin tears right leg.  \"I've had these for awhile now.\"  Lotion applied to arms and legs bi-lat. Pt has had fair PO intake. Pt swallows crushed pills with applesauce. Pt is looking forward to going back to her facility with their therapy.  Will continue to encourage PO intake and increasing activity.           "

## 2023-11-11 NOTE — PLAN OF CARE
PRIMARY DIAGNOSIS: ACUTE PAIN  OUTPATIENT/OBSERVATION GOALS TO BE MET BEFORE DISCHARGE:  1. Pain Status: Improved-controlled with oral pain medications.    2. Return to near baseline physical activity: No    3. Cleared for discharge by consultants (if involved): No    Discharge Planner Nurse   Safe discharge environment identified: No- possible TCU placement needed.  Barriers to discharge: Yes       Entered by: Courtney Vasques RN 11/11/2023 4:24 AM     Please review provider order for any additional goals.   Nurse to notify provider when observation goals have been met and patient is ready for discharge.Goal Outcome Evaluation:

## 2023-11-11 NOTE — CONSULTS
[facility]    Neurosurgery Consultation     Date of Admission:  11/9/2023  Date of Consult (When I saw the patient): 11/11/23    Assessment & Plan   Manuela Olivas is a 89 year old female who was admitted on 11/9/2023. I was asked to see the patient for generalized weakness. Noted to have severe L2-L3 spinal stenosis with acute to subacute sacral insufficiency fracture    Principal Problem:    Generalized weakness  Lumbar spinal stenosis     Plan:   Discussed that we could offer surgical intervention to include decompression lumbar laminectomy at L2-L3 along with risks and benefits associated of which she states she is not interested in having surgery   Recommendations for conservative treatment with physical therapy and pain management per hospitalist   Will otherwise sign off please re-consult of call with any questions     Called and spoke with nephotis Juan he would want to request [facility]OR if possible, discussed that pending pain control can explore injections after therapy as an outpatient vs inpatient and would need consult with IR should her pain persist       I have discussed the following assessment and plan with Dr. Johnson who is in agreement with initial plan and will follow up with further consultation recommendations.    Juli Regan PA-C  [facility] Neurosurgery  O: [phone]          Code Status    No CPR- Pre-arrest intubation OK    Reason for Consult   Reason for consult: I was asked by Dr. Serna to evaluate this patient for lower extremity weakness.    Primary Care Physician   Antonette Arechiga    Chief Complaint   Generalized weakness     History is obtained from the patient    History of Present Illness   Manuela Olivas is a 89 year old female who presents with  CKD IIIa, GERD, htn, glaucoma who presents on 11/9/2023 with complaint of generalized weakness and urinary urgency. She had a fall on 10/21/2023 and notes that since her fall she has had worsening lower  extremity weakness and urinary urgency. She was recently treated for UTI 5 days ago but still has complaint with dysuria and urgency though she states she still will get the urge just has to go right away. She states that walking at home in her assisted living facility when she lost her balance and fell backwards hitting her back on furniture as she fell. She states that she was not able to get up on her own and was laying on the ground for at least four hours as her call button was out of reach from the fall. She denies any radicular lower extremity pain numbness tingling. She notes that her legs have become progressively weaker since then. Nephew states that a few days after the fall she had severe sharp stabbing back pain and bilateral leg pain of which she is no currently complaining of.     Past Medical History   I have reviewed this patient's medical history and updated it with pertinent information if needed.   Past Medical History:   Diagnosis Date    Anxiety     Autoimmune hepatitis (H) 2006    Resolved    Breast cyst 1975    Chronic kidney disease, stage 3 (H) 7/27/2021    Controlled substance agreement signed 2/13/2018    Esophageal dysphagia     Fibrocystic breast     Former smoker     GERD (gastroesophageal reflux disease)     HLD (hyperlipidemia)     HTN (hypertension)     Hypercalcemia     Hyperparathyroidism (H24)     Subclinical     Insomnia     OA (osteoarthritis) 5/27/2016    PMR (polymyalgia rheumatica) (H24)     Presbyesophagus     Senile purpura (H24) 4/18/2019    Unspecified glaucoma     Unspecified glaucoma(365.9)        Past Surgical History   I have reviewed this patient's surgical history and updated it with pertinent information if needed.  Past Surgical History:   Procedure Laterality Date    BIOPSY BREAST Right 1975    BREAST CYST ASPIRATION Bilateral 1975    CATARACT EXTRACTION, BILATERAL      HYSTERECTOMY  1967    OOPHORECTOMY Right 1975    TOTAL HIP ARTHROPLASTY Right 2010        Prior to Admission Medications   Prior to Admission Medications   Prescriptions Last Dose Informant Patient Reported? Taking?   Neomycin-Bacitracin-Polymyxin (TRIPLE ANTIBIOTIC) 3.5-400-5000 OINT ointment 11/9/2023 at am  Yes Yes   Sig: Externally apply topically every morning Apply to left ear until healed   Skin Protectants, Misc. (MINERAL OIL-WHITE PETROLATUM) CREA cream 11/9/2023 at am  Yes Yes   Sig: Apply topically 2 times daily To legs   acetaminophen (TYLENOL) 325 MG tablet 11/9/2023 at am  Yes Yes   Sig: Take 650 mg by mouth 4 times daily   calcium carbonate-vitamin D (OSCAL W/D) 500-200 MG-UNIT tablet 11/9/2023 at am  Yes Yes   Sig: Take 1 tablet by mouth 2 times daily   cefdinir (OMNICEF) 300 MG capsule 11/9/2023 at am  Yes Yes   Sig: Take 300 mg by mouth 2 times daily X 7 days   dimethicone-zinc oxide (SIERRA PROTECT) external cream 11/9/2023 at am  Yes Yes   Sig: Apply topically 2 times daily   dorzolamide (TRUSOPT) 2 % ophthalmic solution 11/9/2023 at am  Yes Yes   Sig: Place 1 drop into both eyes 2 times daily   furosemide (LASIX) 20 MG tablet 11/9/2023 at am  Yes Yes   Sig: Take 20 mg by mouth every morning   loperamide (IMODIUM) 2 MG capsule More than a month at prn  Yes Yes   Sig: Take 2 mg by mouth 3 times daily as needed for diarrhea   melatonin 3 MG tablet 11/8/2023 at pm  Yes Yes   Sig: Take 2 tablets by mouth at bedtime   metoprolol tartrate (LOPRESSOR) 50 MG tablet 11/9/2023 at am  Yes Yes   Sig: Take 50 mg by mouth 2 times daily   omeprazole (PRILOSEC) 40 MG DR capsule 11/9/2023 at am  Yes Yes   Sig: Take 40 mg by mouth 2 times daily   polyethylene glycol (MIRALAX) 17 GM/Dose powder 11/9/2023 at am  Yes Yes   Sig: Take 0.5 Capfuls by mouth every morning   polyethylene glycol (MIRALAX) 17 g packet Unknown at prn  Yes Yes   Sig: Take 17 g by mouth 2 times daily as needed   predniSONE (DELTASONE) 5 MG tablet 11/9/2023 at am  Yes Yes   Sig: Take 10 mg by mouth every morning   senna-docusate  "(SENOKOT-S/PERICOLACE) 8.6-50 MG tablet Past Month at prn  Yes Yes   Sig: Take 1 tablet by mouth 2 times daily   spironolactone (ALDACTONE) 25 MG tablet 11/9/2023 at am  Yes Yes   Sig: Take 25 mg by mouth every morning   timolol maleate (TIMOPTIC) 0.5 % ophthalmic solution 11/9/2023 at am  Yes Yes   Sig: Place 1 drop into both eyes 2 times daily   traZODone (DESYREL) 50 MG tablet Unknown at prn  Yes Yes   Sig: Take 50 mg by mouth nightly as needed      Facility-Administered Medications: None     Allergies   No Known Allergies    Social History   I have reviewed this patient's social history and updated it with pertinent information if needed. Manuela Olivas  reports that she has quit smoking. She has never used smokeless tobacco. She reports that she does not drink alcohol and does not use drugs.    Family History   I have reviewed this patient's family history and updated it with pertinent information if needed.   Family History   Problem Relation Age of Onset    Cerebrovascular Disease Mother     Hyperlipidemia Mother     Coronary Artery Disease Father        Review of Systems   14 point review of systems negative with exception to HPI     Physical Exam   Temp: 97.7  F (36.5  C) Temp src: Oral BP: (!) 142/65 Pulse: 90   Resp: 21 SpO2: 96 % O2 Device: None (Room air)    Vital Signs with Ranges  Temp:  [97.7  F (36.5  C)-98.3  F (36.8  C)] 97.7  F (36.5  C)  Pulse:  [78-94] 90  Resp:  [20-21] 21  BP: (126-151)/(65-71) 142/65  SpO2:  [95 %-96 %] 96 %  136 lbs 14.49 oz     , Blood pressure (!) 142/65, pulse 90, temperature 97.7  F (36.5  C), temperature source Oral, resp. rate 21, height 1.676 m (5' 6\"), weight 62.1 kg (136 lb 14.5 oz), SpO2 96%, not currently breastfeeding.  136 lbs 14.49 oz  HEENT:  Normocephalic, atraumatic.  PERRLA.  EOM s intact.   Neck:  Supple, non-tender, without lymphadenopathy.  Heart:  No peripheral edema  Lungs:  No SOB  Abdomen:  Soft, non-tender, non-distended.  Normal bowel " sounds.  Skin:  Warm and dry, good capillary refill   Extremities:  Good radial and dorsalis pedis pulses bilaterally, multiple wounds to bilateral lower extremities     NEUROLOGICAL EXAMINATION:   Mental status:  Alert and Oriented x 3, speech is fluent.  Cranial nerves:  II-XII intact.   Motor:  Strength is 5/5 throughout the upper and lower extremities  Deltoids:  Right: 5/5   Left:  5/5  Biceps:  Right: 5/5   Left:  5/5  Triceps: Right: 5/5   Left:  5/5                       Wrist Extensors: Right: 5/5   Left:  5/5        Wrist Flexors:Right: 5/5   Left:  5/5             Hand : Right: 5/5   Left:  5/5         Hip Flexor: Right: 2/5   Left:  3-/5                 Knee extension :Right: 2/5   Left:  3/5               Knee flexion: Right: 4/5   Left:  4/5              Plantar flexion:Right: 5/5   Left:  5/5        dorsiflexion:Right: 5/5   Left:  5/5                        EHL:Right: 5/5   Left:  5/5                     Sensation:  intact to LT throughout   Reflexes:  Negative Babinski.  Negative Clonus.    Coordination:  Smooth finger to nose testing.   Negative pronator drift.   Gait:  not tested    Cervical examination reveals good range of motion.  No tenderness to palpation of the cervical spine or paraspinous muscles bilaterally.     Lumbar examination reveals no tenderness of the spine or paraspinous muscles.  Straight leg raise is negative bilaterally.     Images reviewed personally   FINDINGS:   Nomenclature is based on 5 lumbar type vertebral bodies. Normal lumbar vertebral body heights. Negative for lower thoracic or lumbar fracture. Acute to subacute, bilateral sacral ala insufficiency fractures with transverse component at S2-S3 noted. No   evidence for cortical displacement or sacral canal epidural hematoma. Small amount of presacral space hematoma and edema. Normal distal spinal cord and cauda equina with conus medullaris at L1. No extraspinal abnormality. Unremarkable visualized bony   pelvis. 3  mm degenerative anterolisthesis at L5-S1. 2.5 mm retrolisthesis at L3-L4. 3 mm stepwise retrolisthesis on a degenerative basis at L1-L2 and L2-L3.     Abnormal disc signal at L2-L3 through L5 with vacuum disc and areas of high T2 signal. This may represent vacuum disc with trace intradiscal fluid but there is no evidence for endplate edema or acute endplate erosion. Schmorl's nodes at multiple levels.   No suspicious enhancement. No evidence for epidural abscess or phlegmon. Negative evidence for paraspinous inflammation or fluid.     T11-T12: Small central disc extrusion. Borderline central canal stenosis. Patent foramina.     T12-L1: Disc desiccation.. Small left paracentral disc extrusion extending inferiorly. Patent central canal and foramen.      L1-L2: Disc osteophyte. Adequate central canal. Mild bilateral foraminal stenosis.     L2-L3: Moderate-sized disc extrusion osteophyte complex. Facet arthropathy. Moderate to severe central canal stenosis with substantial effacement of CSF around the cauda equina nerves. Disc and osteophyte with moderate to severe bilateral foraminal   stenosis. Facet arthropathy of mild degree. Moderate to severe bilateral lateral recess stenosis.     L3-L4: Small to moderate-sized disc extrusion osteophyte.. Facet arthropathy. Moderate central canal stenosis and bilateral lateral recess stenosis. Mild-to-moderate bilateral foraminal stenosis.     L4-L5: Disc osteophyte complex. Facet arthropathy. Moderate left and mild right foraminal stenosis. Moderate left and mild right lateral recess stenosis. Mild central canal stenosis.     L5-S1: Small central disc protrusion. Facet arthropathy. Patent central canal. Mild to moderate bilateral foraminal stenosis.                                                                    IMPRESSION:  1.  Acute to subacute H-type sacral insufficiency fracture     2.  Moderate to severe, multilevel lumbar spondylosis. High-grade central canal stenosis at  "L2-L3.     3.  Negative evidence for lumbar fracture     4.  Normal appearance of the distal thoracic cord and conus medullaris.    Data     CBC RESULTS:   Recent Labs   Lab Test 11/10/23  0613   WBC 7.4   RBC 4.27   HGB 12.8   HCT 41.2   MCV 97   MCH 30.0   MCHC 31.1*   RDW 13.9        Basic Metabolic Panel:  Lab Results   Component Value Date     11/10/2023      Lab Results   Component Value Date    POTASSIUM 4.0 11/10/2023    POTASSIUM 4.0 12/14/2021     Lab Results   Component Value Date    CHLORIDE 100 11/10/2023    CHLORIDE 104 12/14/2021     Lab Results   Component Value Date    MORENO 10.6 11/10/2023     Lab Results   Component Value Date    CO2 26 11/10/2023    CO2 23 12/14/2021     Lab Results   Component Value Date    BUN 22.7 11/10/2023    BUN 20 12/14/2021     Lab Results   Component Value Date    CR 1.10 11/10/2023     Lab Results   Component Value Date    GLC 91 11/10/2023    GLC 84 12/14/2021     INR:  No results found for: \"INR\"   "

## 2023-11-11 NOTE — CONSULTS
Care Management Initial Consult    General Information  Assessment completed with: Patient, Family, Pt and nephewAngelo  Type of CM/SW Visit: Initial Assessment    Primary Care Provider verified and updated as needed:     Readmission within the last 30 days:        Reason for Consult: discharge planning  Advance Care Planning:            Communication Assessment  Patient's communication style: spoken language (English or Bilingual)    Hearing Difficulty or Deaf: no   Wear Glasses or Blind: no    Cognitive  Cognitive/Neuro/Behavioral: WDL                      Living Environment:   People in home: alone     Current living Arrangements: assisted living  [facility]   Able to return to prior arrangements: yes       Family/Social Support:  Care provided by: self, other (see comments), homecare agency (facility staff)  Provides care for: no one, unable/limited ability to care for self     Other (specify) (nephews)          Description of Support System: Supportive, Involved    Support Assessment: Adequate family and caregiver support, Adequate social supports    Current Resources:   Patient receiving home care services: Yes  Skilled Home Care Services: Skilled Nursing  Community Resources: Home Care  Equipment currently used at home: wheelchair, manual, walker, rolling  Supplies currently used at home:      Employment/Financial:  Employment Status:          Financial Concerns: none   Referral to Financial Worker: No       Does the patient's insurance plan have a 3 day qualifying hospital stay waiver?  Yes     Which insurance plan 3 day waiver is available? [employer]    Will the waiver be used for post-acute placement? Undetermined at this time    Lifestyle & Psychosocial Needs:  Social Determinants of Health     Food Insecurity: Not on file   Depression: At risk (4/25/2022)    PHQ-2     PHQ-2 Score: 4   Housing Stability: Not on file   Tobacco Use: Medium Risk (10/21/2023)    Patient History      Smoking Tobacco Use: Former     Smokeless Tobacco Use: Never     Passive Exposure: Not on file   Financial Resource Strain: Not on file   Alcohol Use: Not on file   Transportation Needs: Not on file   Physical Activity: Not on file   Interpersonal Safety: Not on file   Stress: Not on file   Social Connections: Not on file       Functional Status:  Prior to admission patient needed assistance:   Dependent ADLs:: Ambulation-walker, Transfers, Bathing, Dressing, Toileting  Dependent IADLs:: Cleaning, Cooking, Laundry, Shopping, Meal Preparation, Medication Management, Transportation            Additional Information:  RYLEY reviewed chart and discussed pts needs with MD outside pt room. Per MD awaiting neurosurgery consult and then PT to re-eval. Pt goal is return to [facility].    RYLEY met with pt and pts nephew, Angelo, for initial assessment. They confirm pt is at [facility]. They report that until a fall about three weeks ago that the pt was mostly independent with ADLs. Pt reports that she ambulated with a walker around her apartment and down to meals. Angelo reports she only received assistance with bathing. In the last few weeks pt has had increasing needs related to mobility and cares per pt and Angelo. They report that prior to pts admission she was needing to use her call button to have a staff come and assist her to the bathroom. In discussion of pt needs pt is clear that she wants to return to [facility]. She reports that she is not interested in moving to different facilities. Angelo reports that pt did have a TCU stay in the past but that they found the home care PT/OT she received from [facility] once she was moved to [facility] actually helped pt progress more than the TCU therapy. He notes strong support for pts goal of returning to [facility] at discharge. Discussed that as of yesterday TCU was recommended by therapy as pt needing assist of 2. Angelo reports that he believes pt can increase cares at [facility]  to get what she needs. They report pt has home care nurse from [facility] coming to do wound cares. Their preference for home PT/OT is [facility], but [facility] cannot do RN so this may not be an option at discharge. Discussed plan for SW to be in communication with [facility] and pt/family regarding planning. Pt agreeable. RYLEY reviewed [facility] and observation status with pt and Angelo. They report understanding. Discussed that pt could potentially go to a Medicare [employer] TCU facility if TCU is needed, list provided. Angelo and pt report understanding.     RYLEY sent referral to [facility] to confirm pts services. RYLEY placed call to [facility] and left VM for nurse requesting call back. [phone])    RYLEY will follow for pt to be re-evaluated by therapy after neurosurgery consult and coordinate care with pt, family and facility.    Noni Padilla, [facility]    Addendum: RYLEY attempted to call nurse at [facility] again this afternoon but no answer at the facility. RYLEY will try again tomorrow.  3:33 PM

## 2023-11-11 NOTE — PROGRESS NOTES
SPIRITUAL HEALTH SERVICES Progress Note    Saw pt Manuela Olivas and offered Baptism sacrament of anointing for the healing of the sick.     Fr. Parminder Chun

## 2023-11-11 NOTE — CONSULTS
[facility] Orthopedic Consultation    Manuela Olivas MRN# [id number]   Age: 89 year old YOB: 1930     Date of Admission:  11/9/2023    Reason for consult: Sacral fractures       Requesting physician: Juli Juárez MD       Level of consult: One-time consult to assist in determining a diagnosis and to recommend an appropriate treatment plan           Assessment and Plan:   Assessment:   Subacute Sacral Insufficiency Fractures; 3 weeks post initial injury         Plan:   --Recommend continue conservative management.    --From an ortho standpoint patient can be WBAT and progress with PT/OT as pain allows  --Will defer to neurology for treatment recs for the lumbar spine and if any restrictions needed.   --Continue scheduled tylenol for pain control   --Advised that fractures will continue to heal over the next 6-8 weeks and pain will gradually improve.   --Discharge when medically stable, passed PT/OT and cleared by Neuro. Follow-up with ortho if needed.     --Did discuss plan her family member, Juan.     Thank you for this consultation. Ortho will sign off at this time.  Call with an further questions or concerns [phone]                Chief Complaint:   Buttocks pain and leg weakness.        History is obtained from the patient and electronic health record         History of Present Illness:   This patient is a 89 year old female who presents with the following condition requiring a hospital admission:      She suffered an unwitnessed fall on October 21, 2023 at her assisted living facility residence. X-rays in the ED were negative for fracture.  She was discharged.  Since the fall has had ongoing weakness.  About 6 days ago she was diagnosed with UTI and started on antibiotics.  Due to ongoing urinary symptoms and weakness she was admitted for further evaluation and treatment.     MRI of the lumbar spine was ordered to evaluate for any mechanical derangement  causing the leg weakness.  MRI showed subacute sacral insufficiency fractures.  Ortho was consulted for the fractures.  Neuro has been consulted for the low back.     Pertinent history of GERD, HTN, anxiety, CKD, PAD and HLD.     On visitation today patient is resting comfortably in bed wishing she could discharge home.  Denies any radicular symptoms.  Has some pain in the buttocks region intermittently.              Past Medical History:     Past Medical History:   Diagnosis Date    Anxiety     Autoimmune hepatitis (H) 2006    Resolved    Breast cyst 1975    Chronic kidney disease, stage 3 (H) 7/27/2021    Controlled substance agreement signed 2/13/2018    Esophageal dysphagia     Fibrocystic breast     Former smoker     GERD (gastroesophageal reflux disease)     HLD (hyperlipidemia)     HTN (hypertension)     Hypercalcemia     Hyperparathyroidism (H24)     Subclinical     Insomnia     OA (osteoarthritis) 5/27/2016    PMR (polymyalgia rheumatica) (H24)     Presbyesophagus     Senile purpura (H24) 4/18/2019    Unspecified glaucoma     Unspecified glaucoma(365.9)              Past Surgical History:     Past Surgical History:   Procedure Laterality Date    BIOPSY BREAST Right 1975    BREAST CYST ASPIRATION Bilateral 1975    CATARACT EXTRACTION, BILATERAL      HYSTERECTOMY  1967    OOPHORECTOMY Right 1975    TOTAL HIP ARTHROPLASTY Right 2010             Social History:     Social History     Tobacco Use    Smoking status: Former    Smokeless tobacco: Never   Substance Use Topics    Alcohol use: No             Family History:     Family History   Problem Relation Age of Onset    Cerebrovascular Disease Mother     Hyperlipidemia Mother     Coronary Artery Disease Father      Family history not discussed             Allergies:   No Known Allergies          Medications:     Current Facility-Administered Medications   Medication    acetaminophen (TYLENOL) tablet 650 mg    Or    acetaminophen (TYLENOL) Suppository 650 mg     acetaminophen (TYLENOL) tablet 650 mg    dorzolamide (TRUSOPT) 2 % ophthalmic solution 1 drop    furosemide (LASIX) tablet 20 mg    loperamide (IMODIUM) capsule 2 mg    melatonin tablet 1 mg    methocarbamol (ROBAXIN) tablet 500 mg    metoprolol tartrate (LOPRESSOR) tablet 50 mg    ondansetron (ZOFRAN ODT) ODT tab 4 mg    Or    ondansetron (ZOFRAN) injection 4 mg    pantoprazole (PROTONIX) EC tablet 40 mg    polyethylene glycol (MIRALAX) Packet 8.5 g    predniSONE (DELTASONE) tablet 10 mg    senna-docusate (SENOKOT-S/PERICOLACE) 8.6-50 MG per tablet 1 tablet    Or    senna-docusate (SENOKOT-S/PERICOLACE) 8.6-50 MG per tablet 2 tablet    spironolactone (ALDACTONE) tablet 25 mg    timolol maleate (TIMOPTIC) 0.5 % ophthalmic solution 1 drop    traZODone (DESYREL) tablet 50 mg             Review of Systems:   Pertinet ROS noted in HPI          Physical Exam:   Vitals were reviewed  Temp: 97.7  F (36.5  C) Temp src: Oral BP: (!) 142/65 Pulse: 90   Resp: 21 SpO2: 96 % O2 Device: None (Room air)    All vitals have been reviewed    On exam patient has intact active dorsi/plantar flexion of bilateral ankles.  Intact knee and hip ROM without pain.  Bilateral calves are soft and nontender. Motor and sensation grossly intact in bilateral lower extremities.           Data:     Lab Results   Component Value Date    WBC 7.4 11/10/2023    HGB 12.8 11/10/2023    HCT 41.2 11/10/2023     11/10/2023     11/10/2023    POTASSIUM 4.0 11/10/2023    CHLORIDE 100 11/10/2023    CO2 26 11/10/2023    BUN 22.7 11/10/2023    CR 1.10 (H) 11/10/2023    GLC 91 11/10/2023    SED 22 12/26/2022    AST 23 11/09/2023    ALT 20 11/09/2023    ALKPHOS 138 (H) 11/09/2023    BILITOTAL 0.5 11/09/2023     Imaging    MRI Lumbar spine w contrast; 11/10/2023    IMPRESSION:  1.  Acute to subacute H-type sacral insufficiency fracture     2.  Moderate to severe, multilevel lumbar spondylosis. High-grade central canal stenosis at L2-L3.     3.  Negative  evidence for lumbar fracture     4.  Normal appearance of the distal thoracic cord and conus medullaris.             Khalida Ely PA-C

## 2023-11-11 NOTE — PROGRESS NOTES
[facility]    Progress Note - Hospitalist Service       Date of Admission:  11/9/2023    Assessment & Plan   Manuela Olivas is a 89 year old female admitted on 11/9/2023. She has a history of CKD3a, HTN, GERD, PMR, glaucoma, and insomnia and is admitted for generalized weakness after a fall found to have sacral insufficiency fractures alongside several spinal findings. Awaiting placement back to her [facility] with increased cares vs TCU.    Generalized weakness  Sacral insufficiency fracture  Low back pain  Pt suffered unwitnessed fall overnight last month (10/21) in which she fell backward from her walker onto her backside. Landed on tile floor, did not hit her head. Presented to the ED where she had negative imaging of her head, C spine, and L shoulder. Since then has been having ongoing intermittent stabbing pain in her R right buttock that does occasionally radiate down the R leg. Pt reports progressive generalized weakness over the last month. At baseline she is able to ambulate w/ a walker at her assisted living facility but has been needing a wheelchair to get around for the last week or so. XR from 11/9 showed loss of disc space height and degenerative end plate spurring, subsequent MRI showing acute to subacute H-type sacral insufficiency fracture and several levels of lumbar spondylosis. Orthopedic surgery and neurosurgery both consulted, recommending conservative management at this time given patient currently declining surgery. Plan to discharge either back to TCU (recommended) or back to [facility] at [facility] with increased cares (preferred by patient, unclear if able to have additional assistance there at this time). Awaiting dispo.  - care management consulted, appreciate recs   - awaiting response from [facility] regarding patient's new need for assist x 2  - Scheduled tylenol for pain  - PRN robaxin  - PT/OT  - AM CBC and BMP     Dysuria  Urinary frequency  Dx w/ UTI 5d ago  "and started on course of Cefdinir which she had completed by time of admission. Still endorsing urinary frequency, urgency, dysuria, and suprapubic tenderness. UA in the ED non infectious appearing. UC from 11/4 growing out E coli which was pan sensitive to all abx tested. Improved today.  - UA done in the ED w/o evidence of infection  - PO cefdinir completed  - Consider bladder spasm medication if continuing to have symptoms?     Urinary incontinence  Has had ongoing urinary incontinence since fall. MRI findings as above, neurosurgery consulted, no acute intervention required at this time. Improved, plan to continue monitoring.    Elevated Alk phos  Alk phos elevated to 138 on admission, LFTs o/w WNL. No abdominal symptoms. Will continue to monitor.      Mental health  Pt repeatedly stating that she wishes there were a pill \"to just end it all\" and that she has nothing to live for. Denies specific plan or intent to harm herself. Doing better once hearing about diagnosis, plan to monitor.  - Consider psych evaluation while here if worsening from depressive standpoint.     HFpEF  Mitral stenosis  Mitral annulus calcification  History of HFpEF, had been doing well at [facility]. Pt PTA med list including metoprolol, lasix, spironolactone. Trop mildly elevated at 27, no acute chest symptoms or acute distress. Echo 11/10 revealed mild mitral valve stenosis with mitral annulus calcification. Asymptomatic, euvolemic on exam. Unlikely to be cause of falls at this time given no syncopal symptoms. Plan to monitor.  - Continue PTA metoprolol, lasix, spirinolactone     Chronic condition:  GERD- PTA omeprazole  Polymyalgia rheumatica- PTA prednisone  Glaucoma- PTA eye drops  Insomnia- PTA trazodone         Diet: Combination Diet Regular Diet Adult    DVT Prophylaxis: Pneumatic Compression Devices  Knapp Catheter: Not present  Fluids: PO  Lines: None     Cardiac Monitoring: None  Code Status: No CPR- Pre-arrest intubation OK  "     Clinically Significant Risk Factors        # Hypercalcemia: Highest Ca = 10.6 mg/dL in last 2 days, will monitor as appropriate        # Hypertension: Noted on problem list            # Financial/Environmental Concerns: none         Disposition Plan      Expected Discharge Date: 11/13/2023        Discharge Comments: MRI-new sacral insufficiency fx--neurosurgery/ortho to see  needs ECHO   PT/OT to see        The patient's care was discussed with the Attending Physician, Dr. Serna .    Ashely Lopez MD  Hospitalist Service  [facility]  Securely message with [employer] (more info)  Text page via [employer] Paging/Directory   ______________________________________________________________________    Interval History   No acute events overnight. Orthopedic surgery consult, recommending conservative management at this time. Neurosurgery consult, offered L2-L3 decompression lumbar laminectomy, declined by patient at this time. Dispo challenging given patient is now an assist of 2, PT recommending TCU. Patient wanting to return to [facility] for comfort and believes she can return there with additional help. Appreciate care management's input, following with their recommendations. Dr. Serna discussed with nephews at bedside this morning. Await dispo.    Physical Exam   Vital Signs: Temp: 97.7  F (36.5  C) Temp src: Oral BP: (!) 148/67 Pulse: 84   Resp: 21 SpO2: 96 % O2 Device: None (Room air)    Weight: 136 lbs 14.49 oz    General: Alert and oriented x 3, no acute distress  Skin: clean, dry, and intact. Appears euvolemic.  HEENT: normocephalic, atraumatic, PERRL, EOMI, no conjunctival injection or icterus, ears and nose normal, no LAD or masses  Resp: clear to ausculation bilaterally, no rales or wheezes  Cardio: RRR, S1 and S2 present, no S3 or S4, no rubs or murmurs noted on exam today.  Abdomen: soft, nontender, nondistended  Extremities: no erythema or edema noted. ROM of lower limbs and back  limited by pain. Sensation intact in lower dermatomes, muscle strength 5/5.  Psych: sad mood, but mentating normally.      Medical Decision Making   Please see A&P for additional details of medical decision making.      Data   ------------------------- PAST 24 HR DATA REVIEWED -----------------------------------------------      Imaging results reviewed over the past 24 hrs:   No results found for this or any previous visit (from the past 24 hour(s)).

## 2023-11-11 NOTE — PLAN OF CARE
"PRIMARY DIAGNOSIS: \"GENERIC\" NURSING  OUTPATIENT/OBSERVATION GOALS TO BE MET BEFORE DISCHARGE:  ADLs back to baseline: No    Activity and level of assistance: Stayed in bed overnight.     Pain status: Improved-controlled with oral pain medications.    Return to near baseline physical activity: No     Discharge Planner Nurse   Safe discharge environment identified: No, Pt possibly needing TCU placement.  Barriers to discharge: Yes       Entered by: Courtney Vasques RN 11/11/2023 4:25 AM     Please review provider order for any additional goals.   Nurse to notify provider when observation goals have been met and patient is ready for discharge.Goal Outcome Evaluation:                        "

## 2023-11-12 NOTE — PROGRESS NOTES
Care Management Follow Up    Length of Stay (days): 2    Expected Discharge Date: 11/13/2023     Concerns to be Addressed: discharge planning     Patient plan of care discussed at interdisciplinary rounds: Yes    Anticipated Discharge Disposition:  [facility] vs TCU     Anticipated Discharge Services:  Possible home care    Education Provided on the Discharge Plan:  yes  Patient/Family in Agreement with the Plan:  yes    Referrals Placed by CM/SW:  home care  Private pay costs discussed: Not applicable    Additional Information:  RYLEY placed call to [facility] ([phone]) and spoke with nurse working this weekend, Rhonda. RYLEY informed her of pts goal to return to [facility] as soon as possible. Discussed that family advocating for pt to be able to return. Rhonda requested to have SW send therapy notes so she can review pt status (fax- [phone]). RYLEY informed her that pt is an assist of 1-2, TCU recommended but pt declining and wishes to return to [facility]. Rhonda reports that she doesn't believe that they can accommodate assist of 2 in the [facility] but she will reach out to the director about that question. She reports that the director will be back on site tomorrow for further discussion. RYLEY sent referral with PT/OT notes for Rhonda to review, requested that Rhonda keep SW updated on ability to have pt return.     Noni Padilla, [facility]    Addendum: RYLEY discussed with attending MD. RYLEY went to pts room to provide update on conversation with weekend nurse from [facility]. Pts nephew not present and pt reports he has left for a bit. SW updated pt on the above. She reports understanding, anticipate plan to speak with director of nursing tomorrow regarding their ability to meet pts needs at the facility. Pt denies questions for SW. RYLEY informed pt that SW can return when her nephew is present to give an update as well.  9:33 AM    RYLEY checked in with pt and her nephews, Juan and nAgelo. Discussed care planning at this time.  Juan and Angelo are understanding that pt requiring an assist of 2 and that this is not something that [facility] can accommodate- Juan reports he also spoke with Rhonda today. They do express frustration that the facility seemed to be willing to accommodate an assist of 2 if someone progressed to that need while at the facility but not when they were at the hospital. Pt scheduled for PT/OT this afternoon and per MD and family having reduced pain. Discussed that SW will plan to follow for pts level of assistance needs and readdress with the director of nursing at [facility] in the morning. If pt at assist of 1 would work on discharge back to [facility]. If pt requiring assist of 2 would work on TCU placement. While awaiting placement pt could continue to work with therapy at the hospital and if able to progress to assist of 1 would change plan back to [facility]. Pt and family in agreement with this. Pt notes feeling somewhat overwhelmed. SW provided support.  1:28 PM    SW spoke with pts nephews outside pt room. They report that they observed pt attempt to move in and out of the chair and she is needing significant assistance. They report that pt will need TCU before returning to [facility]. Discussed options. They request referrals to [facility] (1st choice), [facility], and [facility]. SW sent referrals and will follow.  2:24 PM

## 2023-11-12 NOTE — PLAN OF CARE
"  Problem: Adult Inpatient Plan of Care  Goal: Plan of Care Review  Description: The Plan of Care Review/Shift note should be completed every shift.  The Outcome Evaluation is a brief statement about your assessment that the patient is improving, declining, or no change.  This information will be displayed automatically on your shift  note.  Outcome: Progressing  Goal: Patient-Specific Goal (Individualized)  Description: You can add care plan individualizations to a care plan. Examples of Individualization might be:  \"Parent requests to be called daily at 9am for status\", \"I have a hard time hearing out of my right ear\", or \"Do not touch me to wake me up as it startles  me\".  Outcome: Progressing  Goal: Absence of Hospital-Acquired Illness or Injury  Intervention: Identify and Manage Fall Risk  Recent Flowsheet Documentation  Taken 11/12/2023 1613 by Shiela Nunez RN  Safety Promotion/Fall Prevention: activity supervised  Taken 11/12/2023 1330 by Shiela Nunez RN  Safety Promotion/Fall Prevention: activity supervised  Taken 11/12/2023 0727 by Shiela Nunez RN  Safety Promotion/Fall Prevention: activity supervised  Intervention: Prevent Skin Injury  Recent Flowsheet Documentation  Taken 11/12/2023 1613 by Shiela Nunez RN  Body Position:   supine   supine, head elevated  Taken 11/12/2023 1330 by Shiela Nunez RN  Body Position:   supine   supine, head elevated  Taken 11/12/2023 0727 by Shiela Nunez RN  Body Position:   supine   supine, head elevated  Intervention: Prevent Infection  Recent Flowsheet Documentation  Taken 11/12/2023 1613 by Shiela Nunez RN  Infection Prevention: environmental surveillance performed  Taken 11/12/2023 1330 by Shiela Nunez RN  Infection Prevention: environmental surveillance performed  Taken 11/12/2023 0727 by Shiela Nunez RN  Infection Prevention: environmental surveillance performed  Goal: Optimal Comfort and Wellbeing  Outcome: " Progressing   Goal Outcome Evaluation:  Both of nephews were updated by Care management pt was seen By PT . Patient now wants to  talk to palliative care communicated this to Dr Sesay sent a text message . Was up in the recliner with assist of 2 and  transfer belt .

## 2023-11-12 NOTE — PLAN OF CARE
Problem: Adult Inpatient Plan of Care  Goal: Plan of Care Review  Description: The Plan of Care Review/Shift note should be completed every shift.  The Outcome Evaluation is a brief statement about your assessment that the patient is improving, declining, or no change.  This information will be displayed automatically on your shift  note.  Outcome: Progressing     Problem: Pain Acute  Goal: Optimal Pain Control and Function  Outcome: Progressing   Goal Outcome Evaluation:       Pt alert and oriented, denies pain, SOB, VSS, slept between cares

## 2023-11-12 NOTE — PROGRESS NOTES
[facility]    Progress Note - Hospitalist Service       Date of Admission:  11/9/2023    Assessment & Plan   Manuela Olivas is a 89 year old female admitted on 11/9/2023. She has a history of CKD3a, HTN, GERD, PMR, glaucoma, and insomnia and is admitted for generalized weakness after a fall found to have sacral insufficiency fractures alongside several spinal findings. Awaiting placement back to her [facility] with increased cares vs TCU.     Generalized weakness  Sacral insufficiency fracture  Low back pain  Pt suffered unwitnessed fall overnight last month (10/21) in which she fell backward from her walker onto her backside. Landed on tile floor, did not hit her head. Presented to the ED where she had negative imaging of her head, C spine, and L shoulder. Since then has been having ongoing intermittent stabbing pain in her R right buttock that does occasionally radiate down the R leg. Pt reports progressive generalized weakness over the last month. At baseline she is able to ambulate w/ a walker at her assisted living facility but has been needing a wheelchair to get around for the last week or so. XR from 11/9 showed loss of disc space height and degenerative end plate spurring, subsequent MRI showing acute to subacute H-type sacral insufficiency fracture and several levels of lumbar spondylosis. Orthopedic surgery and neurosurgery both consulted, recommending conservative management at this time given patient currently declining surgery. Plan to discharge either back to TCU (recommended) or back to care home at [facility] with increased cares (preferred by patient).  Waiting evaluation from [facility] Nursing Director on 11/13.  - care management consulted, appreciate recs              - awaiting response from [facility] regarding patient's new need for assist x 2  - Scheduled tylenol for pain  - PRN robaxin  - PT/OT  - AM BMP     Dysuria, improved  Urinary frequency  Dx w/ UTI 5d ago and  "started on course of Cefdinir which she had completed by time of admission. Still endorsing urinary frequency, urgency, dysuria, and suprapubic tenderness. UA in the ED non infectious appearing. UC from 11/4 growing out E coli which was pan sensitive to all abx tested..  - UA done in the ED w/o evidence of infection  - PO cefdinir completed  - Consider bladder spasm medication if continuing to have symptoms     Urinary incontinence  Has had ongoing urinary incontinence since fall. MRI findings as above, neurosurgery consulted, no acute intervention required at this time. Improved, plan to continue monitoring.     Elevated Alk phos  Alk phos elevated to 138 on admission, LFTs o/w WNL. No abdominal symptoms. Will continue to monitor.      Mental health  Pt repeatedly stating that she wishes there were a pill \"to just end it all\" and that she has nothing to live for. Denies specific plan or intent to harm herself. Doing better once hearing about diagnosis, plan to monitor.  - Consider psych evaluation while here if worsening from depressive standpoint.     HFpEF  Mitral stenosis  Mitral annulus calcification  History of HFpEF, had been doing well at [facility]. Pt PTA med list including metoprolol, lasix, spironolactone. Trop mildly elevated at 27, no acute chest symptoms or acute distress. Echo 11/10 revealed mild mitral valve stenosis with mitral annulus calcification. Asymptomatic, euvolemic on exam. Unlikely to be cause of falls at this time given no syncopal symptoms. Plan to monitor.  - Continue PTA metoprolol, lasix, spirinolactone     Chronic condition:  GERD- PTA omeprazole  Polymyalgia rheumatica- PTA prednisone  Glaucoma- PTA eye drops  Insomnia- PTA trazodone         Diet: Combination Diet Regular Diet Adult    DVT Prophylaxis: Pneumatic Compression Devices  Knapp Catheter: Not present  Fluids: Oral  Lines: None     Cardiac Monitoring: None  Code Status: No CPR- Pre-arrest intubation OK      Clinically Significant " Risk Factors                  # Hypertension: Noted on problem list            # Financial/Environmental Concerns: none         Disposition Plan      Expected Discharge Date: 11/13/2023        Discharge Comments: MRI-new sacral insufficiency fx--neurosurgery/ortho to see  needs ECHO   PT/OT to see        The patient's care was discussed with the Attending Physician, Dr. Serna .    Zen Sesay MD  Hospitalist Service  [facility]  Securely message with [employer] (more info)  Text page via [employer] Paging/Directory   ______________________________________________________________________    Interval History   No acute events overnight.  Feels about the same today.  Hopeful she can go back to her assisted living facility.  Awaiting discussion with nursing director at assisted living facility.  No new symptoms today.    Physical Exam   Vital Signs: Temp: 97.4  F (36.3  C) Temp src: Oral BP: (!) 143/67 Pulse: 91   Resp: 16 SpO2: 95 % O2 Device: None (Room air)    Weight: 136 lbs 14.49 oz    General Appearance: Comfortable.  No acute distress.  Respiratory: Comfortable respiratory effort.  Clear to auscultation bilaterally.   Cardiovascular: Regular rate and rhythm.    GI: Nondistended.  Bowel sounds present.  Soft nontender  Skin: No visible rashes or bruising   Neuro: Neutral affect.  Fluent speech.  Sensation intact in lower extremities.          Data

## 2023-11-12 NOTE — PLAN OF CARE
Goal Outcome Evaluation:    Problem: Adult Inpatient Plan of Care  Goal: Optimal Comfort and Wellbeing  Outcome: Progressing     Problem: Risk for Delirium  Goal: Improved Behavioral Control  Outcome: Progressing     Problem: Risk for Delirium  Goal: Improved Attention and Thought Clarity  Outcome: Progressing     Problem: Risk for Delirium  Goal: Improved Sleep  Outcome: Progressing        Pt aox4. Denies pain. VSS. Tolerated dinner and ate a moderate amount. Didn't want to get out of bed, frequent repositioning. Mepilex CDI. No significant events. Pt pleasant.

## 2023-11-13 NOTE — PROGRESS NOTES
[facility]    Progress Note - Hospitalist Service       Date of Admission:  11/9/2023    Assessment & Plan   Manuela Olivas is a 89 year old female admitted on 11/9/2023. She has a history of CKD3a, HTN, GERD, PMR, glaucoma, and insomnia and is admitted for generalized weakness after a fall found to have sacral insufficiency fractures alongside several spinal findings. Awaiting placement back to her [facility] with increased cares vs TCU.     Generalized weakness  Sacral insufficiency fracture  Low back pain  Pt suffered unwitnessed fall overnight last month (10/21) in which she fell backward from her walker onto her backside. Landed on tile floor, did not hit her head. Presented to the ED where she had negative imaging of her head, C spine, and L shoulder. Since then has been having ongoing intermittent stabbing pain in her R right buttock that does occasionally radiate down the R leg. Pt reports progressive generalized weakness over the last month. At baseline she is able to ambulate w/ a walker at her assisted living facility but has been needing a wheelchair to get around for the last week or so. XR from 11/9 showed loss of disc space height and degenerative end plate spurring, subsequent MRI showing acute to subacute H-type sacral insufficiency fracture and several levels of lumbar spondylosis. Orthopedic surgery and neurosurgery both consulted, recommending conservative management at this time given patient currently declining surgery.   - Scheduled tylenol for pain  - PRN robaxin  - Added PRN oxycodone 2.5 mg q4h today due to increased pain   - PT/OT  - Palliative consult to assist patient and nephews in goals of care decisions, pain management     Discharge planning   Goals of care  Medically ready for discharge. Patient was hoping to return to her prior [facility]; however, they are unable to accommodate her need for an assist of 2 with transfers. Patient reports that she does  "not desire any aggressive medical interventions and would like to focus on comfort. Family now considering outpatient hospice. They request a palliative medicine consult. Patient will likely discharge to TCU vs SNF with outpatient hospice.   - CM consulted, appreciate recommendations   - Palliative consult to assist patient and nephews in goals of care decisions, pain management      Dysuria, improved  Urinary frequency  Dx w/ E. Coli (pansensitive) UTI 5d prior to admission, s/p course of cefdinir. Still endorsing urinary frequency, urgency, dysuria, and suprapubic tenderness. UA in the ED with no evidence of infection.   - Cefdinir course completed  - Consider bladder spasm medication if continuing to have symptoms     Urinary incontinence  Has had ongoing urinary incontinence since fall. MRI findings as above, neurosurgery consulted, no acute intervention required at this time. Improved.   - Continue to monitor      Mental health  Pt repeatedly stating that she wishes there were a pill \"to just end it all\" and that she has nothing to live for. Denies specific plan or intent to harm herself. Doing better once hearing about diagnosis. She feels ready for death and may wish to pursue hospice care.   - Continue to monitor      HFpEF  Mitral stenosis  Mitral annulus calcification  History of HFpEF, had been doing well at [facility]. Pt PTA med list including metoprolol, lasix, spironolactone. Trop mildly elevated at 27, no acute chest symptoms or acute distress. Echo 11/10 revealed mild mitral valve stenosis with mitral annulus calcification. Asymptomatic, euvolemic on exam. Unlikely to be cause of falls at this time given no syncopal symptoms. Plan to monitor.  - Continue PTA metoprolol, lasix, spirinolactone     Chronic condition:  GERD- PTA omeprazole  Polymyalgia rheumatica- PTA prednisone  Glaucoma- PTA eye drops  Insomnia- PTA trazodone         Diet: Combination Diet Regular Diet Adult    DVT Prophylaxis: Pneumatic " Compression Devices  Knapp Catheter: Not present  Fluids: Oral  Lines: None     Cardiac Monitoring: None  Code Status: No CPR- Pre-arrest intubation OK      Clinically Significant Risk Factors           # Hypercalcemia: Highest Ca = 10.9 mg/dL in last 2 days, will monitor as appropriate        # Hypertension: Noted on problem list            # Financial/Environmental Concerns: none         Disposition Plan     Expected Discharge Date: 11/13/2023        Discharge Comments: MRI-new sacral insufficiency fx--neurosurgery/ortho to see  needs ECHO   PT/OT to see        The patient's care was discussed with the Attending Physician, Dr. Perez .    Elizabeth Talbert MD  Hospitalist Service  [facility]  Securely message with [employer] (more info)  Text page via [employer] Paging/Directory   ______________________________________________________________________    Interval History   No acute events overnight.    Reports low back pain today that is really bothersome. She endorses pain with any movement and is wondering what else we can do to help with her pain. She also reports low appetite - food just doesn't taste or sound good.    Patient states that she is feeling ready to die. She feels there is not much left for her to live for, and she is not finding enjoyment in her life. She worries that she is causing stress for her nephews. She is understandably frustrated by her pain and inability to ambulate. Discussed that she is feeling ready to focus more on comfort and is not interested in any aggressive medical measures.     Physical Exam   Vital Signs: Temp: 97.7  F (36.5  C) Temp src: Oral BP: (!) 152/67 Pulse: 84   Resp: 18 SpO2: 96 % O2 Device: None (Room air)    Weight: 136 lbs 14.49 oz    General Appearance: Comfortable.  No acute distress. Sitting up in bed.   Respiratory: Comfortable respiratory effort.  Clear to auscultation bilaterally.   Cardiovascular: Regular rate and rhythm.    GI: Nondistended.   Bowel sounds present.  Soft nontender  Skin: No visible rashes or bruising   Neuro: Neutral affect.  Fluent speech.  Sensation intact in lower extremities.      Data     I have personally reviewed the following data over the past 24 hrs:    N/A  \   N/A   / N/A     135 99 28.6 (H) /  90   4.2 23 1.08 (H) \

## 2023-11-13 NOTE — PROGRESS NOTES
Physical Therapy Discharge Summary    Reason for therapy discharge:    Manuela has decided to change goals of care to comfort focused and would like to explore hospice.    Progress towards therapy goal(s). See goals on Care Plan in [facility] electronic health record for goal details.  Goals not met.  Barriers to achieving goals:   limited tolerance for therapy.    Therapy recommendation(s):    No further therapy is recommended.    Makayla Adhikari, LEROY 11/13/2023

## 2023-11-13 NOTE — PROGRESS NOTES
Care Management Follow Up    Length of Stay (days): 3    Expected Discharge Date: 11/13/2023     Concerns to be Addressed: discharge planning     Patient plan of care discussed at interdisciplinary rounds: Yes    Anticipated Discharge Disposition:  Pending team recommendations, pt has chosen TCU, but is interested in a palliative approach.      Anticipated Discharge Services:    Anticipated Discharge DME:      Patient/family educated on Medicare website which has current facility and service quality ratings:    Education Provided on the Discharge Plan:    Patient/Family in Agreement with the Plan:      Referrals Placed by CM/SW:    Private pay costs discussed:  Cost of long term care versus transitional care    Additional Information:  RYLEY met with pt, and her 2 nephews Juan and Angelo in room. SW explained costs of long term care and coverage of Medicare for TCU. Nephews had questions regarding acceptance to TCU and desire to tour facilities, Pt had questions about comfort care, and long term care. SW explained for comfort care, palliative is the best to explain it to her. Pt and nephews expressed understanding.   Angelo asked about transportation to a facility, writer explained that pt could go by medical transport to a facility, due to sacral fractures and pain while sitting. Juan wants to be available if pt discharges.     RYLEY received call from Pat MARIE from [facility]. She spoke to facility being unable to provide assist of 2 for pt. She would like to be updated with discharge plans for pt. She will send pt POA paperwork from facility.   RYLEY spoke to nephew Juan over phone, he is thinking comfort cares for his aunt may be the best choice for her, and he is wondering if she qualifies for hospice. RYLEY let him know the process of referral to palliative and once they have spoken to pt and she has determined what she wants to pursue, then next steps will occur. RYLEY updated him that [facility] Steele has accepted to  TCU, he is agreeable to that but if pt no longer wants to work for PT/TCU he will let her make her decision.        Pat Sparks - [phone]    JERICA Colón

## 2023-11-13 NOTE — CONSULTS
"Palliative Care Consultation Note  [facility]      Patient: Manuela Olivas  Date of Admission:  11/9/2023    Requesting Clinician / Team: Elizabeth Talbert MD/[facility]  Reason for consult: \"pain control, goals of care discussions, family requested to speak with palliative\"  Pain management  Goals of care  Patient and family support       Recommendations & Counseling     GOALS OF CARE:   Comfort focused .  Life expectancy appears to be weeks-months; she is not on comfort care/comfort measures only as life expectancy appears longer than hours-short days.  Manuela clearly wishes to avoid rehospitalization, she would like to return to [facility] if their staffing will allow it; we reviewed that SNF vs hospice facility would be an option.  She does not want to do anything to prevent a natural death from occurring (no antibiotics, artificial nutrition or hydration desired).  She is aware that prognosis is unclear but she has had rapid change in her functional status.  She wouldn't want to escalate care to ICU.    ADVANCE CARE PLANNING:  No health care directive on file. Per  informed consent policy, next of kin should be involved if patient becomes unable.  POLST completed (DNR/Comfort focus) today and uploaded to EPIC.  Code status: No CPR- Pre-arrest intubation OK-->now DNR/DNI after discussion.    MEDICAL MANAGEMENT:      # Pain: due to osteoporotic fractures (sacral insufficiency fractures, lumbar spondylosis w ? Myelopathy could be contributing to bowel/bladder symptoms?)  - on scheduled APAP  - on PRN methocarbamol (robaxin) which could risk delirium due to anticholinergic effects  - has prn oxycodone (5mg in past 24 hrs = 6.125 MME); continue with the 2.5mg q4H but could use oxycodone liquid (not intensol form) and titrate to 3 or 4mg oxy q4H prn.  - recommend starting gabapentin for neuropathic pain (ordered for you)  - ? Brief Steroid burst to see if lessens pain; [place] [facility]    # " Existential distress  - spiritual care (was anointed 11/11/2)  - continued spiritual health support  - will request palliative SW for emotional support  - hospice support as outpatient would be very appropriate.    # Chronic constipation, acute urine retention  - suspect due to progressing severe spinal stenosis w ?myelopathy  - may benefit from rockwell catheter for comfort if continuing to need straight cathing?  Would align with comfort focused goals.  Defer to [facility] [place] [place].  -  dulcolax supp today (ordered)  - low threshold for enema (tap water enema ordered)  - senna scheduled BID (ordered)     PSYCHOSOCIAL/SPIRITUAL SUPPORT:  Family nephews Juan and Angelo Olivas involved  Friends   Nikki community: [employer]   Would appreciate Spiritual Health Services    Palliative Care will continue to follow . Thank you for the consult and allowing us to aid in the care of Manuela Olivas.    These recommendations have been discussed with Dr Talbert and with Care management Earlene Huffman.    Ricarda Sumner MD  Securely message with [employer] (more info)  Text page via [facility] Paging/Directory       Assessment      Manuela Olivas is a 89 year old female with a past medical history of CKD III, HTN, GERD, PMR, peripheral arterial disease, HFpEF with mitral stenosis (mild on echo), glaucoma, insomnia, osteoporosis and recent fall 10/21/23 with sequellae of sacral insufficiency and mod-severe multilevel lumbar spondylosis and high grade central canal stenosis at L2L3.   She was recently diagnosed with UTI 11/4/23, prior to this admission.  She presented on 11/9/23 with urinary frequency/dysuria as well as generalized weakness with declining functional status (lost ability to ambulate approx 2 weeks ago).  She has been cared for by [facility] and Neurosurgery and Orthopedics have consulted as well; lumbar laminectomy decompression offered 11/11/23 and pt declined this.    Today, the patient was seen for:  Introduction to  palliative care, establish rapport, initial goals of care conversation.    Palliative Care Summary:   Met with Manuela and her nephew Hermelindo Olivas.     Introduced the role of palliative care as an interdisciplinary team that cares for patients with serious illness to help support symptom management, communication, coping for patients and their families as well as support with medical decision making.    Manuela notes progressive decline and a very difficult past year.  She had a periprosthetic fracture in September 2022 and was able to regain ability to ambulate with a walker after TCU stays.  Has had 3 falls in past year, most recent caused multiple sacral insufficiency fractures in October.  She has had progressive decline in ability to ambulate and is noting loss of bowel and bladder function.  She and nephew are aware that she was diagnosed with the osteoporotic pelvic fractures.    Manuela notes pain with any movement, pain with sitting and repositioning.  She denies numbness or perineal anesthesia.  She has not had problems with urine incontinence previously, but has developed difficulties in the past couple weeks.  Has not been able to ambulate for at least a week prior to admission.  She recalled having symptoms of neurogenic claudication in past when ambulating, needing to lean farther and farther forward to lessen her back pain.  Denies feeling suicidal.  Notes her present quality of life is not satisfactory for her.    RN notes pt required str cath for >800 ml urine this morning and only 50 ml urine output thus far during days w bladder scan of 373 at 1400.    What brings abigail: Time with family (her nephews)  and friends.  Used to enjoy traveling and spending time with close friends, is unable to do most of the activities that she used to find a meaningful and rewarding.      What is most important: Leatha notes she would want to focus on comfort and that she is ready to die whenever the time  comes.    Prognosis, Goals, & Planning:    Functional Status just prior to this current hospitalization:  Prior baseline before 10/21/23 fall was being able to ambulate with a walker; resided in [facility] with support in bathing and with medication administration.  One week prior to admission needed to use wheelchair, no longer able to ambulate.  New urinary incontinence also plus urine retention and problems with constipation    Prognosis, Goals, and/or Advance Care Planning:  We discussed general treatment options (full/restorative, selective/conservatives, and comfort only/hospice). We then discussed how these specifically apply to her situation with recent falls, fragility fractures, declining level of function, now concerned that she is developing loss of bowel and bladder control, becoming dependent for activities of daily living except for feeding, decreased appetite and interest in eating, heart failure.    She does not wish to have any life-prolonging treatments .  based on this discussion, Manuela has decided to change goals of care to comfort focused and would like to explore hospice in the outpatient setting so that she has support after discharge  Education provided regarding hospice philosophy, prognostic,and eligibility criteria. Discussed what services are provided and those that are not,  Discussed common misconceptions. We explored the various disposition options where they can receive hospice care (home, residential hospice homes, LTC with hospice) including subsequent financial and familial implications. Discussed typical anticipated timing of discharge.  Advance Care Planning Discussion 11/13/2023. IRicarda MD met with Patient and their family today at the hospital to discuss Advance Care Planning. Manuela Olivas does have decisional capacity and was present for this discussion.  Those present were informed of the voluntary nature of this discussion and wished to proceed.  The  discussion included:  Completion of goals of care conversation and finishing a POLST which is DNR/comfort focused.  The patient also does not wish to have antibiotic therapy or artificial nutrition . This discussion began at 2:30 and ended at 2:46 for a total of 16 minutes.      Code Status was addressed today:   Yes, We discussed potential risks and rationale of attempting cardiac resuscitation, intubation, and mechanical ventilation.  We also discussed probability of survival as well as quality of life implications.  Based on this discussion, patient or surrogate response/decision: Wishes to be DNR/DNI, no escalation of care to ICU      Patient's decision making preferences: independently        Patient has decision-making capacity today for complex decisions:Intact            Coping, Meaning, & Spirituality:   Mood, coping, and/or meaning in the context of serious illness were addressed today: Yes    Social:   Living situation:resides in assisted living at [facility]  Important relationships/caregivers:nephews Angelo and Hermelindo are supports;  and no children of her own.  Occupation: used to volunteer at [employer], also      Areas of fulfillment/abigail: travel, time with family and friends.    Medications:  I have reviewed this patient's medication profile and medications from this hospitalization. Notable medications:    Protonix, furosemide, miralax, prednisone (for PMR), spironolactone, scheduled APAP, oxycodone 2.5mg q4H prn pain.    ROS:  Comprehensive ROS is reviewed and is negative except as here & per HPI:     Physical Exam   Vital Signs with Ranges  Temp:  [97.6  F (36.4  C)-98.5  F (36.9  C)] 97.7  F (36.5  C)  Pulse:  [74-92] 84  Resp:  [17-19] 18  BP: (128-152)/(66-70) 152/67  SpO2:  [94 %-96 %] 96 %  136 lbs 14.49 oz    Body mass index is 22.1 kg/m .      Intake/Output Summary (Last 24 hours) at 11/13/2023 1231  Last data filed at 11/13/2023 0700  Gross per 24 hour   Intake 180 ml  "  Output 1470 ml   Net -1290 ml       PHYSICAL EXAM:  Alert 89 yo woman, sitting up in bed.    Oriented to self, situation, place.  Neck supple no jVD  Heart S1S2 without murmur  Lungs CTA with diminished sounds in bases, no crackles.  Abdomen soft, distended, no guarding.  No tenderness to palpation, active bowel sounds noted.  Trace ankle edema bilaterally with chronic venous stasis changes to legs.  Affect full, fluent speech.    Mild tremor.    Data reviewed:  Last Comprehensive Metabolic Panel:  Lab Results   Component Value Date     11/13/2023    POTASSIUM 4.2 11/13/2023    CHLORIDE 99 11/13/2023    CO2 23 11/13/2023    ANIONGAP 13 11/13/2023    GLC 90 11/13/2023    BUN 28.6 (H) 11/13/2023    CR 1.08 (H) 11/13/2023    GFRESTIMATED 48 (L) 11/13/2023    MORENO 10.9 (H) 11/13/2023       Lab Results   Component Value Date    WBC 7.4 11/10/2023     Lab Results   Component Value Date    RBC 4.27 11/10/2023     Lab Results   Component Value Date    HGB 12.8 11/10/2023     Lab Results   Component Value Date    HCT 41.2 11/10/2023     No components found for: \"MCT\"  Lab Results   Component Value Date    MCV 97 11/10/2023     Lab Results   Component Value Date    MCH 30.0 11/10/2023     Lab Results   Component Value Date    MCHC 31.1 11/10/2023     Lab Results   Component Value Date    RDW 13.9 11/10/2023     Lab Results   Component Value Date     11/10/2023     Lab Results   Component Value Date    AST 23 11/09/2023     Lab Results   Component Value Date    ALT 20 11/09/2023     No results found for: \"BILICONJ\"   Lab Results   Component Value Date    BILITOTAL 0.5 11/09/2023     Lab Results   Component Value Date    ALBUMIN 3.6 11/09/2023    ALBUMIN 3.9 12/14/2021     Lab Results   Component Value Date    PROTTOTAL 6.5 11/09/2023      Lab Results   Component Value Date    ALKPHOS 138 11/09/2023     Echocardiogram 11/10/23:  Interpretation Summary      The left ventricle is normal in size.   Left ventricular " function is normal.The ejection fraction is 55-60%.   Normal right ventricle size and systolic function.   The left atrium is moderately dilated.   There is mild mitral stenosis.   There is moderate mitral annular calcification.     MRI lumbar spine w contrast:  IMPRESSION:   1. Acute to subacute H-type sacral insufficiency fracture     2.  Moderate to severe, multilevel lumbar spondylosis. High-grade central canal stenosis at L2-L3.     3.  Negative evidence for lumbar fracture     4.  Normal appearance of the distal thoracic cord and conus medullaris.     Lumbar xr 11/9/23:  IMPRESSION: Five lumbar-type vertebrae. Minimal degenerative retrolisthesis of L1 upon L2 and L2 upon L3. Alignment is otherwise normal. Vertebral body heights normal. No fractures. There is loss of disc space height and degenerative endplate spurring at    all levels of the lumbar spine.     Ricarda Sumner MD  [facility], Palliative Care  Securely message with the [email or web address]) Web Console (learn more here) or  Text page via [employer] Paging/Directory

## 2023-11-14 NOTE — PROGRESS NOTES
"PALLIATIVE CARE PROGRESS NOTE  [facility]     Patient Name: Manuela Olivas  Date of Admission: 11/9/2023   Requesting Clinician / Team: Elizabeth Talbert MD/[facility]  Reason for consult: \"pain control, goals of care discussions, family requested to speak with palliative\"  Pain management  Goals of care  Patient and family support     Recommendations & Counseling     GOALS OF CARE:   Comfort focused goals. Life expectancy appears to be weeks-months; she is not on a strict comfort care/comfort measures only regimen as her life expectancy appears longer than hours-days.  She has stated several times that she is ready to die and asking aloud \"what am I still here for?\";  furthermore voicing that it is hard to be the last friend within her age group still alive and also last and her family to pass on.  Manuela has explicitly stated her preference to avoid all future hospitalizations.  She does not want to do anything to prevent a natural death from occurring (no antibiotics, artificial nutrition or hydration).  She is aware that prognosis is unclear but she has had rapid change in her functional status.  Under no circumstance would she want to escalate care to ICU.     ADVANCE CARE PLANNING:  No health care directive on file. Per  informed consent policy, next of kin should be involved if patient becomes unable.  Peter Casarez and Janis CRABTREE completed (DNR/Comfort focus) today and uploaded to EPIC.  Code status: No CPR- Do NOT Intubate      # Pain: due to osteoporotic fractures (sacral insufficiency fractures, lumbar spondylosis w ? Myelopathy could be contributing to bowel/bladder symptoms?)  - on scheduled APAP  - on PRN methocarbamol (robaxin) which could risk delirium due to anticholinergic effects  - has prn oxycodone (2.5 mg in past 24 hrs); continue with the 2.5mg q4H but could use oxycodone liquid (not intensol form) and titrate to 3 or 4mg oxy q4H prn.  - gabapentin " "100 mg p.o. TID scheduled (started 11/13/2023)  - ? Brief Steroid burst to see if lessens pain; defer to [facility] [place]     # Anxiety and Existential distress  - spiritual care appreciated (was anointed 11/11/2)  - will request palliative SW for emotional support  - patient agreeable to trial of low-dose SSRI; palliative order sertraline tab 25 mg q.day started 11/14/2023  - hospice support as outpatient would be very appropriate.     # Chronic constipation, acute urine retention  - suspect due to progressing severe spinal stenosis w ?myelopathy  - may benefit from rockwell catheter for comfort if continuing to need straight cathing?  Would align with comfort focused goals.  Defer to [facility] team.  - dulcolax supp today (ordered)  - low threshold for enema (tap water enema ordered)  - senna scheduled BID (ordered)      PSYCHOSOCIAL/SPIRITUAL SUPPORT:  Family nephews Juan and Angelo Olivas involved  Friends   Nikki community: [employer]   Would appreciate Spiritual Health Services      Palliative Care will continue to follow. Thank you for the consult and allowing us to aid in the care of Manuela Olivas.    These recommendations have been discussed with team.    Nate Flores, DO  Securely message with [employer] (more info)  Text page via [employer] Paging/Directory          Interval History:     Multidisciplinary collaboration:  Independently reviewed notes within EMR.  Discussed care management with bedside nurse.    Patient is only received 1 low-dose oxycodone over the past 24 hours.    Patient/family narrative  Awoke patient today to discuss medical plans and assess symptoms, she describes feeling somewhat sad and hopeless today that she is the last remaining of her friends and social group.  She is ready to die and wonders \"what am I still here for?\".  She wishes there was a pill or something she could take to expedite the end of her life as she is no longer able to bear weight or ambulate, and lost most of her social outlets.  She " "anticipates being discharged from hospital to a long-term care facility, looking less likely that it will be her previous jail, and eventually enroll in community hospice program.  Have tried to assure her that although we cannot expedite EOL, we can try to keep her comfortable for the remainder of her life and avoid unnecessary suffering and/or life prolonging measures.  Palliative clinical SW Fariba Elam has been visiting and helping with support, today patient has voiced a preference to try an SSRI in hopes of alleviating some anxiety and emotional distress.  Plan to start low-dose sertraline and monitor.    Palliative Summary/HPI          Manuela Olivas is a 89 year old female with a past medical history of CKD III, HTN, GERD, PMR, peripheral arterial disease, HFpEF with mitral stenosis (mild on echo), glaucoma, insomnia, osteoporosis and recent fall 10/21/23 with sequellae of sacral insufficiency and mod-severe multilevel lumbar spondylosis and high grade central canal stenosis at L2-L3.   She was recently diagnosed with UTI 11/4/23, prior to this admission.  She presented on 11/9/23 with urinary frequency/dysuria as well as generalized weakness with declining functional status (lost ability to ambulate approx 2 weeks ago).  She has been cared for by [facility] and Neurosurgery and Orthopedics have consulted as well; lumbar laminectomy decompression offered 11/11/23 and pt declined this.            Review of Systems:     Besides above, an additional system ROS was reviewed and is unremarkable             Physical Exam:   /56 (BP Location: Right arm)   Pulse 84   Temp 97.6  F (36.4  C) (Oral)   Resp 17   Ht 1.676 m (5' 6\")   Wt 62.1 kg (136 lb 14.5 oz)   LMP  (LMP Unknown)   SpO2 93%   BMI 22.10 kg/m    General:  WDWN.  Appears stated age.  NAD.   HENT:  Normocephalic and atraumatic.  Hearing intact.  Moist mucous membranes.  Eyes:  Conjunctivae are clear.  Sclera is nonicteric. "   Cardiovascular:  Without cyanosis or mottling.  Respiratory:  Breathing comfortably without increased work of breathing or signs of respiratory distress.  Able to speak in complete sentences.    Skin:  Without diaphoresis.  No suspicious rashes/lesions on exposed skin.  Musculoskeletal:  Moving spontaneously.   Neuro:  Alert, mentation intact, conversational and speech normal.   Psych:  Appropriate mood and affect.  No sign of confusion or delusion.  Patient seems to have insight with regards to medical condition and therapeutic options.    Wt Readings from Last 8 Encounters:   11/09/23 62.1 kg (136 lb 14.5 oz)   10/21/23 68 kg (150 lb)   10/25/22 62.1 kg (137 lb)   10/18/22 61.7 kg (136 lb)   10/14/22 62.8 kg (138 lb 6.4 oz)   08/29/22 59.5 kg (131 lb 3.2 oz)   08/01/22 67.1 kg (148 lb)   07/11/22 67.1 kg (148 lb)                Current Problem List:   Principal Problem:    Generalized weakness      No Known Allergies             Data Reviewed:     Recent Labs   Lab Test 11/10/23  0613 11/09/23  1035 10/21/23  0816 10/09/23  0927   WBC 7.4 8.5 12.2* 9.6   HGB 12.8 12.7 12.9 13.1   MCV 97 100 96 101*    281 199 243   NEUTROPHIL  --  72 86 62     Recent Labs   Lab Test 11/13/23  0645 11/10/23  0613 11/09/23  1035    135 135   POTASSIUM 4.2 4.0 4.0   CHLORIDE 99 100 100   CO2 23 26 23   ANIONGAP 13 9 12   BUN 28.6* 22.7 26.7*   CR 1.08* 1.10* 1.04*   MORENO 10.9* 10.6* 10.3*     Recent Labs   Lab Test 11/09/23  1035 10/09/23  0927 12/26/22  0835   AST 23 22 25   ALT 20 21 31   ALKPHOS 138* 73 89   BILITOTAL 0.5 0.3 0.3   ALBUMIN 3.6 3.9 3.7   PROTTOTAL 6.5 6.3* 6.1*           Medical Decision Making       40 MINUTES SPENT BY ME on the date of service doing chart review, history, exam, documentation & further activities per the note.

## 2023-11-14 NOTE — PROGRESS NOTES
PALLIATIVE CARE SOCIAL WORK Progress Note   Location: [facility]      PCSW visit with Pt for check in and support. Introduced self and role of Palliative Care . Talked with Pt about how she is coping with hospitalization and decline. She talked about how hard it has been over the past few years and recent weeks with her physical decline and limitations and feeling more and more tired and weak. She reflected on feeling alone at her age, when all of her close friends and spouse are passed on. She feels ready to die and just wants to be peaceful. She has lost interest in things because she is so tired and because her mobility has limited her. She is accepting of death when it comes.  Talked about her support system. She has two nephews who are involved and help her out. She had no siblings and was never able to have children. Her friends and spouse were her main connections in life. She reflected on the many groups of girl friends that she had and the things they used to do together. She enjoyed travel, volunteering and social time. She worked as a  and in various part time positions for many years and really enjoyed her work and the social connections she made there. Pt is hoping to return home to her [facility] apartment with the support of hospice.   Provided active listening, reflection and emotional support. Pt is open to ongoing visits from PCSW.     LATER ENTRY:   PCSW recvd msg from care management asking for follow up with nephews. Made plan for Palliative visit tomorrow 11/15/23 at 10:30am.    Plan: PCSW continues to follow.     Clinical Social Work Interventions:   Introduction of Palliative clinical social work interventions  Facilitation of processing of thoughts/feelings  Life review facilitation  Re-framing    Fariba Diaz [facility]  [facility], Palliative Care  Securely message with the [employer] Web Console (learn more here) or  Text page via [employer] Paging/Directory

## 2023-11-14 NOTE — PLAN OF CARE
Occupational Therapy Discharge Summary    Reason for therapy discharge:    Pt has changed goals of care to comfort care.  No further OT.

## 2023-11-14 NOTE — PLAN OF CARE
Goal Outcome Evaluation:       Pt is alert and oriented.  Pain in her back/sacral area rated 3/10.  Declined to get into the chair this am for breakfast.  Tylenol for pain administered.  Pt bladder scanned for 400 ml. Ordered placed for rockwell. Placed. Clear yellow urine.  Tolerating some oral intake.  Takes pills crushed in applesauce.  Robaxin around noon for pain rated 8/10.

## 2023-11-14 NOTE — PLAN OF CARE
Problem: Adult Inpatient Plan of Care  Goal: Plan of Care Review  Description: The Plan of Care Review/Shift note should be completed every shift.  The Outcome Evaluation is a brief statement about your assessment that the patient is improving, declining, or no change.  This information will be displayed automatically on your shift  note.  Outcome: Progressing     Problem: Adult Inpatient Plan of Care  Goal: Absence of Hospital-Acquired Illness or Injury  Outcome: Progressing  Intervention: Identify and Manage Fall Risk  Recent Flowsheet Documentation  Taken 11/13/2023 1621 by Milagros Dove RN  Safety Promotion/Fall Prevention:   activity supervised   lighting adjusted   patient and family education  Intervention: Prevent Skin Injury  Recent Flowsheet Documentation  Taken 11/13/2023 1706 by Milagros Dove RN  Body Position: sitting up in bed  Taken 11/13/2023 1621 by Milagros Dove RN  Body Position: sitting up in bed     Problem: Adult Inpatient Plan of Care  Goal: Absence of Hospital-Acquired Illness or Injury  Intervention: Identify and Manage Fall Risk  Recent Flowsheet Documentation  Taken 11/13/2023 1621 by Milagros Dove RN  Safety Promotion/Fall Prevention:   activity supervised   lighting adjusted   patient and family education     Problem: Adult Inpatient Plan of Care  Goal: Optimal Comfort and Wellbeing  Outcome: Progressing  Intervention: Monitor Pain and Promote Comfort  Recent Flowsheet Documentation  Taken 11/13/2023 1848 by Milagros Dove RN  Pain Management Interventions: medication (see MAR)     Problem: Fall Injury Risk  Goal: Absence of Fall and Fall-Related Injury  Outcome: Progressing  Intervention: Identify and Manage Contributors  Recent Flowsheet Documentation  Taken 11/13/2023 1621 by Milagros Dove RN  Medication Review/Management: medications reviewed  Intervention: Promote Injury-Free Environment  Recent Flowsheet Documentation  Taken 11/13/2023 1621 by Milagros Dove RN  Safety Promotion/Fall  Prevention:   activity supervised   lighting adjusted   patient and family education   Goal Outcome Evaluation:       Pt is alert and oriented. Complains of pain on buttocks. Pt received scheduled Tylenol for pain. Pt is on room air. Pt has 2 nephews who are involved in her care.     Milagros Dove RN

## 2023-11-14 NOTE — CONSULTS
Care Management Follow Up    Length of Stay (days): 5    Expected Discharge Date: 11/15/2023     Concerns to be Addressed: discharge planning; hospice at [facility]/LTC   Patient plan of care discussed at interdisciplinary rounds: Yes    Anticipated Discharge Disposition:  [facility]/LTC with hospice     Anticipated Discharge Services:  per hospice  Anticipated Discharge DME:  per hospice    Patient/family educated on Medicare website which has current facility and service quality ratings:  yes  Education Provided on the Discharge Plan:  yes  Patient/Family in Agreement with the Plan:  yes    Referrals Placed by CM/SW:  will place hospice referral  Private pay costs discussed:  not at this time    Additional Information:  Patient electing hospice.  Patient would prefer [facility] with hospice if they can support her, if not, will pursue LTC with hospice or hospice home.    CM called FRANK Aguilar at [facility], left voicemail requesting call back.    Pat from [facility] called back, they do not have LTC at [facility] and patient would need to be less than A2 to return to [facility].      Will likely need to pursue LTC with hospice or hospice home.  Although goal is to return to [facility] with hospice agency.    CM discussed disposition with nephewHermelindo.  Hermelindo does not understand why patient cannot return to [facility] when she is going on hospice and is bed-ridden.  CM explained that is what is documented, that patient needs assistance of 2.  Hermelindo wants this to be addressed because patient is bed ridden and should be able to return to her home on hospice. Hermelindo is coming in at 1530 to discuss options.     CM received call form Pat at [facility], Pat explained that if patient did need to get up, or if she fell out of bed, she would need assist of 2, which the [facility] cannot provide.       Will need medical transport to a facility, due to sacral fractures and pain while sitting.     CM met with nephews.  Nephews expressed frustration with [facility]  not wanting to take patient back home.  Nephews requesting it is documented what level of assistance we are providing at this hospital for her while she is bed-ridden.    CM attempted to notify Dr. Sumner, Dr. Sumner is not here today, CM notified Lenard Link.  Fariba will visit with nephews tomorrow around 1030.    CM discussed assist level with charge nurse.  It is documented assist of 1-2.  Nephews would like staff to attempt to use assist of 1 so it is documented for the [facility].     [facility] and [facility] can accept for LTC.     [facility] - [phone]     Radhika Cuenca RN

## 2023-11-14 NOTE — PROGRESS NOTES
[facility]    Progress Note - Hospitalist Service       Date of Admission:  11/9/2023    Assessment & Plan   Manuela Olivas is a 89 year old female admitted on 11/9/2023. She has a history of CKD3a, HTN, GERD, PMR, glaucoma, and insomnia and is admitted for generalized weakness after a fall found to have sacral insufficiency fractures alongside several spinal findings. Patient electing for comfort-focused cares, desires to discharge on outpatient hospice.      Generalized weakness  Sacral insufficiency fracture  Low back pain  Pt suffered unwitnessed fall overnight last month (10/21) in which she fell backward from her walker onto her backside. Landed on tile floor, did not hit her head. Presented to the ED where she had negative imaging of her head, C spine, and L shoulder. Since then has been having ongoing intermittent stabbing pain in her R right buttock that does occasionally radiate down the R leg. Pt reports progressive generalized weakness over the last month. At baseline she is able to ambulate w/ a walker at her assisted living facility but has been needing a wheelchair to get around for the last week or so. XR from 11/9 showed loss of disc space height and degenerative end plate spurring, subsequent MRI showing acute to subacute H-type sacral insufficiency fracture and several levels of lumbar spondylosis. Orthopedic surgery and neurosurgery both consulted, recommending conservative management at this time given patient currently declining surgery. Pain adequately controlled this morning.   - Palliative team following, appreciate recommendations   - Scheduled tylenol   - Scheduled gabapentin 100 mg TID started 11/13  - PRN oxycodone 2.5 mg q4h PO  - Discontinue PRN robaxin due to risk for delirium; could restart if patient's pain not controlled with above medications   - Could consider steroid burst for patient's back pain in the future if pain uncontrolled    Discharge  "planning   Goals of care  Medically ready for discharge. Patient was hoping to return to her prior [facility]; however, they are unable to accommodate her need for an assist of 2 with transfers. Patient reports that she does not desire any aggressive medical interventions and would like to focus on comfort. Palliative medicine team on board. Plan for outpatient hospice; consult placed today 11/14.   - Plan for discharge to LTC with hospice or hospice home   - Outpatient hospice consult ordered   - CM consulted, appreciate recommendations   - Palliative following, appreciate recommendations   - DNR/DNI  - POLST completed      Dysuria, improved  Urinary frequency  Dx w/ E. Coli (pansensitive) UTI 5d prior to admission, s/p course of cefdinir. Still endorsing urinary frequency, urgency, dysuria, and suprapubic tenderness. UA in the ED with no evidence of infection.   - Cefdinir course completed  - Consider bladder spasm medication if continuing to have symptoms     Urinary incontinence  Urinary retention   Has had ongoing urinary incontinence and retention since fall. MRI findings as above, neurosurgery consulted, no acute intervention required at this time. Continues to retain urine, required 2 straight caths 11/13, so rockwell placed 11/14.   - Rockwell placed 11/14 due to recurrent urinary retention  - Continue to monitor      Mental health  Pt repeatedly stating that she wishes there were a pill \"to just end it all\" and that she has nothing to live for. Denies specific plan or intent to harm herself. Doing better once hearing about diagnosis. She feels ready for death and may wish to pursue hospice care.   - Continue to monitor      HFpEF  Mitral stenosis  Mitral annulus calcification  History of HFpEF, had been doing well at [facility]. Pt PTA med list including metoprolol, lasix, spironolactone. Trop mildly elevated at 27, no acute chest symptoms or acute distress. Echo 11/10 revealed mild mitral valve stenosis with " mitral annulus calcification. Asymptomatic, euvolemic on exam. Unlikely to be cause of falls at this time given no syncopal symptoms. Plan to monitor.  - Continue PTA metoprolol, lasix, spirinolactone     Chronic condition:  GERD- PTA omeprazole  Polymyalgia rheumatica- PTA prednisone  Glaucoma- PTA eye drops  Insomnia- PTA trazodone         Diet: Combination Diet Regular Diet Adult    DVT Prophylaxis: Pneumatic Compression Devices  Knapp Catheter: Not present  Fluids: Oral  Lines: None     Cardiac Monitoring: None  Code Status: No CPR- Do NOT Intubate      Clinically Significant Risk Factors           # Hypercalcemia: Highest Ca = 10.9 mg/dL in last 2 days, will monitor as appropriate        # Hypertension: Noted on problem list            # Financial/Environmental Concerns: none         Disposition Plan     Expected Discharge Date: 11/15/2023        Discharge Comments: MRI-new sacral insufficiency fx--neurosurgery/ortho to see  needs ECHO   PT/OT to see        The patient's care was discussed with the Attending Physician, Dr. Perez .    Elizabeth Talbert MD  Hospitalist Service  [facility]  Securely message with [employer] (more info)  Text page via [employer] Paging/Directory   ______________________________________________________________________    Interval History   No acute events overnight.    Pain well controlled this morning. Feeling eager to leave the hospital.     Physical Exam   Vital Signs: Temp: 97.9  F (36.6  C) Temp src: Oral BP: 139/64 Pulse: 84   Resp: 18 SpO2: 98 % O2 Device: None (Room air)    Weight: 136 lbs 14.49 oz    General Appearance: Comfortable.  No acute distress. Sitting up in bed.   Respiratory: Comfortable respiratory effort.  Clear to auscultation bilaterally.   Cardiovascular: Regular rate and rhythm.    GI: Nondistended.  Bowel sounds present.  Soft nontender  Skin: No visible rashes or bruising   Neuro: Neutral affect.  Fluent speech.       Data

## 2023-11-15 NOTE — PROGRESS NOTES
"PALLIATIVE CARE PROGRESS NOTE  [facility]     Patient Name: Manuela Olivas  Date of Admission: 11/9/2023   Today the patient was seen for: Symptom assessment and support with goals of care     Recommendations & Counseling     GOALS OF CARE:   Comfort focused goals and requesting to transition to a strict comfort care plan, patient has inquired about stopping all blood pressure medications etc. with nephew Hermelindo planning to reach out to primary team regarding discontinuation of all noncomfort meds.  She has stated several times that she is ready to die and asking aloud \"what am I still here for?\";  furthermore voicing that it is hard to be the last friend within her age group still alive and also last and her family to pass on.  Manuela has explicitly stated her preference to avoid all future hospitalizations.  She does not want to do anything to prevent a natural death from occurring (no antibiotics, antihypertensive agents, artificial nutrition or hydration).  She is aware that prognosis is unclear but she has had rapid change in her functional status.     ADVANCE CARE PLANNING:  No health care directive on file. Per  informed consent policy, next of kin should be involved if patient becomes unable.  Peter Casarez and Janis CRABTREE completed (DNR/Comfort focus)  Code status: No CPR- Do NOT Intubate       # Pain: due to osteoporotic fractures (sacral insufficiency fractures, lumbar spondylosis w ? Myelopathy could be contributing to bowel/bladder symptoms?)  - on scheduled APAP  - on PRN methocarbamol (robaxin) which could risk delirium due to anticholinergic effects  - has prn oxycodone (2.5 mg in past 24 hrs); continue with the 2.5mg q4H but could use oxycodone liquid (not intensol form) and titrate to 3 or 4mg oxy q4H prn.  - gabapentin 100 mg p.o. TID scheduled (started 11/13/2023)  - ? Brief Steroid burst to see if lessens pain; [facility] to [facility] to [facility]     # Anxiety and Existential " distress  - spiritual care appreciated (was anointed 11/11/2)  - will request palliative SW for emotional support  - patient agreeable to trial of low-dose SSRI; palliative order sertraline tab 25 mg q.day started 11/14/2023  - hospice support as outpatient would be very appropriate.     # Chronic constipation, acute urine retention  - suspect due to progressing severe spinal stenosis w ?myelopathy  - may benefit from rockwell catheter for comfort if continuing to need straight cathing?  Would align with comfort focused goals.  Defer to [facility] team.  - dulcolax supp today (ordered)  - low threshold for enema (tap water enema ordered)  - senna scheduled BID (ordered)      PSYCHOSOCIAL/SPIRITUAL SUPPORT:  Family nephews Juan and Angelo Olivas involved  Friends   [place] community: [place]   Would appreciate Spiritual Health Services      Palliative Care will continue to follow. Thank you for the consult and allowing us to aid in the care of Manuela Olivas.    These recommendations have been discussed with team.    Nate Flores, DO  Securely message with [facility] (more info)  Text page via [employer] Paging/Directory          Interval History:     Multidisciplinary collaboration:  Independently reviewed EMR.  Spoke directly with bedside nursing staff regarding patient care.    Over the past 24 hours, 3 doses of acetaminophen and single dose of 2.5 mg oxycodone tab    Patient/family narrative  Patient still remains emotionally distraught, spent a fair amount of time visiting with palliative clinical SW Fariba Elam.  Physical symptoms are adequately managed to patient satisfaction.  2 separate conversations with nephew Hermelindo around patient's symptoms and goals of care, family reportedly remains frustrated that patient may not be able to return to assisted living facility where she is comfortable.  They also have heard that patient may not be hospice eligible per hospice nursing staff.  Hermelindo has relayed his frustrations and  "also still has hope that patient can be enrolled in outpatient hospice at her long-term care facility.  Patient is certainly of hospice philosophy, no longer wanting life prolonging therapies.    Palliative Summary/HPI          Manuela Olivas is a 89 year old female with a past medical history of CKD III, HTN, GERD, PMR, peripheral arterial disease, HFpEF with mitral stenosis (mild on echo), glaucoma, insomnia, osteoporosis and recent fall 10/21/23 with sequellae of sacral insufficiency and mod-severe multilevel lumbar spondylosis and high grade central canal stenosis at L2-L3.   She was recently diagnosed with UTI 11/4/23, prior to this admission.  She presented on 11/9/23 with urinary frequency/dysuria as well as generalized weakness with declining functional status (lost ability to ambulate approx 2 weeks ago).  She has been cared for by [facility] and Neurosurgery and Orthopedics have consulted as well; lumbar laminectomy decompression offered 11/11/23 and pt declined this.             Review of Systems:     Besides above, an additional system ROS was reviewed and is unremarkable             Physical Exam:   BP (!) 142/65 (BP Location: Right arm)   Pulse 65   Temp 98.2  F (36.8  C) (Oral)   Resp 17   Ht 1.676 m (5' 6\")   Wt 62.1 kg (136 lb 14.5 oz)   LMP  (LMP Unknown)   SpO2 96%   BMI 22.10 kg/m    General:  WDWN.  Appears stated age.  NAD.   HENT:  Normocephalic and atraumatic.  Hearing intact.  Moist mucous membranes.  Eyes:  Conjunctivae are clear.  Sclera is nonicteric.   Cardiovascular:  Without cyanosis or mottling.  Respiratory:  Breathing comfortably without increased work of breathing or signs of respiratory distress.  Able to speak in complete sentences.    Skin:  Without diaphoresis.  No suspicious rashes/lesions on exposed skin.  Musculoskeletal:  Moving spontaneously.   Neuro:  Alert, mentation intact, conversational and speech normal.   Psych:  Appropriate mood and affect.  No " sign of confusion or delusion.  Patient seems to have insight with regards to medical condition and therapeutic options.    Wt Readings from Last 8 Encounters:   11/09/23 62.1 kg (136 lb 14.5 oz)   10/21/23 68 kg (150 lb)   10/25/22 62.1 kg (137 lb)   10/18/22 61.7 kg (136 lb)   10/14/22 62.8 kg (138 lb 6.4 oz)   08/29/22 59.5 kg (131 lb 3.2 oz)   08/01/22 67.1 kg (148 lb)   07/11/22 67.1 kg (148 lb)              Current Problem List:   Principal Problem:    Generalized weakness      No Known Allergies           Data Reviewed:     Recent Labs   Lab Test 11/10/23  0613 11/09/23  1035 10/21/23  0816 10/09/23  0927   WBC 7.4 8.5 12.2* 9.6   HGB 12.8 12.7 12.9 13.1   MCV 97 100 96 101*    281 199 243   NEUTROPHIL  --  72 86 62     Recent Labs   Lab Test 11/13/23  0645 11/10/23  0613 11/09/23  1035    135 135   POTASSIUM 4.2 4.0 4.0   CHLORIDE 99 100 100   CO2 23 26 23   ANIONGAP 13 9 12   BUN 28.6* 22.7 26.7*   CR 1.08* 1.10* 1.04*   MORENO 10.9* 10.6* 10.3*     Recent Labs   Lab Test 11/09/23  1035 10/09/23  0927 12/26/22  0835   AST 23 22 25   ALT 20 21 31   ALKPHOS 138* 73 89   BILITOTAL 0.5 0.3 0.3   ALBUMIN 3.6 3.9 3.7   PROTTOTAL 6.5 6.3* 6.1*           Medical Decision Making       70 MINUTES SPENT BY ME on the date of service doing chart review, history, exam, documentation & further activities per the note.

## 2023-11-15 NOTE — PROGRESS NOTES
Care Management Follow Up    Length of Stay (days): 6    Expected Discharge Date: 11/15/2023     Concerns to be Addressed: discharge planning     Patient plan of care discussed at interdisciplinary rounds: Yes    Anticipated Discharge Disposition:  Long term care with hospice, nephirma want her to return to [facility] on hospice. Barrier is pt is an assist x 2 usually.      Anticipated Discharge Services:  Hospice  Anticipated Discharge DME:      Patient/family educated on Medicare website which has current facility and service quality ratings:    Education Provided on the Discharge Plan:    Patient/Family in Agreement with the Plan:      Referrals Placed by CM/SW:    Private pay costs discussed: private room/amenity fees    Additional Information:  RYLEY spoke to Maris, admissions at [facility], pt will need a $5000 deposit to admit to long term care, pt is meeting with palliative this morning and writer will contact her with pt decision after meeting. Admissions will need to know soon.   RYLEY spoke to Diana at [facility], she would be able to provide a long term care bed to pt. Deposit is $5000.   RYLEY let admissions know there is a morning palliative meeting with family and writer will update admissions at both facilities with family decision. RYLEY spoke with palliative SW, she will meet with family. Nephews state they are meeting with a hospice agency this morning. Unsure of name.She was told [facility], SW unaware of this agency.  RYLEY was visited by Sharlene, liaison with [facility] hospice, she acknowledged it was [facility] meeting with family today to discuss hospice.   RYLEY met with nephews and resident following visit from [facility]. Sons stated they are planning on talking to the  of [facility] about pt returning to facility and that FRANK [facility] is not in charge of readmissions. RYLEY discussed with nephews the 2 long term care beds that pt had been accepted to. Both Juan and Agnelo stated that they do not  want long term care and that pt was returning to [facility]. Angelo instructed RYLEY that I must write an email to [facility] stating pt was bedbound and on hospice. Writer declined stating this was out of scope of position and would be happy to send hospice information once pt is accepted on to hospice. Juan stated that he thought pt was already accepted on hospice. SW attempted to redirect nephews and instruct them on process that hospice does with referrals. SW and resident provided teachback response for nephews that they were declining the long term care beds and writer could instruct facilities to release beds. Both angelo and Juan stated to let the beds go and SW was no longer needed as pt was returning to [facility]. RYLEY updated admissions at [facility] and [facility].       RYLEY spoke to  [place] with [facility] requesting update. Dione arranged for a nurse at 330 PM today to evaluate pt for hospice. The clinical team has completed a chart review, and needs an in person assessment.       Earlene Bocanegra, JERICA [facility] - [phone]

## 2023-11-15 NOTE — PROGRESS NOTES
[facility]    Progress Note - Hospitalist Service       Date of Admission:  11/9/2023    Assessment & Plan   Manuela Olivas is a 89 year old female admitted on 11/9/2023. She has a history of CKD3a, HTN, GERD, PMR, glaucoma, and insomnia and is admitted for generalized weakness after a fall found to have sacral insufficiency fractures alongside several spinal findings. Patient electing for comfort-focused cares, desires to discharge on outpatient hospice. Discharge planning in process.     Generalized weakness  Sacral insufficiency fracture  Low back pain  Pt suffered unwitnessed fall 10/21 that resulted in a sacral insufficiency fracture, which has been causing her a lot of pain and weakness. XR from 11/9 showed loss of disc space height and degenerative end plate spurring, subsequent MRI showing acute to subacute H-type sacral insufficiency fracture and several levels of lumbar spondylosis. Orthopedic surgery and neurosurgery both consulted, recommending conservative management at this time given patient currently declining surgery. Pain adequately controlled this morning.   - Palliative team following, appreciate recommendations   - Scheduled tylenol   - Scheduled gabapentin 100 mg TID started 11/13  - PRN oxycodone 2.5 mg q4h PO  - Could consider steroid burst or epidural steroid injection for patient's back pain in the future if pain uncontrolled    Discharge planning   Goals of care  Medically ready for discharge. Patient was hoping to return to her prior [facility]; however, they are unable to accommodate her need for an assist of 2 with transfers. Patient reports that she does not desire any aggressive medical interventions and would like to focus on comfort. Palliative medicine team on board. Plan for outpatient hospice; [facility] assessing patient's eligibility today.   - Plan for discharge to LTC with hospice or hospice home   - Outpatient hospice consult ordered   -  "CM consulted, appreciate recommendations   - Palliative following, appreciate recommendations   - DNR/DNI  - POLST completed      Urinary incontinence  Urinary retention   Has had ongoing urinary incontinence and retention since fall. MRI findings as above, neurosurgery consulted, no acute intervention required at this time. Continues to retain urine, required 2 straight caths 11/13, so rockwell placed 11/14.   - Rockwell placed 11/14 due to recurrent urinary retention  - Continue to monitor     Dysuria, improved  Urinary frequency  Dx w/ E. Coli (pansensitive) UTI 5d prior to admission, s/p course of cefdinir. Still endorsing urinary frequency, urgency, dysuria, and suprapubic tenderness. UA in the ED with no evidence of infection.   - Cefdinir course completed  - Consider bladder spasm medication if continuing to have symptoms     Mental health  Pt repeatedly stating that she wishes there were a pill \"to just end it all\" and that she has nothing to live for. Denies specific plan or intent to harm herself. Doing better once hearing about diagnosis. She feels ready for death and may wish to pursue hospice care. Started Zoloft 11/15 to hopefully help with patient's low mood and high level of anxiety.   - Start Zoloft 25 mg daily (okay with patient's family)      HFpEF  Mitral stenosis  Mitral annulus calcification  History of HFpEF, had been doing well at [facility]. Pt PTA med list including metoprolol, lasix, spironolactone. Trop mildly elevated at 27, no acute chest symptoms or acute distress. Echo 11/10 revealed mild mitral valve stenosis with mitral annulus calcification. Asymptomatic, euvolemic on exam. Unlikely to be cause of falls at this time given no syncopal symptoms. Plan to monitor.  - Continue PTA metoprolol, lasix  - Discontinue spironolactone due to comfort goals      Chronic condition:  GERD- PTA omeprazole  Polymyalgia rheumatica- PTA prednisone  Glaucoma- PTA eye drops  Insomnia- PTA trazodone         Diet: " Combination Diet Regular Diet Adult    DVT Prophylaxis: Pneumatic Compression Devices  Knapp Catheter: PRESENT, indication: Retention  Fluids: Oral  Lines: None     Cardiac Monitoring: None  Code Status: No CPR- Do NOT Intubate      Clinically Significant Risk Factors                  # Hypertension: Noted on problem list            # Financial/Environmental Concerns: none         Disposition Plan      Expected Discharge Date: 11/16/2023    Discharge Delays: Placement - LTC    Discharge Comments: MRI-new sacral insufficiency fx--neurosurgery/ortho to see  needs ECHO   PT/OT to see        The patient's care was discussed with the Attending Physician, Dr. Trinidad .    Elizabeth Talbert MD  Hospitalist Service  [facility]  Securely message with [employer] (more info)  Text page via [employer] Paging/Directory   ______________________________________________________________________    Interval History   No acute events overnight.  Pain well controlled.   Patient would like to return to her home [facility], but they are currently unable to accept her back due to her increased care needs.   Her nephews are planning to discuss with [facility] further.   Patient repeatedly states that she just wants to die and wishes that we could speed up the process.     Physical Exam   Vital Signs: Temp: 98.2  F (36.8  C) Temp src: Oral BP: (!) 142/65 Pulse: 65   Resp: 17 SpO2: 96 % O2 Device: None (Room air)    Weight: 136 lbs 14.49 oz    General Appearance: Comfortable.  No acute distress. Sitting up in bed.   Respiratory: Comfortable respiratory effort.  Clear to auscultation bilaterally.   Cardiovascular: Regular rate and rhythm.    GI: Nondistended.  Bowel sounds present.  Soft nontender  Skin: No visible rashes or bruising   Neuro: Neutral affect.  Fluent speech.       Data

## 2023-11-15 NOTE — PROGRESS NOTES
[facility] assessed patient and determined patient is eligible for their services. When the patient discharges, they can be on board for hospice services.     [facility] RN also encouraged patient's family to consider all options given to them by  regarding discharge facilities.

## 2023-11-15 NOTE — PLAN OF CARE
Problem: Adult Inpatient Plan of Care  Goal: Absence of Hospital-Acquired Illness or Injury  Intervention: Identify and Manage Fall Risk  Recent Flowsheet Documentation  Taken 11/15/2023 0800 by Elsa Abel RN  Safety Promotion/Fall Prevention: activity supervised  Intervention: Prevent Skin Injury  Recent Flowsheet Documentation  Taken 11/15/2023 0800 by Elsa Abel RN  Body Position: refuses positioning     Problem: Risk for Delirium  Goal: Optimal Coping  Intervention: Optimize Psychosocial Adjustment to Delirium  Recent Flowsheet Documentation  Taken 11/15/2023 0800 by Elsa Abel RN  Supportive Measures:   active listening utilized   decision-making supported   verbalization of feelings encouraged  Goal: Improved Behavioral Control  Intervention: Prevent and Manage Agitation  Recent Flowsheet Documentation  Taken 11/15/2023 0800 by Elsa Abel RN  Environment Familiarity/Consistency: daily routine followed  Intervention: Minimize Safety Risk  Recent Flowsheet Documentation  Taken 11/15/2023 0800 by Elsa Abel RN  Communication Enhancement Strategies: call light answered in person  Goal: Improved Attention and Thought Clarity  Intervention: Maximize Cognitive Function  Recent Flowsheet Documentation  Taken 11/15/2023 0800 by Elsa Abel RN  Sensory Stimulation Regulation:   care clustered   quiet environment promoted  Reorientation Measures:   clock in view   calendar in view     Problem: Fall Injury Risk  Goal: Absence of Fall and Fall-Related Injury  Intervention: Promote Injury-Free Environment  Recent Flowsheet Documentation  Taken 11/15/2023 0800 by Elsa Abel RN  Safety Promotion/Fall Prevention: activity supervised     Problem: Pain Acute  Goal: Optimal Pain Control and Function  Intervention: Prevent or Manage Pain  Recent Flowsheet Documentation  Taken 11/15/2023 0800 by Elsa Abel RN  Sensory Stimulation Regulation:   care clustered   quiet environment  "promoted  Intervention: Optimize Psychosocial Wellbeing  Recent Flowsheet Documentation  Taken 11/15/2023 0800 by Elsa Abel, RN  Supportive Measures:   active listening utilized   decision-making supported   verbalization of feelings encouraged   Goal Outcome Evaluation:  Patient continues to state that she is, \"ready to go\" and states \"It should not be this hard to die. Can't someone of sound mind make their own decisions?\" Palliative care working with patient and nephews who are surrogate decision makers. Plan for discharge is hospice per the patient's wishes. Patient refused turns/repositioning and bathing this morning. She states she is anxious and would like something to help calm her nerves. Zoloft is on hold per patient's nephews. Please see Palliative/Social Work and Resident's notes.    Elsa Abel, RN      "

## 2023-11-15 NOTE — PROGRESS NOTES
"PALLIATIVE CARE SOCIAL WORK Progress Note   Location: [facility]      PCSW and resident MD met with Pt, nephew Juan and nephew Angelo. Pt was awake at time, dozing at times - oriented x3. Pt has been clear over the past days that she does not want aggressive medical care and is ready to die. She wants to discharge from the hospital with hospice services. Juan and Angelo understand her wishes and are working with care management in getting a discharge plan in place. They are hoping that she can return to her [facility]. There has been some confusion about whether Pt is able to return to [facility] and whether they can meet her needs. Juan reports that they have a meeting with [facility] today at 11:30am.   Talked with Pt and family about next steps of working with care management and coordinating hospice. Juan and Angelo wanted to talk about Pt being started on Zoloft yesterday. Juan spoke at length this morning with Palliative MD Mehrdad Flores and has asked that for now the Zoloft be held. They had questions about why she was prescribed an selective serotonin reuptake inhibitor and not something like valium or xanax. Educated that what we are seeing and hearing from Pt is more of a mood concern - low mood, depression - which is more appropriately and safely treated with something like Zoloft. Educated that a medicine like valium or xanax is really for an acute anxiety or panic episode and that it can have more negative side effects for people, especially people her age. Discussed that while it is understandable that Pt feels ready to die, tired and has poor quality of life - she also is expressing distress about continuing to live and saying things like 'I just want to go. I dont care where, just throw me in the garbage.\" Encouraged them to consider the ssri as a way to support Pts mood and quality of life.       Plan: Pt wishes to focus on comfort focused goals. She and family hope to discharge with hospice. Care management is " working on discharge planning.   PCSW continues to follow for emotional support.     Clinical Social Work Interventions:   Facilitation of processing of thoughts/feelings  Goals of care discussion/facilitation    Fariba Diaz [facility]  [facility], Palliative Care  Securely message with the [employer] Web [employer] (learn more here) or  Text page via [facility] Paging/Directory

## 2023-11-15 NOTE — PLAN OF CARE
Problem: Pain Acute  Goal: Optimal Pain Control and Function  Outcome: Progressing  Intervention: Develop Pain Management Plan  Recent Flowsheet Documentation  Taken 11/14/2023 1730 by Keenan Richards RN  Pain Management Interventions: repositioned  Intervention: Prevent or Manage Pain  Recent Flowsheet Documentation  Taken 11/14/2023 1730 by Keenan Richards RN  Sensory Stimulation Regulation: care clustered  Intervention: Optimize Psychosocial Wellbeing  Recent Flowsheet Documentation  Taken 11/14/2023 1730 by Keenan Richards RN  Supportive Measures:   active listening utilized   verbalization of feelings encouraged   Goal Outcome Evaluation:       Pt alert and oriented x4. Very withdrawn. Denied pain at the end of the shift. Knapp on place. Family doesn't want Zoloft given. They want it discontinued. 11/15/23 0900 dose held as per family request until they talk to the doctor. Pt turned and repositioned as needed.

## 2023-11-16 NOTE — PLAN OF CARE
Goal Outcome Evaluation:    Pt A/O, fluctuates. C/o pain in sacral area, requested tylenol, effective. Knapp patent and draining. PRN melatonin given to promote rest. Pt affect is flat and withdrawn. SCD's on. Left PIV is SL. Takes medications crushed in applesauce. A2 with walker and GB. Plan for LTC with hospice.

## 2023-11-16 NOTE — PROGRESS NOTES
[facility]    Progress Note - Hospitalist Service       Date of Admission:  11/9/2023    Assessment & Plan   Manulea Olivas is a 89 year old female admitted on 11/9/2023. She has a history of CKD3a, HTN, GERD, PMR, glaucoma, and insomnia and is admitted for generalized weakness after a fall found to have sacral insufficiency fractures alongside several spinal findings. Patient electing for comfort-focused cares. Accepted to [facility] outpatient hospice. Discharge planning in process.     Generalized weakness  Sacral insufficiency fracture  Low back pain  Pt suffered unwitnessed fall 10/21 that resulted in a sacral insufficiency fracture, which has been causing her a lot of pain and weakness. XR from 11/9 showed loss of disc space height and degenerative end plate spurring, subsequent MRI showing acute to subacute H-type sacral insufficiency fracture and several levels of lumbar spondylosis. Orthopedic surgery and neurosurgery both consulted, recommending conservative management at this time given patient currently declining surgery. Pain adequately controlled.   - Palliative team following, appreciate recommendations   - Scheduled tylenol   - Scheduled gabapentin 100 mg TID started 11/13  - PRN oxycodone 2.5 mg q4h PO  - Could consider steroid burst or epidural steroid injection for patient's back pain in the future if pain uncontrolled    Discharge planning   Goals of care  Medically ready for discharge. Patient was hoping to return to her prior [facility]; however, they are unable to accommodate her need for an assist of 2 with transfers. Patient reports that she does not desire any aggressive medical interventions and would like to focus on comfort. Palliative medicine team on board. Accepted to [facility] for outpatient hospice.   - Plan for discharge to LTC with hospice or hospice home   - CM consulted, appreciate recommendations   - Palliative following, appreciate  "recommendations   - DNR/DNI  - POLST completed   - Discontinue spironolactone due to patient wanting to limit pills   - Taper off metoprolol due to patient preference; decreased to 25 mg BID 11/16  - Continue PTA lasix to avoid pulmonary edema/discomfort      Urinary incontinence  Urinary retention   Has had ongoing urinary incontinence and retention since fall. MRI findings as above, neurosurgery consulted, no acute intervention required at this time. Continues to retain urine, required 2 straight caths 11/13, so rockwell placed 11/14.   - Rockwell placed 11/14 due to recurrent urinary retention  - Continue to monitor     Dysuria, improved  Urinary frequency  Dx w/ E. Coli (pansensitive) UTI 5d prior to admission, s/p course of cefdinir. Still endorsing urinary frequency, urgency, dysuria, and suprapubic tenderness. UA in the ED with no evidence of infection.   - Cefdinir course completed  - Consider bladder spasm medication if continuing to have symptoms     Mental health  Pt repeatedly stating that she wishes there were a pill \"to just end it all\" and that she has nothing to live for. Denies specific plan or intent to harm herself. Doing better once hearing about diagnosis. She feels ready for death and may wish to pursue hospice care. Started Zoloft 11/15 to hopefully help with patient's low mood and high level of anxiety.   - Start Zoloft 25 mg daily (okay with patient's family)      HFpEF  Mitral stenosis  Mitral annulus calcification  History of HFpEF, had been doing well at [facility]. Pt PTA med list including metoprolol, lasix, spironolactone. Trop mildly elevated at 27, no acute chest symptoms or acute distress. Echo 11/10 revealed mild mitral valve stenosis with mitral annulus calcification. Asymptomatic, euvolemic on exam. Unlikely to be cause of falls at this time given no syncopal symptoms. Plan to monitor.  - Taper off metoprolol due to patient preference; decreased to 25 mg BID 11/16  - Continue PTA lasix to " avoid pulmonary edema/discomfort   - Discontinue spironolactone due to comfort goals      Chronic condition:  GERD- PTA omeprazole  Polymyalgia rheumatica- PTA prednisone  Glaucoma- PTA eye drops  Insomnia- PTA trazodone         Diet: Combination Diet Regular Diet Adult  Room Service    DVT Prophylaxis: Pneumatic Compression Devices  Nkapp Catheter: PRESENT, indication: Retention  Fluids: Oral  Lines: None     Cardiac Monitoring: None  Code Status: No CPR- Do NOT Intubate      Clinically Significant Risk Factors                  # Hypertension: Noted on problem list            # Financial/Environmental Concerns: none         Disposition Plan     Expected Discharge Date: 2023    Discharge Delays: Placement - LTC    Discharge Comments: MRI-new sacral insufficiency fx--neurosurgery/ortho to see  needs ECHO   PT/OT to see        The patient's care was discussed with the Attending Physician, Dr. Gallagher .    Elizabeth Talbert MD  Hospitalist Service  [facility]  Securely message with [employer] (more info)  Text page via "[employer] Paging/Directory   ______________________________________________________________________    Interval History   No acute events overnight.  Long conversation with Manuela today about her goals for the end of her life.   She wishes there was a way that the medical team could hasten the process of death.   Discussed that she likely has months to weeks left to live.   Inquired about ways that patient could find to enjoy her last weeks and months of life.   She expressed interest in going back through old photos and watching TV/movies for comfort.   She misses her  who  in  and looks forward to seeing him again after death.     Physical Exam   Vital Signs: Temp: 98.4  F (36.9  C) Temp src: Oral BP: 135/71 Pulse: 100   Resp: 18 SpO2: 94 % O2 Device: None (Room air)    Weight: 136 lbs 14.49 oz    General Appearance: Comfortable.  No acute distress. Sitting up  in bed.   Respiratory: Comfortable respiratory effort.  Clear to auscultation bilaterally.   Cardiovascular: Regular rate and rhythm.    GI: Nondistended.  Bowel sounds present.  Soft nontender  Skin: No visible rashes or bruising   Neuro: Neutral affect.  Fluent speech.       Data

## 2023-11-16 NOTE — PROGRESS NOTES
Care Management Follow Up    Length of Stay (days): 7    Expected Discharge Date: 11/16/2023     Concerns to be Addressed: discharge planning     Patient plan of care discussed at interdisciplinary rounds: Yes    Anticipated Discharge Disposition:  Plan for the [facility] on hospice     Anticipated Discharge Services:  Hospice, pt accepted by [facility] once discharge.   Anticipated Discharge DME:  pending team recommendations     Patient/family educated on Medicare website which has current facility and service quality ratings:  yes  Education Provided on the Discharge Plan:  yes  Patient/Family in Agreement with the Plan:  yes    Referrals Placed by CM/SW:    Private pay costs discussed: transportation costs    Additional Information:  RYLEY spoke to Sharlene, liaison with [facility] hospice, pt is formally accepted to hospice with [facility]. RYLEY called Pat Director of nursing at [facility], they met with pt 2 nephews, Angelo and Juan, nephews are stating understanding that pt will need to move to a different facility due to care needs.   RYLEY met with pt, Angelo and Juan in pt room, nephews have visited the [facility], and are planning on moving pt to that facility on Monday 11-20. Regina from the [facility] will be visiting pt this afternoon to complete a nursing assessment. RYLEY offered to call the [facility] to connect and send referral. Pt and nephews in agreement. RYLEY placed call to Carmel at [facility] and left a voicemail with request to return to call.     RYLEY met with Jocelyne from [facility] who came to visit and assess pt. She is planning on working with [facility] for a hospital bed and having pt come to the [facility] on 11-20. Jocelyne will need orders in the morning.     JERICA Colón

## 2023-11-16 NOTE — PROGRESS NOTES
CLINICAL NUTRITION SERVICES  -  ASSESSMENT NOTE    Recommendations Ordered by Registered Dietitian (RD): Ordered a daily berry Ensure Enlive and a pineapple Gel to try   Malnutrition:   % Weight Loss:  Weight loss does not meet criteria for malnutrition; suspect 10/21 wt is self reported  % Intake:  </= 50% for >/= 5 days (severe malnutrition)  Subcutaneous Fat Loss:  Orbital region mild depletion  Muscle Loss:  Temporal region mild depletion, Clavicle bone region mild depletion, and Dorsal hand region mild depletion  Fluid Retention:  None noted    Malnutrition Diagnosis: Moderate malnutrition  In Context of:  Acute illness or injury on Chronic illness or disease     REASON FOR ASSESSMENT  Manuela Olivas is a 89 year old female seen by Registered Dietitian for LOS    PMH of: CKD3a, HTN, GERD, PMR, glaucoma, and insomnia and is admitted for generalized weakness after a fall found to have sacral insufficiency fractures alongside several spinal findings. Patient electing for comfort-focused cares.     NUTRITION HISTORY  - Information obtained from chart review and pt at bedside, with a family or friend in the chair  - Patient on a regular diet at home.  - I did not investigate Manuela's nutrition history as she was visibly sad and is electing for comfort focused cares  - Use of oral supplements: Manuela was open to getting a Berry oral nutrition supplement and try a pineapple Gelatein plus    CURRENT NUTRITION ORDERS  Diet Order:   Regular     Current Intake/Tolerance: Manuela said she hasn't been eating very much at all because she has no appetite     Per flow sheet, pt has a poor appetite most days, consuming 0-75% of 1-2 meals per day. Per [employer] (meal ordering system), pt ordering a 5-day average of 311 kcal and 15 g protein (<25% of needs).    GI: LBM x3 11/14    NUTRITION FOCUSED PHYSICAL ASSESSMENT FOR DIAGNOSING MALNUTRITION)  Yes         Observed:    Muscle wasting (refer to documentation in  "Malnutrition section)    ANTHROPOMETRICS  Height: 5' 6\"  Weight: 136 lbs 14.49 oz (62.1 kg)  Body mass index is 22.1 kg/m .  Weight Status:  Normal BMI  IBW: 130 lb (59.1 kg)  % IBW: 105%  Weight History:   Wt Readings from Last 10 Encounters:   11/09/23 62.1 kg (136 lb 14.5 oz)   10/21/23 68 kg (150 lb)   10/25/22 62.1 kg (137 lb)   10/18/22 61.7 kg (136 lb)   10/14/22 62.8 kg (138 lb 6.4 oz)   08/29/22 59.5 kg (131 lb 3.2 oz)   08/01/22 67.1 kg (148 lb)   07/11/22 67.1 kg (148 lb)   06/07/22 67.5 kg (148 lb 12.8 oz)   04/25/22 65.6 kg (144 lb 11.2 oz)      LABS  BUN 28.6 (H), Cr 1.08 (H), GFR 48 (L),     MEDICATIONS  Medications reviewed    ASSESSED NUTRITION NEEDS PER APPROVED PRACTICE GUIDELINES:  Dosing Weight 62 kg (actual)    Estimated Energy Needs: 9583-9243 kcals (25-30 Kcal/Kg)  Justification: maintenance  Estimated Protein Needs: 62-74 grams protein (1-1.2 g pro/Kg)  Justification: maintenance  Estimated Fluid Needs:  (1 mL/Kcal)  Justification: per provider pending fluid status    MALNUTRITION:  % Weight Loss:  Weight loss does not meet criteria for malnutrition; suspect 10/21 wt is self reported  % Intake:  </= 50% for >/= 5 days (severe malnutrition)  Subcutaneous Fat Loss:  Orbital region mild depletion  Muscle Loss:  Temporal region mild depletion, Clavicle bone region mild depletion, and Dorsal hand region mild depletion  Fluid Retention:  None noted    Malnutrition Diagnosis: Moderate malnutrition  In Context of:  Acute illness or injury on Chronic illness or disease    NUTRITION DIAGNOSIS:  Inadequate oral intake related to poor appetite as evidenced by pt report, <50% po intake for >5 days, and mild muscle wasting.     NUTRITION INTERVENTIONS  Recommendations / Nutrition Prescription  Ordered a daily berry Ensure Enlive and a pineapple Gel to try    Implementation  Nutrition education: Per Provider order if indicated   Medical Food Supplement    Nutrition Goals  Patient to consume % of " nutritionally adequate meal trays BID, or the equivalent with supplements/snacks.    MONITORING AND EVALUATION:  Progress towards goals will be monitored and evaluated per protocol and Practice Guidelines

## 2023-11-16 NOTE — PLAN OF CARE
Goal Outcome Evaluation:  Pt has flat effect. Minimal verbal talking and interaction with staffs. Keeps asking when she can leave. Depressed. Intermittently expressing her wish to die. C/o back pain. Oxycodone is given. Poor appetite.

## 2023-11-16 NOTE — PLAN OF CARE
Problem: Adult Inpatient Plan of Care  Goal: Plan of Care Review  Description: The Plan of Care Review/Shift note should be completed every shift.  The Outcome Evaluation is a brief statement about your assessment that the patient is improving, declining, or no change.  This information will be displayed automatically on your shift  note.  Outcome: Progressing     Problem: Pain Acute  Goal: Optimal Pain Control and Function  Outcome: Progressing  Intervention: Prevent or Manage Pain  Recent Flowsheet Documentation  Taken 11/15/2023 2673 by Lorrie Herndon RN  Sensory Stimulation Regulation: care clustered  Medication Review/Management: medications reviewed   Goal Outcome Evaluation:    No VS needed. Patient unsure if she wanted to take Metoprolol. Refused dose. Pain controlled with Gabapentin and Oxycodone q4h. Patient states she thought she was on Hospice right now. Educated by nurse on process.

## 2023-11-17 NOTE — PROGRESS NOTES
[facility]    Progress Note - Hospitalist Service       Date of Admission:  11/9/2023    Assessment & Plan   Manuela Olivas is a 89 year old female admitted on 11/9/2023. She has a history of CKD3a, HTN, GERD, PMR, glaucoma, and insomnia and is admitted for generalized weakness after a fall found to have sacral insufficiency fractures alongside several spinal findings. Patient electing for comfort-focused cares. Accepted to [facility] outpatient hospice. Plan is to discharge to [facility] on Monday, 11/20.    Generalized weakness  Sacral insufficiency fracture  Low back pain  Pt suffered unwitnessed fall 10/21 that resulted in a sacral insufficiency fracture, which has been causing her a lot of pain and weakness. XR from 11/9 showed loss of disc space height and degenerative end plate spurring, subsequent MRI showing acute to subacute H-type sacral insufficiency fracture and several levels of lumbar spondylosis. Orthopedic surgery and neurosurgery both consulted, recommending conservative management at this time given patient currently declining surgery. Patient experiencing more pain this morning.   - Palliative team following, appreciate recommendations   - Scheduled tylenol   - Scheduled gabapentin 100 mg TID started 11/13  - PRN oxycodone 2.5-5 mg q4h PO  - Could consider steroid burst or epidural steroid injection for patient's back pain in the future if pain uncontrolled    Discharge planning   Goals of care  Medically ready for discharge. Plan is to discharge to [facility] on Monday, 11/20, with outpatient hospice through [facility].  - CM consulted, appreciate recommendations     Comfort cares   Patient reports that she does not desire any aggressive medical interventions and would like to focus on comfort. Palliative medicine team on board. Accepted to [facility] for outpatient hospice.   - Palliative following, appreciate recommendations   -  "DNR/DNI  - POLST completed   - Discontinue spironolactone due to patient wanting to limit pills   - Taper off metoprolol due to patient preference; decreased to 25 mg BID 11/16  - Continue PTA lasix to avoid pulmonary edema/discomfort      Urinary incontinence  Urinary retention   Has had ongoing urinary incontinence and retention since fall. MRI findings as above, neurosurgery consulted, no acute intervention required at this time. Continues to retain urine, required 2 straight caths 11/13, so rockwell placed 11/14.   - Rockwell placed 11/14 due to recurrent urinary retention  - Will discharge with rockwell   - Continue to monitor     Dysuria, improved  Urinary frequency  Dx w/ E. Coli (pansensitive) UTI 5d prior to admission, s/p course of cefdinir. Still endorsing urinary frequency, urgency, dysuria, and suprapubic tenderness. UA in the ED with no evidence of infection.   - Cefdinir course completed  - Consider bladder spasm medication if continuing to have symptoms     Mental health  Pt repeatedly stating that she wishes there were a pill \"to just end it all\" and that she has nothing to live for. Denies specific plan or intent to harm herself. Doing better once hearing about diagnosis. She feels ready for death and may wish to pursue hospice care. Started Zoloft 11/15 to hopefully help with patient's low mood and high level of anxiety.   - Zoloft 25 mg daily (okay with patient's family)      HFpEF  Mitral stenosis  Mitral annulus calcification  History of HFpEF, [facility]. Pt PTA med list including metoprolol, lasix, spironolactone. Trop mildly elevated at 27, no acute chest symptoms or acute distress. Echo 11/10 revealed mild mitral valve stenosis with mitral annulus calcification. Asymptomatic, euvolemic on exam. Unlikely to be cause of falls at this time given no syncopal symptoms. Plan to monitor.  - Taper off metoprolol due to patient preference; decreased to 25 mg BID 11/16  - Continue PTA lasix to " avoid pulmonary edema/discomfort   - Discontinue spironolactone due to comfort goals      Chronic condition:  GERD- PTA omeprazole  Polymyalgia rheumatica- PTA prednisone  Glaucoma- PTA eye drops  Insomnia- PTA trazodone         Diet: Combination Diet Regular Diet Adult  Room Service  Snacks/Supplements Adult: Ensure Enlive; With Meals    DVT Prophylaxis: Pneumatic Compression Devices  Knapp Catheter: PRESENT, indication: Retention  Fluids: Oral  Lines: None     Cardiac Monitoring: None  Code Status: No CPR- Do NOT Intubate      Clinically Significant Risk Factors                  # Hypertension: Noted on problem list         # Moderate Malnutrition: based on nutrition assessment    # Financial/Environmental Concerns: none         Disposition Plan      Expected Discharge Date: 11/20/2023    Discharge Delays: Placement - LTC    Discharge Comments: MRI-new sacral insufficiency fx--neurosurgery/ortho to see  needs ECHO   PT/OT to see        The patient's care was discussed with the Attending Physician, Dr. Perez .    Elizabeth Talbert MD  Hospitalist Service  [facility]  Securely message with [facility] (more info)  Text page via [employer] P[employer]/Directory   ______________________________________________________________________    Interval History   No acute events overnight.  Experiencing more sacral pain this morning.   Feeling frustrated that she will be in the hospital until Monday due to LTC bed availability.       Physical Exam   Vital Signs: Temp: 97.4  F (36.3  C) Temp src: Oral BP: 134/63 Pulse: 97   Resp: 20 SpO2: 97 % O2 Device: None (Room air)    Weight: 136 lbs 14.49 oz    General Appearance: Comfortable.  No acute distress. Sitting up in bed.   Respiratory: Comfortable respiratory effort.  Clear to auscultation bilaterally.   Cardiovascular: Regular rate and rhythm.    GI: Nondistended.  Bowel sounds present.  Soft nontender  Skin: No visible rashes or bruising   Neuro: Neutral affect.   Fluent speech.       Data

## 2023-11-17 NOTE — PROGRESS NOTES
Quick Palliative Care Note:  Reviewed chart, see pt plan to discharge on Monday, with [facility]. Had had good pain control until this AM.   Spoke with MD, re: some options, eg reduce the PRN interval to q 3 hours PRN or offer a higher dose of PRN oxycodone for severe pain.   MD feels comfortable with pain mgmt/ok for inpatient Palliative Care to sign off.   Binh García NP,CNP, Palliative Care

## 2023-11-17 NOTE — PROGRESS NOTES
"Pt c/o generalized pain in back, buttocks, neck and shoulders. Oxy dose increased and pt seems more comfortable now. Repositioned frequently, T-pump heating pad ordered for back discomfort.     Affect flat and frustrated. Pt states she just wants to be \"knocked out\" until she dies.     Appetite poor, ate small amount of tomato soup for lunch. Nephew visited this afternoon. Will continue to monitor.       "

## 2023-11-17 NOTE — PROGRESS NOTES
Brief Update    Phone discussion with patient's nephew Hermelindo. Patient is saying that she wants to be off as many medications as possible. Long discussion about whether or not to start/stop Lasix, Zoloft, and metoprolol with comfort goals in mind.     Plan:   - Continue to work toward tapering off metoprolol   - Stop Lasix per patient, family preference (consider restarting if patient becomes dyspneic)   - Stop Zoloft per patient, family preference     Elizabeth Talbert MD, PGY-2  [facility]   Pager #: [phone]

## 2023-11-17 NOTE — PROGRESS NOTES
Care Management Follow Up    Length of Stay (days): 8    Expected Discharge Date: 11/20/2023     Concerns to be Addressed: discharge planning     Patient plan of care discussed at interdisciplinary rounds: Yes    Anticipated Discharge Disposition:  [facility]     Anticipated Discharge Services:  Hospice  Anticipated Discharge DME:  per hospice team    Pt to discharge to assisted living on Monday 11-20. Pt will need stretcher transport due to pelvic fractures and pain with movement.       JERICA Colón     [facility] [address], [place] [address]   [phone]

## 2023-11-17 NOTE — PLAN OF CARE
Problem: Pain Acute  Goal: Optimal Pain Control and Function  Outcome: Progressing  Intervention: Develop Pain Management Plan  Recent Flowsheet Documentation  Taken 11/16/2023 1647 by Lorrie Herndon RN  Pain Management Interventions:   rest   pain management plan reviewed with patient/caregiver  Intervention: Prevent or Manage Pain  Recent Flowsheet Documentation  Taken 11/16/2023 1647 by Lorrie Herndon RN  Sleep/Rest Enhancement:   awakenings minimized   comfort measures   family presence promoted   medication   regular sleep/rest pattern promoted  Medication Review/Management: medications reviewed  Intervention: Optimize Psychosocial Wellbeing  Recent Flowsheet Documentation  Taken 11/16/2023 1647 by Lorrie Herndon RN  Spiritual Activities Assistance: personal rituals encouraged  Supportive Measures:   active listening utilized   counseling provided   goal-setting facilitated   decision-making supported   problem-solving facilitated   verbalization of feelings encouraged   self-responsibility promoted     Problem: Palliative Care  Goal: Enhanced Quality of Life  Outcome: Progressing  Intervention: Maximize Comfort  Recent Flowsheet Documentation  Taken 11/16/2023 1647 by Lorrie Herndon RN  Pain Management Interventions:   rest   pain management plan reviewed with patient/caregiver  Nutrition Interventions: supplemental drinks provided  Intervention: Optimize Function  Recent Flowsheet Documentation  Taken 11/16/2023 1647 by Lorrie Herndon RN  Sleep/Rest Enhancement:   awakenings minimized   comfort measures   family presence promoted   medication   regular sleep/rest pattern promoted  Intervention: Optimize Psychosocial Wellbeing  Recent Flowsheet Documentation  Taken 11/16/2023 1647 by Lorrie Herndon RN  Spiritual Activities Assistance: personal rituals encouraged  Supportive Measures:   active listening utilized   counseling provided   goal-setting facilitated   decision-making  "supported   problem-solving facilitated   verbalization of feelings encouraged   self-responsibility promoted   Goal Outcome Evaluation:    Patient states she does not want vitals to be done and is therefor going to refuse Lopressor for now. Given Oxycodone for 4/10 pain \"all over\". States 5/10 is unbearable.  On recheck, patient appears asleep. Rest promoted, meds held at this time.   "

## 2023-11-17 NOTE — PLAN OF CARE
"Problem: Adult Inpatient Plan of Care  Goal: Plan of Care Review  Description: The Plan of Care Review/Shift note should be completed every shift.  The Outcome Evaluation is a brief statement about your assessment that the patient is improving, declining, or no change.  This information will be displayed automatically on your shift  note.  Outcome: Progressing  Goal: Patient-Specific Goal (Individualized)  Description: You can add care plan individualizations to a care plan. Examples of Individualization might be:  \"Parent requests to be called daily at 9am for status\", \"I have a hard time hearing out of my right ear\", or \"Do not touch me to wake me up as it startles  me\".  Outcome: Progressing  Goal: Absence of Hospital-Acquired Illness or Injury  Outcome: Progressing  Intervention: Identify and Manage Fall Risk  Recent Flowsheet Documentation  Taken 11/17/2023 0758 by Earlene Blanco RN  Safety Promotion/Fall Prevention:   supervised activity   room near nurse's station   room door open  Intervention: Prevent Skin Injury  Recent Flowsheet Documentation  Taken 11/17/2023 0758 by Earlene Blanco RN  Body Position:   turned   right  Goal: Optimal Comfort and Wellbeing  Outcome: Progressing  Goal: Readiness for Transition of Care  Outcome: Progressing     Problem: Risk for Delirium  Goal: Optimal Coping  Outcome: Progressing  Goal: Improved Behavioral Control  Outcome: Progressing  Intervention: Minimize Safety Risk  Recent Flowsheet Documentation  Taken 11/17/2023 0758 by Earlene Blanco RN  Enhanced Safety Measures: room near unit station  Goal: Improved Attention and Thought Clarity  Outcome: Progressing  Goal: Improved Sleep  Outcome: Progressing     Problem: Fall Injury Risk  Goal: Absence of Fall and Fall-Related Injury  Outcome: Progressing  Intervention: Identify and Manage Contributors  Recent Flowsheet Documentation  Taken 11/17/2023 0758 by Earlene Blanco RN  Medication Review/Management: medications " reviewed  Intervention: Promote Injury-Free Environment  Recent Flowsheet Documentation  Taken 11/17/2023 0758 by Earlene Blanco, RN  Safety Promotion/Fall Prevention:   supervised activity   room near nurse's station   room door open     Problem: Infection  Goal: Absence of Infection Signs and Symptoms  Outcome: Progressing     Problem: Pain Acute  Goal: Optimal Pain Control and Function  Outcome: Progressing  Intervention: Prevent or Manage Pain  Recent Flowsheet Documentation  Taken 11/17/2023 0758 by Earlene Blanco, RN  Medication Review/Management: medications reviewed     Problem: Palliative Care  Goal: Enhanced Quality of Life  Outcome: Progressing   Goal Outcome Evaluation:

## 2023-11-18 NOTE — PROGRESS NOTES
Pt given Oxy PRN for back pain. Declined need for T-pump heating pad this shift.     Knapp patent and draining. Repo in bed Q 2 hours. Plan for transition to Hospice Monday.

## 2023-11-18 NOTE — PLAN OF CARE
Problem: Adult Inpatient Plan of Care  Goal: Plan of Care Review  Description: The Plan of Care Review/Shift note should be completed every shift.  The Outcome Evaluation is a brief statement about your assessment that the patient is improving, declining, or no change.  This information will be displayed automatically on your shift  note.  Outcome: Progressing     Problem: Adult Inpatient Plan of Care  Goal: Readiness for Transition of Care  Outcome: Adequate for Care Transition   Goal Outcome Evaluation:       Patient here with sacral fracture do to fall. Patient denies pain at this time, repositioned every 2 hours, rockwell in place for retention. Plan is hospice on Monday to [facility].

## 2023-11-18 NOTE — PROGRESS NOTES
[facility]    Progress Note - Hospitalist Service       Date of Admission:  11/9/2023    Assessment & Plan   Manuela Olivas is a 89 year old female admitted on 11/9/2023. She has a history of CKD3a, HTN, GERD, PMR, glaucoma, and insomnia and is admitted for generalized weakness after a fall found to have sacral insufficiency fractures alongside several spinal findings. Patient electing for comfort-focused cares. Accepted to [facility] outpatient hospice. Plan is to discharge to [facility] on Monday, 11/20.    Generalized weakness  Sacral insufficiency fracture  Low back pain  Pt suffered unwitnessed fall 10/21 that resulted in a sacral insufficiency fracture, which has been causing her a lot of pain and weakness. XR from 11/9 showed loss of disc space height and degenerative end plate spurring, subsequent MRI showing acute to subacute H-type sacral insufficiency fracture and several levels of lumbar spondylosis. Orthopedic surgery and neurosurgery both consulted, recommending conservative management at this time given patient currently declining surgery.  - Palliative team consulted, and have signed off  - Scheduled tylenol   - Scheduled gabapentin 100 mg TID started 11/13  - PRN oxycodone 2.5-5 mg q4h PO  - Could consider steroid burst or epidural steroid injection for patient's back pain in the future if pain uncontrolled    Discharge planning   Goals of care  Medically ready for discharge. Plan is to discharge to [facility] on Monday, 11/20, with outpatient hospice through [facility].  - CM consulted, appreciate recommendations     Comfort cares   Patient reports that she does not desire any aggressive medical interventions and would like to focus on comfort. Palliative medicine team on board. Accepted to [facility] for outpatient hospice.   - DNR/DNI  - POLST completed   - Discontinue spironolactone due to patient wanting to limit pills   - Taper off  "metoprolol due to patient preference; decreased to 25 mg BID 11/16  -  Stop Lasix per patient, family preference (consider restarting if patient becomes dyspneic)      Urinary incontinence  Urinary retention   Has had ongoing urinary incontinence and retention since fall. MRI findings as above, neurosurgery consulted, no acute intervention required at this time. Continues to retain urine, required 2 straight caths 11/13, so rockwell placed 11/14.   - Rockwell placed 11/14 due to recurrent urinary retention  - Will discharge with rockwell   - Continue to monitor     Dysuria, improved  Urinary frequency  Dx w/ E. Coli (pansensitive) UTI 5d prior to admission, s/p course of cefdinir. Still endorsing urinary frequency, urgency, dysuria, and suprapubic tenderness. UA in the ED with no evidence of infection.   - Cefdinir course completed  - Consider bladder spasm medication if continuing to have symptoms     Mental health  Pt repeatedly stating that she wishes there were a pill \"to just end it all\" and that she has nothing to live for. Denies specific plan or intent to harm herself. Doing better once hearing about diagnosis. She feels ready for death and may wish to pursue hospice care. Started Zoloft 11/15 to hopefully help with patient's low mood and high level of anxiety.   - Stop Zoloft per patient, family preference       HFpEF  Mitral stenosis  Mitral annulus calcification  History of HFpEF, had been doing well at [facility]. Pt PTA med list including metoprolol, lasix, spironolactone. Trop mildly elevated at 27, no acute chest symptoms or acute distress. Echo 11/10 revealed mild mitral valve stenosis with mitral annulus calcification. Asymptomatic, euvolemic on exam. Unlikely to be cause of falls at this time given no syncopal symptoms. Plan to monitor.  - Taper off metoprolol due to patient preference; decreased to 25 mg BID 11/16  - Stop Lasix per patient, family preference (consider restarting if patient becomes dyspneic)   - " Discontinue spironolactone due to comfort goals      Chronic condition:  GERD- BID protonix  Polymyalgia rheumatica- PTA prednisone  Glaucoma- PTA eye drops  Insomnia- PTA trazodone         Diet: Combination Diet Regular Diet Adult  Room Service  Snacks/Supplements Adult: Ensure Enlive; With Meals    DVT Prophylaxis: Pneumatic Compression Devices  Knapp Catheter: PRESENT, indication: Retention  Fluids: Oral  Lines: None     Cardiac Monitoring: None  Code Status: No CPR- Do NOT Intubate      Clinically Significant Risk Factors                  # Hypertension: Noted on problem list         # Moderate Malnutrition: based on nutrition assessment    # Financial/Environmental Concerns: none         Disposition Plan     Expected Discharge Date: 11/20/2023    Discharge Delays: Placement - LTC    Discharge Comments: plan for d/c on Monday on hospice        The patient's care was discussed with the Attending Physician, Dr. Perez .    Jerry Shields MD  Hospitalist Service  [facility]  Securely message with [employer] (more info)  Text page via [employer] Paging/Directory   ______________________________________________________________________    Interval History   No acute events overnight.  Reports pain is well controlled this morning.   Not feeling very hungry      Physical Exam   Vital Signs: Temp: 97.8  F (36.6  C) Temp src: Oral BP: 125/61 Pulse: 88   Resp: 18 SpO2: 95 % O2 Device: None (Room air)    Weight: 136 lbs 14.49 oz    General Appearance: Comfortable.  No acute distress. Sitting up in bed.   Respiratory: Comfortable respiratory effort.   Cardiovascular: extremities well perfused   GI: Nondistended.    Skin: No visible rashes or bruising   Neuro: Neutral affect.  Fluent speech.       Data

## 2023-11-18 NOTE — PROGRESS NOTES
Care Management Follow Up     Length of Stay (days): 9     Expected Discharge Date: 11/20/2023     Concerns to be Addressed: discharge planning     Patient plan of care discussed at interdisciplinary rounds: Yes     Anticipated Discharge Disposition:  [facility] living on [facility]     Anticipated Discharge Services:  Hospice  Anticipated Discharge DME:  per hospice team     Chart reviewed. No new events overnight.  Plan is for pt. to discharge to assisted living with [facility] hospice on Monday 11-20. Pt will need stretcher transport due to pelvic fractures and pain with movement.   PCS completed by this writer today and placed in chart.     Care Manager for Sunday 11/19 - please schedule stretcher transport.    RABIA Escobar     [facility]  [address], [place] [address]   [phone]    Lc Stephens, --[phone]

## 2023-11-18 NOTE — PROGRESS NOTES
Pt states pain is more tolerable with increased Oxy dose and use of T-pump heating pad.     Turn/repo in bed. Aria patent. Will continue to monitor.

## 2023-11-18 NOTE — PLAN OF CARE
Goal Outcome Evaluation:  Problem: Adult Inpatient Plan of Care  Goal: Plan of Care Review  Outcome: Progressing  Goal: Patient-Specific Goal (Individualized)  Outcome: Progressing  Goal: Absence of Hospital-Acquired Illness or Injury  Outcome: Progressing  Intervention: Identify and Manage Fall Risk  Recent Flowsheet Documentation  Taken 11/18/2023 0756 by Earlene Blanco RN  Safety Promotion/Fall Prevention:   activity supervised   assistive device/personal items within reach  Goal: Optimal Comfort and Wellbeing  Outcome: Progressing  Intervention: Monitor Pain and Promote Comfort  Recent Flowsheet Documentation  Taken 11/18/2023 0755 by Earlene Blanco RN  Pain Management Interventions: medication (see MAR)     Problem: Risk for Delirium  Goal: Optimal Coping  Outcome: Progressing  Goal: Improved Behavioral Control  Outcome: Progressing  Goal: Improved Attention and Thought Clarity  Outcome: Progressing  Goal: Improved Sleep  Outcome: Progressing     Problem: Fall Injury Risk  Goal: Absence of Fall and Fall-Related Injury  Outcome: Progressing  Intervention: Promote Injury-Free Environment  Recent Flowsheet Documentation  Taken 11/18/2023 0756 by Earlene Blanco RN  Safety Promotion/Fall Prevention:   activity supervised   assistive device/personal items within reach     Problem: Infection  Goal: Absence of Infection Signs and Symptoms  Outcome: Progressing     Problem: Pain Acute  Goal: Optimal Pain Control and Function  Outcome: Progressing  Intervention: Develop Pain Management Plan  Recent Flowsheet Documentation  Taken 11/18/2023 0755 by Earlene Blanco RN  Pain Management Interventions: medication (see MAR)     Problem: Palliative Care  Goal: Enhanced Quality of Life  Outcome: Progressing  Intervention: Maximize Comfort  Recent Flowsheet Documentation  Taken 11/18/2023 0755 by Earlene Blanco RN  Pain Management Interventions: medication (see MAR)

## 2023-11-19 NOTE — PLAN OF CARE
Problem: Pain Acute  Goal: Optimal Pain Control and Function  Outcome: Progressing  Intervention: Prevent or Manage Pain  Recent Flowsheet Documentation  Taken 11/19/2023 0010 by Jelena Cervantes RN  Medication Review/Management: medications reviewed  Intervention: Optimize Psychosocial Wellbeing  Recent Flowsheet Documentation  Taken 11/19/2023 0010 by Jelena Cervantes RN  Supportive Measures:   active listening utilized   positive reinforcement provided     Problem: Pain Acute  Goal: Optimal Pain Control and Function  Intervention: Prevent or Manage Pain  Recent Flowsheet Documentation  Taken 11/19/2023 0010 by Jelena Cervnates RN  Medication Review/Management: medications reviewed     Problem: Pain Acute  Goal: Optimal Pain Control and Function  Intervention: Optimize Psychosocial Wellbeing  Recent Flowsheet Documentation  Taken 11/19/2023 0010 by Jelena Cervantes RN  Supportive Measures:   active listening utilized   positive reinforcement provided     Problem: Palliative Care  Goal: Enhanced Quality of Life  Outcome: Progressing  Intervention: Optimize Psychosocial Wellbeing  Recent Flowsheet Documentation  Taken 11/19/2023 0010 by Jelena Cervantes RN  Supportive Measures:   active listening utilized   positive reinforcement provided     Problem: Palliative Care  Goal: Enhanced Quality of Life  Intervention: Optimize Psychosocial Wellbeing  Recent Flowsheet Documentation  Taken 11/19/2023 0010 by Jelena Cervantes RN  Supportive Measures:   active listening utilized   positive reinforcement provided   Goal Outcome Evaluation:         Patient given Oxycodone 5 mg's for pain with reposioning, turned x3 over nights. Patient is getting tired of being repositioned and just wants to be left alone. Mepilex on bilateral heels, and heart mepilex applied to sacrum. Debbie area reddened starting to turn maroon/purple.

## 2023-11-19 NOTE — PLAN OF CARE
Goal Outcome Evaluation:  Problem: Adult Inpatient Plan of Care  Goal: Absence of Hospital-Acquired Illness or Injury  Intervention: Prevent Skin Injury  Recent Flowsheet Documentation  Taken 11/18/2023 2205 by Jose Gillespie RN  Body Position:   right   turned  Taken 11/18/2023 1900 by Jose Gillespie RN  Body Position:   left   turned  Taken 11/18/2023 1736 by Jose Gillespie RN  Device Skin Pressure Protection: skin-to-device areas padded     Problem: Pain Acute  Goal: Optimal Pain Control and Function  Outcome: Progressing  Intervention: Prevent or Manage Pain  Recent Flowsheet Documentation  Taken 11/18/2023 1736 by Jose Gillespie RN  Medication Review/Management: medications reviewed  Intervention: Optimize Psychosocial Wellbeing  Recent Flowsheet Documentation  Taken 11/18/2023 1736 by Jose Gillespie RN  Supportive Measures:   active listening utilized   relaxation techniques promoted   verbalization of feelings encouraged   Pt taking scheduled tylenol with little relief from sacral pain . Pt requested Oxycodone before it was due but was willing to wait as her pain is minimal when not moving at all. Pt received oxy at 1830 with relief. Pt has skin tears that are covered with mepilex and those were changed this shift. Mepilex were added to bilat heels. Right is red and left has 3 purple areas. Heels elevated on pillows. Pt turned Q 2 hours.

## 2023-11-19 NOTE — PROGRESS NOTES
[facility]    Progress Note - Hospitalist Service       Date of Admission:  11/9/2023    Assessment & Plan   Manuela Olivas is a 89 year old female admitted on 11/9/2023. She has a history of CKD3a, HTN, GERD, PMR, glaucoma, and insomnia and is admitted for generalized weakness after a fall found to have sacral insufficiency fractures alongside several spinal findings. Patient electing for comfort-focused cares. Accepted to [facility] outpatient hospice. Plan is to discharge to [facility] on Monday, 11/20.    Generalized weakness  Sacral insufficiency fracture  Low back pain  Pt suffered unwitnessed fall 10/21 that resulted in a sacral insufficiency fracture, which has been causing her a lot of pain and weakness. XR from 11/9 showed loss of disc space height and degenerative end plate spurring, subsequent MRI showing acute to subacute H-type sacral insufficiency fracture and several levels of lumbar spondylosis. Orthopedic surgery and neurosurgery both consulted, recommending conservative management at this time given patient currently declining surgery.  - Palliative team consulted, and have signed off  - Scheduled tylenol   - Scheduled gabapentin 100 mg TID started 11/13  - PRN oxycodone 2.5-5 mg q4h PO  - Could consider steroid burst or epidural steroid injection for patient's back pain in the future if pain uncontrolled    Discharge planning   Goals of care  Medically ready for discharge. Plan is to discharge to [facility] on Monday, 11/20, with outpatient hospice through [facility].  - CM consulted, appreciate recommendations     Comfort cares   Patient reports that she does not desire any aggressive medical interventions and would like to focus on comfort. Palliative medicine team on board. Accepted to [facility] for outpatient hospice.   - DNR/DNI  - POLST completed   - Discontinue spironolactone due to patient wanting to limit pills   - Taper off  "metoprolol due to patient preference; decreased to 25 mg BID 11/16  - Stop Lasix per patient, family preference (consider restarting if patient becomes dyspneic)      Urinary incontinence  Urinary retention   Has had ongoing urinary incontinence and retention since fall. MRI findings as above, neurosurgery consulted, no acute intervention required at this time. Continues to retain urine, required 2 straight caths 11/13, so rockwell placed 11/14.   - Rockwell placed 11/14 due to recurrent urinary retention  - Will discharge with rockwell     Dysuria, improved  Urinary frequency  Dx w/ E. Coli (pansensitive) UTI 5d prior to admission, s/p course of cefdinir. Still endorsing urinary frequency, urgency, dysuria, and suprapubic tenderness. UA in the ED with no evidence of infection.   - Cefdinir course completed  - Consider bladder spasm medication if continuing to have symptoms     Mental health  Pt repeatedly stating that she wishes there were a pill \"to just end it all\" and that she has nothing to live for. Denies specific plan or intent to harm herself. Doing better once hearing about diagnosis. She feels ready for death and may wish to pursue hospice care. Started Zoloft 11/15 to hopefully help with patient's low mood and high level of anxiety.   - Stop Zoloft per patient, family preference       HFpEF  Mitral stenosis  Mitral annulus calcification  History of HFpEF, had been doing well at [facility]. Pt PTA med list including metoprolol, lasix, spironolactone. Trop mildly elevated at 27, no acute chest symptoms or acute distress. Echo 11/10 revealed mild mitral valve stenosis with mitral annulus calcification. Asymptomatic, euvolemic on exam. Unlikely to be cause of falls at this time given no syncopal symptoms. Plan to monitor.  - Taper off metoprolol due to patient preference; decreased to 25 mg BID 11/16  - Stop Lasix per patient, family preference (consider restarting if patient becomes dyspneic)   - Discontinue spironolactone " due to comfort goals      Chronic condition:  GERD- BID protonix  Polymyalgia rheumatica- PTA prednisone  Glaucoma- PTA eye drops  Insomnia- PTA trazodone         Diet: Combination Diet Regular Diet Adult  Room Service  Snacks/Supplements Adult: Ensure Enlive; With Meals    DVT Prophylaxis: Pneumatic Compression Devices  Knapp Catheter: PRESENT, indication: Retention  Fluids: Oral  Lines: None     Cardiac Monitoring: None  Code Status: No CPR- Do NOT Intubate      Clinically Significant Risk Factors                  # Hypertension: Noted on problem list         # Moderate Malnutrition: based on nutrition assessment    # Financial/Environmental Concerns: none         Disposition Plan      Expected Discharge Date: 11/20/2023    Discharge Delays: Placement - LTC    Discharge Comments: plan for d/c on Monday on hospice        The patient's care was discussed with the Attending Physician, Dr. Perez .    Jerry Shields MD  Hospitalist Service  [facility]  Securely message with [employer] (more info)  Text page via [employer] Paging/Directory   ______________________________________________________________________    Interval History   No acute events overnight.  Sleeping comfortably      Physical Exam   Vital Signs:     BP: 125/59 Pulse: 71            Weight: 136 lbs 14.49 oz    General Appearance: Comfortable.  Sleeping   Respiratory: Comfortable respiratory effort.   Cardiovascular: extremities well perfused   GI: Nondistended.    Skin: No visible rashes or bruising   Neuro: Neutral affect.  Fluent speech.       Data

## 2023-11-19 NOTE — PROGRESS NOTES
Care Management Follow Up    Length of Stay (days): 10    Expected Discharge Date: 11/20/2023     Concerns to be Addressed: discharge planning     Patient plan of care discussed at interdisciplinary rounds: Yes    Anticipated Discharge Disposition:  DEREK with Hospice      Anticipated Discharge Services:    Anticipated Discharge DME:      Patient/family educated on Medicare website which has current facility and service quality ratings:    Education Provided on the Discharge Plan:    Patient/Family in Agreement with the Plan:      Referrals Placed by CM/SW:    Private pay costs discussed: transportation costs    Additional Information:  Patient to discharge to [facility] on Monday with [facility].    CM called [facility] main line to ask when they will be able to meet with patient and family tomorrow to sign onto services. They state they will forward request to their intake and have intake call CM back.    CM also left VM for [facility] to see what time they would like patient to arrive tomorrow.     1:00 PM  Charge RN let CM know that patient's family has questions about when DME will be delivered to [facility]. CM met with patient's nephew Hermelindo and let him know that CM has a call out to [facility] to coordinate discharge and will ask when the DME will be delivered.     2:13 PM  Left message with [facility] Hospice Liaison, Sharlene asking for her to follow up with CM on Monday to confirm plan and when DME will be delivered.     REAGAN JasonW

## 2023-11-19 NOTE — PROGRESS NOTES
Pt getting tylenol, gabapentin and PRN Oxy for pain. Sleeps in-between meals and cares.     Turn/repo in bed. Poor appetite. Nephew upset that pt is getting Boost on her meal trays, asked Dietician to discontinue.     Will continue to monitor.

## 2023-11-19 NOTE — PLAN OF CARE
Problem: Adult Inpatient Plan of Care  Goal: Plan of Care Review  Outcome: Progressing  Goal: Patient-Specific Goal (Individualized)  Outcome: Progressing  Goal: Absence of Hospital-Acquired Illness or Injury  Outcome: Progressing  Intervention: Identify and Manage Fall Risk  Recent Flowsheet Documentation  Taken 11/19/2023 0900 by Earlene Blanco RN  Safety Promotion/Fall Prevention:   activity supervised   assistive device/personal items within reach   supervised activity   safety round/check completed   room near nurse's station  Goal: Optimal Comfort and Wellbeing  Outcome: Progressing     Problem: Risk for Delirium  Goal: Optimal Coping  Outcome: Progressing  Goal: Improved Behavioral Control  Outcome: Progressing  Intervention: Minimize Safety Risk  Recent Flowsheet Documentation  Taken 11/19/2023 0900 by Earlene Blanco RN  Enhanced Safety Measures: family to remain at bedside  Goal: Improved Attention and Thought Clarity  Outcome: Progressing  Goal: Improved Sleep  Outcome: Progressing     Problem: Fall Injury Risk  Goal: Absence of Fall and Fall-Related Injury  Outcome: Progressing  Intervention: Identify and Manage Contributors  Recent Flowsheet Documentation  Taken 11/19/2023 0900 by Earlene Blanco RN  Medication Review/Management: medications reviewed  Intervention: Promote Injury-Free Environment  Recent Flowsheet Documentation  Taken 11/19/2023 0900 by Earlene Blanco RN  Safety Promotion/Fall Prevention:   activity supervised   assistive device/personal items within reach   supervised activity   safety round/check completed   room near nurse's station     Problem: Infection  Goal: Absence of Infection Signs and Symptoms  Outcome: Progressing     Problem: Pain Acute  Goal: Optimal Pain Control and Function  Outcome: Progressing  Intervention: Prevent or Manage Pain  Recent Flowsheet Documentation  Taken 11/19/2023 0900 by Earlene Blanco RN  Medication Review/Management: medications reviewed     Problem: Palliative  Care  Goal: Enhanced Quality of Life  Outcome: Progressing   Goal Outcome Evaluation:

## 2023-11-20 NOTE — PLAN OF CARE
Problem: Fall Injury Risk  Goal: Absence of Fall and Fall-Related Injury  Intervention: Promote Injury-Free Environment  Recent Flowsheet Documentation  Taken 11/19/2023 2330 by Benson Ramsey RN  Safety Promotion/Fall Prevention: room near nurse's station   Goal Outcome Evaluation:       Pt is A&Ox4. Able to make needs known, in a room air, denies pain, repositioning Q2h. Catheter in place. Pt is discharging today to [facility].

## 2023-11-20 NOTE — PLAN OF CARE
"  Problem: Adult Inpatient Plan of Care  Goal: Patient-Specific Goal (Individualized)  Description: You can add care plan individualizations to a care plan. Examples of Individualization might be:  \"Parent requests to be called daily at 9am for status\", \"I have a hard time hearing out of my right ear\", or \"Do not touch me to wake me up as it startles  me\".  Outcome: Progressing     Problem: Adult Inpatient Plan of Care  Goal: Absence of Hospital-Acquired Illness or Injury  Outcome: Progressing  Intervention: Prevent Skin Injury  Recent Flowsheet Documentation  Taken 11/20/2023 0800 by Daniel Rueda RN  Body Position:   turned   supine   supine, legs elevated   weight shifting     Problem: Adult Inpatient Plan of Care  Goal: Optimal Comfort and Wellbeing  Outcome: Progressing     Problem: Risk for Delirium  Goal: Optimal Coping  Outcome: Progressing   Goal Outcome Evaluation:                        "

## 2023-11-20 NOTE — PROGRESS NOTES
Pt sleeping in-between cares. Oxy PRN for back pain. Declines need for T-pump heating pad this shift. Emotional support provided.

## 2023-11-20 NOTE — CONSULTS
"SPIRITUAL HEALTH SERVICES CONSULT NOTE  [facility]; P1    Saw pt Manuela Olivas per Spiritual Care Consult    Patient/Family Understanding of Illness and Goals of Care - Leatha said that she fell recently and hurt her hips and legs. She says \"I've fallen before but I'm 89 now and I don't know how much better it is going to get.\" She says that she was supposed to discharge today but it didn't happen. She states that she is scheduled to discharge tomorrow now, but doesn't know if that will actually happen either. She says that she cannot go back to her care facility (as she is now an assist of 2 instead of 1) so she needs to move somewhere else. She says that hospital bed is being delivered today.     Distress and Loss - Leatha notes that she has lost several parts of her life that she enjoyed in recent years - driving, going to [employer], friends, mobility, etc, which has not been easy. She also notes that she cannot remember things as easily (like where she lived previously), which is very frustrating for her.     Strengths, Coping, and Resources - Leatha identifies her two nephews as sources of support. She notes that PT has been helpful in the past, but is somewhat dubious about the possibility of regaining strength at this point.     Meaning, Beliefs, and Spirituality - Leatha is [employer] and was seen by our hospital  earlier today. She is not connected to a parish currently. She expressed appreciation for my visit.     Plan of Care: Available for further follow-up as requested by patient, family or staff.      Time Spent with Patient/Family: 15 minutes    Jocelyn Ventura M.Div.      Office: [phone] (for non-urgent requests)  Please Vocera or page through [facility] for time-sensitive requests    "

## 2023-11-20 NOTE — PLAN OF CARE
Patient is alert and oriented and looking forward to be discharge tomorrow. Complained of back pain, scheduled medication given and repositioned her in bed. Takes medication with apple sauce. Knapp in place. Will monitor closely.

## 2023-11-20 NOTE — CONSULTS
[facility] Nurse Inpatient Assessment     Consulted for: Bilateral heels    Summary: patient admitted 11/9    Patient History (according to provider note(s):      Manuela Olivas is a 89 year old female w/ pmh of CKD IIIa, GERD, htn, glaucoma who presents with one month of progressive generalized weakness and urinary symptoms. Patient reportedly suffered mechanical fall in which she fell backwards onto her bottom on 10/21. She was seen at that time in the ED where she had imaging of her head, C spine, and L shoulder without any acute fractures or other injuries noted. Pt returned to her assisted living facility, and notes that she has been having continued low back/buttock pain since this incident as well as new urinary incontinence which is not normal for her. In addition to this patient then began noticing urinary symptoms including frequency, urgency, and dysuria approximately one week ago. Seen 5 days ago and diagnosed w/ UTI by [facility] physician, started on course of Cefdinir which she has been taking since then without resolution of her symptoms. Upon arrival to the ED her main complaint is suprapubic discomfort and dysuria/frequency. In addition to this she has been becoming progressively weaker since her fall last month. At baseline she is able to ambulate with a walker, but over the last week now has felt so weak that she is needing a wheelchair to get around.      Patient currently lives at [facility] living [facility] in [place]. She reports taht she does not drink alcohol or use tobacco products    Assessment:      Pressure Injury Location: bilateral heels      11/20    Last photo: 11/20  Wound type: Pressure Injury     Pressure Injury Stage: Deep Tissue Pressure Injury (DTPI), hospital acquired   Wound history/plan of care:   patient had a recent fall with resulting back pain, patient has not been getting out of bed as much per notes.    Wound base: 100 %  "maroon and purple,      Palpation of the wound bed: boggy more to L heel than R     Drainage: none     Description of drainage: none     Measurements (length x width x depth, in cm) R heel 0.5x0.3 and 1x0.5cm; L heel 0.7x0.2cm     Tunneling N/A     Undermining N/A  Periwound skin: Erythema- blanchable      Color: red      Temperature: warm  Odor: none  Pain: mild and facial expression of distress, aching  Pain intervention prior to dressing change: slow and gentle cares   Treatment goal:  offload pressure  STATUS: initial assessment  Supplies ordered: at bedside    My PI Risk Assessment     Sensory Perception: 2 - Very Limited     Moisture: 2 - Very moist      Activity: 1 - Bedfast      Mobility: 2 - Very limited     Nutrition: 2 - Probably inadequate      Friction/Shear: 2 - Potential problem      TOTAL: 11       Treatment Plan:     Pressure Injury Prevention (PIP) Plan:  If patient is declining pressure injury prevention interventions: Explore reason why and address patient's concerns, Educate on pressure injury risk and prevention intervention(s), If patient is still declining, document \"informed refusal\" , and Ensure Care team is aware ( provider, charge nurse, etc)  Mattress: Follow bed algorithm, reassess daily and order specialty mattress, if indicated.  HOB: Maintain at or below 30 degrees, unless contraindicated  Repositioning in bed: Every 1-2 hours  and Left/right positioning; avoid supine  Heels: Keep elevated off mattress and Pillows under calves  Protective Dressing: Sacral Mepilex for prevention (#264960),  especially for the agitated patient   Positioning Equipment: None  Chair positioning: Chair cushion (#[id number])    If patient has a buttock pressure injury, or high risk for PI use chair cushion or SPS.  Moisture Management: Perineal cleansing /protection: Follow Incontinence Protocol, Avoid brief in bed, Clean and dry skin folds with bathing , and Moisturize dry skin  Under Devices: Inspect skin " "under all medical devices during skin inspection , Ensure tubes are stabilized without tension, and Ensure patient is not lying on medical devices or equipment when repositioned  Ask provider to discontinue device when no longer needed.      Orders: Written    RECOMMEND PRIMARY TEAM ORDER: None, at this time  Education provided: importance of repositioning and Off-loading pressure  Discussed plan of care with: Patient, Family, and Nurse  [facility] nurse follow-up plan: weekly  Notify WOC if wound(s) deteriorate.  Nursing to notify the Provider(s) and re-consult the [facility] Nurse if new skin concern.    DATA:     Current support surface: Standard  Standard gel/foam mattress (IsoFlex, Atmos air, etc)  Containment of urine/stool: Incontinence Protocol  BMI: Body mass index is 22.1 kg/m .   Active diet order: Orders Placed This Encounter      Combination Diet Regular Diet Adult      Diet     Output: I/O last 3 completed shifts:  In: 230 [P.O.:230]  Out: 750 [Urine:750]     Labs: No lab results found in last 7 days.    Invalid input(s): \"GLUCOMBO\"  Pressure injury risk assessment:   Sensory Perception: 3-->slightly limited  Moisture: 3-->occasionally moist  Activity: 1-->bedfast  Mobility: 3-->slightly limited  Nutrition: 3-->adequate  Friction and Shear: 2-->potential problem  Cesario Score: 15    PRERNA Fragoso RN CWOCN  Pager no longer in use, please contact through [employer] group: [facility] [employer]      "

## 2023-11-20 NOTE — PROGRESS NOTES
RYLEY met with pt, she is disappointed on the wait for discharging to assisted living. RYLEY spoke to Sharlene, she put the order in this morning for medical equipment and will call writer with time of delivery.    SW received a call from Regina, at [facility], bed to be delivered at 1030. SW contacted transport, first available isnt until 5 PM. SW spoke to Regina, they will need cancel admission until tomorrow morning. Pts family also needs to arrange for sheets for the hospital bed as they have not done this yet.  Transport at 930 am- [phone] AM      1114 AM RYLEY received a call from Sharlene with [facility] she is hoping bed will be delivered by 2 PM to facility today, she could sign pt on this evening. RYLEY updated her that pt will now discharge on 11-21 at 9 11 AM.             Earlene Bocanegra, JERICA     [facility] [address], [place] [address]   [phone]

## 2023-11-20 NOTE — PROGRESS NOTES
[facility]    Progress Note - Hospitalist Service       Date of Admission:  11/9/2023    Assessment & Plan   Manuela Olivas is a 89 year old female admitted on 11/9/2023. She has a history of CKD3a, HTN, GERD, PMR, glaucoma, and insomnia and is admitted for generalized weakness after a fall found to have sacral insufficiency fractures alongside several spinal findings. Patient electing for comfort-focused cares. Accepted to [facility] outpatient hospice. Plan is to discharge to [facility] on Tuesday, 11/21.     Generalized weakness  Sacral insufficiency fracture  Low back pain  Pt suffered unwitnessed fall 10/21 that resulted in a sacral insufficiency fracture, which has been causing her a lot of pain and weakness. XR from 11/9 showed loss of disc space height and degenerative end plate spurring, subsequent MRI showing acute to subacute H-type sacral insufficiency fracture and several levels of lumbar spondylosis. Orthopedic surgery and neurosurgery both consulted, recommending conservative management at this time given patient currently declining surgery.  - Palliative team consulted, and have signed off  - Scheduled tylenol   - Scheduled gabapentin 100 mg TID started 11/13  - PRN oxycodone 2.5-5 mg q4h PO  - Could consider steroid burst or epidural steroid injection for patient's back pain in the future if pain uncontrolled    Discharge planning   Goals of care  Medically ready for discharge. Plan is to discharge to [facility] on Tuesday, 11/21, with outpatient hospice through [facility].  - CM consulted, appreciate recommendations     Comfort cares   Patient reports that she does not desire any aggressive medical interventions and would like to focus on comfort. Palliative medicine team on board. Accepted to [facility] for outpatient hospice.   - DNR/DNI  - POLST completed   - Discontinued spironolactone, lasix due to patient wanting to limit pills   -  "Taper off metoprolol due to patient preference     Urinary incontinence  Urinary retention   Has had ongoing urinary incontinence and retention since fall. MRI findings as above, neurosurgery consulted, no acute intervention required at this time. Continues to retain urine, required 2 straight caths 11/13, so rockwell placed 11/14.   - Rockwell placed 11/14 due to recurrent urinary retention  - Will discharge with rockwell     Dysuria, improved  Urinary frequency  Dx w/ E. Coli (pansensitive) UTI 5d prior to admission, s/p course of cefdinir. Still endorsing urinary frequency, urgency, dysuria, and suprapubic tenderness. UA in the ED with no evidence of infection.   - Cefdinir course completed  - Consider bladder spasm medication if continuing to have symptoms     Mental health  Pt repeatedly stating that she wishes there were a pill \"to just end it all\" and that she has nothing to live for. Denies specific plan or intent to harm herself. Doing better once hearing about diagnosis. She feels ready for death and may wish to pursue hospice care. Many conversations about starting an SSRI, but patient and family ultimately declined this.      HFpEF  Mitral stenosis  Mitral annulus calcification  History of HFpEF, had been doing well at [facility]. Pt PTA med list including metoprolol, lasix, spironolactone. Trop mildly elevated at 27, no acute chest symptoms or acute distress. Echo 11/10 revealed mild mitral valve stenosis with mitral annulus calcification. Asymptomatic, euvolemic on exam. Unlikely to be cause of falls at this time given no syncopal symptoms. Plan to monitor.  - Taper off metoprolol due to patient preference; decreased to 12.5 mg BID 11/20 (plan to stop at discharge)   - Discontinued spironolactone, Lasx due to comfort goals and patient preference      Chronic condition:  GERD- BID protonix  Polymyalgia rheumatica- PTA prednisone  Glaucoma- PTA eye drops  Insomnia- PTA trazodone         Diet: Combination Diet Regular " Diet Adult  Room Service  Diet    DVT Prophylaxis: Pneumatic Compression Devices  Knapp Catheter: PRESENT, indication: Retention  Fluids: Oral  Lines: None     Cardiac Monitoring: None  Code Status: No CPR- Do NOT Intubate      Clinically Significant Risk Factors                  # Hypertension: Noted on problem list         # Moderate Malnutrition: based on nutrition assessment    # Financial/Environmental Concerns: none         Disposition Plan      Expected Discharge Date: 11/21/2023,  9:11 AM  Discharge Delays: *Early Discharge Anticipated    Discharge Comments: plan for d/c on 11/21 on hospice 911 AM via stretcher        The patient's care was discussed with the Attending Physician, Dr. Serna .    Elizabeth Talbert MD  Hospitalist Service  [employer]  Securely message with [employer] (more info)  Text page via [employer] Paging/Directory   ______________________________________________________________________    Interval History   No acute events overnight.  Sleeping comfortably.    Plan was originally that patient would discharge today. However, must await delivery of outpatient hospice supplies to L[facility] and transport plan. New plan is for discharge tomorrow AM.       Physical Exam   Vital Signs: Temp: 97.8  F (36.6  C) Temp src: Oral BP: 129/62 Pulse: 80   Resp: 12 SpO2: 95 % O2 Device: None (Room air)    Weight: 136 lbs 14.49 oz    General Appearance: Comfortable.  Sleeping   Respiratory: Comfortable respiratory effort.   Cardiovascular: extremities well perfused   GI: Nondistended.    Skin: No visible rashes or bruising   Neuro: Neutral affect.  Fluent speech.       Data

## 2023-11-21 PROBLEM — S32.10XG CLOSED FRACTURE OF SACRUM WITH DELAYED HEALING: Status: ACTIVE | Noted: 2023-01-01

## 2023-11-21 NOTE — PLAN OF CARE
Problem: Adult Inpatient Plan of Care  Goal: Plan of Care Review  Description: The Plan of Care Review/Shift note should be completed every shift.  The Outcome Evaluation is a brief statement about your assessment that the patient is improving, declining, or no change.  This information will be displayed automatically on your shift  note.  Outcome: Progressing   Goal Outcome Evaluation:       Tylenol given for pain with good relief. Pt family visiting at bedside. Pt turned every two hours for comfort. Knapp draining. Heels off the bed with pillows. Bed alarm on for safety.

## 2023-11-21 NOTE — PROGRESS NOTES
Care Management Discharge Note    Discharge Date: 11/21/2023       Discharge Disposition:  [facility] with [facility] hospice    Discharge Services:  Hospice    Discharge DME:  per hospice team    Discharge Transportation: [facility] stretcher transportation due to sacral insufficiency fractures    Private pay costs discussed: transportation costs    Does the patient's insurance plan have a 3 day qualifying hospital stay waiver?  No    PAS Confirmation Code:  NA  Patient/family educated on Medicare website which has current facility and service quality ratings:  NA    Education Provided on the Discharge Plan:  yes  Persons Notified of Discharge Plans: Pt Angelo mcclellan and Juan, pt, Hospice, Jocelyne at [facility].   Patient/Family in Agreement with the Plan:  Yes    Handoff Referral Completed: Yes    Additional Information:  RYLEY met with eleuterio on 11:20 to discuss discharge and recommendations for supplies needed by assisted living for pt, bedding, sheets, towels etc. Due to need for DME pt discharge cancelled from yesterday and moved to this morning.   Pt needs stretcher transport - PCS completed and faxed.. RYLEY met with pt in pt room to discuss discharge and transport costs related to discharge. Pt kept saying she doesn't believe she will finally discharge. She is agreeable to stretcher and understands any bills for transport will be submitted to insurance and then she will be billed. Pt looking forward to discharge.   RYLEY met with victoria Stephens in [facility], discussed transportation costs and billing along with PCS with him, he is having all of pt bills forwarded to his house. He is agreeable to medical transport and associated potential costs.   Sharlene from [facility] hospice visited writer, she will visit pt and Angelo in pt room. She reports all medical equipment is at facility.     JERICA Colón     [facility]  [address], [place] [address] [phone]  [phone]

## 2023-11-21 NOTE — TREATMENT PLAN
Transferred to [facility] on cart report given to transport both nephews present medicated with Oxycodone 5mg for ride home medications filled  And given to family, patient anxious to leave hospital leaving on hospice.

## 2023-11-21 NOTE — PLAN OF CARE
Problem: Adult Inpatient Plan of Care  Goal: Optimal Comfort and Wellbeing  Outcome: Progressing   Goal Outcome Evaluation:       Tylenol and oxycodone given for pain. She was turned and repositioned for comfort. Bilateral lower extremities elevated with pillows, heels off the bed. Knapp catheter patent and draining. Meds crushed with apple sauce, little sips of water with encouragement.

## 2023-11-21 NOTE — DISCHARGE SUMMARY
[facility]  Discharge Summary - Medicine & Pediatrics       Date of Admission:  11/9/2023  Date of Discharge:  11/21/2023  Discharging Provider: Elizabeth Talbert MD and Sita Serna MD  Discharge Service: Hospitalist Service    Discharge Diagnoses   Principal Problem:    Generalized weakness (11/9/2023)    Sacral fracture     Clinically Significant Risk Factors     # Moderate Malnutrition: based on nutrition assessment      Follow-ups Needed After Discharge   Follow-up Appointments     Follow Up and recommended labs and tests      Follow-up with outpatient hospice within 7 days for hospital discharge   follow-up          Unresulted Labs Ordered in the Past 30 Days of this Admission       No orders found from 10/10/2023 to 11/10/2023.        These results will be followed up by N/A    Discharge Disposition   Discharged to long-term care facility with outpatient hospice   Condition at discharge: Stable    Hospital Course   Manuela Olivas is a 89 year old female admitted on 11/9/2023. She has a history of CKD3a, HTN, GERD, PMR, glaucoma, and insomnia and was admitted for generalized weakness after a fall found to have sacral insufficiency fractures alongside several spinal findings. Patient elected for comfort-focused cares. Discharged to LTC with outpatient hospice. The following problems were addressed during her hospitalization:     Generalized weakness  Sacral insufficiency fracture  Low back pain  Pt suffered unwitnessed fall 10/21 that resulted in a sacral insufficiency fracture, which has been causing her a lot of pain and weakness. XR from 11/9 showed loss of disc space height and degenerative end plate spurring, subsequent MRI showing acute to subacute H-type sacral insufficiency fracture and several levels of lumbar spondylosis. Orthopedic surgery and neurosurgery both consulted, recommending conservative management at this time given patient currently declining surgery.  -  "Scheduled tylenol   - Scheduled gabapentin 100 mg TID  - PRN oxycodone 2.5-5 mg q4h PO  - Could consider steroid burst or epidural steroid injection for patient's back pain in the future if pain uncontrolled    Comfort cares   Patient reports that she does not desire any aggressive medical interventions and would like to focus on comfort. Palliative medicine team consulted during hospital stay to assist in goals of care conversations and pain control. Accepted to [facility] for outpatient hospice.   - Discharged to LTC with outpatient hospice through [facility]  - DNR/DNI  - POLST completed   - Discontinued spironolactone, lasix, metoprolol due to comfort goals and patient preference      Urinary retention   Urinary incontinence  Has had ongoing urinary incontinence and retention since fall. MRI findings as above, neurosurgery consulted, no acute intervention required at this time. Continues to retain urine, so rockwell replaced 11/14.   - Rockwell placed 11/14 due to recurrent urinary retention  - Discharged with [facility]  Pt repeatedly stating that she wishes there were a pill \"to just end it all\" and that she has nothing to live for. Denies specific plan or intent to harm herself. She feels ready for death and may wish to pursue hospice care. Many conversations about starting an SSRI, but patient and family ultimately declined this.   - Continue conversation about starting SSRI.      HFpEF  Mitral stenosis  Mitral annulus calcification  History of HFpEF, had been doing well at [facility]. Pt PTA med list including metoprolol, lasix, spironolactone. Trop mildly elevated at 27, no acute chest symptoms or acute distress. Echo 11/10 revealed mild mitral valve stenosis with mitral annulus calcification. Asymptomatic, euvolemic on exam. Unlikely to be cause of falls at this time given no syncopal symptoms. Plan to monitor.  - Discontinued spironolactone, Lasix, metoprolol due to comfort goals and patient preference "      Chronic condition:  GERD- BID protonix  Polymyalgia rheumatica- PTA prednisone  Glaucoma- PTA eye drops  Insomnia- PTA trazodone     Consultations This Hospital Stay   PHYSICAL THERAPY ADULT IP CONSULT  OCCUPATIONAL THERAPY ADULT IP CONSULT  ORTHOPEDIC SURGERY IP CONSULT  NEUROSURGERY IP CONSULT  CARE MANAGEMENT / SOCIAL WORK IP CONSULT  PHYSICAL THERAPY ADULT IP CONSULT  PALLIATIVE CARE ADULT IP CONSULT  CARE MANAGEMENT / SOCIAL WORK IP CONSULT  INPATIENT HOSPICE ADULT CONSULT  SPIRITUAL HEALTH SERVICES IP CONSULT  WOUND OSTOMY CONTINENCE NURSE  IP CONSULT    Code Status   Prior       The patient was discussed with Dr. Kareem Talbert MD  [address] [address]  Phone: [phone]  Fax: [phone]  ______________________________________________________________________    Physical Exam   Vital Signs: Temp: 98  F (36.7  C) Temp src: Oral BP: 110/50 Pulse: 72   Resp: 16 SpO2: 92 % O2 Device: None (Room air)    Weight: 136 lbs 14.49 oz  GENERAL: healthy, alert and no distress  RESP: speaking in full sentences, normal work of breathing   CV: extremities well perfused  ABDOMEN: soft, nontender  MS: no gross musculoskeletal defects noted, no edema  PSYCH: mentation appears normal, affect normal/bright       Primary Care Physician   Antonette Arechiga    Discharge Orders      Primary Care - Care Coordination Referral      General info for SNF    Length of Stay Estimate: Long Term Care  Condition at Discharge: Terminal  Level of care:skilled   Rehabilitation Potential: Poor  Admission H&P remains valid and up-to-date: Yes  Recent Chemotherapy: N/A  Use Nursing Home Standing Orders: Yes     Mantoux instructions    Give two-step Mantoux (PPD) Per Facility Policy Yes     Follow Up and recommended labs and tests    Follow-up with outpatient hospice within 7 days for hospital discharge follow-up     Reason for your hospital stay    You were hospitalized  for pain caused by a sacral fracture.     Knapp catheter    To straight gravity drainage. Change catheter every 2 weeks and PRN for leaking or decreased urine output with signs of bladder distention. DO NOT change catheter without a specific Provider order IF diagnosis of benign prostatic hypertrophy (BPH), neurogenic bladder, or other urological conditions     Activity - Up with nursing assistance     Diet    Follow this diet upon discharge: regular adult diet       Significant Results and Procedures   Most Recent 3 CBC's:  Recent Labs   Lab Test 11/10/23  0613 11/09/23  1035 10/21/23  0816   WBC 7.4 8.5 12.2*   HGB 12.8 12.7 12.9   MCV 97 100 96    281 199     Most Recent 3 BMP's:  Recent Labs   Lab Test 11/13/23  0645 11/10/23  0613 11/09/23  1035    135 135   POTASSIUM 4.2 4.0 4.0   CHLORIDE 99 100 100   CO2 23 26 23   BUN 28.6* 22.7 26.7*   CR 1.08* 1.10* 1.04*   ANIONGAP 13 9 12   MORENO 10.9* 10.6* 10.3*   GLC 90 91 112*   ,   Results for orders placed or performed during the hospital encounter of 11/09/23   XR Lumbar Spine 2/3 Views    Narrative    EXAM: XR LUMBAR SPINE 2/3 VIEWS  LOCATION: [facility]  DATE: 11/9/2023    INDICATION: Fall 10-21 weeks, ongoing radicular pain and urinary incontinence.    COMPARISON: None.      Impression    IMPRESSION: Five lumbar-type vertebrae. Minimal degenerative retrolisthesis of L1 upon L2 and L2 upon L3. Alignment is otherwise normal. Vertebral body heights normal. No fractures. There is loss of disc space height and degenerative endplate spurring at   all levels of the lumbar spine.     MR Lumbar Spine w Contrast    Narrative    EXAM: MR LUMBAR SPINE W CONTRAST  LOCATION: [facility]  DATE: 11/10/2023    INDICATION: fall 3 weeks ago, low back buttock pain since with urinary incontinence  COMPARISON: Lumbar radiographs 11/09/2023  CONTRAST: 6ml gadavist  TECHNIQUE: Routine Lumbar Spine MRI with IV  contrast.    FINDINGS:   Nomenclature is based on 5 lumbar type vertebral bodies. Normal lumbar vertebral body heights. Negative for lower thoracic or lumbar fracture. Acute to subacute, bilateral sacral ala insufficiency fractures with transverse component at S2-S3 noted. No   evidence for cortical displacement or sacral canal epidural hematoma. Small amount of presacral space hematoma and edema. Normal distal spinal cord and cauda equina with conus medullaris at L1. No extraspinal abnormality. Unremarkable visualized bony   pelvis. 3 mm degenerative anterolisthesis at L5-S1. 2.5 mm retrolisthesis at L3-L4. 3 mm stepwise retrolisthesis on a degenerative basis at L1-L2 and L2-L3.    Abnormal disc signal at L2-L3 through L5 with vacuum disc and areas of high T2 signal. This may represent vacuum disc with trace intradiscal fluid but there is no evidence for endplate edema or acute endplate erosion. Schmorl's nodes at multiple levels.   No suspicious enhancement. No evidence for epidural abscess or phlegmon. Negative evidence for paraspinous inflammation or fluid.    T11-T12: Small central disc extrusion. Borderline central canal stenosis. Patent foramina.    T12-L1: Disc desiccation.. Small left paracentral disc extrusion extending inferiorly. Patent central canal and foramen.     L1-L2: Disc osteophyte. Adequate central canal. Mild bilateral foraminal stenosis.    L2-L3: Moderate-sized disc extrusion osteophyte complex. Facet arthropathy. Moderate to severe central canal stenosis with substantial effacement of CSF around the cauda equina nerves. Disc and osteophyte with moderate to severe bilateral foraminal   stenosis. Facet arthropathy of mild degree. Moderate to severe bilateral lateral recess stenosis.    L3-L4: Small to moderate-sized disc extrusion osteophyte.. Facet arthropathy. Moderate central canal stenosis and bilateral lateral recess stenosis. Mild-to-moderate bilateral foraminal stenosis.    L4-L5: Disc  osteophyte complex. Facet arthropathy. Moderate left and mild right foraminal stenosis. Moderate left and mild right lateral recess stenosis. Mild central canal stenosis.    L5-S1: Small central disc protrusion. Facet arthropathy. Patent central canal. Mild to moderate bilateral foraminal stenosis.      Impression    IMPRESSION:  1.  Acute to subacute H-type sacral insufficiency fracture    2.  Moderate to severe, multilevel lumbar spondylosis. High-grade central canal stenosis at L2-L3.    3.  Negative evidence for lumbar fracture    4.  Normal appearance of the distal thoracic cord and conus medullaris.   Echocardiogram Complete     Value    LVEF  55-60%    [facility]    [id number]  [id number]  [id number]  [id number]^[address] [address]     Name: LITZY CROSS  MRN: [id number]  : 1930  Study Date: 11/10/2023 02:12 PM  Age: 89 yrs  Gender: Female  Patient Location: [facility]  Reason For Study: CHF  Ordering Physician: LOUIS MARSHALL  Performed By: WR     BSA: 1.7 m2  Height: 66 in  Weight: 136 lb  HR: 86  BP: 132/62 mmHg  ______________________________________________________________________________  Procedure  Complete Portable Echo Adult.  ______________________________________________________________________________  Interpretation Summary     The left ventricle is normal in size.  Left ventricular function is normal.The ejection fraction is 55-60%.  Normal right ventricle size and systolic function.  The left atrium is moderately dilated.  There is mild mitral stenosis.  There is moderate mitral annular calcification.  ______________________________________________________________________________  Left Ventricle  The left ventricle is normal in size. Left ventricular function is normal.The  ejection fraction is 55-60%. There is normal left ventricular wall thickness.  Left ventricular diastolic function is normal. No regional wall  motion  abnormalities noted.     Right Ventricle  Normal right ventricle size and systolic function.     Atria  The left atrium is moderately dilated. Right atrial size is normal. There is  no color Doppler evidence of an atrial shunt.     Mitral Valve  The mitral valve leaflets appear thickened, but open well. There is moderate  mitral annular calcification. There is mild (1+) mitral regurgitation. There  is mild mitral stenosis.     Tricuspid Valve  Tricuspid valve leaflets appear normal. There is mild (1+) tricuspid  regurgitation. Right ventricular systolic pressure is elevated, consistent  with mild pulmonary hypertension.     Aortic Valve  The aortic valve is trileaflet with aortic valve sclerosis. No aortic  regurgitation is present. No hemodynamically significant valvular aortic  stenosis.     Pulmonic Valve  The pulmonic valve is not well visualized. This degree of valvular  regurgitation is within normal limits.     Vessels  The aorta root is normal. Normal size ascending aorta. IVC diameter <2.1 cm  collapsing >50% with sniff suggests a normal RA pressure of 3 mmHg.     Pericardium  There is no pericardial effusion.     ______________________________________________________________________________  MMode/2D Measurements & Calculations  RVDd: 3.0 cm  IVSd: 0.97 cm  LVIDd: 4.3 cm  LVIDs: 2.7 cm  LVPWd: 0.99 cm  FS: 37.9 %     LV mass(C)d: 138.3 grams  LV mass(C)dI: 81.4 grams/m2  Ao root diam: 2.8 cm  LA dimension: 2.4 cm  asc Aorta Diam: 3.0 cm  LA/Ao: 0.84  LVOT diam: 1.8 cm  LVOT area: 2.5 cm2  Ao root diam index Ht(cm/m): 1.7  Ao root diam index BSA (cm/m2): 1.6  Asc Ao diam index BSA (cm/m2): 1.8  Asc Ao diam index Ht(cm/m): 1.8  LA Volume (BP): 64.1 ml  LA Volume Index (BP): 37.7 ml/m2     LA Volume Indexed (AL/bp): 39.2 ml/m2  RWT: 0.46  TAPSE: 2.3 cm     Time Measurements  Aortic HR: 87.0 BPM  MM HR: 86.0 BPM     Doppler Measurements & Calculations  MV E max layton: 69.7 cm/sec  MV A max layton: 158.0  cm/sec  MV E/A: 0.44  MV dec slope: 242.0 cm/sec2  MV dec time: 0.29 sec  Ao V2 max: 179.0 cm/sec  Ao max P.0 mmHg  Ao V2 mean: 121.0 cm/sec  Ao mean P.0 mmHg  Ao V2 VTI: 32.6 cm  WILLIE(I,D): 1.9 cm2  WILLIE(V,D): 2.0 cm2  LV V1 max P.0 mmHg  LV V1 max: 141.0 cm/sec  LV V1 VTI: 24.4 cm  CO(LVOT): 5.4 l/min  CI(LVOT): 3.2 l/min/m2  SV(LVOT): 62.1 ml  SI(LVOT): 36.6 ml/m2  PA acc time: 0.09 sec  TR max oliverio: 314.0 cm/sec  TR max P.4 mmHg  AV Oliverio Ratio (DI): 0.79  WILLIE Index (cm2/m2): 1.1  E/E': 11.2  E/E' av.6  Lateral E/e': 11.2  Medial E/e': 18.1  Peak E' Oliverio: 6.2 cm/sec  RV S Oliverio: 26.7 cm/sec     ______________________________________________________________________________  Report approved by: Serafin Potter 11/10/2023 03:53 PM             Discharge Medications   Discharge Medication List as of 2023  8:44 AM        START taking these medications    Details   gabapentin (NEURONTIN) 100 MG capsule Take 1 capsule (100 mg) by mouth 3 times daily, Disp-90 capsule, R-0, Local Print      oxyCODONE (ROXICODONE) 5 MG tablet Take 0.5-1 tablets (2.5-5 mg) by mouth every 4 hours as needed for moderate to severe pain, Disp-30 tablet, R-0, Local Print           CONTINUE these medications which have NOT CHANGED    Details   acetaminophen (TYLENOL) 325 MG tablet Take 650 mg by mouth 4 times daily, Historical      !! dimethicone-zinc oxide (SIERRA PROTECT) external cream Apply topically 2 times dailyHistorical      dorzolamide (TRUSOPT) 2 % ophthalmic solution Place 1 drop into both eyes 2 times daily, Historical      loperamide (IMODIUM) 2 MG capsule Take 2 mg by mouth 3 times daily as needed for diarrhea, Historical      melatonin 3 MG tablet Take 2 tablets by mouth at bedtime, Historical      omeprazole (PRILOSEC) 40 MG DR capsule Take 40 mg by mouth 2 times daily, Historical      !! polyethylene glycol (MIRALAX) 17 g packet Take 17 g by mouth 2 times daily as needed, Historical      !! polyethylene  glycol (MIRALAX) 17 GM/Dose powder Take 0.5 Capfuls by mouth every morning, Historical      predniSONE (DELTASONE) 5 MG tablet Take 10 mg by mouth every morning, Historical      senna-docusate (SENOKOT-S/PERICOLACE) 8.6-50 MG tablet Take 1 tablet by mouth 2 times daily, Historical      !! Skin Protectants, Misc. (MINERAL OIL-WHITE PETROLATUM) CREA cream Apply topically 2 times daily To legsHistorical      timolol maleate (TIMOPTIC) 0.5 % ophthalmic solution Place 1 drop into both eyes 2 times daily, Historical      traZODone (DESYREL) 50 MG tablet Take 50 mg by mouth nightly as needed, Historical       !! - Potential duplicate medications found. Please discuss with provider.        STOP taking these medications       calcium carbonate-vitamin D (OSCAL W/D) 500-200 MG-UNIT tablet Comments:   Reason for Stopping:         cefdinir (OMNICEF) 300 MG capsule Comments:   Reason for Stopping:         furosemide (LASIX) 20 MG tablet Comments:   Reason for Stopping:         metoprolol tartrate (LOPRESSOR) 50 MG tablet Comments:   Reason for Stopping:         Neomycin-Bacitracin-Polymyxin (TRIPLE ANTIBIOTIC) 3.5-400-5000 OINT ointment Comments:   Reason for Stopping:         spironolactone (ALDACTONE) 25 MG tablet Comments:   Reason for Stopping:             Allergies   No Known Allergies

## 2023-11-21 NOTE — PLAN OF CARE
Problem: Palliative Care  Goal: Enhanced Quality of Life  Outcome: Progressing   Goal Outcome Evaluation:       Restful night, turned every two hours for comfort. Knapp draining, call light within reach and bed alarm on.

## 2023-11-24 NOTE — PROGRESS NOTES
Received a call from [facility] who was requesting an update on the disposition for this patient. Update provided to Miriam at [phone].    Sharlene Cavazos RN

## 2025-01-24 NOTE — PLAN OF CARE
Problem: Palliative Care  Goal: Enhanced Quality of Life  Outcome: Progressing  Intervention: Optimize Psychosocial Wellbeing  Recent Flowsheet Documentation  Taken 11/16/2023 2343 by Clint Anne, RN  Spiritual Activities Assistance: personal rituals encouraged   Goal Outcome Evaluation:       Pt alert with occasional confusion. Oxycodone given for pain rated 55/10 with reported effectiveness. Knapp patent and draining, repositioned as tolerated, call light within reach.                No
